# Patient Record
Sex: MALE | Race: WHITE | NOT HISPANIC OR LATINO | Employment: OTHER | URBAN - METROPOLITAN AREA
[De-identification: names, ages, dates, MRNs, and addresses within clinical notes are randomized per-mention and may not be internally consistent; named-entity substitution may affect disease eponyms.]

---

## 2020-01-01 ENCOUNTER — ANESTHESIA EVENT (OUTPATIENT)
Dept: TELEMETRY | Facility: HOSPITAL | Age: 80
End: 2020-01-01

## 2020-01-01 ENCOUNTER — OFFICE VISIT (OUTPATIENT)
Dept: ONCOLOGY | Facility: CLINIC | Age: 80
End: 2020-01-01

## 2020-01-01 ENCOUNTER — DOCUMENTATION (OUTPATIENT)
Dept: ONCOLOGY | Facility: CLINIC | Age: 80
End: 2020-01-01

## 2020-01-01 ENCOUNTER — APPOINTMENT (OUTPATIENT)
Dept: GENERAL RADIOLOGY | Facility: HOSPITAL | Age: 80
End: 2020-01-01

## 2020-01-01 ENCOUNTER — TELEPHONE (OUTPATIENT)
Dept: ONCOLOGY | Facility: CLINIC | Age: 80
End: 2020-01-01

## 2020-01-01 ENCOUNTER — PREP FOR SURGERY (OUTPATIENT)
Dept: OTHER | Facility: HOSPITAL | Age: 80
End: 2020-01-01

## 2020-01-01 ENCOUNTER — TELEPHONE (OUTPATIENT)
Dept: ONCOLOGY | Facility: HOSPITAL | Age: 80
End: 2020-01-01

## 2020-01-01 ENCOUNTER — READMISSION MANAGEMENT (OUTPATIENT)
Dept: CALL CENTER | Facility: HOSPITAL | Age: 80
End: 2020-01-01

## 2020-01-01 ENCOUNTER — HOSPITAL ENCOUNTER (INPATIENT)
Facility: HOSPITAL | Age: 80
LOS: 1 days | Discharge: HOME | End: 2020-10-29
Attending: INTERNAL MEDICINE | Admitting: INTERNAL MEDICINE

## 2020-01-01 ENCOUNTER — APPOINTMENT (OUTPATIENT)
Dept: CT IMAGING | Facility: HOSPITAL | Age: 80
End: 2020-01-01

## 2020-01-01 ENCOUNTER — APPOINTMENT (OUTPATIENT)
Dept: LAB | Facility: HOSPITAL | Age: 80
End: 2020-01-01

## 2020-01-01 ENCOUNTER — ANESTHESIA (OUTPATIENT)
Dept: PERIOP | Facility: HOSPITAL | Age: 80
End: 2020-01-01

## 2020-01-01 ENCOUNTER — TELEPHONE (OUTPATIENT)
Dept: PULMONOLOGY | Facility: HOSPITAL | Age: 80
End: 2020-01-01

## 2020-01-01 ENCOUNTER — HOSPITAL ENCOUNTER (OUTPATIENT)
Dept: CARDIOLOGY | Facility: HOSPITAL | Age: 80
Discharge: HOME | End: 2020-11-11

## 2020-01-01 ENCOUNTER — APPOINTMENT (OUTPATIENT)
Dept: RADIATION ONCOLOGY | Facility: HOSPITAL | Age: 80
End: 2020-01-01

## 2020-01-01 ENCOUNTER — INPATIENT HOSPITAL (OUTPATIENT)
Dept: URBAN - METROPOLITAN AREA HOSPITAL 76 | Facility: HOSPITAL | Age: 80
End: 2020-01-01
Payer: MEDICARE

## 2020-01-01 ENCOUNTER — INPATIENT HOSPITAL (OUTPATIENT)
Dept: URBAN - METROPOLITAN AREA HOSPITAL 84 | Facility: HOSPITAL | Age: 80
End: 2020-01-01
Payer: MEDICARE

## 2020-01-01 ENCOUNTER — CONSULT (OUTPATIENT)
Dept: RADIATION ONCOLOGY | Facility: HOSPITAL | Age: 80
End: 2020-01-01

## 2020-01-01 ENCOUNTER — HOSPITAL ENCOUNTER (OUTPATIENT)
Facility: HOSPITAL | Age: 80
Setting detail: HOSPITAL OUTPATIENT SURGERY
Discharge: HOME | End: 2020-11-16
Attending: THORACIC SURGERY (CARDIOTHORACIC VASCULAR SURGERY) | Admitting: THORACIC SURGERY (CARDIOTHORACIC VASCULAR SURGERY)

## 2020-01-01 ENCOUNTER — APPOINTMENT (OUTPATIENT)
Dept: MRI IMAGING | Facility: HOSPITAL | Age: 80
End: 2020-01-01

## 2020-01-01 ENCOUNTER — APPOINTMENT (OUTPATIENT)
Dept: RESPIRATORY THERAPY | Facility: HOSPITAL | Age: 80
End: 2020-01-01

## 2020-01-01 ENCOUNTER — HOSPITAL ENCOUNTER (OUTPATIENT)
Dept: GENERAL RADIOLOGY | Facility: HOSPITAL | Age: 80
Discharge: HOME | End: 2020-11-16

## 2020-01-01 ENCOUNTER — HOSPITAL ENCOUNTER (OUTPATIENT)
Dept: RADIATION ONCOLOGY | Facility: HOSPITAL | Age: 80
Discharge: HOME | End: 2020-12-11

## 2020-01-01 ENCOUNTER — ANESTHESIA EVENT (OUTPATIENT)
Dept: GASTROENTEROLOGY | Facility: HOSPITAL | Age: 80
End: 2020-01-01

## 2020-01-01 ENCOUNTER — HOSPITAL ENCOUNTER (EMERGENCY)
Facility: HOSPITAL | Age: 80
Discharge: HOME | End: 2020-10-24
Admitting: EMERGENCY MEDICINE

## 2020-01-01 ENCOUNTER — LAB (OUTPATIENT)
Dept: LAB | Facility: HOSPITAL | Age: 80
End: 2020-01-01

## 2020-01-01 ENCOUNTER — ANESTHESIA (OUTPATIENT)
Dept: GASTROENTEROLOGY | Facility: HOSPITAL | Age: 80
End: 2020-01-01

## 2020-01-01 ENCOUNTER — TELEPHONE (OUTPATIENT)
Dept: CARDIOLOGY | Facility: CLINIC | Age: 80
End: 2020-01-01

## 2020-01-01 ENCOUNTER — HOSPITAL ENCOUNTER (OUTPATIENT)
Dept: INFUSION THERAPY | Facility: HOSPITAL | Age: 80
Setting detail: INFUSION SERIES
Discharge: HOME | End: 2020-11-25

## 2020-01-01 ENCOUNTER — DOCUMENTATION (OUTPATIENT)
Dept: ONCOLOGY | Facility: HOSPITAL | Age: 80
End: 2020-01-01

## 2020-01-01 ENCOUNTER — HOSPITAL ENCOUNTER (OUTPATIENT)
Dept: ONCOLOGY | Facility: HOSPITAL | Age: 80
Setting detail: INFUSION SERIES
Discharge: HOME | End: 2020-11-17

## 2020-01-01 ENCOUNTER — HOSPITAL ENCOUNTER (OUTPATIENT)
Dept: PET IMAGING | Facility: HOSPITAL | Age: 80
Discharge: HOME | End: 2020-11-05
Admitting: NURSE PRACTITIONER

## 2020-01-01 ENCOUNTER — HOSPITAL ENCOUNTER (EMERGENCY)
Facility: HOSPITAL | Age: 80
Discharge: HOME | End: 2020-10-17
Attending: EMERGENCY MEDICINE | Admitting: EMERGENCY MEDICINE

## 2020-01-01 ENCOUNTER — ANESTHESIA (OUTPATIENT)
Dept: TELEMETRY | Facility: HOSPITAL | Age: 80
End: 2020-01-01

## 2020-01-01 ENCOUNTER — OFFICE VISIT (OUTPATIENT)
Dept: PULMONOLOGY | Facility: HOSPITAL | Age: 80
End: 2020-01-01

## 2020-01-01 ENCOUNTER — APPOINTMENT (OUTPATIENT)
Dept: INTERVENTIONAL RADIOLOGY/VASCULAR | Facility: HOSPITAL | Age: 80
End: 2020-01-01

## 2020-01-01 ENCOUNTER — OFFICE VISIT (OUTPATIENT)
Dept: SURGERY | Facility: CLINIC | Age: 80
End: 2020-01-01

## 2020-01-01 ENCOUNTER — HOSPITAL ENCOUNTER (OUTPATIENT)
Dept: RADIATION ONCOLOGY | Facility: HOSPITAL | Age: 80
Discharge: HOME | End: 2020-12-09

## 2020-01-01 ENCOUNTER — OFFICE (OUTPATIENT)
Dept: URBAN - METROPOLITAN AREA CLINIC 64 | Facility: CLINIC | Age: 80
End: 2020-01-01

## 2020-01-01 ENCOUNTER — HOSPITAL ENCOUNTER (OUTPATIENT)
Dept: RADIATION ONCOLOGY | Facility: HOSPITAL | Age: 80
Setting detail: RADIATION/ONCOLOGY SERIES
End: 2020-01-01

## 2020-01-01 ENCOUNTER — APPOINTMENT (OUTPATIENT)
Dept: CARDIOLOGY | Facility: HOSPITAL | Age: 80
End: 2020-01-01

## 2020-01-01 ENCOUNTER — HOSPITAL ENCOUNTER (OUTPATIENT)
Dept: ONCOLOGY | Facility: HOSPITAL | Age: 80
Setting detail: INFUSION SERIES
Discharge: HOME | End: 2020-11-09

## 2020-01-01 ENCOUNTER — HOSPITAL ENCOUNTER (OUTPATIENT)
Dept: RADIATION ONCOLOGY | Facility: HOSPITAL | Age: 80
Discharge: HOME | End: 2020-12-10

## 2020-01-01 ENCOUNTER — HOSPITAL ENCOUNTER (INPATIENT)
Facility: HOSPITAL | Age: 80
LOS: 13 days | Discharge: HOME | End: 2020-12-08
Attending: FAMILY MEDICINE | Admitting: STUDENT IN AN ORGANIZED HEALTH CARE EDUCATION/TRAINING PROGRAM

## 2020-01-01 ENCOUNTER — HOSPITAL ENCOUNTER (OUTPATIENT)
Dept: OTHER | Facility: HOSPITAL | Age: 80
Discharge: HOME | End: 2020-11-09

## 2020-01-01 ENCOUNTER — ANESTHESIA EVENT (OUTPATIENT)
Dept: PERIOP | Facility: HOSPITAL | Age: 80
End: 2020-01-01

## 2020-01-01 ENCOUNTER — APPOINTMENT (OUTPATIENT)
Dept: OTHER | Facility: HOSPITAL | Age: 80
End: 2020-01-01

## 2020-01-01 ENCOUNTER — HOSPITAL ENCOUNTER (OUTPATIENT)
Dept: ONCOLOGY | Facility: HOSPITAL | Age: 80
Setting detail: INFUSION SERIES
End: 2020-01-01

## 2020-01-01 ENCOUNTER — HOSPITAL ENCOUNTER (OUTPATIENT)
Dept: RESPIRATORY THERAPY | Facility: HOSPITAL | Age: 80
Discharge: HOME | End: 2020-11-16

## 2020-01-01 VITALS
BODY MASS INDEX: 23.54 KG/M2 | RESPIRATION RATE: 16 BRPM | HEART RATE: 56 BPM | HEIGHT: 74 IN | DIASTOLIC BLOOD PRESSURE: 72 MMHG | SYSTOLIC BLOOD PRESSURE: 129 MMHG | TEMPERATURE: 97.3 F | WEIGHT: 183.4 LBS

## 2020-01-01 VITALS
HEART RATE: 119 BPM | OXYGEN SATURATION: 95 % | HEIGHT: 74 IN | DIASTOLIC BLOOD PRESSURE: 71 MMHG | TEMPERATURE: 97 F | BODY MASS INDEX: 22.07 KG/M2 | RESPIRATION RATE: 22 BRPM | SYSTOLIC BLOOD PRESSURE: 118 MMHG | WEIGHT: 171.96 LBS

## 2020-01-01 VITALS
DIASTOLIC BLOOD PRESSURE: 77 MMHG | HEART RATE: 93 BPM | OXYGEN SATURATION: 98 % | SYSTOLIC BLOOD PRESSURE: 130 MMHG | WEIGHT: 184 LBS | TEMPERATURE: 97.1 F | BODY MASS INDEX: 23.61 KG/M2 | HEIGHT: 74 IN

## 2020-01-01 VITALS
WEIGHT: 195.11 LBS | TEMPERATURE: 97.9 F | RESPIRATION RATE: 16 BRPM | HEIGHT: 72 IN | BODY MASS INDEX: 26.43 KG/M2 | DIASTOLIC BLOOD PRESSURE: 75 MMHG | OXYGEN SATURATION: 95 % | HEART RATE: 113 BPM | SYSTOLIC BLOOD PRESSURE: 126 MMHG

## 2020-01-01 VITALS
HEIGHT: 74 IN | WEIGHT: 181 LBS | HEART RATE: 75 BPM | DIASTOLIC BLOOD PRESSURE: 69 MMHG | BODY MASS INDEX: 23.23 KG/M2 | SYSTOLIC BLOOD PRESSURE: 102 MMHG | TEMPERATURE: 96.9 F

## 2020-01-01 VITALS
HEART RATE: 79 BPM | HEIGHT: 74 IN | BODY MASS INDEX: 24.49 KG/M2 | DIASTOLIC BLOOD PRESSURE: 67 MMHG | TEMPERATURE: 97.1 F | SYSTOLIC BLOOD PRESSURE: 115 MMHG | WEIGHT: 190.8 LBS | RESPIRATION RATE: 16 BRPM

## 2020-01-01 VITALS
TEMPERATURE: 96.6 F | HEIGHT: 74 IN | DIASTOLIC BLOOD PRESSURE: 74 MMHG | HEART RATE: 94 BPM | BODY MASS INDEX: 24.38 KG/M2 | WEIGHT: 190 LBS | RESPIRATION RATE: 16 BRPM | OXYGEN SATURATION: 95 % | SYSTOLIC BLOOD PRESSURE: 124 MMHG

## 2020-01-01 VITALS
BODY MASS INDEX: 25.38 KG/M2 | TEMPERATURE: 98.4 F | OXYGEN SATURATION: 97 % | RESPIRATION RATE: 18 BRPM | WEIGHT: 187.39 LBS | DIASTOLIC BLOOD PRESSURE: 75 MMHG | HEART RATE: 75 BPM | SYSTOLIC BLOOD PRESSURE: 114 MMHG | HEIGHT: 72 IN

## 2020-01-01 VITALS
OXYGEN SATURATION: 95 % | BODY MASS INDEX: 23.61 KG/M2 | TEMPERATURE: 97.9 F | RESPIRATION RATE: 17 BRPM | DIASTOLIC BLOOD PRESSURE: 69 MMHG | SYSTOLIC BLOOD PRESSURE: 116 MMHG | HEART RATE: 62 BPM | WEIGHT: 184 LBS | HEIGHT: 74 IN

## 2020-01-01 VITALS
BODY MASS INDEX: 23.92 KG/M2 | RESPIRATION RATE: 18 BRPM | TEMPERATURE: 98.4 F | SYSTOLIC BLOOD PRESSURE: 127 MMHG | OXYGEN SATURATION: 97 % | WEIGHT: 186.4 LBS | DIASTOLIC BLOOD PRESSURE: 61 MMHG | HEIGHT: 74 IN | HEART RATE: 77 BPM

## 2020-01-01 VITALS
HEART RATE: 93 BPM | SYSTOLIC BLOOD PRESSURE: 135 MMHG | RESPIRATION RATE: 15 BRPM | HEIGHT: 74 IN | TEMPERATURE: 97.6 F | OXYGEN SATURATION: 94 % | BODY MASS INDEX: 25.86 KG/M2 | WEIGHT: 201.5 LBS | DIASTOLIC BLOOD PRESSURE: 64 MMHG

## 2020-01-01 VITALS
TEMPERATURE: 97.9 F | DIASTOLIC BLOOD PRESSURE: 59 MMHG | HEIGHT: 74 IN | SYSTOLIC BLOOD PRESSURE: 109 MMHG | BODY MASS INDEX: 23 KG/M2 | HEART RATE: 98 BPM | WEIGHT: 179.23 LBS | OXYGEN SATURATION: 94 % | RESPIRATION RATE: 16 BRPM

## 2020-01-01 VITALS
HEART RATE: 98 BPM | TEMPERATURE: 98.7 F | RESPIRATION RATE: 20 BRPM | SYSTOLIC BLOOD PRESSURE: 121 MMHG | OXYGEN SATURATION: 99 % | DIASTOLIC BLOOD PRESSURE: 75 MMHG

## 2020-01-01 VITALS
DIASTOLIC BLOOD PRESSURE: 65 MMHG | HEART RATE: 111 BPM | SYSTOLIC BLOOD PRESSURE: 121 MMHG | WEIGHT: 180 LBS | HEIGHT: 74 IN

## 2020-01-01 VITALS — SYSTOLIC BLOOD PRESSURE: 120 MMHG | OXYGEN SATURATION: 92 % | HEART RATE: 106 BPM | DIASTOLIC BLOOD PRESSURE: 57 MMHG

## 2020-01-01 DIAGNOSIS — R63.3 FEEDING DIFFICULTIES: ICD-10-CM

## 2020-01-01 DIAGNOSIS — C34.90 ADENOCARCINOMA, LUNG, UNSPECIFIED LATERALITY (HCC): Primary | Chronic | ICD-10-CM

## 2020-01-01 DIAGNOSIS — C34.32 MALIGNANT NEOPLASM OF LOWER LOBE, LEFT BRONCHUS OR LUNG (HCC): ICD-10-CM

## 2020-01-01 DIAGNOSIS — F17.200 SMOKER: ICD-10-CM

## 2020-01-01 DIAGNOSIS — D69.59 OTHER SECONDARY THROMBOCYTOPENIA: ICD-10-CM

## 2020-01-01 DIAGNOSIS — R09.3 ABNORMAL SPUTUM: ICD-10-CM

## 2020-01-01 DIAGNOSIS — R53.1 WEAKNESS: ICD-10-CM

## 2020-01-01 DIAGNOSIS — R05.9 COUGH: ICD-10-CM

## 2020-01-01 DIAGNOSIS — C34.90 ADENOCARCINOMA, LUNG, UNSPECIFIED LATERALITY (HCC): Primary | ICD-10-CM

## 2020-01-01 DIAGNOSIS — C34.90 MALIGNANT NEOPLASM OF UNSPECIFIED PART OF UNSPECIFIED BRONCH: ICD-10-CM

## 2020-01-01 DIAGNOSIS — J44.9 CHRONIC OBSTRUCTIVE PULMONARY DISEASE, UNSPECIFIED COPD TYPE (HCC): Primary | ICD-10-CM

## 2020-01-01 DIAGNOSIS — R91.8 LUNG MASS: ICD-10-CM

## 2020-01-01 DIAGNOSIS — R63.0 LOSS OF APPETITE: Primary | ICD-10-CM

## 2020-01-01 DIAGNOSIS — R93.89 ABNORMAL CT SCAN: ICD-10-CM

## 2020-01-01 DIAGNOSIS — I48.91 UNSPECIFIED ATRIAL FIBRILLATION: ICD-10-CM

## 2020-01-01 DIAGNOSIS — J44.9 CHRONIC OBSTRUCTIVE PULMONARY DISEASE, UNSPECIFIED COPD TYPE (HCC): ICD-10-CM

## 2020-01-01 DIAGNOSIS — R63.0 ANOREXIA: ICD-10-CM

## 2020-01-01 DIAGNOSIS — K44.9 DIAPHRAGMATIC HERNIA WITHOUT OBSTRUCTION OR GANGRENE: ICD-10-CM

## 2020-01-01 DIAGNOSIS — A41.9 SEPSIS, DUE TO UNSPECIFIED ORGANISM, UNSPECIFIED WHETHER ACUTE ORGAN DYSFUNCTION PRESENT (HCC): ICD-10-CM

## 2020-01-01 DIAGNOSIS — R07.9 CHEST PAIN, UNSPECIFIED TYPE: Primary | ICD-10-CM

## 2020-01-01 DIAGNOSIS — C34.90 ADENOCARCINOMA, LUNG, UNSPECIFIED LATERALITY (HCC): ICD-10-CM

## 2020-01-01 DIAGNOSIS — C34.80 MALIGNANT NEOPLASM OF OVERLAPPING SITES OF UNSPECIFIED BRONC: ICD-10-CM

## 2020-01-01 DIAGNOSIS — Z20.822 COVID-19 RULED OUT: Primary | ICD-10-CM

## 2020-01-01 DIAGNOSIS — I48.91 ATRIAL FIBRILLATION, CONTROLLED (HCC): ICD-10-CM

## 2020-01-01 DIAGNOSIS — I71.40 ABDOMINAL AORTIC ANEURYSM (AAA) WITHOUT RUPTURE (HCC): Primary | ICD-10-CM

## 2020-01-01 DIAGNOSIS — Z45.2 ENCOUNTER FOR FITTING AND ADJUSTMENT OF VASCULAR CATHETER: ICD-10-CM

## 2020-01-01 DIAGNOSIS — J44.9 CHRONIC OBSTRUCTIVE PULMONARY DISEASE, UNSPECIFIED: ICD-10-CM

## 2020-01-01 DIAGNOSIS — R07.89 ATYPICAL CHEST PAIN: ICD-10-CM

## 2020-01-01 DIAGNOSIS — R63.4 ABNORMAL WEIGHT LOSS: ICD-10-CM

## 2020-01-01 DIAGNOSIS — D72.829 LEUKOCYTOSIS, UNSPECIFIED TYPE: ICD-10-CM

## 2020-01-01 DIAGNOSIS — R63.30 FEEDING DIFFICULTIES: ICD-10-CM

## 2020-01-01 DIAGNOSIS — C34.90 ADENOCARCINOMA, LUNG, UNSPECIFIED LATERALITY (HCC): Chronic | ICD-10-CM

## 2020-01-01 DIAGNOSIS — J44.9 CHRONIC OBSTRUCTIVE PULMONARY DISEASE, UNSPECIFIED COPD TYPE (HCC): Chronic | ICD-10-CM

## 2020-01-01 DIAGNOSIS — R09.02 HYPOXIA: ICD-10-CM

## 2020-01-01 DIAGNOSIS — C78.7 SECONDARY MALIGNANT NEOPLASM OF LIVER AND INTRAHEPATIC BILE: ICD-10-CM

## 2020-01-01 DIAGNOSIS — Z72.0 TOBACCO USE: ICD-10-CM

## 2020-01-01 DIAGNOSIS — I49.8 BIGEMINY: ICD-10-CM

## 2020-01-01 DIAGNOSIS — R91.8 LUNG MASS: Primary | ICD-10-CM

## 2020-01-01 DIAGNOSIS — K21.9 GASTROESOPHAGEAL REFLUX DISEASE, UNSPECIFIED WHETHER ESOPHAGITIS PRESENT: ICD-10-CM

## 2020-01-01 DIAGNOSIS — D64.9 ANEMIA, UNSPECIFIED: ICD-10-CM

## 2020-01-01 DIAGNOSIS — D69.6 THROMBOCYTOPENIA (HCC): ICD-10-CM

## 2020-01-01 DIAGNOSIS — C34.32 MALIGNANT NEOPLASM OF LOWER LOBE, LEFT BRONCHUS OR LUNG (HCC): Primary | ICD-10-CM

## 2020-01-01 DIAGNOSIS — D69.6 THROMBOCYTOPENIA (HCC): Primary | ICD-10-CM

## 2020-01-01 DIAGNOSIS — R11.11 VOMITING WITHOUT NAUSEA, INTRACTABILITY OF VOMITING NOT SPECIFIED, UNSPECIFIED VOMITING TYPE: ICD-10-CM

## 2020-01-01 DIAGNOSIS — J18.9 PNEUMONIA OF RIGHT LUNG DUE TO INFECTIOUS ORGANISM, UNSPECIFIED PART OF LUNG: Primary | ICD-10-CM

## 2020-01-01 DIAGNOSIS — R53.1 WEAKNESS: Primary | ICD-10-CM

## 2020-01-01 DIAGNOSIS — R13.10 DYSPHAGIA, UNSPECIFIED: ICD-10-CM

## 2020-01-01 DIAGNOSIS — R16.0 LIVER MASS: ICD-10-CM

## 2020-01-01 DIAGNOSIS — K22.10 ULCER OF ESOPHAGUS WITHOUT BLEEDING: ICD-10-CM

## 2020-01-01 DIAGNOSIS — D61.818 OTHER PANCYTOPENIA: ICD-10-CM

## 2020-01-01 DIAGNOSIS — K59.00 CONSTIPATION, UNSPECIFIED CONSTIPATION TYPE: ICD-10-CM

## 2020-01-01 LAB
ABO GROUP BLD: NORMAL
ALBUMIN SERPL ELPH-MCNC: 1.9 G/DL (ref 2.9–4.4)
ALBUMIN SERPL-MCNC: 2.4 G/DL (ref 3.5–5.2)
ALBUMIN SERPL-MCNC: 2.4 G/DL (ref 3.5–5.2)
ALBUMIN SERPL-MCNC: 2.5 G/DL (ref 3.5–5.2)
ALBUMIN SERPL-MCNC: 2.8 G/DL (ref 3.5–5.2)
ALBUMIN SERPL-MCNC: 2.9 G/DL (ref 3.5–5.2)
ALBUMIN SERPL-MCNC: 2.9 G/DL (ref 3.5–5.2)
ALBUMIN SERPL-MCNC: 3 G/DL (ref 3.5–5.2)
ALBUMIN/GLOB SERPL: 0.5 {RATIO} (ref 0.7–1.7)
ALBUMIN/GLOB SERPL: 0.6 G/DL
ALBUMIN/GLOB SERPL: 0.7 G/DL
ALBUMIN/GLOB SERPL: 0.7 G/DL
ALBUMIN/GLOB SERPL: 0.8 G/DL
ALBUMIN/GLOB SERPL: 0.9 G/DL
ALP SERPL-CCNC: 133 U/L (ref 39–117)
ALP SERPL-CCNC: 76 U/L (ref 39–117)
ALP SERPL-CCNC: 83 U/L (ref 39–117)
ALP SERPL-CCNC: 86 U/L (ref 39–117)
ALP SERPL-CCNC: 86 U/L (ref 39–117)
ALP SERPL-CCNC: 89 U/L (ref 39–117)
ALP SERPL-CCNC: 98 U/L (ref 39–117)
ALPHA1 GLOB SERPL ELPH-MCNC: 0.4 G/DL (ref 0–0.4)
ALPHA2 GLOB SERPL ELPH-MCNC: 0.9 G/DL (ref 0.4–1)
ALT SERPL W P-5'-P-CCNC: 19 U/L (ref 1–41)
ALT SERPL W P-5'-P-CCNC: 20 U/L (ref 1–41)
ALT SERPL W P-5'-P-CCNC: 27 U/L (ref 1–41)
ALT SERPL W P-5'-P-CCNC: 30 U/L (ref 1–41)
ALT SERPL W P-5'-P-CCNC: 40 U/L (ref 1–41)
ANION GAP SERPL CALCULATED.3IONS-SCNC: 10 MMOL/L (ref 5–15)
ANION GAP SERPL CALCULATED.3IONS-SCNC: 11 MMOL/L (ref 5–15)
ANION GAP SERPL CALCULATED.3IONS-SCNC: 12 MMOL/L (ref 5–15)
ANION GAP SERPL CALCULATED.3IONS-SCNC: 12.2 MMOL/L (ref 5–15)
ANION GAP SERPL CALCULATED.3IONS-SCNC: 7 MMOL/L (ref 5–15)
ANION GAP SERPL CALCULATED.3IONS-SCNC: 8 MMOL/L (ref 5–15)
ANION GAP SERPL CALCULATED.3IONS-SCNC: 9 MMOL/L (ref 5–15)
ANISOCYTOSIS BLD QL: ABNORMAL
APPEARANCE FLD: ABNORMAL
APTT PPP: 26 SECONDS (ref 24–31)
APTT PPP: 27.4 SECONDS (ref 24–31)
APTT PPP: 34 SECONDS (ref 24–31)
APTT PPP: 38.3 SECONDS (ref 24–31)
ARTERIAL PATENCY WRIST A: POSITIVE
AST SERPL-CCNC: 12 U/L (ref 1–40)
AST SERPL-CCNC: 13 U/L (ref 1–40)
AST SERPL-CCNC: 14 U/L (ref 1–40)
AST SERPL-CCNC: 17 U/L (ref 1–40)
AST SERPL-CCNC: 19 U/L (ref 1–40)
AST SERPL-CCNC: 27 U/L (ref 1–40)
AST SERPL-CCNC: 8 U/L (ref 1–40)
ATMOSPHERIC PRESS: ABNORMAL MM[HG]
ATMOSPHERIC PRESS: ABNORMAL MM[HG]
B PARAPERT DNA SPEC QL NAA+PROBE: NOT DETECTED
B PARAPERT DNA SPEC QL NAA+PROBE: NOT DETECTED
B PERT DNA SPEC QL NAA+PROBE: NOT DETECTED
B PERT DNA SPEC QL NAA+PROBE: NOT DETECTED
B-GLOBULIN SERPL ELPH-MCNC: 0.9 G/DL (ref 0.7–1.3)
BACTERIA FLD CULT: NORMAL
BACTERIA SPEC AEROBE CULT: NO GROWTH
BACTERIA SPEC AEROBE CULT: NORMAL
BACTERIA SPEC ANAEROBE CULT: NORMAL
BACTERIA SPEC RESP CULT: NORMAL
BACTERIA UR QL AUTO: ABNORMAL /HPF
BACTERIA UR QL AUTO: ABNORMAL /HPF
BASE EXCESS BLDA CALC-SCNC: 0.9 MMOL/L (ref 0–3)
BASE EXCESS BLDV CALC-SCNC: 3 MMOL/L
BASOPHILS # BLD AUTO: 0 10*3/MM3 (ref 0–0.2)
BASOPHILS # BLD AUTO: 0.01 10*3/MM3 (ref 0–0.2)
BASOPHILS # BLD AUTO: 0.01 10*3/MM3 (ref 0–0.2)
BASOPHILS # BLD AUTO: 0.05 10*3/MM3 (ref 0–0.2)
BASOPHILS # BLD AUTO: 0.1 10*3/MM3 (ref 0–0.2)
BASOPHILS # BLD MANUAL: 0.02 10*3/MM3 (ref 0–0.2)
BASOPHILS # BLD MANUAL: 0.08 10*3/MM3 (ref 0–0.2)
BASOPHILS # BLD MANUAL: 0.13 10*3/MM3 (ref 0–0.2)
BASOPHILS NFR BLD AUTO: 0.3 % (ref 0–1.5)
BASOPHILS NFR BLD AUTO: 0.4 % (ref 0–1.5)
BASOPHILS NFR BLD AUTO: 0.5 % (ref 0–1.5)
BASOPHILS NFR BLD AUTO: 0.6 % (ref 0–1.5)
BASOPHILS NFR BLD AUTO: 0.6 % (ref 0–1.5)
BASOPHILS NFR BLD AUTO: 1 % (ref 0–1.5)
BDY SITE: ABNORMAL
BDY SITE: ABNORMAL
BH BB BLOOD EXPIRATION DATE: NORMAL
BH BB BLOOD TYPE BARCODE: 5100
BH BB BLOOD TYPE BARCODE: 5100
BH BB BLOOD TYPE BARCODE: 6200
BH BB BLOOD TYPE BARCODE: 7300
BH BB BLOOD TYPE BARCODE: 8400
BH BB DISPENSE STATUS: NORMAL
BH BB PRODUCT CODE: NORMAL
BH BB UNIT NUMBER: NORMAL
BH CV ECHO MEAS - ACS: 1.7 CM
BH CV ECHO MEAS - AO MAX PG (FULL): -1.2 MMHG
BH CV ECHO MEAS - AO MAX PG: 4 MMHG
BH CV ECHO MEAS - AO MEAN PG (FULL): -0.47 MMHG
BH CV ECHO MEAS - AO MEAN PG: 1.8 MMHG
BH CV ECHO MEAS - AO ROOT AREA (BSA CORRECTED): 1.6
BH CV ECHO MEAS - AO ROOT AREA: 8.3 CM^2
BH CV ECHO MEAS - AO ROOT DIAM: 3.3 CM
BH CV ECHO MEAS - AO V2 MAX: 100.1 CM/SEC
BH CV ECHO MEAS - AO V2 MEAN: 62.4 CM/SEC
BH CV ECHO MEAS - AO V2 VTI: 18.9 CM
BH CV ECHO MEAS - ASC AORTA: 3.6 CM
BH CV ECHO MEAS - AVA(I,A): 3.6 CM^2
BH CV ECHO MEAS - AVA(I,D): 3.6 CM^2
BH CV ECHO MEAS - AVA(V,A): 4.1 CM^2
BH CV ECHO MEAS - AVA(V,D): 4.1 CM^2
BH CV ECHO MEAS - BSA(HAYCOCK): 2.1 M^2
BH CV ECHO MEAS - BSA: 2.1 M^2
BH CV ECHO MEAS - BZI_BMI: 25.4 KILOGRAMS/M^2
BH CV ECHO MEAS - BZI_METRIC_HEIGHT: 182.9 CM
BH CV ECHO MEAS - BZI_METRIC_WEIGHT: 84.8 KG
BH CV ECHO MEAS - EDV(CUBED): 145.9 ML
BH CV ECHO MEAS - EDV(MOD-SP4): 119.6 ML
BH CV ECHO MEAS - EDV(TEICH): 133.2 ML
BH CV ECHO MEAS - EF(CUBED): 57.3 %
BH CV ECHO MEAS - EF(MOD-BP): 60 %
BH CV ECHO MEAS - EF(MOD-SP4): 59.8 %
BH CV ECHO MEAS - EF(TEICH): 48.6 %
BH CV ECHO MEAS - ESV(CUBED): 62.2 ML
BH CV ECHO MEAS - ESV(MOD-SP4): 48 ML
BH CV ECHO MEAS - ESV(TEICH): 68.4 ML
BH CV ECHO MEAS - FS: 24.7 %
BH CV ECHO MEAS - IVS/LVPW: 0.85
BH CV ECHO MEAS - IVSD: 1.3 CM
BH CV ECHO MEAS - LA DIMENSION(2D): 5.8 CM
BH CV ECHO MEAS - LV DIASTOLIC VOL/BSA (35-75): 57.8 ML/M^2
BH CV ECHO MEAS - LV MASS(C)D: 336.7 GRAMS
BH CV ECHO MEAS - LV MASS(C)DI: 162.6 GRAMS/M^2
BH CV ECHO MEAS - LV MAX PG: 5.3 MMHG
BH CV ECHO MEAS - LV MEAN PG: 2.3 MMHG
BH CV ECHO MEAS - LV SYSTOLIC VOL/BSA (12-30): 23.2 ML/M^2
BH CV ECHO MEAS - LV V1 MAX: 114.6 CM/SEC
BH CV ECHO MEAS - LV V1 MEAN: 67.6 CM/SEC
BH CV ECHO MEAS - LV V1 VTI: 18.9 CM
BH CV ECHO MEAS - LVIDD: 5.3 CM
BH CV ECHO MEAS - LVIDS: 4 CM
BH CV ECHO MEAS - LVOT AREA: 3.6 CM^2
BH CV ECHO MEAS - LVOT DIAM: 2.1 CM
BH CV ECHO MEAS - LVPWD: 1.6 CM
BH CV ECHO MEAS - MR MAX PG: 128.9 MMHG
BH CV ECHO MEAS - MR MAX VEL: 567.1 CM/SEC
BH CV ECHO MEAS - MV A MAX VEL: 172 CM/SEC
BH CV ECHO MEAS - MV DEC TIME: 0.17 SEC
BH CV ECHO MEAS - MV E MAX VEL: 217.1 CM/SEC
BH CV ECHO MEAS - MV E/A: 1.3
BH CV ECHO MEAS - MV MAX PG: 24.4 MMHG
BH CV ECHO MEAS - MV MEAN PG: 12.1 MMHG
BH CV ECHO MEAS - MV V2 MAX: 247 CM/SEC
BH CV ECHO MEAS - MV V2 MEAN: 163.4 CM/SEC
BH CV ECHO MEAS - MV V2 VTI: 52.4 CM
BH CV ECHO MEAS - MVA(VTI): 1.3 CM^2
BH CV ECHO MEAS - PA MAX PG (FULL): 1.3 MMHG
BH CV ECHO MEAS - PA MAX PG: 2.6 MMHG
BH CV ECHO MEAS - PA MEAN PG (FULL): 0.81 MMHG
BH CV ECHO MEAS - PA MEAN PG: 1.4 MMHG
BH CV ECHO MEAS - PA V2 MAX: 80.1 CM/SEC
BH CV ECHO MEAS - PA V2 MEAN: 57.3 CM/SEC
BH CV ECHO MEAS - PA V2 VTI: 16.1 CM
BH CV ECHO MEAS - PVA(I,A): 1.9 CM^2
BH CV ECHO MEAS - PVA(I,D): 1.9 CM^2
BH CV ECHO MEAS - PVA(V,A): 2.1 CM^2
BH CV ECHO MEAS - PVA(V,D): 2.1 CM^2
BH CV ECHO MEAS - QP/QS: 0.44
BH CV ECHO MEAS - RAP SYSTOLE: 8 MMHG
BH CV ECHO MEAS - RV MAX PG: 1.2 MMHG
BH CV ECHO MEAS - RV MEAN PG: 0.6 MMHG
BH CV ECHO MEAS - RV V1 MAX: 55.4 CM/SEC
BH CV ECHO MEAS - RV V1 MEAN: 35.4 CM/SEC
BH CV ECHO MEAS - RV V1 VTI: 10 CM
BH CV ECHO MEAS - RVDD: 2.8 CM
BH CV ECHO MEAS - RVOT AREA: 3 CM^2
BH CV ECHO MEAS - RVOT DIAM: 2 CM
BH CV ECHO MEAS - RVSP: 36.1 MMHG
BH CV ECHO MEAS - SI(AO): 75.7 ML/M^2
BH CV ECHO MEAS - SI(CUBED): 40.4 ML/M^2
BH CV ECHO MEAS - SI(LVOT): 32.9 ML/M^2
BH CV ECHO MEAS - SI(MOD-SP4): 34.6 ML/M^2
BH CV ECHO MEAS - SI(TEICH): 31.3 ML/M^2
BH CV ECHO MEAS - SV(AO): 156.8 ML
BH CV ECHO MEAS - SV(CUBED): 83.6 ML
BH CV ECHO MEAS - SV(LVOT): 68 ML
BH CV ECHO MEAS - SV(MOD-SP4): 71.6 ML
BH CV ECHO MEAS - SV(RVOT): 29.8 ML
BH CV ECHO MEAS - SV(TEICH): 64.8 ML
BH CV ECHO MEAS - TR MAX VEL: 255.3 CM/SEC
BILIRUB SERPL-MCNC: 0.4 MG/DL (ref 0–1.2)
BILIRUB SERPL-MCNC: 0.5 MG/DL (ref 0–1.2)
BILIRUB SERPL-MCNC: 0.8 MG/DL (ref 0–1.2)
BILIRUB SERPL-MCNC: 0.8 MG/DL (ref 0–1.2)
BILIRUB SERPL-MCNC: 0.9 MG/DL (ref 0–1.2)
BILIRUB UR QL STRIP: NEGATIVE
BILIRUB UR QL STRIP: NEGATIVE
BUN SERPL-MCNC: 11 MG/DL (ref 8–23)
BUN SERPL-MCNC: 11 MG/DL (ref 8–23)
BUN SERPL-MCNC: 12 MG/DL (ref 8–23)
BUN SERPL-MCNC: 13 MG/DL (ref 8–23)
BUN SERPL-MCNC: 14 MG/DL (ref 8–23)
BUN SERPL-MCNC: 15 MG/DL (ref 8–23)
BUN SERPL-MCNC: 16 MG/DL (ref 8–23)
BUN SERPL-MCNC: 17 MG/DL (ref 8–23)
BUN SERPL-MCNC: 18 MG/DL (ref 8–23)
BUN SERPL-MCNC: 18 MG/DL (ref 8–23)
BUN SERPL-MCNC: 19 MG/DL (ref 8–23)
BUN SERPL-MCNC: 20 MG/DL (ref 8–23)
BUN SERPL-MCNC: 20 MG/DL (ref 8–23)
BUN SERPL-MCNC: 21 MG/DL (ref 8–23)
BUN SERPL-MCNC: 21 MG/DL (ref 8–23)
BUN SERPL-MCNC: 22 MG/DL (ref 8–23)
BUN SERPL-MCNC: 24 MG/DL (ref 8–23)
BUN SERPL-MCNC: 25 MG/DL (ref 8–23)
BUN/CREAT SERPL: 12.6 (ref 7–25)
BUN/CREAT SERPL: 13.4 (ref 7–25)
BUN/CREAT SERPL: 17.1 (ref 7–25)
BUN/CREAT SERPL: 20 (ref 7–25)
BUN/CREAT SERPL: 21.9 (ref 7–25)
BUN/CREAT SERPL: 22.5 (ref 7–25)
BUN/CREAT SERPL: 23.2 (ref 7–25)
BUN/CREAT SERPL: 25.4 (ref 7–25)
BUN/CREAT SERPL: 25.8 (ref 7–25)
BUN/CREAT SERPL: 26.9 (ref 7–25)
BUN/CREAT SERPL: 27.3 (ref 7–25)
BUN/CREAT SERPL: 27.5 (ref 7–25)
BUN/CREAT SERPL: 28.3 (ref 7–25)
BUN/CREAT SERPL: 28.4 (ref 7–25)
BUN/CREAT SERPL: 29.9 (ref 7–25)
BUN/CREAT SERPL: 32.8 (ref 7–25)
BUN/CREAT SERPL: 32.8 (ref 7–25)
BUN/CREAT SERPL: 34.5 (ref 7–25)
BUN/CREAT SERPL: 39.3 (ref 7–25)
BUN/CREAT SERPL: 46.3 (ref 7–25)
BURR CELLS BLD QL SMEAR: ABNORMAL
C PNEUM DNA NPH QL NAA+NON-PROBE: NOT DETECTED
C PNEUM DNA NPH QL NAA+NON-PROBE: NOT DETECTED
CALCIUM SPEC-SCNC: 7.4 MG/DL (ref 8.6–10.5)
CALCIUM SPEC-SCNC: 7.5 MG/DL (ref 8.6–10.5)
CALCIUM SPEC-SCNC: 7.6 MG/DL (ref 8.6–10.5)
CALCIUM SPEC-SCNC: 7.7 MG/DL (ref 8.6–10.5)
CALCIUM SPEC-SCNC: 7.7 MG/DL (ref 8.6–10.5)
CALCIUM SPEC-SCNC: 7.8 MG/DL (ref 8.6–10.5)
CALCIUM SPEC-SCNC: 7.9 MG/DL (ref 8.6–10.5)
CALCIUM SPEC-SCNC: 7.9 MG/DL (ref 8.6–10.5)
CALCIUM SPEC-SCNC: 8 MG/DL (ref 8.6–10.5)
CALCIUM SPEC-SCNC: 8 MG/DL (ref 8.6–10.5)
CALCIUM SPEC-SCNC: 8.1 MG/DL (ref 8.6–10.5)
CALCIUM SPEC-SCNC: 8.1 MG/DL (ref 8.6–10.5)
CALCIUM SPEC-SCNC: 8.2 MG/DL (ref 8.6–10.5)
CALCIUM SPEC-SCNC: 8.2 MG/DL (ref 8.6–10.5)
CALCIUM SPEC-SCNC: 8.3 MG/DL (ref 8.6–10.5)
CALCIUM SPEC-SCNC: 8.5 MG/DL (ref 8.6–10.5)
CALCIUM SPEC-SCNC: 8.7 MG/DL (ref 8.6–10.5)
CALCIUM SPEC-SCNC: 8.9 MG/DL (ref 8.6–10.5)
CALCIUM SPEC-SCNC: 8.9 MG/DL (ref 8.6–10.5)
CALCIUM SPEC-SCNC: 9 MG/DL (ref 8.6–10.5)
CHLORIDE SERPL-SCNC: 101 MMOL/L (ref 98–107)
CHLORIDE SERPL-SCNC: 101 MMOL/L (ref 98–107)
CHLORIDE SERPL-SCNC: 102 MMOL/L (ref 98–107)
CHLORIDE SERPL-SCNC: 102 MMOL/L (ref 98–107)
CHLORIDE SERPL-SCNC: 103 MMOL/L (ref 98–107)
CHLORIDE SERPL-SCNC: 103 MMOL/L (ref 98–107)
CHLORIDE SERPL-SCNC: 107 MMOL/L (ref 98–107)
CHLORIDE SERPL-SCNC: 107 MMOL/L (ref 98–107)
CHLORIDE SERPL-SCNC: 91 MMOL/L (ref 98–107)
CHLORIDE SERPL-SCNC: 93 MMOL/L (ref 98–107)
CHLORIDE SERPL-SCNC: 94 MMOL/L (ref 98–107)
CHLORIDE SERPL-SCNC: 96 MMOL/L (ref 98–107)
CHLORIDE SERPL-SCNC: 97 MMOL/L (ref 98–107)
CHLORIDE SERPL-SCNC: 97 MMOL/L (ref 98–107)
CHLORIDE SERPL-SCNC: 98 MMOL/L (ref 98–107)
CHLORIDE SERPL-SCNC: 99 MMOL/L (ref 98–107)
CLARITY UR: ABNORMAL
CLARITY UR: CLEAR
CMV DNA SPEC QL NAA+PROBE: NEGATIVE
CO2 BLDA-SCNC: 24.1 MMOL/L (ref 22–29)
CO2 BLDA-SCNC: 28.3 MMOL/L (ref 22–29)
CO2 SERPL-SCNC: 21 MMOL/L (ref 22–29)
CO2 SERPL-SCNC: 24 MMOL/L (ref 22–29)
CO2 SERPL-SCNC: 25.8 MMOL/L (ref 22–29)
CO2 SERPL-SCNC: 26 MMOL/L (ref 22–29)
CO2 SERPL-SCNC: 27 MMOL/L (ref 22–29)
CO2 SERPL-SCNC: 28 MMOL/L (ref 22–29)
CO2 SERPL-SCNC: 28 MMOL/L (ref 22–29)
CO2 SERPL-SCNC: 29 MMOL/L (ref 22–29)
CO2 SERPL-SCNC: 29 MMOL/L (ref 22–29)
CO2 SERPL-SCNC: 30 MMOL/L (ref 22–29)
CO2 SERPL-SCNC: 31 MMOL/L (ref 22–29)
CO2 SERPL-SCNC: 32 MMOL/L (ref 22–29)
CO2 SERPL-SCNC: 33 MMOL/L (ref 22–29)
COLOR FLD: YELLOW
COLOR UR: ABNORMAL
COLOR UR: ABNORMAL
CREAT BLDA-MCNC: 0.7 MG/DL (ref 0.6–1.3)
CREAT SERPL-MCNC: 0.46 MG/DL (ref 0.76–1.27)
CREAT SERPL-MCNC: 0.51 MG/DL (ref 0.76–1.27)
CREAT SERPL-MCNC: 0.54 MG/DL (ref 0.76–1.27)
CREAT SERPL-MCNC: 0.58 MG/DL (ref 0.76–1.27)
CREAT SERPL-MCNC: 0.59 MG/DL (ref 0.76–1.27)
CREAT SERPL-MCNC: 0.61 MG/DL (ref 0.76–1.27)
CREAT SERPL-MCNC: 0.64 MG/DL (ref 0.76–1.27)
CREAT SERPL-MCNC: 0.64 MG/DL (ref 0.76–1.27)
CREAT SERPL-MCNC: 0.66 MG/DL (ref 0.76–1.27)
CREAT SERPL-MCNC: 0.67 MG/DL (ref 0.76–1.27)
CREAT SERPL-MCNC: 0.69 MG/DL (ref 0.76–1.27)
CREAT SERPL-MCNC: 0.7 MG/DL (ref 0.76–1.27)
CREAT SERPL-MCNC: 0.7 MG/DL (ref 0.76–1.27)
CREAT SERPL-MCNC: 0.77 MG/DL (ref 0.76–1.27)
CREAT SERPL-MCNC: 0.8 MG/DL (ref 0.76–1.27)
CREAT SERPL-MCNC: 0.82 MG/DL (ref 0.76–1.27)
CREAT SERPL-MCNC: 0.87 MG/DL (ref 0.76–1.27)
CRP SERPL-MCNC: 7.32 MG/DL (ref 0–0.5)
D-LACTATE SERPL-SCNC: 1.2 MMOL/L (ref 0.5–2)
D-LACTATE SERPL-SCNC: 2.3 MMOL/L (ref 0.5–2)
DACRYOCYTES BLD QL SMEAR: ABNORMAL
DEPRECATED RDW RBC AUTO: 42.4 FL (ref 37–54)
DEPRECATED RDW RBC AUTO: 42.5 FL (ref 37–54)
DEPRECATED RDW RBC AUTO: 42.9 FL (ref 37–54)
DEPRECATED RDW RBC AUTO: 43.3 FL (ref 37–54)
DEPRECATED RDW RBC AUTO: 43.3 FL (ref 37–54)
DEPRECATED RDW RBC AUTO: 43.8 FL (ref 37–54)
DEPRECATED RDW RBC AUTO: 44.2 FL (ref 37–54)
DEPRECATED RDW RBC AUTO: 45.1 FL (ref 37–54)
DEPRECATED RDW RBC AUTO: 45.1 FL (ref 37–54)
DEPRECATED RDW RBC AUTO: 45.5 FL (ref 37–54)
DEPRECATED RDW RBC AUTO: 45.5 FL (ref 37–54)
DEPRECATED RDW RBC AUTO: 45.9 FL (ref 37–54)
DEPRECATED RDW RBC AUTO: 45.9 FL (ref 37–54)
DEPRECATED RDW RBC AUTO: 46.3 FL (ref 37–54)
DEPRECATED RDW RBC AUTO: 46.4 FL (ref 37–54)
DEPRECATED RDW RBC AUTO: 46.8 FL (ref 37–54)
DEPRECATED RDW RBC AUTO: 48.2 FL (ref 37–54)
DOHLE BODIES: PRESENT
DOHLE BODIES: PRESENT
EOSINOPHIL # BLD AUTO: 0.55 10*3/MM3 (ref 0–0.4)
EOSINOPHIL # BLD AUTO: 0.83 10*3/MM3 (ref 0–0.4)
EOSINOPHIL # BLD AUTO: 2.6 10*3/MM3 (ref 0–0.4)
EOSINOPHIL # BLD AUTO: 2.79 10*3/MM3 (ref 0–0.4)
EOSINOPHIL # BLD AUTO: 2.8 10*3/MM3 (ref 0–0.4)
EOSINOPHIL # BLD AUTO: 2.8 10*3/MM3 (ref 0–0.4)
EOSINOPHIL # BLD AUTO: 3 10*3/MM3 (ref 0–0.4)
EOSINOPHIL # BLD MANUAL: 0.02 10*3/MM3 (ref 0–0.4)
EOSINOPHIL # BLD MANUAL: 0.08 10*3/MM3 (ref 0–0.4)
EOSINOPHIL # BLD MANUAL: 0.11 10*3/MM3 (ref 0–0.4)
EOSINOPHIL # BLD MANUAL: 0.16 10*3/MM3 (ref 0–0.4)
EOSINOPHIL # BLD MANUAL: 0.16 10*3/MM3 (ref 0–0.4)
EOSINOPHIL # BLD MANUAL: 0.17 10*3/MM3 (ref 0–0.4)
EOSINOPHIL # BLD MANUAL: 0.21 10*3/MM3 (ref 0–0.4)
EOSINOPHIL # BLD MANUAL: 0.27 10*3/MM3 (ref 0–0.4)
EOSINOPHIL # BLD MANUAL: 0.29 10*3/MM3 (ref 0–0.4)
EOSINOPHIL # BLD MANUAL: 0.42 10*3/MM3 (ref 0–0.4)
EOSINOPHIL # BLD MANUAL: 0.5 10*3/MM3 (ref 0–0.4)
EOSINOPHIL # BLD MANUAL: 0.51 10*3/MM3 (ref 0–0.4)
EOSINOPHIL # BLD MANUAL: 0.67 10*3/MM3 (ref 0–0.4)
EOSINOPHIL # BLD MANUAL: 0.74 10*3/MM3 (ref 0–0.4)
EOSINOPHIL # BLD MANUAL: 0.9 10*3/MM3 (ref 0–0.4)
EOSINOPHIL # BLD MANUAL: 3.63 10*3/MM3 (ref 0–0.4)
EOSINOPHIL NFR BLD AUTO: 16.9 % (ref 0.3–6.2)
EOSINOPHIL NFR BLD AUTO: 17.3 % (ref 0.3–6.2)
EOSINOPHIL NFR BLD AUTO: 17.7 % (ref 0.3–6.2)
EOSINOPHIL NFR BLD AUTO: 17.8 % (ref 0.3–6.2)
EOSINOPHIL NFR BLD AUTO: 19.8 % (ref 0.3–6.2)
EOSINOPHIL NFR BLD AUTO: 24.2 % (ref 0.3–6.2)
EOSINOPHIL NFR BLD AUTO: 27 % (ref 0.3–6.2)
EOSINOPHIL NFR BLD MANUAL: 1 % (ref 0.3–6.2)
EOSINOPHIL NFR BLD MANUAL: 1 % (ref 0.3–6.2)
EOSINOPHIL NFR BLD MANUAL: 15 % (ref 0.3–6.2)
EOSINOPHIL NFR BLD MANUAL: 15 % (ref 0.3–6.2)
EOSINOPHIL NFR BLD MANUAL: 16 % (ref 0.3–6.2)
EOSINOPHIL NFR BLD MANUAL: 18 % (ref 0.3–6.2)
EOSINOPHIL NFR BLD MANUAL: 2 % (ref 0.3–6.2)
EOSINOPHIL NFR BLD MANUAL: 22 % (ref 0.3–6.2)
EOSINOPHIL NFR BLD MANUAL: 22 % (ref 0.3–6.2)
EOSINOPHIL NFR BLD MANUAL: 3 % (ref 0.3–6.2)
EOSINOPHIL NFR BLD MANUAL: 4 % (ref 0.3–6.2)
EOSINOPHIL NFR BLD MANUAL: 4 % (ref 0.3–6.2)
EOSINOPHIL NFR BLD MANUAL: 5 % (ref 0.3–6.2)
EOSINOPHIL NFR BLD MANUAL: 5 % (ref 0.3–6.2)
EOSINOPHIL NFR BLD MANUAL: 5.1 % (ref 0.3–6.2)
EOSINOPHIL NFR BLD MANUAL: 8 % (ref 0.3–6.2)
ERYTHROCYTE [DISTWIDTH] IN BLOOD BY AUTOMATED COUNT: 12.9 % (ref 12.3–15.4)
ERYTHROCYTE [DISTWIDTH] IN BLOOD BY AUTOMATED COUNT: 13.9 % (ref 12.3–15.4)
ERYTHROCYTE [DISTWIDTH] IN BLOOD BY AUTOMATED COUNT: 14 % (ref 12.3–15.4)
ERYTHROCYTE [DISTWIDTH] IN BLOOD BY AUTOMATED COUNT: 14.1 % (ref 12.3–15.4)
ERYTHROCYTE [DISTWIDTH] IN BLOOD BY AUTOMATED COUNT: 14.2 % (ref 12.3–15.4)
ERYTHROCYTE [DISTWIDTH] IN BLOOD BY AUTOMATED COUNT: 14.3 % (ref 12.3–15.4)
ERYTHROCYTE [DISTWIDTH] IN BLOOD BY AUTOMATED COUNT: 14.3 % (ref 12.3–15.4)
ERYTHROCYTE [DISTWIDTH] IN BLOOD BY AUTOMATED COUNT: 14.4 % (ref 12.3–15.4)
ERYTHROCYTE [DISTWIDTH] IN BLOOD BY AUTOMATED COUNT: 14.5 % (ref 12.3–15.4)
ERYTHROCYTE [DISTWIDTH] IN BLOOD BY AUTOMATED COUNT: 14.5 % (ref 12.3–15.4)
ERYTHROCYTE [DISTWIDTH] IN BLOOD BY AUTOMATED COUNT: 14.6 % (ref 12.3–15.4)
ERYTHROCYTE [DISTWIDTH] IN BLOOD BY AUTOMATED COUNT: 14.7 % (ref 12.3–15.4)
FERRITIN SERPL-MCNC: 334.9 NG/ML (ref 30–400)
FERRITIN SERPL-MCNC: 3676 NG/ML (ref 30–400)
FIBRINOGEN PPP-MCNC: 554 MG/DL (ref 210–450)
FLUAV H1 2009 PAND RNA NPH QL NAA+PROBE: NOT DETECTED
FLUAV H1 2009 PAND RNA NPH QL NAA+PROBE: NOT DETECTED
FLUAV H1 HA GENE NPH QL NAA+PROBE: NOT DETECTED
FLUAV H1 HA GENE NPH QL NAA+PROBE: NOT DETECTED
FLUAV H3 RNA NPH QL NAA+PROBE: NOT DETECTED
FLUAV H3 RNA NPH QL NAA+PROBE: NOT DETECTED
FLUAV SUBTYP SPEC NAA+PROBE: NOT DETECTED
FLUAV SUBTYP SPEC NAA+PROBE: NOT DETECTED
FLUBV RNA ISLT QL NAA+PROBE: NOT DETECTED
FLUBV RNA ISLT QL NAA+PROBE: NOT DETECTED
FOLATE SERPL-MCNC: 10.8 NG/ML (ref 4.78–24.2)
FOLATE SERPL-MCNC: 18.8 NG/ML (ref 4.78–24.2)
FUNGUS WND CULT: NORMAL
GAMMA GLOB SERPL ELPH-MCNC: 1.4 G/DL (ref 0.4–1.8)
GFR SERPL CREATININE-BSD FRML MDRD: 109 ML/MIN/1.73
GFR SERPL CREATININE-BSD FRML MDRD: 109 ML/MIN/1.73
GFR SERPL CREATININE-BSD FRML MDRD: 111 ML/MIN/1.73
GFR SERPL CREATININE-BSD FRML MDRD: 114 ML/MIN/1.73
GFR SERPL CREATININE-BSD FRML MDRD: 117 ML/MIN/1.73
GFR SERPL CREATININE-BSD FRML MDRD: 121 ML/MIN/1.73
GFR SERPL CREATININE-BSD FRML MDRD: 121 ML/MIN/1.73
GFR SERPL CREATININE-BSD FRML MDRD: 128 ML/MIN/1.73
GFR SERPL CREATININE-BSD FRML MDRD: 133 ML/MIN/1.73
GFR SERPL CREATININE-BSD FRML MDRD: 135 ML/MIN/1.73
GFR SERPL CREATININE-BSD FRML MDRD: 147 ML/MIN/1.73
GFR SERPL CREATININE-BSD FRML MDRD: 85 ML/MIN/1.73
GFR SERPL CREATININE-BSD FRML MDRD: 91 ML/MIN/1.73
GFR SERPL CREATININE-BSD FRML MDRD: 93 ML/MIN/1.73
GFR SERPL CREATININE-BSD FRML MDRD: 97 ML/MIN/1.73
GFR SERPL CREATININE-BSD FRML MDRD: >150 ML/MIN/1.73
GFR SERPL CREATININE-BSD FRML MDRD: >150 ML/MIN/1.73
GLOBULIN SER CALC-MCNC: 3.5 G/DL (ref 2.2–3.9)
GLOBULIN UR ELPH-MCNC: 2.8 GM/DL
GLOBULIN UR ELPH-MCNC: 2.9 GM/DL
GLOBULIN UR ELPH-MCNC: 3 GM/DL
GLOBULIN UR ELPH-MCNC: 3.9 GM/DL
GLOBULIN UR ELPH-MCNC: 4 GM/DL
GLOBULIN UR ELPH-MCNC: 4.1 GM/DL
GLOBULIN UR ELPH-MCNC: 4.5 GM/DL
GLUCOSE BLDC GLUCOMTR-MCNC: 111 MG/DL (ref 70–105)
GLUCOSE BLDC GLUCOMTR-MCNC: 120 MG/DL (ref 70–105)
GLUCOSE BLDC GLUCOMTR-MCNC: 137 MG/DL (ref 70–105)
GLUCOSE BLDC GLUCOMTR-MCNC: 141 MG/DL (ref 70–105)
GLUCOSE BLDC GLUCOMTR-MCNC: 147 MG/DL (ref 70–105)
GLUCOSE BLDC GLUCOMTR-MCNC: 164 MG/DL (ref 70–105)
GLUCOSE BLDC GLUCOMTR-MCNC: 166 MG/DL (ref 70–105)
GLUCOSE BLDC GLUCOMTR-MCNC: 166 MG/DL (ref 70–105)
GLUCOSE BLDC GLUCOMTR-MCNC: 178 MG/DL (ref 70–105)
GLUCOSE BLDC GLUCOMTR-MCNC: 179 MG/DL (ref 70–105)
GLUCOSE BLDC GLUCOMTR-MCNC: 186 MG/DL (ref 70–105)
GLUCOSE BLDC GLUCOMTR-MCNC: 187 MG/DL (ref 70–105)
GLUCOSE BLDC GLUCOMTR-MCNC: 188 MG/DL (ref 70–105)
GLUCOSE BLDC GLUCOMTR-MCNC: 195 MG/DL (ref 70–105)
GLUCOSE BLDC GLUCOMTR-MCNC: 214 MG/DL (ref 70–105)
GLUCOSE BLDC GLUCOMTR-MCNC: 215 MG/DL (ref 70–105)
GLUCOSE BLDC GLUCOMTR-MCNC: 240 MG/DL (ref 70–105)
GLUCOSE SERPL-MCNC: 103 MG/DL (ref 65–99)
GLUCOSE SERPL-MCNC: 103 MG/DL (ref 65–99)
GLUCOSE SERPL-MCNC: 111 MG/DL (ref 65–99)
GLUCOSE SERPL-MCNC: 113 MG/DL (ref 65–99)
GLUCOSE SERPL-MCNC: 119 MG/DL (ref 65–99)
GLUCOSE SERPL-MCNC: 139 MG/DL (ref 65–99)
GLUCOSE SERPL-MCNC: 140 MG/DL (ref 65–99)
GLUCOSE SERPL-MCNC: 143 MG/DL (ref 65–99)
GLUCOSE SERPL-MCNC: 144 MG/DL (ref 65–99)
GLUCOSE SERPL-MCNC: 154 MG/DL (ref 65–99)
GLUCOSE SERPL-MCNC: 155 MG/DL (ref 65–99)
GLUCOSE SERPL-MCNC: 155 MG/DL (ref 65–99)
GLUCOSE SERPL-MCNC: 158 MG/DL (ref 65–99)
GLUCOSE SERPL-MCNC: 194 MG/DL (ref 65–99)
GLUCOSE SERPL-MCNC: 202 MG/DL (ref 65–99)
GLUCOSE SERPL-MCNC: 244 MG/DL (ref 65–99)
GLUCOSE SERPL-MCNC: 70 MG/DL (ref 65–99)
GLUCOSE SERPL-MCNC: 77 MG/DL (ref 65–99)
GLUCOSE SERPL-MCNC: 83 MG/DL (ref 65–99)
GLUCOSE SERPL-MCNC: 89 MG/DL (ref 65–99)
GLUCOSE UR STRIP-MCNC: NEGATIVE MG/DL
GLUCOSE UR STRIP-MCNC: NEGATIVE MG/DL
GRAM STN SPEC: NORMAL
HADV DNA SPEC NAA+PROBE: NOT DETECTED
HADV DNA SPEC NAA+PROBE: NOT DETECTED
HAPTOGLOB SERPL-MCNC: 285 MG/DL (ref 30–200)
HAPTOGLOB SERPL-MCNC: 343 MG/DL (ref 30–200)
HCO3 BLDA-SCNC: 23.1 MMOL/L (ref 21–28)
HCO3 BLDV-SCNC: 27.1 MMOL/L
HCOV 229E RNA SPEC QL NAA+PROBE: NOT DETECTED
HCOV 229E RNA SPEC QL NAA+PROBE: NOT DETECTED
HCOV HKU1 RNA SPEC QL NAA+PROBE: NOT DETECTED
HCOV HKU1 RNA SPEC QL NAA+PROBE: NOT DETECTED
HCOV NL63 RNA SPEC QL NAA+PROBE: NOT DETECTED
HCOV NL63 RNA SPEC QL NAA+PROBE: NOT DETECTED
HCOV OC43 RNA SPEC QL NAA+PROBE: NOT DETECTED
HCOV OC43 RNA SPEC QL NAA+PROBE: NOT DETECTED
HCT VFR BLD AUTO: 29.8 % (ref 37.5–51)
HCT VFR BLD AUTO: 29.8 % (ref 37.5–51)
HCT VFR BLD AUTO: 30.1 % (ref 37.5–51)
HCT VFR BLD AUTO: 31.7 % (ref 37.5–51)
HCT VFR BLD AUTO: 32.3 % (ref 37.5–51)
HCT VFR BLD AUTO: 32.7 % (ref 37.5–51)
HCT VFR BLD AUTO: 34 % (ref 37.5–51)
HCT VFR BLD AUTO: 34.3 % (ref 37.5–51)
HCT VFR BLD AUTO: 36 % (ref 37.5–51)
HCT VFR BLD AUTO: 36.3 % (ref 37.5–51)
HCT VFR BLD AUTO: 36.3 % (ref 37.5–51)
HCT VFR BLD AUTO: 36.9 % (ref 37.5–51)
HCT VFR BLD AUTO: 37.5 % (ref 37.5–51)
HCT VFR BLD AUTO: 37.6 % (ref 37.5–51)
HCT VFR BLD AUTO: 37.7 % (ref 37.5–51)
HCT VFR BLD AUTO: 40.6 % (ref 37.5–51)
HCT VFR BLD AUTO: 41.8 % (ref 37.5–51)
HCT VFR BLD AUTO: 42 % (ref 37.5–51)
HCT VFR BLD AUTO: 42.1 % (ref 37.5–51)
HCT VFR BLD AUTO: 42.3 % (ref 37.5–51)
HCT VFR BLD AUTO: 42.4 % (ref 37.5–51)
HCT VFR BLD AUTO: 42.7 % (ref 37.5–51)
HCT VFR BLD AUTO: 42.9 % (ref 37.5–51)
HCT VFR BLD AUTO: 43.8 % (ref 37.5–51)
HCT VFR BLD AUTO: 44.3 % (ref 37.5–51)
HCT VFR BLD AUTO: 45.4 % (ref 37.5–51)
HCT VFR BLD AUTO: 45.9 % (ref 37.5–51)
HEMODILUTION: NO
HGB BLD-MCNC: 10 G/DL (ref 13–17.7)
HGB BLD-MCNC: 10.6 G/DL (ref 13–17.7)
HGB BLD-MCNC: 10.6 G/DL (ref 13–17.7)
HGB BLD-MCNC: 10.7 G/DL (ref 13–17.7)
HGB BLD-MCNC: 11.1 G/DL (ref 13–17.7)
HGB BLD-MCNC: 11.5 G/DL (ref 13–17.7)
HGB BLD-MCNC: 11.7 G/DL (ref 13–17.7)
HGB BLD-MCNC: 11.9 G/DL (ref 13–17.7)
HGB BLD-MCNC: 11.9 G/DL (ref 13–17.7)
HGB BLD-MCNC: 12.2 G/DL (ref 13–17.7)
HGB BLD-MCNC: 12.2 G/DL (ref 13–17.7)
HGB BLD-MCNC: 12.3 G/DL (ref 13–17.7)
HGB BLD-MCNC: 12.4 G/DL (ref 13–17.7)
HGB BLD-MCNC: 13.6 G/DL (ref 13–17.7)
HGB BLD-MCNC: 13.7 G/DL (ref 13–17.7)
HGB BLD-MCNC: 13.7 G/DL (ref 13–17.7)
HGB BLD-MCNC: 14.1 G/DL (ref 13–17.7)
HGB BLD-MCNC: 14.2 G/DL (ref 13–17.7)
HGB BLD-MCNC: 14.3 G/DL (ref 13–17.7)
HGB BLD-MCNC: 14.4 G/DL (ref 13–17.7)
HGB BLD-MCNC: 14.4 G/DL (ref 13–17.7)
HGB BLD-MCNC: 15 G/DL (ref 13–17.7)
HGB BLD-MCNC: 15.2 G/DL (ref 13–17.7)
HGB BLD-MCNC: 9.8 G/DL (ref 13–17.7)
HGB BLD-MCNC: 9.8 G/DL (ref 13–17.7)
HGB UR QL STRIP.AUTO: ABNORMAL
HGB UR QL STRIP.AUTO: NEGATIVE
HMPV RNA NPH QL NAA+NON-PROBE: NOT DETECTED
HMPV RNA NPH QL NAA+NON-PROBE: NOT DETECTED
HOLD SPECIMEN: NORMAL
HPIV1 RNA SPEC QL NAA+PROBE: NOT DETECTED
HPIV1 RNA SPEC QL NAA+PROBE: NOT DETECTED
HPIV2 RNA SPEC QL NAA+PROBE: NOT DETECTED
HPIV2 RNA SPEC QL NAA+PROBE: NOT DETECTED
HPIV3 RNA NPH QL NAA+PROBE: NOT DETECTED
HPIV3 RNA NPH QL NAA+PROBE: NOT DETECTED
HPIV4 P GENE NPH QL NAA+PROBE: NOT DETECTED
HPIV4 P GENE NPH QL NAA+PROBE: NOT DETECTED
HYALINE CASTS UR QL AUTO: ABNORMAL /LPF
HYALINE CASTS UR QL AUTO: ABNORMAL /LPF
INHALED O2 CONCENTRATION: 21 %
INHALED O2 CONCENTRATION: 50 %
INR PPP: 1.15 (ref 0.93–1.1)
INR PPP: 1.22 (ref 0.93–1.1)
INR PPP: 1.29 (ref 0.93–1.1)
INR PPP: 1.35 (ref 0.93–1.1)
INR PPP: 2.38 (ref 0.93–1.1)
IRON 24H UR-MRATE: 23 MCG/DL (ref 59–158)
IRON 24H UR-MRATE: 48 MCG/DL (ref 59–158)
IRON SATN MFR SERPL: 10 % (ref 20–50)
IRON SATN MFR SERPL: 30 % (ref 20–50)
KETONES UR QL STRIP: ABNORMAL
KETONES UR QL STRIP: ABNORMAL
L PNEUMO1 AG UR QL IA: NEGATIVE
LAB AP CARIS, ADDENDUM: NORMAL
LAB AP CASE REPORT: NORMAL
LAB AP DIAGNOSIS COMMENT: NORMAL
LABORATORY COMMENT REPORT: ABNORMAL
LACTATE HOLD SPECIMEN: NORMAL
LARGE PLATELETS: ABNORMAL
LARGE PLATELETS: ABNORMAL
LDH FLD-CCNC: 190 U/L
LDH SERPL-CCNC: 249 U/L (ref 135–225)
LDH SERPL-CCNC: 396 U/L (ref 135–225)
LEUKOCYTE ESTERASE UR QL STRIP.AUTO: ABNORMAL
LEUKOCYTE ESTERASE UR QL STRIP.AUTO: NEGATIVE
LIPASE SERPL-CCNC: 8 U/L (ref 13–60)
LYMPHOCYTES # BLD AUTO: 0.7 10*3/MM3 (ref 0.7–3.1)
LYMPHOCYTES # BLD AUTO: 0.9 10*3/MM3 (ref 0.7–3.1)
LYMPHOCYTES # BLD AUTO: 0.97 10*3/MM3 (ref 0.7–3.1)
LYMPHOCYTES # BLD AUTO: 1.1 10*3/MM3 (ref 0.7–3.1)
LYMPHOCYTES # BLD AUTO: 1.4 10*3/MM3 (ref 0.7–3.1)
LYMPHOCYTES # BLD MANUAL: 0.29 10*3/MM3 (ref 0.7–3.1)
LYMPHOCYTES # BLD MANUAL: 0.38 10*3/MM3 (ref 0.7–3.1)
LYMPHOCYTES # BLD MANUAL: 0.46 10*3/MM3 (ref 0.7–3.1)
LYMPHOCYTES # BLD MANUAL: 0.46 10*3/MM3 (ref 0.7–3.1)
LYMPHOCYTES # BLD MANUAL: 0.49 10*3/MM3 (ref 0.7–3.1)
LYMPHOCYTES # BLD MANUAL: 0.63 10*3/MM3 (ref 0.7–3.1)
LYMPHOCYTES # BLD MANUAL: 0.64 10*3/MM3 (ref 0.7–3.1)
LYMPHOCYTES # BLD MANUAL: 0.78 10*3/MM3 (ref 0.7–3.1)
LYMPHOCYTES # BLD MANUAL: 0.88 10*3/MM3 (ref 0.7–3.1)
LYMPHOCYTES # BLD MANUAL: 0.88 10*3/MM3 (ref 0.7–3.1)
LYMPHOCYTES # BLD MANUAL: 0.89 10*3/MM3 (ref 0.7–3.1)
LYMPHOCYTES # BLD MANUAL: 0.98 10*3/MM3 (ref 0.7–3.1)
LYMPHOCYTES # BLD MANUAL: 1.25 10*3/MM3 (ref 0.7–3.1)
LYMPHOCYTES # BLD MANUAL: 1.58 10*3/MM3 (ref 0.7–3.1)
LYMPHOCYTES # BLD MANUAL: 1.62 10*3/MM3 (ref 0.7–3.1)
LYMPHOCYTES # BLD MANUAL: 2 10*3/MM3 (ref 0.7–3.1)
LYMPHOCYTES # BLD MANUAL: 2.04 10*3/MM3 (ref 0.7–3.1)
LYMPHOCYTES # BLD MANUAL: 2.31 10*3/MM3 (ref 0.7–3.1)
LYMPHOCYTES NFR BLD AUTO: 29.3 % (ref 19.6–45.3)
LYMPHOCYTES NFR BLD AUTO: 30.8 % (ref 19.6–45.3)
LYMPHOCYTES NFR BLD AUTO: 6.2 % (ref 19.6–45.3)
LYMPHOCYTES NFR BLD AUTO: 6.7 % (ref 19.6–45.3)
LYMPHOCYTES NFR BLD AUTO: 6.7 % (ref 19.6–45.3)
LYMPHOCYTES NFR BLD AUTO: 7.6 % (ref 19.6–45.3)
LYMPHOCYTES NFR BLD AUTO: 9.5 % (ref 19.6–45.3)
LYMPHOCYTES NFR BLD MANUAL: 1.1 % (ref 5–12)
LYMPHOCYTES NFR BLD MANUAL: 11 % (ref 19.6–45.3)
LYMPHOCYTES NFR BLD MANUAL: 11 % (ref 19.6–45.3)
LYMPHOCYTES NFR BLD MANUAL: 13 % (ref 19.6–45.3)
LYMPHOCYTES NFR BLD MANUAL: 13 % (ref 5–12)
LYMPHOCYTES NFR BLD MANUAL: 14 % (ref 19.6–45.3)
LYMPHOCYTES NFR BLD MANUAL: 16 % (ref 19.6–45.3)
LYMPHOCYTES NFR BLD MANUAL: 16 % (ref 19.6–45.3)
LYMPHOCYTES NFR BLD MANUAL: 16 % (ref 5–12)
LYMPHOCYTES NFR BLD MANUAL: 19.8 % (ref 19.6–45.3)
LYMPHOCYTES NFR BLD MANUAL: 2 % (ref 5–12)
LYMPHOCYTES NFR BLD MANUAL: 2 % (ref 5–12)
LYMPHOCYTES NFR BLD MANUAL: 20 % (ref 19.6–45.3)
LYMPHOCYTES NFR BLD MANUAL: 21 % (ref 19.6–45.3)
LYMPHOCYTES NFR BLD MANUAL: 3 % (ref 19.6–45.3)
LYMPHOCYTES NFR BLD MANUAL: 3 % (ref 5–12)
LYMPHOCYTES NFR BLD MANUAL: 30 % (ref 19.6–45.3)
LYMPHOCYTES NFR BLD MANUAL: 30.3 % (ref 19.6–45.3)
LYMPHOCYTES NFR BLD MANUAL: 33 % (ref 19.6–45.3)
LYMPHOCYTES NFR BLD MANUAL: 4 % (ref 5–12)
LYMPHOCYTES NFR BLD MANUAL: 4 % (ref 5–12)
LYMPHOCYTES NFR BLD MANUAL: 49 % (ref 19.6–45.3)
LYMPHOCYTES NFR BLD MANUAL: 49 % (ref 19.6–45.3)
LYMPHOCYTES NFR BLD MANUAL: 5 % (ref 5–12)
LYMPHOCYTES NFR BLD MANUAL: 6 % (ref 5–12)
LYMPHOCYTES NFR BLD MANUAL: 6 % (ref 5–12)
LYMPHOCYTES NFR BLD MANUAL: 7 % (ref 19.6–45.3)
LYMPHOCYTES NFR BLD MANUAL: 7 % (ref 19.6–45.3)
LYMPHOCYTES NFR BLD MANUAL: 7 % (ref 5–12)
LYMPHOCYTES NFR BLD MANUAL: 8 % (ref 19.6–45.3)
LYMPHOCYTES NFR BLD MANUAL: 8 % (ref 5–12)
LYMPHOCYTES NFR BLD MANUAL: 8 % (ref 5–12)
LYMPHOCYTES NFR BLD MANUAL: 8.1 % (ref 5–12)
LYMPHOCYTES NFR BLD MANUAL: 9 % (ref 5–12)
LYMPHOCYTES NFR FLD MANUAL: 28 %
Lab: NORMAL
M PNEUMO IGG SER IA-ACNC: NOT DETECTED
M PNEUMO IGG SER IA-ACNC: NOT DETECTED
M PROTEIN SERPL ELPH-MCNC: ABNORMAL G/DL
MAGNESIUM SERPL-MCNC: 1.5 MG/DL (ref 1.6–2.4)
MAGNESIUM SERPL-MCNC: 1.6 MG/DL (ref 1.6–2.4)
MAGNESIUM SERPL-MCNC: 1.7 MG/DL (ref 1.6–2.4)
MAGNESIUM SERPL-MCNC: 1.7 MG/DL (ref 1.6–2.4)
MAGNESIUM SERPL-MCNC: 1.8 MG/DL (ref 1.6–2.4)
MAGNESIUM SERPL-MCNC: 1.9 MG/DL (ref 1.6–2.4)
MAGNESIUM SERPL-MCNC: 2 MG/DL (ref 1.6–2.4)
MAGNESIUM SERPL-MCNC: 2.1 MG/DL (ref 1.6–2.4)
MAGNESIUM SERPL-MCNC: 2.3 MG/DL (ref 1.6–2.4)
MAGNESIUM SERPL-MCNC: 2.9 MG/DL (ref 1.6–2.4)
MCH RBC QN AUTO: 28.4 PG (ref 26.6–33)
MCH RBC QN AUTO: 28.4 PG (ref 26.6–33)
MCH RBC QN AUTO: 28.6 PG (ref 26.6–33)
MCH RBC QN AUTO: 28.6 PG (ref 26.6–33)
MCH RBC QN AUTO: 28.7 PG (ref 26.6–33)
MCH RBC QN AUTO: 28.7 PG (ref 26.6–33)
MCH RBC QN AUTO: 28.9 PG (ref 26.6–33)
MCH RBC QN AUTO: 29 PG (ref 26.6–33)
MCH RBC QN AUTO: 29.1 PG (ref 26.6–33)
MCH RBC QN AUTO: 29.3 PG (ref 26.6–33)
MCH RBC QN AUTO: 29.4 PG (ref 26.6–33)
MCH RBC QN AUTO: 29.4 PG (ref 26.6–33)
MCH RBC QN AUTO: 29.5 PG (ref 26.6–33)
MCH RBC QN AUTO: 29.6 PG (ref 26.6–33)
MCH RBC QN AUTO: 29.7 PG (ref 26.6–33)
MCH RBC QN AUTO: 29.8 PG (ref 26.6–33)
MCH RBC QN AUTO: 29.9 PG (ref 26.6–33)
MCH RBC QN AUTO: 30 PG (ref 26.6–33)
MCH RBC QN AUTO: 30.1 PG (ref 26.6–33)
MCH RBC QN AUTO: 30.2 PG (ref 26.6–33)
MCH RBC QN AUTO: 30.4 PG (ref 26.6–33)
MCH RBC QN AUTO: 30.4 PG (ref 26.6–33)
MCHC RBC AUTO-ENTMCNC: 32.1 G/DL (ref 31.5–35.7)
MCHC RBC AUTO-ENTMCNC: 32.3 G/DL (ref 31.5–35.7)
MCHC RBC AUTO-ENTMCNC: 32.3 G/DL (ref 31.5–35.7)
MCHC RBC AUTO-ENTMCNC: 32.4 G/DL (ref 31.5–35.7)
MCHC RBC AUTO-ENTMCNC: 32.4 G/DL (ref 31.5–35.7)
MCHC RBC AUTO-ENTMCNC: 32.5 G/DL (ref 31.5–35.7)
MCHC RBC AUTO-ENTMCNC: 32.5 G/DL (ref 31.5–35.7)
MCHC RBC AUTO-ENTMCNC: 32.6 G/DL (ref 31.5–35.7)
MCHC RBC AUTO-ENTMCNC: 32.7 G/DL (ref 31.5–35.7)
MCHC RBC AUTO-ENTMCNC: 32.7 G/DL (ref 31.5–35.7)
MCHC RBC AUTO-ENTMCNC: 32.8 G/DL (ref 31.5–35.7)
MCHC RBC AUTO-ENTMCNC: 32.9 G/DL (ref 31.5–35.7)
MCHC RBC AUTO-ENTMCNC: 33 G/DL (ref 31.5–35.7)
MCHC RBC AUTO-ENTMCNC: 33 G/DL (ref 31.5–35.7)
MCHC RBC AUTO-ENTMCNC: 33.1 G/DL (ref 31.5–35.7)
MCHC RBC AUTO-ENTMCNC: 33.3 G/DL (ref 31.5–35.7)
MCHC RBC AUTO-ENTMCNC: 33.3 G/DL (ref 31.5–35.7)
MCHC RBC AUTO-ENTMCNC: 33.4 G/DL (ref 31.5–35.7)
MCHC RBC AUTO-ENTMCNC: 33.6 G/DL (ref 31.5–35.7)
MCV RBC AUTO: 86.5 FL (ref 79–97)
MCV RBC AUTO: 86.7 FL (ref 79–97)
MCV RBC AUTO: 86.8 FL (ref 79–97)
MCV RBC AUTO: 86.8 FL (ref 79–97)
MCV RBC AUTO: 87 FL (ref 79–97)
MCV RBC AUTO: 87.6 FL (ref 79–97)
MCV RBC AUTO: 87.6 FL (ref 79–97)
MCV RBC AUTO: 87.7 FL (ref 79–97)
MCV RBC AUTO: 87.9 FL (ref 79–97)
MCV RBC AUTO: 88.4 FL (ref 79–97)
MCV RBC AUTO: 88.9 FL (ref 79–97)
MCV RBC AUTO: 89.3 FL (ref 79–97)
MCV RBC AUTO: 89.5 FL (ref 79–97)
MCV RBC AUTO: 89.6 FL (ref 79–97)
MCV RBC AUTO: 89.8 FL (ref 79–97)
MCV RBC AUTO: 90.1 FL (ref 79–97)
MCV RBC AUTO: 90.4 FL (ref 79–97)
MCV RBC AUTO: 90.5 FL (ref 79–97)
MCV RBC AUTO: 90.8 FL (ref 79–97)
MCV RBC AUTO: 90.8 FL (ref 79–97)
MCV RBC AUTO: 90.9 FL (ref 79–97)
MCV RBC AUTO: 91.9 FL (ref 79–97)
MCV RBC AUTO: 92.4 FL (ref 79–97)
MCV RBC AUTO: 92.4 FL (ref 79–97)
MESOTHL CELL NFR FLD MANUAL: 15 %
METAMYELOCYTES NFR BLD MANUAL: 1 % (ref 0–0)
METAMYELOCYTES NFR BLD MANUAL: 2 % (ref 0–0)
METAMYELOCYTES NFR BLD MANUAL: 4 % (ref 0–0)
METAMYELOCYTES NFR BLD MANUAL: 6 % (ref 0–0)
METAMYELOCYTES NFR BLD MANUAL: 8.8 % (ref 0–0)
METAMYELOCYTES NFR BLD MANUAL: 9 % (ref 0–0)
METHOD: ABNORMAL
MODALITY: ABNORMAL
MODALITY: ABNORMAL
MONOCYTES # BLD AUTO: 0.02 10*3/MM3 (ref 0.1–0.9)
MONOCYTES # BLD AUTO: 0.03 10*3/MM3 (ref 0.1–0.9)
MONOCYTES # BLD AUTO: 0.03 10*3/MM3 (ref 0.1–0.9)
MONOCYTES # BLD AUTO: 0.04 10*3/MM3 (ref 0.1–0.9)
MONOCYTES # BLD AUTO: 0.04 10*3/MM3 (ref 0.1–0.9)
MONOCYTES # BLD AUTO: 0.05 10*3/MM3 (ref 0.1–0.9)
MONOCYTES # BLD AUTO: 0.07 10*3/MM3 (ref 0.1–0.9)
MONOCYTES # BLD AUTO: 0.11 10*3/MM3 (ref 0.1–0.9)
MONOCYTES # BLD AUTO: 0.17 10*3/MM3 (ref 0.1–0.9)
MONOCYTES # BLD AUTO: 0.17 10*3/MM3 (ref 0.1–0.9)
MONOCYTES # BLD AUTO: 0.32 10*3/MM3 (ref 0.1–0.9)
MONOCYTES # BLD AUTO: 0.67 10*3/MM3 (ref 0.1–0.9)
MONOCYTES # BLD AUTO: 0.72 10*3/MM3 (ref 0.1–0.9)
MONOCYTES # BLD AUTO: 0.99 10*3/MM3 (ref 0.1–0.9)
MONOCYTES # BLD AUTO: 1 10*3/MM3 (ref 0.1–0.9)
MONOCYTES # BLD AUTO: 1.1 10*3/MM3 (ref 0.1–0.9)
MONOCYTES # BLD AUTO: 1.26 10*3/MM3 (ref 0.1–0.9)
MONOCYTES # BLD AUTO: 1.26 10*3/MM3 (ref 0.1–0.9)
MONOCYTES # BLD AUTO: 1.3 10*3/MM3 (ref 0.1–0.9)
MONOCYTES # BLD AUTO: 1.3 10*3/MM3 (ref 0.1–0.9)
MONOCYTES # BLD AUTO: 1.32 10*3/MM3 (ref 0.1–0.9)
MONOCYTES # BLD AUTO: 1.34 10*3/MM3 (ref 0.1–0.9)
MONOCYTES # BLD AUTO: 1.38 10*3/MM3 (ref 0.1–0.9)
MONOCYTES # BLD AUTO: 1.46 10*3/MM3 (ref 0.1–0.9)
MONOCYTES # BLD AUTO: 1.63 10*3/MM3 (ref 0.1–0.9)
MONOCYTES NFR BLD AUTO: 0.7 % (ref 5–12)
MONOCYTES NFR BLD AUTO: 1.8 % (ref 5–12)
MONOCYTES NFR BLD AUTO: 6.5 % (ref 5–12)
MONOCYTES NFR BLD AUTO: 7 % (ref 5–12)
MONOCYTES NFR BLD AUTO: 7.9 % (ref 5–12)
MONOCYTES NFR BLD AUTO: 8.2 % (ref 5–12)
MONOCYTES NFR BLD AUTO: 8.6 % (ref 5–12)
MONOCYTES NFR FLD: 39 %
MYCOBACTERIUM SPEC CULT: NORMAL
MYELOCYTES NFR BLD MANUAL: 1 % (ref 0–0)
MYELOCYTES NFR BLD MANUAL: 2 % (ref 0–0)
MYELOCYTES NFR BLD MANUAL: 3 % (ref 0–0)
MYELOCYTES NFR BLD MANUAL: 6 % (ref 0–0)
NEUTROPHILS # BLD AUTO: 0.48 10*3/MM3 (ref 1.7–7)
NEUTROPHILS # BLD AUTO: 0.54 10*3/MM3 (ref 1.7–7)
NEUTROPHILS # BLD AUTO: 0.66 10*3/MM3 (ref 1.7–7)
NEUTROPHILS # BLD AUTO: 1.09 10*3/MM3 (ref 1.7–7)
NEUTROPHILS # BLD AUTO: 1.09 10*3/MM3 (ref 1.7–7)
NEUTROPHILS # BLD AUTO: 1.15 10*3/MM3 (ref 1.7–7)
NEUTROPHILS # BLD AUTO: 1.25 10*3/MM3 (ref 1.7–7)
NEUTROPHILS # BLD AUTO: 1.42 10*3/MM3 (ref 1.7–7)
NEUTROPHILS # BLD AUTO: 1.43 10*3/MM3 (ref 1.7–7)
NEUTROPHILS # BLD AUTO: 10.44 10*3/MM3 (ref 1.7–7)
NEUTROPHILS # BLD AUTO: 11.46 10*3/MM3 (ref 1.7–7)
NEUTROPHILS # BLD AUTO: 13.93 10*3/MM3 (ref 1.7–7)
NEUTROPHILS # BLD AUTO: 14.32 10*3/MM3 (ref 1.7–7)
NEUTROPHILS # BLD AUTO: 16.35 10*3/MM3 (ref 1.7–7)
NEUTROPHILS # BLD AUTO: 6.64 10*3/MM3 (ref 1.7–7)
NEUTROPHILS # BLD AUTO: 8 10*3/MM3 (ref 1.7–7)
NEUTROPHILS # BLD AUTO: 9.52 10*3/MM3 (ref 1.7–7)
NEUTROPHILS # BLD AUTO: 9.57 10*3/MM3 (ref 1.7–7)
NEUTROPHILS NFR BLD AUTO: 0.97 10*3/MM3 (ref 1.7–7)
NEUTROPHILS NFR BLD AUTO: 1.31 10*3/MM3 (ref 1.7–7)
NEUTROPHILS NFR BLD AUTO: 10.51 10*3/MM3 (ref 1.7–7)
NEUTROPHILS NFR BLD AUTO: 10.8 10*3/MM3 (ref 1.7–7)
NEUTROPHILS NFR BLD AUTO: 11.4 10*3/MM3 (ref 1.7–7)
NEUTROPHILS NFR BLD AUTO: 42.7 % (ref 42.7–76)
NEUTROPHILS NFR BLD AUTO: 42.8 % (ref 42.7–76)
NEUTROPHILS NFR BLD AUTO: 63.1 % (ref 42.7–76)
NEUTROPHILS NFR BLD AUTO: 67.1 % (ref 42.7–76)
NEUTROPHILS NFR BLD AUTO: 67.2 % (ref 42.7–76)
NEUTROPHILS NFR BLD AUTO: 67.7 % (ref 42.7–76)
NEUTROPHILS NFR BLD AUTO: 68.2 % (ref 42.7–76)
NEUTROPHILS NFR BLD AUTO: 9.6 10*3/MM3 (ref 1.7–7)
NEUTROPHILS NFR BLD AUTO: 9.8 10*3/MM3 (ref 1.7–7)
NEUTROPHILS NFR BLD MANUAL: 21 % (ref 42.7–76)
NEUTROPHILS NFR BLD MANUAL: 30 % (ref 42.7–76)
NEUTROPHILS NFR BLD MANUAL: 30 % (ref 42.7–76)
NEUTROPHILS NFR BLD MANUAL: 41 % (ref 42.7–76)
NEUTROPHILS NFR BLD MANUAL: 45 % (ref 42.7–76)
NEUTROPHILS NFR BLD MANUAL: 46 % (ref 42.7–76)
NEUTROPHILS NFR BLD MANUAL: 46.2 % (ref 42.7–76)
NEUTROPHILS NFR BLD MANUAL: 48 % (ref 42.7–76)
NEUTROPHILS NFR BLD MANUAL: 50 % (ref 42.7–76)
NEUTROPHILS NFR BLD MANUAL: 50.5 % (ref 42.7–76)
NEUTROPHILS NFR BLD MANUAL: 55 % (ref 42.7–76)
NEUTROPHILS NFR BLD MANUAL: 58 % (ref 42.7–76)
NEUTROPHILS NFR BLD MANUAL: 59 % (ref 42.7–76)
NEUTROPHILS NFR BLD MANUAL: 66 % (ref 42.7–76)
NEUTROPHILS NFR BLD MANUAL: 72 % (ref 42.7–76)
NEUTROPHILS NFR BLD MANUAL: 75 % (ref 42.7–76)
NEUTROPHILS NFR FLD MANUAL: 18 %
NEUTS BAND NFR BLD MANUAL: 1 % (ref 0–5)
NEUTS BAND NFR BLD MANUAL: 1.1 % (ref 0–5)
NEUTS BAND NFR BLD MANUAL: 10 % (ref 0–5)
NEUTS BAND NFR BLD MANUAL: 13 % (ref 0–5)
NEUTS BAND NFR BLD MANUAL: 13 % (ref 0–5)
NEUTS BAND NFR BLD MANUAL: 2 % (ref 0–5)
NEUTS BAND NFR BLD MANUAL: 20 % (ref 0–5)
NEUTS BAND NFR BLD MANUAL: 21 % (ref 0–5)
NEUTS BAND NFR BLD MANUAL: 23 % (ref 0–5)
NEUTS BAND NFR BLD MANUAL: 23 % (ref 0–5)
NEUTS BAND NFR BLD MANUAL: 26 % (ref 0–5)
NEUTS BAND NFR BLD MANUAL: 3 % (ref 0–5)
NEUTS BAND NFR BLD MANUAL: 4 % (ref 0–5)
NEUTS BAND NFR BLD MANUAL: 4 % (ref 0–5)
NEUTS BAND NFR BLD MANUAL: 8 % (ref 0–5)
NEUTS BAND NFR BLD MANUAL: 8 % (ref 0–5)
NEUTS VAC BLD QL SMEAR: ABNORMAL
NIGHT BLUE STAIN TISS: NORMAL
NITRITE UR QL STRIP: NEGATIVE
NITRITE UR QL STRIP: NEGATIVE
NRBC BLD AUTO-RTO: 0 /100 WBC (ref 0–0.2)
NRBC SPEC MANUAL: 1 /100 WBC (ref 0–0.2)
NRBC SPEC MANUAL: 2 /100 WBC (ref 0–0.2)
NT-PROBNP SERPL-MCNC: 1184 PG/ML (ref 0–1800)
NT-PROBNP SERPL-MCNC: 1299 PG/ML (ref 0–1800)
NT-PROBNP SERPL-MCNC: 1534 PG/ML (ref 0–1800)
NT-PROBNP SERPL-MCNC: 1779 PG/ML (ref 0–1800)
NT-PROBNP SERPL-MCNC: 1829 PG/ML (ref 0–1800)
NT-PROBNP SERPL-MCNC: 2557 PG/ML (ref 0–1800)
NT-PROBNP SERPL-MCNC: 2797 PG/ML (ref 0–1800)
NT-PROBNP SERPL-MCNC: 5178 PG/ML (ref 0–1800)
NT-PROBNP SERPL-MCNC: 962.5 PG/ML (ref 0–1800)
NUC CELL # FLD: 755 /MM3
OTHER CELLS %: 1.1 % (ref 0–0)
P JIROVECII DNA # SPEC NAA+PROBE: NEGATIVE {COPIES}/ML
PATH REPORT.FINAL DX SPEC: NORMAL
PATH REPORT.GROSS SPEC: NORMAL
PATHOLOGY REVIEW: YES
PCO2 BLDA: 30 MM HG (ref 35–48)
PCO2 BLDV: 39.1 MM HG (ref 42–51)
PH BLDA: 7.5 PH UNITS (ref 7.35–7.45)
PH BLDV: 7.45 PH UNITS (ref 7.32–7.43)
PH FLD: 7 [PH] (ref 6.5–7.5)
PH UR STRIP.AUTO: 5.5 [PH] (ref 5–8)
PH UR STRIP.AUTO: 6 [PH] (ref 5–8)
PHOSPHATE SERPL-MCNC: 1.6 MG/DL (ref 2.5–4.5)
PHOSPHATE SERPL-MCNC: 1.6 MG/DL (ref 2.5–4.5)
PHOSPHATE SERPL-MCNC: 1.7 MG/DL (ref 2.5–4.5)
PHOSPHATE SERPL-MCNC: 1.8 MG/DL (ref 2.5–4.5)
PHOSPHATE SERPL-MCNC: 2 MG/DL (ref 2.5–4.5)
PHOSPHATE SERPL-MCNC: 2.1 MG/DL (ref 2.5–4.5)
PHOSPHATE SERPL-MCNC: 2.3 MG/DL (ref 2.5–4.5)
PHOSPHATE SERPL-MCNC: 2.5 MG/DL (ref 2.5–4.5)
PHOSPHATE SERPL-MCNC: 2.5 MG/DL (ref 2.5–4.5)
PHOSPHATE SERPL-MCNC: 3.6 MG/DL (ref 2.5–4.5)
PLAT MORPH BLD: NORMAL
PLATELET # BLD AUTO: 10 10*3/MM3 (ref 140–450)
PLATELET # BLD AUTO: 11 10*3/MM3 (ref 140–450)
PLATELET # BLD AUTO: 116 10*3/MM3 (ref 140–450)
PLATELET # BLD AUTO: 129 10*3/MM3 (ref 140–450)
PLATELET # BLD AUTO: 13 10*3/MM3 (ref 140–450)
PLATELET # BLD AUTO: 13 10*3/MM3 (ref 140–450)
PLATELET # BLD AUTO: 136 10*3/MM3 (ref 140–450)
PLATELET # BLD AUTO: 149 10*3/MM3 (ref 140–450)
PLATELET # BLD AUTO: 156 10*3/MM3 (ref 140–450)
PLATELET # BLD AUTO: 158 10*3/MM3 (ref 140–450)
PLATELET # BLD AUTO: 16 10*3/MM3 (ref 140–450)
PLATELET # BLD AUTO: 167 10*3/MM3 (ref 140–450)
PLATELET # BLD AUTO: 169 10*3/MM3 (ref 140–450)
PLATELET # BLD AUTO: 176 10*3/MM3 (ref 140–450)
PLATELET # BLD AUTO: 19 10*3/MM3 (ref 140–450)
PLATELET # BLD AUTO: 23 10*3/MM3 (ref 140–450)
PLATELET # BLD AUTO: 26 10*3/MM3 (ref 140–450)
PLATELET # BLD AUTO: 28 10*3/MM3 (ref 140–450)
PLATELET # BLD AUTO: 29 10*3/MM3 (ref 140–450)
PLATELET # BLD AUTO: 3 10*3/MM3 (ref 140–450)
PLATELET # BLD AUTO: 30 10*3/MM3 (ref 140–450)
PLATELET # BLD AUTO: 32 10*3/MM3 (ref 140–450)
PLATELET # BLD AUTO: 33 10*3/MM3 (ref 140–450)
PLATELET # BLD AUTO: 35 10*3/MM3 (ref 140–450)
PLATELET # BLD AUTO: 36 10*3/MM3 (ref 140–450)
PLATELET # BLD AUTO: 6 10*3/MM3 (ref 140–450)
PLATELET # BLD AUTO: 6 10*3/MM3 (ref 140–450)
PLATELET # BLD AUTO: 66 10*3/MM3 (ref 140–450)
PLATELET # BLD AUTO: 9 10*3/MM3 (ref 140–450)
PMV BLD AUTO: 10.1 FL (ref 6–12)
PMV BLD AUTO: 10.4 FL (ref 6–12)
PMV BLD AUTO: 10.8 FL (ref 6–12)
PMV BLD AUTO: 10.9 FL (ref 6–12)
PMV BLD AUTO: 11.9 FL (ref 6–12)
PMV BLD AUTO: 13.2 FL (ref 6–12)
PMV BLD AUTO: 7.3 FL (ref 6–12)
PMV BLD AUTO: 7.6 FL (ref 6–12)
PMV BLD AUTO: 7.6 FL (ref 6–12)
PMV BLD AUTO: 7.7 FL (ref 6–12)
PMV BLD AUTO: 7.9 FL (ref 6–12)
PMV BLD AUTO: 8.1 FL (ref 6–12)
PMV BLD AUTO: 8.3 FL (ref 6–12)
PMV BLD AUTO: 8.4 FL (ref 6–12)
PMV BLD AUTO: 8.6 FL (ref 6–12)
PMV BLD AUTO: 8.7 FL (ref 6–12)
PMV BLD AUTO: 8.8 FL (ref 6–12)
PMV BLD AUTO: 8.9 FL (ref 6–12)
PMV BLD AUTO: 9 FL (ref 6–12)
PMV BLD AUTO: 9.1 FL (ref 6–12)
PMV BLD AUTO: 9.1 FL (ref 6–12)
PMV BLD AUTO: 9.2 FL (ref 6–12)
PMV BLD AUTO: 9.3 FL (ref 6–12)
PMV BLD AUTO: 9.3 FL (ref 6–12)
PMV BLD AUTO: 9.4 FL (ref 6–12)
PMV BLD AUTO: 9.8 FL (ref 6–12)
PMV BLD AUTO: 9.9 FL (ref 6–12)
PO2 BLDA: 57 MM HG (ref 83–108)
PO2 BLDV: 26.8 MM HG
POIKILOCYTOSIS BLD QL SMEAR: ABNORMAL
POTASSIUM SERPL-SCNC: 3.1 MMOL/L (ref 3.5–5.2)
POTASSIUM SERPL-SCNC: 3.2 MMOL/L (ref 3.5–5.2)
POTASSIUM SERPL-SCNC: 3.3 MMOL/L (ref 3.5–5.2)
POTASSIUM SERPL-SCNC: 3.4 MMOL/L (ref 3.5–5.2)
POTASSIUM SERPL-SCNC: 3.5 MMOL/L (ref 3.5–5.2)
POTASSIUM SERPL-SCNC: 3.6 MMOL/L (ref 3.5–5.2)
POTASSIUM SERPL-SCNC: 3.6 MMOL/L (ref 3.5–5.2)
POTASSIUM SERPL-SCNC: 3.7 MMOL/L (ref 3.5–5.2)
POTASSIUM SERPL-SCNC: 3.7 MMOL/L (ref 3.5–5.2)
POTASSIUM SERPL-SCNC: 3.8 MMOL/L (ref 3.5–5.2)
POTASSIUM SERPL-SCNC: 3.8 MMOL/L (ref 3.5–5.2)
POTASSIUM SERPL-SCNC: 3.9 MMOL/L (ref 3.5–5.2)
POTASSIUM SERPL-SCNC: 4 MMOL/L (ref 3.5–5.2)
POTASSIUM SERPL-SCNC: 4.1 MMOL/L (ref 3.5–5.2)
POTASSIUM SERPL-SCNC: 4.3 MMOL/L (ref 3.5–5.2)
PROCALCITONIN SERPL-MCNC: 0.09 NG/ML (ref 0–0.25)
PROCALCITONIN SERPL-MCNC: 0.21 NG/ML (ref 0–0.25)
PROCALCITONIN SERPL-MCNC: 0.23 NG/ML (ref 0–0.25)
PROT FLD-MCNC: 2.4 G/DL
PROT SERPL-MCNC: 5.3 G/DL (ref 6–8.5)
PROT SERPL-MCNC: 5.3 G/DL (ref 6–8.5)
PROT SERPL-MCNC: 5.4 G/DL (ref 6–8.5)
PROT SERPL-MCNC: 5.4 G/DL (ref 6–8.5)
PROT SERPL-MCNC: 6.7 G/DL (ref 6–8.5)
PROT SERPL-MCNC: 7 G/DL (ref 6–8.5)
PROT SERPL-MCNC: 7 G/DL (ref 6–8.5)
PROT SERPL-MCNC: 7.4 G/DL (ref 6–8.5)
PROT UR QL STRIP: ABNORMAL
PROT UR QL STRIP: ABNORMAL
PROTHROMBIN TIME: 12.6 SECONDS (ref 9.6–11.7)
PROTHROMBIN TIME: 13.3 SECONDS (ref 9.6–11.7)
PROTHROMBIN TIME: 14 SECONDS (ref 9.6–11.7)
PROTHROMBIN TIME: 14.6 SECONDS (ref 9.6–11.7)
PROTHROMBIN TIME: 25.1 SECONDS (ref 9.6–11.7)
QT INTERVAL: 355 MS
QT INTERVAL: 372 MS
QT INTERVAL: 391 MS
QT INTERVAL: 399 MS
QT INTERVAL: 408 MS
RBC # BLD AUTO: 3.42 10*6/MM3 (ref 4.14–5.8)
RBC # BLD AUTO: 3.43 10*6/MM3 (ref 4.14–5.8)
RBC # BLD AUTO: 3.47 10*6/MM3 (ref 4.14–5.8)
RBC # BLD AUTO: 3.61 10*6/MM3 (ref 4.14–5.8)
RBC # BLD AUTO: 3.72 10*6/MM3 (ref 4.14–5.8)
RBC # BLD AUTO: 3.73 10*6/MM3 (ref 4.14–5.8)
RBC # BLD AUTO: 3.92 10*6/MM3 (ref 4.14–5.8)
RBC # BLD AUTO: 3.93 10*6/MM3 (ref 4.14–5.8)
RBC # BLD AUTO: 4.03 10*6/MM3 (ref 4.14–5.8)
RBC # BLD AUTO: 4.1 10*6/MM3 (ref 4.14–5.8)
RBC # BLD AUTO: 4.11 10*6/MM3 (ref 4.14–5.8)
RBC # BLD AUTO: 4.15 10*6/MM3 (ref 4.14–5.8)
RBC # BLD AUTO: 4.19 10*6/MM3 (ref 4.14–5.8)
RBC # BLD AUTO: 4.25 10*6/MM3 (ref 4.14–5.8)
RBC # BLD AUTO: 4.26 10*6/MM3 (ref 4.14–5.8)
RBC # BLD AUTO: 4.46 10*6/MM3 (ref 4.14–5.8)
RBC # BLD AUTO: 4.55 10*6/MM3 (ref 4.14–5.8)
RBC # BLD AUTO: 4.59 10*6/MM3 (ref 4.14–5.8)
RBC # BLD AUTO: 4.62 10*6/MM3 (ref 4.14–5.8)
RBC # BLD AUTO: 4.65 10*6/MM3 (ref 4.14–5.8)
RBC # BLD AUTO: 4.7 10*6/MM3 (ref 4.14–5.8)
RBC # BLD AUTO: 4.71 10*6/MM3 (ref 4.14–5.8)
RBC # BLD AUTO: 4.73 10*6/MM3 (ref 4.14–5.8)
RBC # BLD AUTO: 4.86 10*6/MM3 (ref 4.14–5.8)
RBC # BLD AUTO: 4.88 10*6/MM3 (ref 4.14–5.8)
RBC # BLD AUTO: 5.02 10*6/MM3 (ref 4.14–5.8)
RBC # BLD AUTO: 5.23 10*6/MM3 (ref 4.14–5.8)
RBC # UR: ABNORMAL /HPF
RBC # UR: ABNORMAL /HPF
RBC MORPH BLD: NORMAL
REF LAB TEST METHOD: ABNORMAL
REF LAB TEST METHOD: ABNORMAL
RETICS # AUTO: 0.04 10*6/MM3 (ref 0.02–0.13)
RETICS # AUTO: <0.01 10*6/MM3 (ref 0.02–0.13)
RETICS/RBC NFR AUTO: 0.11 % (ref 0.7–1.9)
RETICS/RBC NFR AUTO: 1.12 % (ref 0.7–1.9)
RH BLD: POSITIVE
RHINOVIRUS RNA SPEC NAA+PROBE: NOT DETECTED
RHINOVIRUS RNA SPEC NAA+PROBE: NOT DETECTED
RSV RNA NPH QL NAA+NON-PROBE: NOT DETECTED
RSV RNA NPH QL NAA+NON-PROBE: NOT DETECTED
S PNEUM AG SPEC QL LA: NEGATIVE
SAO2 % BLDCOA: 92 % (ref 94–98)
SAO2 % BLDCOV: 53.2 %
SARS-COV-2 RNA NPH QL NAA+NON-PROBE: NOT DETECTED
SARS-COV-2 RNA PNL SPEC NAA+PROBE: NOT DETECTED
SARS-COV-2 RNA RESP QL NAA+PROBE: NOT DETECTED
SARS-COV-2 RNA RESP QL NAA+PROBE: NOT DETECTED
SCAN SLIDE: NORMAL
SMALL PLATELETS BLD QL SMEAR: ABNORMAL
SODIUM SERPL-SCNC: 130 MMOL/L (ref 136–145)
SODIUM SERPL-SCNC: 130 MMOL/L (ref 136–145)
SODIUM SERPL-SCNC: 131 MMOL/L (ref 136–145)
SODIUM SERPL-SCNC: 132 MMOL/L (ref 136–145)
SODIUM SERPL-SCNC: 133 MMOL/L (ref 136–145)
SODIUM SERPL-SCNC: 133 MMOL/L (ref 136–145)
SODIUM SERPL-SCNC: 136 MMOL/L (ref 136–145)
SODIUM SERPL-SCNC: 136 MMOL/L (ref 136–145)
SODIUM SERPL-SCNC: 137 MMOL/L (ref 136–145)
SODIUM SERPL-SCNC: 138 MMOL/L (ref 136–145)
SODIUM SERPL-SCNC: 138 MMOL/L (ref 136–145)
SODIUM SERPL-SCNC: 139 MMOL/L (ref 136–145)
SODIUM SERPL-SCNC: 140 MMOL/L (ref 136–145)
SODIUM SERPL-SCNC: 141 MMOL/L (ref 136–145)
SP GR UR STRIP: 1.02 (ref 1–1.03)
SP GR UR STRIP: 1.03 (ref 1–1.03)
SPECIMEN SOURCE: NORMAL
SPECIMEN SOURCE: NORMAL
SQUAMOUS #/AREA URNS HPF: ABNORMAL /HPF
SQUAMOUS #/AREA URNS HPF: ABNORMAL /HPF
TIBC SERPL-MCNC: 158 MCG/DL (ref 298–536)
TIBC SERPL-MCNC: 231 MCG/DL (ref 298–536)
TOXIC GRANULATION: ABNORMAL
TRANSFERRIN SERPL-MCNC: 106 MG/DL (ref 200–360)
TRANSFERRIN SERPL-MCNC: 155 MG/DL (ref 200–360)
TROPONIN T SERPL-MCNC: <0.01 NG/ML (ref 0–0.03)
TSH SERPL DL<=0.05 MIU/L-ACNC: 1.46 UIU/ML (ref 0.27–4.2)
UNIT  ABO: NORMAL
UNIT  RH: NORMAL
UROBILINOGEN UR QL STRIP: ABNORMAL
UROBILINOGEN UR QL STRIP: ABNORMAL
VARIANT LYMPHS NFR BLD MANUAL: 1 % (ref 0–5)
VARIANT LYMPHS NFR BLD MANUAL: 1 % (ref 0–5)
VARIANT LYMPHS NFR BLD MANUAL: 2 % (ref 0–5)
VARIANT LYMPHS NFR BLD MANUAL: 3 % (ref 0–5)
VARIANT LYMPHS NFR BLD MANUAL: 4 % (ref 0–5)
VIRUS SPEC CULT: NORMAL
VIT B12 BLD-MCNC: >2000 PG/ML (ref 211–946)
VIT B12 BLD-MCNC: >2000 PG/ML (ref 211–946)
WBC # BLD AUTO: 1.4 10*3/MM3 (ref 3.4–10.8)
WBC # BLD AUTO: 1.6 10*3/MM3 (ref 3.4–10.8)
WBC # BLD AUTO: 1.8 10*3/MM3 (ref 3.4–10.8)
WBC # BLD AUTO: 1.8 10*3/MM3 (ref 3.4–10.8)
WBC # BLD AUTO: 1.9 10*3/MM3 (ref 3.4–10.8)
WBC # BLD AUTO: 12.5 10*3/MM3 (ref 3.4–10.8)
WBC # BLD AUTO: 14 10*3/MM3 (ref 3.4–10.8)
WBC # BLD AUTO: 14.6 10*3/MM3 (ref 3.4–10.8)
WBC # BLD AUTO: 14.7 10*3/MM3 (ref 3.4–10.8)
WBC # BLD AUTO: 15.2 10*3/MM3 (ref 3.4–10.8)
WBC # BLD AUTO: 15.66 10*3/MM3 (ref 3.4–10.8)
WBC # BLD AUTO: 15.7 10*3/MM3 (ref 3.4–10.8)
WBC # BLD AUTO: 16 10*3/MM3 (ref 3.4–10.8)
WBC # BLD AUTO: 16 10*3/MM3 (ref 3.4–10.8)
WBC # BLD AUTO: 16.2 10*3/MM3 (ref 3.4–10.8)
WBC # BLD AUTO: 16.5 10*3/MM3 (ref 3.4–10.8)
WBC # BLD AUTO: 16.5 10*3/MM3 (ref 3.4–10.8)
WBC # BLD AUTO: 16.7 10*3/MM3 (ref 3.4–10.8)
WBC # BLD AUTO: 17.9 10*3/MM3 (ref 3.4–10.8)
WBC # BLD AUTO: 19.7 10*3/MM3 (ref 3.4–10.8)
WBC # BLD AUTO: 2.1 10*3/MM3 (ref 3.4–10.8)
WBC # BLD AUTO: 2.1 10*3/MM3 (ref 3.4–10.8)
WBC # BLD AUTO: 2.27 10*3/MM3 (ref 3.4–10.8)
WBC # BLD AUTO: 2.3 10*3/MM3 (ref 3.4–10.8)
WBC # BLD AUTO: 3.07 10*3/MM3 (ref 3.4–10.8)
WBC # BLD AUTO: 4.2 10*3/MM3 (ref 3.4–10.8)
WBC # BLD AUTO: 8.1 10*3/MM3 (ref 3.4–10.8)
WBC MORPH BLD: NORMAL
WBC UR QL AUTO: ABNORMAL /HPF
WBC UR QL AUTO: ABNORMAL /HPF
WHOLE BLOOD HOLD SPECIMEN: NORMAL
WHOLE BLOOD HOLD SPECIMEN: NORMAL

## 2020-01-01 PROCEDURE — 82746 ASSAY OF FOLIC ACID SERUM: CPT | Performed by: NURSE PRACTITIONER

## 2020-01-01 PROCEDURE — 84100 ASSAY OF PHOSPHORUS: CPT

## 2020-01-01 PROCEDURE — 83880 ASSAY OF NATRIURETIC PEPTIDE: CPT | Performed by: INTERNAL MEDICINE

## 2020-01-01 PROCEDURE — 97530 THERAPEUTIC ACTIVITIES: CPT

## 2020-01-01 PROCEDURE — C1788 PORT, INDWELLING, IMP: HCPCS | Performed by: THORACIC SURGERY (CARDIOTHORACIC VASCULAR SURGERY)

## 2020-01-01 PROCEDURE — 99204 OFFICE O/P NEW MOD 45 MIN: CPT | Performed by: THORACIC SURGERY (CARDIOTHORACIC VASCULAR SURGERY)

## 2020-01-01 PROCEDURE — 77336 RADIATION PHYSICS CONSULT: CPT | Performed by: RADIOLOGY

## 2020-01-01 PROCEDURE — 83010 ASSAY OF HAPTOGLOBIN QUANT: CPT | Performed by: INTERNAL MEDICINE

## 2020-01-01 PROCEDURE — 94760 N-INVAS EAR/PLS OXIMETRY 1: CPT

## 2020-01-01 PROCEDURE — 93306 TTE W/DOPPLER COMPLETE: CPT

## 2020-01-01 PROCEDURE — 82962 GLUCOSE BLOOD TEST: CPT

## 2020-01-01 PROCEDURE — 25010000002 MAGNESIUM SULFATE PER 500 MG OF MAGNESIUM: Performed by: INTERNAL MEDICINE

## 2020-01-01 PROCEDURE — 99232 SBSQ HOSP IP/OBS MODERATE 35: CPT | Performed by: FAMILY MEDICINE

## 2020-01-01 PROCEDURE — 85025 COMPLETE CBC W/AUTO DIFF WBC: CPT | Performed by: EMERGENCY MEDICINE

## 2020-01-01 PROCEDURE — 83615 LACTATE (LD) (LDH) ENZYME: CPT | Performed by: INTERNAL MEDICINE

## 2020-01-01 PROCEDURE — 94726 PLETHYSMOGRAPHY LUNG VOLUMES: CPT

## 2020-01-01 PROCEDURE — C1726 CATH, BAL DIL, NON-VASCULAR: HCPCS | Performed by: INTERNAL MEDICINE

## 2020-01-01 PROCEDURE — 25010000003 HEPARIN LOCK FLUSH PER 10 UNITS: Performed by: INTERNAL MEDICINE

## 2020-01-01 PROCEDURE — 83880 ASSAY OF NATRIURETIC PEPTIDE: CPT | Performed by: NURSE PRACTITIONER

## 2020-01-01 PROCEDURE — 25010000002 GADOTERIDOL PER 1 ML: Performed by: FAMILY MEDICINE

## 2020-01-01 PROCEDURE — 84484 ASSAY OF TROPONIN QUANT: CPT | Performed by: NURSE PRACTITIONER

## 2020-01-01 PROCEDURE — 77334 RADIATION TREATMENT AID(S): CPT | Performed by: RADIOLOGY

## 2020-01-01 PROCEDURE — 85025 COMPLETE CBC W/AUTO DIFF WBC: CPT | Performed by: INTERNAL MEDICINE

## 2020-01-01 PROCEDURE — C1729 CATH, DRAINAGE: HCPCS

## 2020-01-01 PROCEDURE — G0378 HOSPITAL OBSERVATION PER HR: HCPCS

## 2020-01-01 PROCEDURE — 94060 EVALUATION OF WHEEZING: CPT

## 2020-01-01 PROCEDURE — 25010000003 POTASSIUM CHLORIDE 10 MEQ/100ML SOLUTION: Performed by: FAMILY MEDICINE

## 2020-01-01 PROCEDURE — 84145 PROCALCITONIN (PCT): CPT | Performed by: INTERNAL MEDICINE

## 2020-01-01 PROCEDURE — 80048 BASIC METABOLIC PNL TOTAL CA: CPT | Performed by: FAMILY MEDICINE

## 2020-01-01 PROCEDURE — G0463 HOSPITAL OUTPT CLINIC VISIT: HCPCS

## 2020-01-01 PROCEDURE — 82803 BLOOD GASES ANY COMBINATION: CPT

## 2020-01-01 PROCEDURE — 0 IOPAMIDOL PER 1 ML: Performed by: NURSE PRACTITIONER

## 2020-01-01 PROCEDURE — 83615 LACTATE (LD) (LDH) ENZYME: CPT | Performed by: NURSE PRACTITIONER

## 2020-01-01 PROCEDURE — 25010000002 PROPOFOL 200 MG/20ML EMULSION: Performed by: ANESTHESIOLOGY

## 2020-01-01 PROCEDURE — 92610 EVALUATE SWALLOWING FUNCTION: CPT

## 2020-01-01 PROCEDURE — 89051 BODY FLUID CELL COUNT: CPT | Performed by: INTERNAL MEDICINE

## 2020-01-01 PROCEDURE — 36430 TRANSFUSION BLD/BLD COMPNT: CPT

## 2020-01-01 PROCEDURE — 85610 PROTHROMBIN TIME: CPT | Performed by: INTERNAL MEDICINE

## 2020-01-01 PROCEDURE — 87102 FUNGUS ISOLATION CULTURE: CPT | Performed by: INTERNAL MEDICINE

## 2020-01-01 PROCEDURE — 94799 UNLISTED PULMONARY SVC/PX: CPT

## 2020-01-01 PROCEDURE — 77386: CPT | Performed by: RADIOLOGY

## 2020-01-01 PROCEDURE — 88177 CYTP FNA EVAL EA ADDL: CPT | Performed by: INTERNAL MEDICINE

## 2020-01-01 PROCEDURE — 88305 TISSUE EXAM BY PATHOLOGIST: CPT | Performed by: INTERNAL MEDICINE

## 2020-01-01 PROCEDURE — 85610 PROTHROMBIN TIME: CPT | Performed by: FAMILY MEDICINE

## 2020-01-01 PROCEDURE — 83735 ASSAY OF MAGNESIUM: CPT | Performed by: FAMILY MEDICINE

## 2020-01-01 PROCEDURE — 92526 ORAL FUNCTION THERAPY: CPT

## 2020-01-01 PROCEDURE — 82728 ASSAY OF FERRITIN: CPT | Performed by: NURSE PRACTITIONER

## 2020-01-01 PROCEDURE — 85007 BL SMEAR W/DIFF WBC COUNT: CPT | Performed by: INTERNAL MEDICINE

## 2020-01-01 PROCEDURE — 99233 SBSQ HOSP IP/OBS HIGH 50: CPT | Performed by: INTERNAL MEDICINE

## 2020-01-01 PROCEDURE — 99233 SBSQ HOSP IP/OBS HIGH 50: CPT | Performed by: FAMILY MEDICINE

## 2020-01-01 PROCEDURE — 0B9B8ZX DRAINAGE OF LEFT LOWER LOBE BRONCHUS, VIA NATURAL OR ARTIFICIAL OPENING ENDOSCOPIC, DIAGNOSTIC: ICD-10-PCS | Performed by: INTERNAL MEDICINE

## 2020-01-01 PROCEDURE — 63710000001 PREDNISONE PER 5 MG: Performed by: INTERNAL MEDICINE

## 2020-01-01 PROCEDURE — A9552 F18 FDG: HCPCS | Performed by: NURSE PRACTITIONER

## 2020-01-01 PROCEDURE — 87899 AGENT NOS ASSAY W/OPTIC: CPT | Performed by: NURSE PRACTITIONER

## 2020-01-01 PROCEDURE — 25010000002 DEXAMETHASONE SODIUM PHOSPHATE 120 MG/30ML SOLUTION: Performed by: INTERNAL MEDICINE

## 2020-01-01 PROCEDURE — 25010000002 FILGRASTIM 300 MCG/0.5ML SOLUTION PREFILLED SYRINGE: Performed by: INTERNAL MEDICINE

## 2020-01-01 PROCEDURE — 96366 THER/PROPH/DIAG IV INF ADDON: CPT

## 2020-01-01 PROCEDURE — 77301 RADIOTHERAPY DOSE PLAN IMRT: CPT | Performed by: RADIOLOGY

## 2020-01-01 PROCEDURE — 63710000001 DIPHENHYDRAMINE PER 50 MG: Performed by: INTERNAL MEDICINE

## 2020-01-01 PROCEDURE — 0DH63UZ INSERTION OF FEEDING DEVICE INTO STOMACH, PERCUTANEOUS APPROACH: ICD-10-PCS | Performed by: INTERNAL MEDICINE

## 2020-01-01 PROCEDURE — 88108 CYTOPATH CONCENTRATE TECH: CPT | Performed by: INTERNAL MEDICINE

## 2020-01-01 PROCEDURE — 84155 ASSAY OF PROTEIN SERUM: CPT | Performed by: NURSE PRACTITIONER

## 2020-01-01 PROCEDURE — 25010000002 ONDANSETRON PER 1 MG

## 2020-01-01 PROCEDURE — 93005 ELECTROCARDIOGRAM TRACING: CPT | Performed by: FAMILY MEDICINE

## 2020-01-01 PROCEDURE — 25010000003 LIDOCAINE 1 % SOLUTION: Performed by: RADIOLOGY

## 2020-01-01 PROCEDURE — 83010 ASSAY OF HAPTOGLOBIN QUANT: CPT | Performed by: NURSE PRACTITIONER

## 2020-01-01 PROCEDURE — 0100U HC BIOFIRE FILMARRAY RESP PANEL 2: CPT | Performed by: INTERNAL MEDICINE

## 2020-01-01 PROCEDURE — P9035 PLATELET PHERES LEUKOREDUCED: HCPCS

## 2020-01-01 PROCEDURE — 93005 ELECTROCARDIOGRAM TRACING: CPT | Performed by: NURSE PRACTITIONER

## 2020-01-01 PROCEDURE — 25010000003 POTASSIUM CHLORIDE 10 MEQ/100ML SOLUTION: Performed by: INTERNAL MEDICINE

## 2020-01-01 PROCEDURE — 85007 BL SMEAR W/DIFF WBC COUNT: CPT | Performed by: FAMILY MEDICINE

## 2020-01-01 PROCEDURE — 77014 CHG CT GUIDANCE RADIATION THERAPY FLDS PLACEMENT: CPT | Performed by: RADIOLOGY

## 2020-01-01 PROCEDURE — 85049 AUTOMATED PLATELET COUNT: CPT | Performed by: INTERNAL MEDICINE

## 2020-01-01 PROCEDURE — 99232 SBSQ HOSP IP/OBS MODERATE 35: CPT | Performed by: NURSE PRACTITIONER

## 2020-01-01 PROCEDURE — 25010000002 MAGNESIUM SULFATE 2 GM/50ML SOLUTION: Performed by: FAMILY MEDICINE

## 2020-01-01 PROCEDURE — 99232 SBSQ HOSP IP/OBS MODERATE 35: CPT | Performed by: INTERNAL MEDICINE

## 2020-01-01 PROCEDURE — 85025 COMPLETE CBC W/AUTO DIFF WBC: CPT | Performed by: FAMILY MEDICINE

## 2020-01-01 PROCEDURE — 25010000003 MAGNESIUM SULFATE 4 GM/100ML SOLUTION: Performed by: INTERNAL MEDICINE

## 2020-01-01 PROCEDURE — 87075 CULTR BACTERIA EXCEPT BLOOD: CPT | Performed by: INTERNAL MEDICINE

## 2020-01-01 PROCEDURE — 25010000002 CEFTRIAXONE PER 250 MG: Performed by: NURSE PRACTITIONER

## 2020-01-01 PROCEDURE — 80048 BASIC METABOLIC PNL TOTAL CA: CPT | Performed by: INTERNAL MEDICINE

## 2020-01-01 PROCEDURE — 99233 SBSQ HOSP IP/OBS HIGH 50: CPT | Performed by: STUDENT IN AN ORGANIZED HEALTH CARE EDUCATION/TRAINING PROGRAM

## 2020-01-01 PROCEDURE — 94640 AIRWAY INHALATION TREATMENT: CPT

## 2020-01-01 PROCEDURE — 87040 BLOOD CULTURE FOR BACTERIA: CPT | Performed by: NURSE PRACTITIONER

## 2020-01-01 PROCEDURE — 25010000002 FUROSEMIDE PER 20 MG: Performed by: INTERNAL MEDICINE

## 2020-01-01 PROCEDURE — 71045 X-RAY EXAM CHEST 1 VIEW: CPT

## 2020-01-01 PROCEDURE — 25010000003 CEFAZOLIN PER 500 MG: Performed by: NURSE PRACTITIONER

## 2020-01-01 PROCEDURE — 93005 ELECTROCARDIOGRAM TRACING: CPT

## 2020-01-01 PROCEDURE — 74183 MRI ABD W/O CNTR FLWD CNTR: CPT

## 2020-01-01 PROCEDURE — 93010 ELECTROCARDIOGRAM REPORT: CPT | Performed by: INTERNAL MEDICINE

## 2020-01-01 PROCEDURE — 81001 URINALYSIS AUTO W/SCOPE: CPT | Performed by: FAMILY MEDICINE

## 2020-01-01 PROCEDURE — C9803 HOPD COVID-19 SPEC COLLECT: HCPCS

## 2020-01-01 PROCEDURE — 36415 COLL VENOUS BLD VENIPUNCTURE: CPT

## 2020-01-01 PROCEDURE — 96375 TX/PRO/DX INJ NEW DRUG ADDON: CPT

## 2020-01-01 PROCEDURE — 99283 EMERGENCY DEPT VISIT LOW MDM: CPT

## 2020-01-01 PROCEDURE — 87206 SMEAR FLUORESCENT/ACID STAI: CPT | Performed by: INTERNAL MEDICINE

## 2020-01-01 PROCEDURE — 85049 AUTOMATED PLATELET COUNT: CPT | Performed by: FAMILY MEDICINE

## 2020-01-01 PROCEDURE — 83735 ASSAY OF MAGNESIUM: CPT

## 2020-01-01 PROCEDURE — 25010000002 CYANOCOBALAMIN PER 1000 MCG: Performed by: NURSE PRACTITIONER

## 2020-01-01 PROCEDURE — 96368 THER/DIAG CONCURRENT INF: CPT

## 2020-01-01 PROCEDURE — 96415 CHEMO IV INFUSION ADDL HR: CPT

## 2020-01-01 PROCEDURE — 86140 C-REACTIVE PROTEIN: CPT | Performed by: NURSE PRACTITIONER

## 2020-01-01 PROCEDURE — 83986 ASSAY PH BODY FLUID NOS: CPT | Performed by: INTERNAL MEDICINE

## 2020-01-01 PROCEDURE — 85045 AUTOMATED RETICULOCYTE COUNT: CPT | Performed by: NURSE PRACTITIONER

## 2020-01-01 PROCEDURE — 85610 PROTHROMBIN TIME: CPT | Performed by: NURSE PRACTITIONER

## 2020-01-01 PROCEDURE — 99204 OFFICE O/P NEW MOD 45 MIN: CPT | Performed by: INTERNAL MEDICINE

## 2020-01-01 PROCEDURE — 85730 THROMBOPLASTIN TIME PARTIAL: CPT | Performed by: NURSE PRACTITIONER

## 2020-01-01 PROCEDURE — 96360 HYDRATION IV INFUSION INIT: CPT

## 2020-01-01 PROCEDURE — 82607 VITAMIN B-12: CPT | Performed by: NURSE PRACTITIONER

## 2020-01-01 PROCEDURE — 86901 BLOOD TYPING SEROLOGIC RH(D): CPT | Performed by: NURSE PRACTITIONER

## 2020-01-01 PROCEDURE — 99239 HOSP IP/OBS DSCHRG MGMT >30: CPT | Performed by: FAMILY MEDICINE

## 2020-01-01 PROCEDURE — 85610 PROTHROMBIN TIME: CPT

## 2020-01-01 PROCEDURE — 25010000002 PIPERACILLIN SOD-TAZOBACTAM PER 1 G: Performed by: INTERNAL MEDICINE

## 2020-01-01 PROCEDURE — 99222 1ST HOSP IP/OBS MODERATE 55: CPT | Performed by: NURSE PRACTITIONER

## 2020-01-01 PROCEDURE — 99222 1ST HOSP IP/OBS MODERATE 55: CPT | Performed by: RADIOLOGY

## 2020-01-01 PROCEDURE — 25010000002 PROPOFOL 10 MG/ML EMULSION: Performed by: ANESTHESIOLOGIST ASSISTANT

## 2020-01-01 PROCEDURE — 25010000003 PHYTONADIONE 10 MG/ML SOLUTION 1 ML AMPULE: Performed by: FAMILY MEDICINE

## 2020-01-01 PROCEDURE — 87116 MYCOBACTERIA CULTURE: CPT | Performed by: INTERNAL MEDICINE

## 2020-01-01 PROCEDURE — 87070 CULTURE OTHR SPECIMN AEROBIC: CPT | Performed by: INTERNAL MEDICINE

## 2020-01-01 PROCEDURE — 25010000002 ENOXAPARIN PER 10 MG: Performed by: FAMILY MEDICINE

## 2020-01-01 PROCEDURE — 80053 COMPREHEN METABOLIC PANEL: CPT | Performed by: FAMILY MEDICINE

## 2020-01-01 PROCEDURE — U0004 COV-19 TEST NON-CDC HGH THRU: HCPCS

## 2020-01-01 PROCEDURE — 84157 ASSAY OF PROTEIN OTHER: CPT | Performed by: INTERNAL MEDICINE

## 2020-01-01 PROCEDURE — 87798 DETECT AGENT NOS DNA AMP: CPT | Performed by: FAMILY MEDICINE

## 2020-01-01 PROCEDURE — 76000 FLUOROSCOPY <1 HR PHYS/QHP: CPT

## 2020-01-01 PROCEDURE — 85730 THROMBOPLASTIN TIME PARTIAL: CPT | Performed by: FAMILY MEDICINE

## 2020-01-01 PROCEDURE — 87635 SARS-COV-2 COVID-19 AMP PRB: CPT | Performed by: INTERNAL MEDICINE

## 2020-01-01 PROCEDURE — 99284 EMERGENCY DEPT VISIT MOD MDM: CPT

## 2020-01-01 PROCEDURE — 87205 SMEAR GRAM STAIN: CPT | Performed by: INTERNAL MEDICINE

## 2020-01-01 PROCEDURE — A9579 GAD-BASE MR CONTRAST NOS,1ML: HCPCS | Performed by: FAMILY MEDICINE

## 2020-01-01 PROCEDURE — 77300 RADIATION THERAPY DOSE PLAN: CPT | Performed by: RADIOLOGY

## 2020-01-01 PROCEDURE — 88342 IMHCHEM/IMCYTCHM 1ST ANTB: CPT | Performed by: INTERNAL MEDICINE

## 2020-01-01 PROCEDURE — 77290 THER RAD SIMULAJ FIELD CPLX: CPT | Performed by: RADIOLOGY

## 2020-01-01 PROCEDURE — 36600 WITHDRAWAL OF ARTERIAL BLOOD: CPT

## 2020-01-01 PROCEDURE — 71260 CT THORAX DX C+: CPT

## 2020-01-01 PROCEDURE — 99406 BEHAV CHNG SMOKING 3-10 MIN: CPT

## 2020-01-01 PROCEDURE — 87252 VIRUS INOCULATION TISSUE: CPT | Performed by: INTERNAL MEDICINE

## 2020-01-01 PROCEDURE — 88173 CYTOPATH EVAL FNA REPORT: CPT | Performed by: INTERNAL MEDICINE

## 2020-01-01 PROCEDURE — 84484 ASSAY OF TROPONIN QUANT: CPT | Performed by: EMERGENCY MEDICINE

## 2020-01-01 PROCEDURE — 82607 VITAMIN B-12: CPT | Performed by: INTERNAL MEDICINE

## 2020-01-01 PROCEDURE — 80053 COMPREHEN METABOLIC PANEL: CPT | Performed by: INTERNAL MEDICINE

## 2020-01-01 PROCEDURE — 71046 X-RAY EXAM CHEST 2 VIEWS: CPT

## 2020-01-01 PROCEDURE — 77338 DESIGN MLC DEVICE FOR IMRT: CPT | Performed by: RADIOLOGY

## 2020-01-01 PROCEDURE — 25010000002 POTASSIUM CHLORIDE PER 2 MEQ OF POTASSIUM: Performed by: INTERNAL MEDICINE

## 2020-01-01 PROCEDURE — 25010000002 PEMETREXED 100 MG RECONSTITUTED SOLUTION 100 MG VIAL: Performed by: INTERNAL MEDICINE

## 2020-01-01 PROCEDURE — 71250 CT THORAX DX C-: CPT

## 2020-01-01 PROCEDURE — 83540 ASSAY OF IRON: CPT | Performed by: NURSE PRACTITIONER

## 2020-01-01 PROCEDURE — 82746 ASSAY OF FOLIC ACID SERUM: CPT | Performed by: INTERNAL MEDICINE

## 2020-01-01 PROCEDURE — 87040 BLOOD CULTURE FOR BACTERIA: CPT | Performed by: FAMILY MEDICINE

## 2020-01-01 PROCEDURE — 25010000002 FUROSEMIDE PER 20 MG: Performed by: FAMILY MEDICINE

## 2020-01-01 PROCEDURE — 84132 ASSAY OF SERUM POTASSIUM: CPT | Performed by: FAMILY MEDICINE

## 2020-01-01 PROCEDURE — 77427 RADIATION TX MANAGEMENT X5: CPT | Performed by: RADIOLOGY

## 2020-01-01 PROCEDURE — 93005 ELECTROCARDIOGRAM TRACING: CPT | Performed by: EMERGENCY MEDICINE

## 2020-01-01 PROCEDURE — 99205 OFFICE O/P NEW HI 60 MIN: CPT | Performed by: RADIOLOGY

## 2020-01-01 PROCEDURE — 63710000001 PREDNISONE PER 1 MG: Performed by: INTERNAL MEDICINE

## 2020-01-01 PROCEDURE — 88341 IMHCHEM/IMCYTCHM EA ADD ANTB: CPT | Performed by: INTERNAL MEDICINE

## 2020-01-01 PROCEDURE — 84145 PROCALCITONIN (PCT): CPT | Performed by: NURSE PRACTITIONER

## 2020-01-01 PROCEDURE — 97535 SELF CARE MNGMENT TRAINING: CPT

## 2020-01-01 PROCEDURE — 94729 DIFFUSING CAPACITY: CPT

## 2020-01-01 PROCEDURE — 25010000003 ACETYLCYSTEINE PER 100 MG: Performed by: INTERNAL MEDICINE

## 2020-01-01 PROCEDURE — 85007 BL SMEAR W/DIFF WBC COUNT: CPT | Performed by: NURSE PRACTITIONER

## 2020-01-01 PROCEDURE — 84145 PROCALCITONIN (PCT): CPT | Performed by: FAMILY MEDICINE

## 2020-01-01 PROCEDURE — 85025 COMPLETE CBC W/AUTO DIFF WBC: CPT | Performed by: NURSE PRACTITIONER

## 2020-01-01 PROCEDURE — 82565 ASSAY OF CREATININE: CPT

## 2020-01-01 PROCEDURE — 85027 COMPLETE CBC AUTOMATED: CPT | Performed by: FAMILY MEDICINE

## 2020-01-01 PROCEDURE — 97163 PT EVAL HIGH COMPLEX 45 MIN: CPT

## 2020-01-01 PROCEDURE — 25010000002 PALONOSETRON 0.25 MG/5ML SOLUTION PREFILLED SYRINGE: Performed by: INTERNAL MEDICINE

## 2020-01-01 PROCEDURE — 0W993ZZ DRAINAGE OF RIGHT PLEURAL CAVITY, PERCUTANEOUS APPROACH: ICD-10-PCS | Performed by: RADIOLOGY

## 2020-01-01 PROCEDURE — 0B9D8ZX DRAINAGE OF RIGHT MIDDLE LUNG LOBE, VIA NATURAL OR ARTIFICIAL OPENING ENDOSCOPIC, DIAGNOSTIC: ICD-10-PCS | Performed by: INTERNAL MEDICINE

## 2020-01-01 PROCEDURE — 25010000002 FENTANYL CITRATE (PF) 100 MCG/2ML SOLUTION: Performed by: ANESTHESIOLOGIST ASSISTANT

## 2020-01-01 PROCEDURE — 86900 BLOOD TYPING SEROLOGIC ABO: CPT | Performed by: NURSE PRACTITIONER

## 2020-01-01 PROCEDURE — 0 IOPAMIDOL PER 1 ML: Performed by: INTERNAL MEDICINE

## 2020-01-01 PROCEDURE — 84165 PROTEIN E-PHORESIS SERUM: CPT | Performed by: NURSE PRACTITIONER

## 2020-01-01 PROCEDURE — 80048 BASIC METABOLIC PNL TOTAL CA: CPT | Performed by: EMERGENCY MEDICINE

## 2020-01-01 PROCEDURE — 93005 ELECTROCARDIOGRAM TRACING: CPT | Performed by: INTERNAL MEDICINE

## 2020-01-01 PROCEDURE — 99222 1ST HOSP IP/OBS MODERATE 55: CPT | Performed by: INTERNAL MEDICINE

## 2020-01-01 PROCEDURE — 83605 ASSAY OF LACTIC ACID: CPT

## 2020-01-01 PROCEDURE — 25010000002 ONDANSETRON PER 1 MG: Performed by: ANESTHESIOLOGIST ASSISTANT

## 2020-01-01 PROCEDURE — 84443 ASSAY THYROID STIM HORMONE: CPT | Performed by: FAMILY MEDICINE

## 2020-01-01 PROCEDURE — 99223 1ST HOSP IP/OBS HIGH 75: CPT | Performed by: INTERNAL MEDICINE

## 2020-01-01 PROCEDURE — 85730 THROMBOPLASTIN TIME PARTIAL: CPT

## 2020-01-01 PROCEDURE — 76942 ECHO GUIDE FOR BIOPSY: CPT

## 2020-01-01 PROCEDURE — 70553 MRI BRAIN STEM W/O & W/DYE: CPT

## 2020-01-01 PROCEDURE — 84484 ASSAY OF TROPONIN QUANT: CPT | Performed by: FAMILY MEDICINE

## 2020-01-01 PROCEDURE — 83735 ASSAY OF MAGNESIUM: CPT | Performed by: NURSE PRACTITIONER

## 2020-01-01 PROCEDURE — 80048 BASIC METABOLIC PNL TOTAL CA: CPT

## 2020-01-01 PROCEDURE — 80053 COMPREHEN METABOLIC PANEL: CPT | Performed by: NURSE PRACTITIONER

## 2020-01-01 PROCEDURE — 83690 ASSAY OF LIPASE: CPT | Performed by: NURSE PRACTITIONER

## 2020-01-01 PROCEDURE — 36561 INSERT TUNNELED CV CATH: CPT | Performed by: THORACIC SURGERY (CARDIOTHORACIC VASCULAR SURGERY)

## 2020-01-01 PROCEDURE — 25010000002 PROPOFOL 10 MG/ML EMULSION: Performed by: ANESTHESIOLOGY

## 2020-01-01 PROCEDURE — 88172 CYTP DX EVAL FNA 1ST EA SITE: CPT | Performed by: INTERNAL MEDICINE

## 2020-01-01 PROCEDURE — 87071 CULTURE AEROBIC QUANT OTHER: CPT | Performed by: INTERNAL MEDICINE

## 2020-01-01 PROCEDURE — 25010000002 PEMETREXED PER 10 MG: Performed by: INTERNAL MEDICINE

## 2020-01-01 PROCEDURE — 25010000002 HYDROMORPHONE PER 4 MG: Performed by: ANESTHESIOLOGY

## 2020-01-01 PROCEDURE — 81001 URINALYSIS AUTO W/SCOPE: CPT | Performed by: NURSE PRACTITIONER

## 2020-01-01 PROCEDURE — 25010000002 FOSAPREPITANT PER 1 MG: Performed by: INTERNAL MEDICINE

## 2020-01-01 PROCEDURE — 43241 EGD TUBE/CATH INSERTION: CPT | Performed by: INTERNAL MEDICINE

## 2020-01-01 PROCEDURE — 83735 ASSAY OF MAGNESIUM: CPT | Performed by: INTERNAL MEDICINE

## 2020-01-01 PROCEDURE — 99215 OFFICE O/P EST HI 40 MIN: CPT | Performed by: INTERNAL MEDICINE

## 2020-01-01 PROCEDURE — 74177 CT ABD & PELVIS W/CONTRAST: CPT

## 2020-01-01 PROCEDURE — 84466 ASSAY OF TRANSFERRIN: CPT | Performed by: INTERNAL MEDICINE

## 2020-01-01 PROCEDURE — 85027 COMPLETE CBC AUTOMATED: CPT

## 2020-01-01 PROCEDURE — 77001 FLUOROGUIDE FOR VEIN DEVICE: CPT | Performed by: THORACIC SURGERY (CARDIOTHORACIC VASCULAR SURGERY)

## 2020-01-01 PROCEDURE — 96367 TX/PROPH/DG ADDL SEQ IV INF: CPT

## 2020-01-01 PROCEDURE — 07B74ZX EXCISION OF THORAX LYMPHATIC, PERCUTANEOUS ENDOSCOPIC APPROACH, DIAGNOSTIC: ICD-10-PCS | Performed by: INTERNAL MEDICINE

## 2020-01-01 PROCEDURE — 0 FLUDEOXYGLUCOSE F18 SOLUTION: Performed by: NURSE PRACTITIONER

## 2020-01-01 PROCEDURE — 0202U NFCT DS 22 TRGT SARS-COV-2: CPT | Performed by: NURSE PRACTITIONER

## 2020-01-01 PROCEDURE — 3E0G76Z INTRODUCTION OF NUTRITIONAL SUBSTANCE INTO UPPER GI, VIA NATURAL OR ARTIFICIAL OPENING: ICD-10-PCS | Performed by: INTERNAL MEDICINE

## 2020-01-01 PROCEDURE — 36591 DRAW BLOOD OFF VENOUS DEVICE: CPT

## 2020-01-01 PROCEDURE — 0 IOPAMIDOL PER 1 ML: Performed by: FAMILY MEDICINE

## 2020-01-01 PROCEDURE — 25010000002 CYANOCOBALAMIN PER 1000 MCG: Performed by: INTERNAL MEDICINE

## 2020-01-01 PROCEDURE — 84466 ASSAY OF TRANSFERRIN: CPT | Performed by: NURSE PRACTITIONER

## 2020-01-01 PROCEDURE — 96361 HYDRATE IV INFUSION ADD-ON: CPT

## 2020-01-01 PROCEDURE — 94664 DEMO&/EVAL PT USE INHALER: CPT

## 2020-01-01 PROCEDURE — 96374 THER/PROPH/DIAG INJ IV PUSH: CPT

## 2020-01-01 PROCEDURE — 84100 ASSAY OF PHOSPHORUS: CPT | Performed by: FAMILY MEDICINE

## 2020-01-01 PROCEDURE — 93306 TTE W/DOPPLER COMPLETE: CPT | Performed by: INTERNAL MEDICINE

## 2020-01-01 PROCEDURE — 96372 THER/PROPH/DIAG INJ SC/IM: CPT | Performed by: INTERNAL MEDICINE

## 2020-01-01 PROCEDURE — 25010000002 PROPOFOL 200 MG/20ML EMULSION: Performed by: ANESTHESIOLOGIST ASSISTANT

## 2020-01-01 PROCEDURE — 36415 COLL VENOUS BLD VENIPUNCTURE: CPT | Performed by: INTERNAL MEDICINE

## 2020-01-01 PROCEDURE — 99285 EMERGENCY DEPT VISIT HI MDM: CPT

## 2020-01-01 PROCEDURE — 25010000002 ROMIPLOSTIM PER 10 MCG: Performed by: INTERNAL MEDICINE

## 2020-01-01 PROCEDURE — 85730 THROMBOPLASTIN TIME PARTIAL: CPT | Performed by: INTERNAL MEDICINE

## 2020-01-01 PROCEDURE — 25010000002 HEPARIN (PORCINE) PER 1000 UNITS: Performed by: THORACIC SURGERY (CARDIOTHORACIC VASCULAR SURGERY)

## 2020-01-01 PROCEDURE — 96413 CHEMO IV INFUSION 1 HR: CPT

## 2020-01-01 PROCEDURE — 96417 CHEMO IV INFUS EACH ADDL SEQ: CPT

## 2020-01-01 PROCEDURE — 87496 CYTOMEG DNA AMP PROBE: CPT | Performed by: INTERNAL MEDICINE

## 2020-01-01 PROCEDURE — 85025 COMPLETE CBC W/AUTO DIFF WBC: CPT

## 2020-01-01 PROCEDURE — 83540 ASSAY OF IRON: CPT | Performed by: INTERNAL MEDICINE

## 2020-01-01 PROCEDURE — 99215 OFFICE O/P EST HI 40 MIN: CPT | Performed by: NURSE PRACTITIONER

## 2020-01-01 PROCEDURE — 83880 ASSAY OF NATRIURETIC PEPTIDE: CPT | Performed by: FAMILY MEDICINE

## 2020-01-01 PROCEDURE — 25010000003 CEFAZOLIN PER 500 MG: Performed by: INTERNAL MEDICINE

## 2020-01-01 PROCEDURE — 97167 OT EVAL HIGH COMPLEX 60 MIN: CPT

## 2020-01-01 PROCEDURE — 25010000002 CISPLATIN PER 50 MG: Performed by: INTERNAL MEDICINE

## 2020-01-01 PROCEDURE — 85384 FIBRINOGEN ACTIVITY: CPT | Performed by: INTERNAL MEDICINE

## 2020-01-01 PROCEDURE — 85045 AUTOMATED RETICULOCYTE COUNT: CPT | Performed by: INTERNAL MEDICINE

## 2020-01-01 PROCEDURE — 25010000002 DEXAMETHASONE PER 1 MG: Performed by: ANESTHESIOLOGIST ASSISTANT

## 2020-01-01 PROCEDURE — 99223 1ST HOSP IP/OBS HIGH 75: CPT | Performed by: FAMILY MEDICINE

## 2020-01-01 PROCEDURE — 78815 PET IMAGE W/CT SKULL-THIGH: CPT

## 2020-01-01 DEVICE — POWERPORT M.R.I. IMPLANTABLE PORT WITH ATTACHABLE 8F CHRONOFLEX OPEN-ENDED SINGLE-LUMEN VENOUS CATHETER INTERMEDIATE KIT  (WITH SUTURE PLUGS)
Type: IMPLANTABLE DEVICE | Site: SUBCLAVIAN | Status: FUNCTIONAL
Brand: POWERPORT M.R.I., CHRONOFLEX

## 2020-01-01 RX ORDER — MEPERIDINE HYDROCHLORIDE 25 MG/ML
12.5 INJECTION INTRAMUSCULAR; INTRAVENOUS; SUBCUTANEOUS
Status: DISCONTINUED | OUTPATIENT
Start: 2020-01-01 | End: 2020-01-01 | Stop reason: HOSPADM

## 2020-01-01 RX ORDER — ACETAMINOPHEN 650 MG/1
650 SUPPOSITORY RECTAL ONCE AS NEEDED
Status: DISCONTINUED | OUTPATIENT
Start: 2020-01-01 | End: 2020-01-01 | Stop reason: HOSPADM

## 2020-01-01 RX ORDER — ALBUTEROL SULFATE 2.5 MG/3ML
2.5 SOLUTION RESPIRATORY (INHALATION) EVERY 6 HOURS PRN
Status: DISCONTINUED | OUTPATIENT
Start: 2020-01-01 | End: 2020-01-01

## 2020-01-01 RX ORDER — MAGNESIUM SULFATE HEPTAHYDRATE 40 MG/ML
2 INJECTION, SOLUTION INTRAVENOUS AS NEEDED
Status: DISCONTINUED | OUTPATIENT
Start: 2020-01-01 | End: 2020-01-01 | Stop reason: HOSPADM

## 2020-01-01 RX ORDER — DIPHENHYDRAMINE HCL 25 MG
25 CAPSULE ORAL ONCE
Status: COMPLETED | OUTPATIENT
Start: 2020-01-01 | End: 2020-01-01

## 2020-01-01 RX ORDER — TRAZODONE HYDROCHLORIDE 50 MG/1
50 TABLET ORAL NIGHTLY
Status: DISCONTINUED | OUTPATIENT
Start: 2020-01-01 | End: 2020-01-01 | Stop reason: HOSPADM

## 2020-01-01 RX ORDER — PREDNISONE 10 MG/1
10 TABLET ORAL DAILY
Status: DISCONTINUED | OUTPATIENT
Start: 2020-01-01 | End: 2020-01-01 | Stop reason: HOSPADM

## 2020-01-01 RX ORDER — DIPHENHYDRAMINE HYDROCHLORIDE 50 MG/ML
25 INJECTION INTRAMUSCULAR; INTRAVENOUS ONCE
Status: COMPLETED | OUTPATIENT
Start: 2020-01-01 | End: 2020-01-01

## 2020-01-01 RX ORDER — ONDANSETRON 2 MG/ML
4 INJECTION INTRAMUSCULAR; INTRAVENOUS ONCE AS NEEDED
Status: CANCELLED | OUTPATIENT
Start: 2020-01-01

## 2020-01-01 RX ORDER — PROPOFOL 10 MG/ML
VIAL (ML) INTRAVENOUS AS NEEDED
Status: DISCONTINUED | OUTPATIENT
Start: 2020-01-01 | End: 2020-01-01 | Stop reason: SURG

## 2020-01-01 RX ORDER — SODIUM CHLORIDE 9 MG/ML
250 INJECTION, SOLUTION INTRAVENOUS ONCE
Status: COMPLETED | OUTPATIENT
Start: 2020-01-01 | End: 2020-01-01

## 2020-01-01 RX ORDER — IPRATROPIUM BROMIDE AND ALBUTEROL SULFATE 2.5; .5 MG/3ML; MG/3ML
3 SOLUTION RESPIRATORY (INHALATION) ONCE AS NEEDED
Status: DISCONTINUED | OUTPATIENT
Start: 2020-01-01 | End: 2020-01-01 | Stop reason: HOSPADM

## 2020-01-01 RX ORDER — FUROSEMIDE 40 MG/1
40 TABLET ORAL 2 TIMES DAILY
Qty: 60 TABLET | Refills: 1 | Status: SHIPPED | OUTPATIENT
Start: 2020-01-01

## 2020-01-01 RX ORDER — ASCORBIC ACID 500 MG
500 TABLET ORAL DAILY
COMMUNITY

## 2020-01-01 RX ORDER — NALOXONE HCL 0.4 MG/ML
0.4 VIAL (ML) INJECTION AS NEEDED
Status: DISCONTINUED | OUTPATIENT
Start: 2020-01-01 | End: 2020-01-01 | Stop reason: HOSPADM

## 2020-01-01 RX ORDER — CEFDINIR 300 MG/1
300 CAPSULE ORAL 2 TIMES DAILY
Qty: 20 CAPSULE | Refills: 0 | Status: SHIPPED | OUTPATIENT
Start: 2020-01-01 | End: 2020-01-01

## 2020-01-01 RX ORDER — ACETAMINOPHEN 650 MG/1
650 SUPPOSITORY RECTAL ONCE
Status: COMPLETED | OUTPATIENT
Start: 2020-01-01 | End: 2020-01-01

## 2020-01-01 RX ORDER — BUDESONIDE AND FORMOTEROL FUMARATE DIHYDRATE 160; 4.5 UG/1; UG/1
2 AEROSOL RESPIRATORY (INHALATION)
Qty: 1 INHALER | Refills: 0 | Status: SHIPPED | OUTPATIENT
Start: 2020-01-01

## 2020-01-01 RX ORDER — DEXAMETHASONE SODIUM PHOSPHATE 4 MG/ML
INJECTION, SOLUTION INTRA-ARTICULAR; INTRALESIONAL; INTRAMUSCULAR; INTRAVENOUS; SOFT TISSUE AS NEEDED
Status: DISCONTINUED | OUTPATIENT
Start: 2020-01-01 | End: 2020-01-01 | Stop reason: SURG

## 2020-01-01 RX ORDER — DOXYCYCLINE 100 MG/1
100 TABLET ORAL EVERY 12 HOURS
Status: DISCONTINUED | OUTPATIENT
Start: 2020-01-01 | End: 2020-01-01 | Stop reason: HOSPADM

## 2020-01-01 RX ORDER — IPRATROPIUM BROMIDE AND ALBUTEROL SULFATE 2.5; .5 MG/3ML; MG/3ML
3 SOLUTION RESPIRATORY (INHALATION)
Status: DISCONTINUED | OUTPATIENT
Start: 2020-01-01 | End: 2020-01-01 | Stop reason: HOSPADM

## 2020-01-01 RX ORDER — AZITHROMYCIN 250 MG/1
TABLET, FILM COATED ORAL
Qty: 6 TABLET | Refills: 0 | Status: SHIPPED | OUTPATIENT
Start: 2020-01-01 | End: 2020-01-01 | Stop reason: HOSPADM

## 2020-01-01 RX ORDER — ACETAMINOPHEN 650 MG/1
650 SUPPOSITORY RECTAL EVERY 4 HOURS PRN
Status: DISCONTINUED | OUTPATIENT
Start: 2020-01-01 | End: 2020-01-01 | Stop reason: HOSPADM

## 2020-01-01 RX ORDER — METOPROLOL TARTRATE 50 MG/1
50 TABLET, FILM COATED ORAL EVERY 12 HOURS SCHEDULED
Status: DISCONTINUED | OUTPATIENT
Start: 2020-01-01 | End: 2020-01-01 | Stop reason: HOSPADM

## 2020-01-01 RX ORDER — GUAIFENESIN 600 MG/1
1200 TABLET, EXTENDED RELEASE ORAL EVERY 12 HOURS SCHEDULED
Status: DISCONTINUED | OUTPATIENT
Start: 2020-01-01 | End: 2020-01-01 | Stop reason: HOSPADM

## 2020-01-01 RX ORDER — IPRATROPIUM BROMIDE AND ALBUTEROL SULFATE 2.5; .5 MG/3ML; MG/3ML
3 SOLUTION RESPIRATORY (INHALATION)
Qty: 120 ML | Refills: 5 | Status: SHIPPED | OUTPATIENT
Start: 2020-01-01

## 2020-01-01 RX ORDER — CYANOCOBALAMIN 1000 UG/ML
1000 INJECTION, SOLUTION INTRAMUSCULAR; SUBCUTANEOUS ONCE
Status: COMPLETED | OUTPATIENT
Start: 2020-01-01 | End: 2020-01-01

## 2020-01-01 RX ORDER — ONDANSETRON 2 MG/ML
4 INJECTION INTRAMUSCULAR; INTRAVENOUS EVERY 6 HOURS PRN
Status: DISCONTINUED | OUTPATIENT
Start: 2020-01-01 | End: 2020-01-01 | Stop reason: HOSPADM

## 2020-01-01 RX ORDER — HEPARIN SODIUM (PORCINE) LOCK FLUSH IV SOLN 100 UNIT/ML 100 UNIT/ML
500 SOLUTION INTRAVENOUS AS NEEDED
Status: DISCONTINUED | OUTPATIENT
Start: 2020-01-01 | End: 2020-01-01 | Stop reason: HOSPADM

## 2020-01-01 RX ORDER — LIDOCAINE AND PRILOCAINE 25; 25 MG/G; MG/G
CREAM TOPICAL AS NEEDED
Qty: 30 G | Refills: 5 | Status: SHIPPED | OUTPATIENT
Start: 2020-01-01

## 2020-01-01 RX ORDER — MAGNESIUM SULFATE HEPTAHYDRATE 40 MG/ML
4 INJECTION, SOLUTION INTRAVENOUS AS NEEDED
Status: DISCONTINUED | OUTPATIENT
Start: 2020-01-01 | End: 2020-01-01 | Stop reason: HOSPADM

## 2020-01-01 RX ORDER — POTASSIUM CHLORIDE 20 MEQ/1
40 TABLET, EXTENDED RELEASE ORAL AS NEEDED
Status: DISCONTINUED | OUTPATIENT
Start: 2020-01-01 | End: 2020-01-01 | Stop reason: HOSPADM

## 2020-01-01 RX ORDER — ONDANSETRON 2 MG/ML
INJECTION INTRAMUSCULAR; INTRAVENOUS
Status: COMPLETED
Start: 2020-01-01 | End: 2020-01-01

## 2020-01-01 RX ORDER — PROMETHAZINE HYDROCHLORIDE 25 MG/1
25 TABLET ORAL ONCE AS NEEDED
Status: CANCELLED | OUTPATIENT
Start: 2020-01-01

## 2020-01-01 RX ORDER — DOXYCYCLINE 100 MG/1
100 TABLET ORAL 2 TIMES DAILY
Qty: 6 TABLET | Refills: 0 | Status: SHIPPED | OUTPATIENT
Start: 2020-01-01 | End: 2020-01-01

## 2020-01-01 RX ORDER — NALOXONE HCL 0.4 MG/ML
0.4 VIAL (ML) INJECTION AS NEEDED
Status: CANCELLED | OUTPATIENT
Start: 2020-01-01

## 2020-01-01 RX ORDER — MIRTAZAPINE 15 MG/1
15 TABLET, FILM COATED ORAL NIGHTLY
Status: DISCONTINUED | OUTPATIENT
Start: 2020-01-01 | End: 2020-01-01 | Stop reason: HOSPADM

## 2020-01-01 RX ORDER — SODIUM CHLORIDE 0.9 % (FLUSH) 0.9 %
10 SYRINGE (ML) INJECTION AS NEEDED
Status: DISCONTINUED | OUTPATIENT
Start: 2020-01-01 | End: 2020-01-01 | Stop reason: HOSPADM

## 2020-01-01 RX ORDER — OXYCODONE HYDROCHLORIDE 5 MG/1
7.5 TABLET ORAL ONCE AS NEEDED
Status: DISCONTINUED | OUTPATIENT
Start: 2020-01-01 | End: 2020-01-01 | Stop reason: HOSPADM

## 2020-01-01 RX ORDER — SODIUM CHLORIDE 0.9 % (FLUSH) 0.9 %
10 SYRINGE (ML) INJECTION EVERY 12 HOURS SCHEDULED
Status: DISCONTINUED | OUTPATIENT
Start: 2020-01-01 | End: 2020-01-01 | Stop reason: HOSPADM

## 2020-01-01 RX ORDER — SODIUM CHLORIDE, SODIUM LACTATE, POTASSIUM CHLORIDE, CALCIUM CHLORIDE 600; 310; 30; 20 MG/100ML; MG/100ML; MG/100ML; MG/100ML
75 INJECTION, SOLUTION INTRAVENOUS CONTINUOUS
Status: DISCONTINUED | OUTPATIENT
Start: 2020-01-01 | End: 2020-01-01

## 2020-01-01 RX ORDER — PROMETHAZINE HYDROCHLORIDE 25 MG/1
25 TABLET ORAL ONCE AS NEEDED
Status: DISCONTINUED | OUTPATIENT
Start: 2020-01-01 | End: 2020-01-01 | Stop reason: HOSPADM

## 2020-01-01 RX ORDER — OMEPRAZOLE 10 MG/1
40 CAPSULE, DELAYED RELEASE ORAL DAILY
Qty: 30 CAPSULE | Refills: 5 | Status: CANCELLED | OUTPATIENT
Start: 2020-01-01

## 2020-01-01 RX ORDER — PREDNISONE 10 MG/1
TABLET ORAL
Qty: 18 TABLET | Refills: 0 | Status: SHIPPED | OUTPATIENT
Start: 2020-01-01 | End: 2020-01-01

## 2020-01-01 RX ORDER — ALBUTEROL SULFATE 90 UG/1
2 AEROSOL, METERED RESPIRATORY (INHALATION) EVERY 4 HOURS PRN
Qty: 8 G | Refills: 0 | Status: SHIPPED | OUTPATIENT
Start: 2020-01-01

## 2020-01-01 RX ORDER — ALUMINA, MAGNESIA, AND SIMETHICONE 2400; 2400; 240 MG/30ML; MG/30ML; MG/30ML
15 SUSPENSION ORAL EVERY 6 HOURS PRN
Status: DISCONTINUED | OUTPATIENT
Start: 2020-01-01 | End: 2020-01-01

## 2020-01-01 RX ORDER — ACETAMINOPHEN 160 MG/5ML
650 SOLUTION ORAL ONCE
Status: COMPLETED | OUTPATIENT
Start: 2020-01-01 | End: 2020-01-01

## 2020-01-01 RX ORDER — ACETAMINOPHEN 160 MG/5ML
650 SOLUTION ORAL EVERY 4 HOURS PRN
Status: DISCONTINUED | OUTPATIENT
Start: 2020-01-01 | End: 2020-01-01 | Stop reason: HOSPADM

## 2020-01-01 RX ORDER — HYDROMORPHONE HCL 110MG/55ML
0.2 PATIENT CONTROLLED ANALGESIA SYRINGE INTRAVENOUS
Status: DISCONTINUED | OUTPATIENT
Start: 2020-01-01 | End: 2020-01-01 | Stop reason: HOSPADM

## 2020-01-01 RX ORDER — PANTOPRAZOLE SODIUM 40 MG/1
40 TABLET, DELAYED RELEASE ORAL
Status: DISCONTINUED | OUTPATIENT
Start: 2020-01-01 | End: 2020-01-01 | Stop reason: ALTCHOICE

## 2020-01-01 RX ORDER — APIXABAN 5 MG (74)
5 KIT ORAL TAKE AS DIRECTED
Qty: 74 TABLET | Refills: 0 | Status: SHIPPED | OUTPATIENT
Start: 2020-01-01 | End: 2020-01-01 | Stop reason: HOSPADM

## 2020-01-01 RX ORDER — LIDOCAINE HYDROCHLORIDE 20 MG/ML
JELLY TOPICAL
Status: DISCONTINUED
Start: 2020-01-01 | End: 2020-01-01 | Stop reason: WASHOUT

## 2020-01-01 RX ORDER — BISACODYL 10 MG
10 SUPPOSITORY, RECTAL RECTAL DAILY PRN
Status: DISCONTINUED | OUTPATIENT
Start: 2020-01-01 | End: 2020-01-01 | Stop reason: HOSPADM

## 2020-01-01 RX ORDER — LABETALOL HYDROCHLORIDE 5 MG/ML
5 INJECTION, SOLUTION INTRAVENOUS
Status: DISCONTINUED | OUTPATIENT
Start: 2020-01-01 | End: 2020-01-01 | Stop reason: HOSPADM

## 2020-01-01 RX ORDER — SODIUM CHLORIDE 9 MG/ML
250 INJECTION, SOLUTION INTRAVENOUS AS NEEDED
Status: DISCONTINUED | OUTPATIENT
Start: 2020-01-01 | End: 2020-01-01 | Stop reason: HOSPADM

## 2020-01-01 RX ORDER — CYANOCOBALAMIN 1000 UG/ML
1000 INJECTION, SOLUTION INTRAMUSCULAR; SUBCUTANEOUS ONCE
Status: DISCONTINUED | OUTPATIENT
Start: 2020-01-01 | End: 2020-01-01

## 2020-01-01 RX ORDER — SODIUM CHLORIDE 9 MG/ML
250 INJECTION, SOLUTION INTRAVENOUS ONCE
Status: CANCELLED | OUTPATIENT
Start: 2020-12-30

## 2020-01-01 RX ORDER — DEXAMETHASONE 4 MG/1
4 TABLET ORAL 2 TIMES DAILY WITH MEALS
Status: DISCONTINUED | OUTPATIENT
Start: 2020-01-01 | End: 2020-01-01

## 2020-01-01 RX ORDER — POLYETHYLENE GLYCOL 3350 17 G/17G
34 POWDER, FOR SOLUTION ORAL ONCE
Status: COMPLETED | OUTPATIENT
Start: 2020-01-01 | End: 2020-01-01

## 2020-01-01 RX ORDER — BISACODYL 5 MG/1
5 TABLET, DELAYED RELEASE ORAL DAILY PRN
Status: DISCONTINUED | OUTPATIENT
Start: 2020-01-01 | End: 2020-01-01 | Stop reason: HOSPADM

## 2020-01-01 RX ORDER — ACETAMINOPHEN 325 MG/1
650 TABLET ORAL ONCE
Status: COMPLETED | OUTPATIENT
Start: 2020-01-01 | End: 2020-01-01

## 2020-01-01 RX ORDER — METOCLOPRAMIDE 10 MG/1
10 TABLET ORAL
Status: DISCONTINUED | OUTPATIENT
Start: 2020-01-01 | End: 2020-01-01 | Stop reason: HOSPADM

## 2020-01-01 RX ORDER — SODIUM CHLORIDE 9 MG/ML
30 INJECTION, SOLUTION INTRAVENOUS CONTINUOUS PRN
Status: DISCONTINUED | OUTPATIENT
Start: 2020-01-01 | End: 2020-01-01 | Stop reason: HOSPADM

## 2020-01-01 RX ORDER — NYSTATIN 100000 [USP'U]/ML
2000 SUSPENSION ORAL
Qty: 280 | Refills: 0 | Status: ACTIVE
Start: 2020-01-01

## 2020-01-01 RX ORDER — GUAIFENESIN AND CODEINE PHOSPHATE 100; 10 MG/5ML; MG/5ML
5 SOLUTION ORAL EVERY 4 HOURS PRN
Status: DISCONTINUED | OUTPATIENT
Start: 2020-01-01 | End: 2020-01-01

## 2020-01-01 RX ORDER — ROCURONIUM BROMIDE 10 MG/ML
INJECTION, SOLUTION INTRAVENOUS AS NEEDED
Status: DISCONTINUED | OUTPATIENT
Start: 2020-01-01 | End: 2020-01-01 | Stop reason: SURG

## 2020-01-01 RX ORDER — FAMOTIDINE 40 MG/1
40 TABLET, FILM COATED ORAL NIGHTLY PRN
Qty: 30 TABLET | Refills: 5 | Status: SHIPPED | OUTPATIENT
Start: 2020-01-01

## 2020-01-01 RX ORDER — FLUMAZENIL 0.1 MG/ML
0.2 INJECTION INTRAVENOUS AS NEEDED
Status: DISCONTINUED | OUTPATIENT
Start: 2020-01-01 | End: 2020-01-01 | Stop reason: HOSPADM

## 2020-01-01 RX ORDER — METOPROLOL TARTRATE 50 MG/1
50 TABLET, FILM COATED ORAL EVERY 12 HOURS SCHEDULED
Qty: 60 TABLET | Refills: 2 | Status: SHIPPED | OUTPATIENT
Start: 2020-01-01

## 2020-01-01 RX ORDER — FOLIC ACID 1 MG/1
1 TABLET ORAL DAILY
Status: DISCONTINUED | OUTPATIENT
Start: 2020-01-01 | End: 2020-01-01 | Stop reason: HOSPADM

## 2020-01-01 RX ORDER — BUDESONIDE AND FORMOTEROL FUMARATE DIHYDRATE 160; 4.5 UG/1; UG/1
2 AEROSOL RESPIRATORY (INHALATION)
Status: DISCONTINUED | OUTPATIENT
Start: 2020-01-01 | End: 2020-01-01 | Stop reason: HOSPADM

## 2020-01-01 RX ORDER — FOLIC ACID 1 MG/1
1 TABLET ORAL DAILY
Qty: 30 TABLET | Refills: 5 | Status: SHIPPED | OUTPATIENT
Start: 2020-01-01

## 2020-01-01 RX ORDER — FUROSEMIDE 10 MG/ML
40 INJECTION INTRAMUSCULAR; INTRAVENOUS EVERY 12 HOURS
Status: DISCONTINUED | OUTPATIENT
Start: 2020-01-01 | End: 2020-01-01 | Stop reason: HOSPADM

## 2020-01-01 RX ORDER — BENZONATATE 100 MG/1
200 CAPSULE ORAL 3 TIMES DAILY PRN
Status: DISCONTINUED | OUTPATIENT
Start: 2020-01-01 | End: 2020-01-01 | Stop reason: HOSPADM

## 2020-01-01 RX ORDER — OXYCODONE HYDROCHLORIDE 5 MG/1
10 TABLET ORAL ONCE AS NEEDED
Status: CANCELLED | OUTPATIENT
Start: 2020-01-01

## 2020-01-01 RX ORDER — ONDANSETRON 2 MG/ML
INJECTION INTRAMUSCULAR; INTRAVENOUS AS NEEDED
Status: DISCONTINUED | OUTPATIENT
Start: 2020-01-01 | End: 2020-01-01 | Stop reason: SURG

## 2020-01-01 RX ORDER — LABETALOL HYDROCHLORIDE 5 MG/ML
5 INJECTION, SOLUTION INTRAVENOUS
Status: CANCELLED | OUTPATIENT
Start: 2020-01-01

## 2020-01-01 RX ORDER — SODIUM CHLORIDE 0.9 % (FLUSH) 0.9 %
20 SYRINGE (ML) INJECTION AS NEEDED
Status: DISCONTINUED | OUTPATIENT
Start: 2020-01-01 | End: 2020-01-01 | Stop reason: HOSPADM

## 2020-01-01 RX ORDER — SODIUM CHLORIDE 9 MG/ML
50 INJECTION, SOLUTION INTRAVENOUS CONTINUOUS
Status: DISCONTINUED | OUTPATIENT
Start: 2020-01-01 | End: 2020-01-01

## 2020-01-01 RX ORDER — FAMOTIDINE 40 MG/1
40 TABLET, FILM COATED ORAL NIGHTLY PRN
Qty: 30 TABLET | Refills: 1 | Status: SHIPPED | OUTPATIENT
Start: 2020-01-01 | End: 2020-01-01

## 2020-01-01 RX ORDER — POTASSIUM CHLORIDE 1.5 G/1.77G
40 POWDER, FOR SOLUTION ORAL AS NEEDED
Status: DISCONTINUED | OUTPATIENT
Start: 2020-01-01 | End: 2020-01-01 | Stop reason: HOSPADM

## 2020-01-01 RX ORDER — ONDANSETRON 4 MG/1
4 TABLET, FILM COATED ORAL EVERY 6 HOURS PRN
Status: DISCONTINUED | OUTPATIENT
Start: 2020-01-01 | End: 2020-01-01 | Stop reason: HOSPADM

## 2020-01-01 RX ORDER — ACETAMINOPHEN 500 MG
1000 TABLET ORAL ONCE AS NEEDED
Status: CANCELLED | OUTPATIENT
Start: 2020-01-01

## 2020-01-01 RX ORDER — ALBUTEROL SULFATE 90 UG/1
2 AEROSOL, METERED RESPIRATORY (INHALATION) ONCE
Status: COMPLETED | OUTPATIENT
Start: 2020-01-01 | End: 2020-01-01

## 2020-01-01 RX ORDER — ACETAMINOPHEN 500 MG
1000 TABLET ORAL ONCE AS NEEDED
Status: DISCONTINUED | OUTPATIENT
Start: 2020-01-01 | End: 2020-01-01 | Stop reason: HOSPADM

## 2020-01-01 RX ORDER — PROPOFOL 10 MG/ML
INJECTION, EMULSION INTRAVENOUS AS NEEDED
Status: DISCONTINUED | OUTPATIENT
Start: 2020-01-01 | End: 2020-01-01 | Stop reason: SURG

## 2020-01-01 RX ORDER — SODIUM CHLORIDE, SODIUM LACTATE, POTASSIUM CHLORIDE, CALCIUM CHLORIDE 600; 310; 30; 20 MG/100ML; MG/100ML; MG/100ML; MG/100ML
INJECTION, SOLUTION INTRAVENOUS CONTINUOUS PRN
Status: DISCONTINUED | OUTPATIENT
Start: 2020-01-01 | End: 2020-01-01 | Stop reason: SURG

## 2020-01-01 RX ORDER — PALONOSETRON 0.05 MG/ML
0.25 INJECTION, SOLUTION INTRAVENOUS ONCE
Status: CANCELLED | OUTPATIENT
Start: 2020-12-30

## 2020-01-01 RX ORDER — HYDROMORPHONE HCL 110MG/55ML
0.25 PATIENT CONTROLLED ANALGESIA SYRINGE INTRAVENOUS
Status: DISCONTINUED | OUTPATIENT
Start: 2020-01-01 | End: 2020-01-01 | Stop reason: HOSPADM

## 2020-01-01 RX ORDER — FLUMAZENIL 0.1 MG/ML
0.1 INJECTION INTRAVENOUS AS NEEDED
Status: CANCELLED | OUTPATIENT
Start: 2020-01-01

## 2020-01-01 RX ORDER — LIDOCAINE HYDROCHLORIDE 10 MG/ML
INJECTION, SOLUTION EPIDURAL; INFILTRATION; INTRACAUDAL; PERINEURAL AS NEEDED
Status: DISCONTINUED | OUTPATIENT
Start: 2020-01-01 | End: 2020-01-01 | Stop reason: SURG

## 2020-01-01 RX ORDER — ASPIRIN 81 MG/1
81 TABLET ORAL DAILY
COMMUNITY

## 2020-01-01 RX ORDER — NITROGLYCERIN 0.4 MG/1
0.4 TABLET SUBLINGUAL
Status: DISCONTINUED | OUTPATIENT
Start: 2020-01-01 | End: 2020-01-01 | Stop reason: HOSPADM

## 2020-01-01 RX ORDER — ACETYLCYSTEINE 200 MG/ML
SOLUTION ORAL; RESPIRATORY (INHALATION)
Status: DISCONTINUED
Start: 2020-01-01 | End: 2020-01-01 | Stop reason: HOSPADM

## 2020-01-01 RX ORDER — SUCRALFATE 1 G/1
1 TABLET ORAL
Status: DISCONTINUED | OUTPATIENT
Start: 2020-01-01 | End: 2020-01-01 | Stop reason: HOSPADM

## 2020-01-01 RX ORDER — POTASSIUM CHLORIDE 7.45 MG/ML
10 INJECTION INTRAVENOUS
Status: DISCONTINUED | OUTPATIENT
Start: 2020-01-01 | End: 2020-01-01 | Stop reason: HOSPADM

## 2020-01-01 RX ORDER — ACETAMINOPHEN 650 MG/1
650 SUPPOSITORY RECTAL ONCE AS NEEDED
Status: CANCELLED | OUTPATIENT
Start: 2020-01-01

## 2020-01-01 RX ORDER — LIDOCAINE 50 MG/G
OINTMENT TOPICAL
Status: COMPLETED
Start: 2020-01-01 | End: 2020-01-01

## 2020-01-01 RX ORDER — APIXABAN 5 MG/1
TABLET, FILM COATED ORAL
Qty: 60 TABLET | Refills: 5 | Status: SHIPPED | OUTPATIENT
Start: 2020-01-01 | End: 2020-01-01 | Stop reason: HOSPADM

## 2020-01-01 RX ORDER — FENTANYL CITRATE 50 UG/ML
INJECTION, SOLUTION INTRAMUSCULAR; INTRAVENOUS AS NEEDED
Status: DISCONTINUED | OUTPATIENT
Start: 2020-01-01 | End: 2020-01-01 | Stop reason: SURG

## 2020-01-01 RX ORDER — LIDOCAINE HYDROCHLORIDE 10 MG/ML
INJECTION, SOLUTION INFILTRATION; PERINEURAL
Status: COMPLETED | OUTPATIENT
Start: 2020-01-01 | End: 2020-01-01

## 2020-01-01 RX ORDER — ONDANSETRON 2 MG/ML
4 INJECTION INTRAMUSCULAR; INTRAVENOUS ONCE AS NEEDED
Status: DISCONTINUED | OUTPATIENT
Start: 2020-01-01 | End: 2020-01-01 | Stop reason: HOSPADM

## 2020-01-01 RX ORDER — ONDANSETRON HYDROCHLORIDE 8 MG/1
8 TABLET, FILM COATED ORAL 3 TIMES DAILY PRN
Qty: 30 TABLET | Refills: 5 | Status: SHIPPED | OUTPATIENT
Start: 2020-01-01

## 2020-01-01 RX ORDER — ACETAMINOPHEN 500 MG
1000 TABLET ORAL ONCE
Status: COMPLETED | OUTPATIENT
Start: 2020-01-01 | End: 2020-01-01

## 2020-01-01 RX ORDER — DEXAMETHASONE 4 MG/1
TABLET ORAL
Qty: 6 TABLET | Refills: 3 | Status: SHIPPED | OUTPATIENT
Start: 2020-01-01 | End: 2020-01-01

## 2020-01-01 RX ORDER — POLYETHYLENE GLYCOL 3350 17 G/17G
17 POWDER, FOR SOLUTION ORAL DAILY
Status: DISCONTINUED | OUTPATIENT
Start: 2020-01-01 | End: 2020-01-01

## 2020-01-01 RX ORDER — FOLIC ACID 1 MG/1
1 TABLET ORAL DAILY
Qty: 30 TABLET | Refills: 2 | Status: SHIPPED | OUTPATIENT
Start: 2020-01-01 | End: 2020-01-01 | Stop reason: SDUPTHER

## 2020-01-01 RX ORDER — LIDOCAINE HYDROCHLORIDE 20 MG/ML
JELLY TOPICAL AS NEEDED
Status: DISCONTINUED | OUTPATIENT
Start: 2020-01-01 | End: 2020-01-01 | Stop reason: HOSPADM

## 2020-01-01 RX ORDER — DEXAMETHASONE 4 MG/1
4 TABLET ORAL 2 TIMES DAILY WITH MEALS
COMMUNITY

## 2020-01-01 RX ORDER — PANTOPRAZOLE SODIUM 40 MG/10ML
40 INJECTION, POWDER, LYOPHILIZED, FOR SOLUTION INTRAVENOUS
Status: DISCONTINUED | OUTPATIENT
Start: 2020-01-01 | End: 2020-01-01

## 2020-01-01 RX ORDER — FLUMAZENIL 0.1 MG/ML
0.1 INJECTION INTRAVENOUS AS NEEDED
Status: DISCONTINUED | OUTPATIENT
Start: 2020-01-01 | End: 2020-01-01 | Stop reason: HOSPADM

## 2020-01-01 RX ORDER — OXYCODONE HYDROCHLORIDE 5 MG/1
10 TABLET ORAL ONCE AS NEEDED
Status: COMPLETED | OUTPATIENT
Start: 2020-01-01 | End: 2020-01-01

## 2020-01-01 RX ORDER — ACETAMINOPHEN 325 MG/1
650 TABLET ORAL EVERY 4 HOURS PRN
Status: DISCONTINUED | OUTPATIENT
Start: 2020-01-01 | End: 2020-01-01 | Stop reason: HOSPADM

## 2020-01-01 RX ORDER — HEPARIN SODIUM 10000 [USP'U]/ML
INJECTION, SOLUTION INTRAVENOUS; SUBCUTANEOUS AS NEEDED
Status: DISCONTINUED | OUTPATIENT
Start: 2020-01-01 | End: 2020-01-01 | Stop reason: HOSPADM

## 2020-01-01 RX ORDER — LIDOCAINE HYDROCHLORIDE 20 MG/ML
INJECTION, SOLUTION INFILTRATION; PERINEURAL AS NEEDED
Status: DISCONTINUED | OUTPATIENT
Start: 2020-01-01 | End: 2020-01-01 | Stop reason: HOSPADM

## 2020-01-01 RX ORDER — IPRATROPIUM BROMIDE AND ALBUTEROL SULFATE 2.5; .5 MG/3ML; MG/3ML
3 SOLUTION RESPIRATORY (INHALATION) ONCE AS NEEDED
Status: CANCELLED | OUTPATIENT
Start: 2020-01-01

## 2020-01-01 RX ORDER — PALONOSETRON 0.05 MG/ML
0.25 INJECTION, SOLUTION INTRAVENOUS ONCE
Status: COMPLETED | OUTPATIENT
Start: 2020-01-01 | End: 2020-01-01

## 2020-01-01 RX ORDER — SODIUM CHLORIDE 9 MG/ML
250 INJECTION, SOLUTION INTRAVENOUS AS NEEDED
Status: CANCELLED | OUTPATIENT
Start: 2020-01-01

## 2020-01-01 RX ORDER — METOCLOPRAMIDE 10 MG/1
10 TABLET ORAL
Qty: 90 TABLET | Refills: 1 | Status: SHIPPED | OUTPATIENT
Start: 2020-01-01

## 2020-01-01 RX ORDER — ECHINACEA PURPUREA EXTRACT 125 MG
2 TABLET ORAL AS NEEDED
Status: DISCONTINUED | OUTPATIENT
Start: 2020-01-01 | End: 2020-01-01 | Stop reason: HOSPADM

## 2020-01-01 RX ORDER — HEPARIN SODIUM (PORCINE) LOCK FLUSH IV SOLN 100 UNIT/ML 100 UNIT/ML
500 SOLUTION INTRAVENOUS AS NEEDED
Status: CANCELLED | OUTPATIENT
Start: 2020-01-01

## 2020-01-01 RX ORDER — SODIUM CHLORIDE 0.9 % (FLUSH) 0.9 %
20 SYRINGE (ML) INJECTION AS NEEDED
Status: CANCELLED | OUTPATIENT
Start: 2020-01-01

## 2020-01-01 RX ORDER — ALBUTEROL SULFATE 90 UG/1
AEROSOL, METERED RESPIRATORY (INHALATION)
Refills: 0 | OUTPATIENT
Start: 2020-01-01

## 2020-01-01 RX ORDER — CYANOCOBALAMIN 1000 UG/ML
1000 INJECTION, SOLUTION INTRAMUSCULAR; SUBCUTANEOUS ONCE
Status: CANCELLED | OUTPATIENT
Start: 2020-01-01

## 2020-01-01 RX ORDER — ONDANSETRON 2 MG/ML
4 INJECTION INTRAMUSCULAR; INTRAVENOUS EVERY 6 HOURS PRN
Status: DISCONTINUED | OUTPATIENT
Start: 2020-01-01 | End: 2020-01-01

## 2020-01-01 RX ORDER — LANSOPRAZOLE
30 KIT EVERY MORNING
Status: DISCONTINUED | OUTPATIENT
Start: 2020-01-01 | End: 2020-01-01 | Stop reason: HOSPADM

## 2020-01-01 RX ORDER — LIDOCAINE HYDROCHLORIDE 20 MG/ML
JELLY TOPICAL
Status: DISCONTINUED
Start: 2020-01-01 | End: 2020-01-01 | Stop reason: HOSPADM

## 2020-01-01 RX ORDER — LIDOCAINE HYDROCHLORIDE 20 MG/ML
INJECTION, SOLUTION INTRAVENOUS
Status: DISCONTINUED
Start: 2020-01-01 | End: 2020-01-01 | Stop reason: HOSPADM

## 2020-01-01 RX ORDER — HYDROMORPHONE HCL 110MG/55ML
0.2 PATIENT CONTROLLED ANALGESIA SYRINGE INTRAVENOUS
Status: CANCELLED | OUTPATIENT
Start: 2020-01-01 | End: 2020-01-01

## 2020-01-01 RX ORDER — ERGOCALCIFEROL (VITAMIN D2) 10 MCG
400 TABLET ORAL DAILY
COMMUNITY

## 2020-01-01 RX ORDER — LIDOCAINE HYDROCHLORIDE AND EPINEPHRINE 10; 10 MG/ML; UG/ML
INJECTION, SOLUTION INFILTRATION; PERINEURAL AS NEEDED
Status: DISCONTINUED | OUTPATIENT
Start: 2020-01-01 | End: 2020-01-01 | Stop reason: HOSPADM

## 2020-01-01 RX ORDER — PREDNISONE 20 MG/1
20 TABLET ORAL DAILY
Status: DISCONTINUED | OUTPATIENT
Start: 2020-01-01 | End: 2020-01-01 | Stop reason: HOSPADM

## 2020-01-01 RX ORDER — HYDROMORPHONE HCL 110MG/55ML
0.25 PATIENT CONTROLLED ANALGESIA SYRINGE INTRAVENOUS
Status: CANCELLED | OUTPATIENT
Start: 2020-01-01 | End: 2020-01-01

## 2020-01-01 RX ORDER — PANTOPRAZOLE SODIUM 40 MG/1
40 TABLET, DELAYED RELEASE ORAL DAILY
Qty: 14 TABLET | Refills: 0 | Status: SHIPPED | OUTPATIENT
Start: 2020-01-01

## 2020-01-01 RX ORDER — CHOLECALCIFEROL (VITAMIN D3) 125 MCG
5 CAPSULE ORAL NIGHTLY PRN
Status: DISCONTINUED | OUTPATIENT
Start: 2020-01-01 | End: 2020-01-01 | Stop reason: HOSPADM

## 2020-01-01 RX ORDER — CEFDINIR 300 MG/1
300 CAPSULE ORAL 2 TIMES DAILY
Qty: 6 CAPSULE | Refills: 0 | Status: SHIPPED | OUTPATIENT
Start: 2020-01-01 | End: 2020-01-01

## 2020-01-01 RX ORDER — FUROSEMIDE 10 MG/ML
20 INJECTION INTRAMUSCULAR; INTRAVENOUS EVERY 8 HOURS
Status: COMPLETED | OUTPATIENT
Start: 2020-01-01 | End: 2020-01-01

## 2020-01-01 RX ORDER — MIRTAZAPINE 15 MG/1
15 TABLET, FILM COATED ORAL NIGHTLY
Qty: 30 TABLET | Refills: 5 | Status: SHIPPED | OUTPATIENT
Start: 2020-01-01

## 2020-01-01 RX ORDER — NICOTINE 21 MG/24HR
1 PATCH, TRANSDERMAL 24 HOURS TRANSDERMAL EVERY 24 HOURS
Qty: 21 PATCH | Refills: 0 | Status: SHIPPED | OUTPATIENT
Start: 2020-01-01 | End: 2020-01-01

## 2020-01-01 RX ORDER — CYANOCOBALAMIN 1000 UG/ML
1000 INJECTION, SOLUTION INTRAMUSCULAR; SUBCUTANEOUS
Status: DISCONTINUED | OUTPATIENT
Start: 2020-01-01 | End: 2020-01-01 | Stop reason: HOSPADM

## 2020-01-01 RX ORDER — LIDOCAINE HYDROCHLORIDE 20 MG/ML
INJECTION, SOLUTION INTRAVENOUS
Status: DISCONTINUED
Start: 2020-01-01 | End: 2020-01-01 | Stop reason: WASHOUT

## 2020-01-01 RX ORDER — LIDOCAINE HYDROCHLORIDE 20 MG/ML
INJECTION, SOLUTION EPIDURAL; INFILTRATION; INTRACAUDAL; PERINEURAL AS NEEDED
Status: DISCONTINUED | OUTPATIENT
Start: 2020-01-01 | End: 2020-01-01 | Stop reason: SURG

## 2020-01-01 RX ADMIN — METOCLOPRAMIDE 10 MG: 10 TABLET ORAL at 12:07

## 2020-01-01 RX ADMIN — IPRATROPIUM BROMIDE AND ALBUTEROL SULFATE 3 ML: 2.5; .5 SOLUTION RESPIRATORY (INHALATION) at 11:18

## 2020-01-01 RX ADMIN — METOPROLOL TARTRATE 25 MG: 25 TABLET, FILM COATED ORAL at 20:24

## 2020-01-01 RX ADMIN — DIPHENHYDRAMINE HYDROCHLORIDE 25 MG: 25 CAPSULE ORAL at 09:01

## 2020-01-01 RX ADMIN — NYSTATIN 500000 UNITS: 100000 SUSPENSION ORAL at 21:00

## 2020-01-01 RX ADMIN — IPRATROPIUM BROMIDE AND ALBUTEROL SULFATE 3 ML: 2.5; .5 SOLUTION RESPIRATORY (INHALATION) at 12:54

## 2020-01-01 RX ADMIN — FILGRASTIM 300 MCG: 300 INJECTION, SOLUTION INTRAVENOUS; SUBCUTANEOUS at 16:32

## 2020-01-01 RX ADMIN — MAGNESIUM SULFATE HEPTAHYDRATE 2 G: 40 INJECTION, SOLUTION INTRAVENOUS at 20:30

## 2020-01-01 RX ADMIN — METOPROLOL TARTRATE 50 MG: 50 TABLET, FILM COATED ORAL at 21:29

## 2020-01-01 RX ADMIN — PROPOFOL 200 MG: 10 INJECTION, EMULSION INTRAVENOUS at 09:46

## 2020-01-01 RX ADMIN — SUCRALFATE 1 G: 1 TABLET ORAL at 11:11

## 2020-01-01 RX ADMIN — NYSTATIN 500000 UNITS: 100000 SUSPENSION ORAL at 21:37

## 2020-01-01 RX ADMIN — SUCRALFATE 1 G: 1 TABLET ORAL at 17:25

## 2020-01-01 RX ADMIN — ACETAMINOPHEN 650 MG: 325 TABLET, FILM COATED ORAL at 09:01

## 2020-01-01 RX ADMIN — SODIUM CHLORIDE 5 MG/HR: 900 INJECTION, SOLUTION INTRAVENOUS at 18:38

## 2020-01-01 RX ADMIN — ENOXAPARIN SODIUM 90 MG: 100 INJECTION SUBCUTANEOUS at 20:24

## 2020-01-01 RX ADMIN — DILTIAZEM HYDROCHLORIDE 30 MG: 30 TABLET ORAL at 05:31

## 2020-01-01 RX ADMIN — SUCRALFATE 1 G: 1 TABLET ORAL at 17:24

## 2020-01-01 RX ADMIN — CEFTRIAXONE SODIUM 2 G: 2 INJECTION, POWDER, FOR SOLUTION INTRAMUSCULAR; INTRAVENOUS at 17:32

## 2020-01-01 RX ADMIN — METOCLOPRAMIDE 10 MG: 10 TABLET ORAL at 17:55

## 2020-01-01 RX ADMIN — METOPROLOL TARTRATE 50 MG: 50 TABLET, FILM COATED ORAL at 21:10

## 2020-01-01 RX ADMIN — PREDNISONE 30 MG: 10 TABLET ORAL at 12:19

## 2020-01-01 RX ADMIN — SUCRALFATE 1 G: 1 TABLET ORAL at 22:44

## 2020-01-01 RX ADMIN — DILTIAZEM HYDROCHLORIDE 30 MG: 30 TABLET ORAL at 05:28

## 2020-01-01 RX ADMIN — METOPROLOL TARTRATE 50 MG: 50 TABLET, FILM COATED ORAL at 20:42

## 2020-01-01 RX ADMIN — PANTOPRAZOLE SODIUM 40 MG: 40 TABLET, DELAYED RELEASE ORAL at 06:07

## 2020-01-01 RX ADMIN — SUCRALFATE 1 G: 1 TABLET ORAL at 17:16

## 2020-01-01 RX ADMIN — IPRATROPIUM BROMIDE AND ALBUTEROL SULFATE 3 ML: 2.5; .5 SOLUTION RESPIRATORY (INHALATION) at 11:34

## 2020-01-01 RX ADMIN — NYSTATIN 500000 UNITS: 100000 SUSPENSION ORAL at 20:12

## 2020-01-01 RX ADMIN — BUDESONIDE AND FORMOTEROL FUMARATE DIHYDRATE 2 PUFF: 160; 4.5 AEROSOL RESPIRATORY (INHALATION) at 06:50

## 2020-01-01 RX ADMIN — SUCRALFATE 1 G: 1 TABLET ORAL at 21:28

## 2020-01-01 RX ADMIN — FILGRASTIM 300 MCG: 300 INJECTION, SOLUTION INTRAVENOUS; SUBCUTANEOUS at 17:25

## 2020-01-01 RX ADMIN — NYSTATIN 500000 UNITS: 100000 SUSPENSION ORAL at 17:07

## 2020-01-01 RX ADMIN — NYSTATIN 500000 UNITS: 100000 SUSPENSION ORAL at 13:01

## 2020-01-01 RX ADMIN — CEFAZOLIN 1 G: 1 INJECTION, POWDER, FOR SOLUTION INTRAMUSCULAR; INTRAVENOUS at 15:17

## 2020-01-01 RX ADMIN — HYDROMORPHONE HYDROCHLORIDE 0.25 MG: 2 INJECTION, SOLUTION INTRAMUSCULAR; INTRAVENOUS; SUBCUTANEOUS at 11:52

## 2020-01-01 RX ADMIN — NYSTATIN 500000 UNITS: 100000 SUSPENSION ORAL at 11:39

## 2020-01-01 RX ADMIN — IOPAMIDOL 100 ML: 755 INJECTION, SOLUTION INTRAVENOUS at 09:15

## 2020-01-01 RX ADMIN — SUCRALFATE 1 G: 1 TABLET ORAL at 21:37

## 2020-01-01 RX ADMIN — DILTIAZEM HYDROCHLORIDE 30 MG: 30 TABLET ORAL at 11:34

## 2020-01-01 RX ADMIN — BUDESONIDE AND FORMOTEROL FUMARATE DIHYDRATE 2 PUFF: 160; 4.5 AEROSOL RESPIRATORY (INHALATION) at 07:30

## 2020-01-01 RX ADMIN — Medication 10 ML: at 20:24

## 2020-01-01 RX ADMIN — IPRATROPIUM BROMIDE AND ALBUTEROL SULFATE 3 ML: 2.5; .5 SOLUTION RESPIRATORY (INHALATION) at 15:17

## 2020-01-01 RX ADMIN — METOCLOPRAMIDE 10 MG: 10 TABLET ORAL at 09:17

## 2020-01-01 RX ADMIN — PANTOPRAZOLE SODIUM 40 MG: 40 TABLET, DELAYED RELEASE ORAL at 06:09

## 2020-01-01 RX ADMIN — Medication 10 ML: at 08:58

## 2020-01-01 RX ADMIN — SUCRALFATE 1 G: 1 TABLET ORAL at 09:32

## 2020-01-01 RX ADMIN — POTASSIUM, SODIUM PHOSPHATES 280 MG-160 MG-250 MG ORAL POWDER PACKET 2 PACKET: POWDER IN PACKET at 08:58

## 2020-01-01 RX ADMIN — IPRATROPIUM BROMIDE AND ALBUTEROL SULFATE 3 ML: 2.5; .5 SOLUTION RESPIRATORY (INHALATION) at 07:38

## 2020-01-01 RX ADMIN — FUROSEMIDE 40 MG: 10 INJECTION, SOLUTION INTRAMUSCULAR; INTRAVENOUS at 05:30

## 2020-01-01 RX ADMIN — FUROSEMIDE 40 MG: 10 INJECTION, SOLUTION INTRAMUSCULAR; INTRAVENOUS at 17:56

## 2020-01-01 RX ADMIN — NYSTATIN 500000 UNITS: 100000 SUSPENSION ORAL at 17:25

## 2020-01-01 RX ADMIN — METOPROLOL TARTRATE 25 MG: 25 TABLET, FILM COATED ORAL at 22:42

## 2020-01-01 RX ADMIN — DILTIAZEM HYDROCHLORIDE 30 MG: 30 TABLET ORAL at 01:22

## 2020-01-01 RX ADMIN — SUCRALFATE 1 G: 1 TABLET ORAL at 21:00

## 2020-01-01 RX ADMIN — MIRTAZAPINE 15 MG: 15 TABLET, FILM COATED ORAL at 21:29

## 2020-01-01 RX ADMIN — Medication 10 ML: at 09:16

## 2020-01-01 RX ADMIN — HEPARIN 500 UNITS: 100 SYRINGE at 16:34

## 2020-01-01 RX ADMIN — BUDESONIDE AND FORMOTEROL FUMARATE DIHYDRATE 2 PUFF: 160; 4.5 AEROSOL RESPIRATORY (INHALATION) at 07:16

## 2020-01-01 RX ADMIN — LIDOCAINE HYDROCHLORIDE 60 MG: 20 INJECTION, SOLUTION EPIDURAL; INFILTRATION; INTRACAUDAL; PERINEURAL at 07:25

## 2020-01-01 RX ADMIN — FOLIC ACID 1 MG: 1 TABLET ORAL at 08:58

## 2020-01-01 RX ADMIN — IPRATROPIUM BROMIDE AND ALBUTEROL SULFATE 3 ML: 2.5; .5 SOLUTION RESPIRATORY (INHALATION) at 11:41

## 2020-01-01 RX ADMIN — POTASSIUM CHLORIDE 10 MEQ: 7.46 INJECTION, SOLUTION INTRAVENOUS at 06:19

## 2020-01-01 RX ADMIN — FENTANYL CITRATE 50 MCG: 50 INJECTION, SOLUTION INTRAMUSCULAR; INTRAVENOUS at 11:43

## 2020-01-01 RX ADMIN — SUCRALFATE 1 G: 1 TABLET ORAL at 17:55

## 2020-01-01 RX ADMIN — SUCRALFATE 1 G: 1 TABLET ORAL at 17:07

## 2020-01-01 RX ADMIN — SUCRALFATE 1 G: 1 TABLET ORAL at 08:38

## 2020-01-01 RX ADMIN — CEFTRIAXONE SODIUM 1 G: 10 INJECTION, POWDER, FOR SOLUTION INTRAVENOUS at 14:51

## 2020-01-01 RX ADMIN — PIPERACILLIN AND TAZOBACTAM 3.38 G: 3; .375 INJECTION, POWDER, LYOPHILIZED, FOR SOLUTION INTRAVENOUS at 07:05

## 2020-01-01 RX ADMIN — DOXYCYCLINE 100 MG: 100 TABLET, FILM COATED ORAL at 05:20

## 2020-01-01 RX ADMIN — PIPERACILLIN AND TAZOBACTAM 3.38 G: 3; .375 INJECTION, POWDER, LYOPHILIZED, FOR SOLUTION INTRAVENOUS at 11:44

## 2020-01-01 RX ADMIN — DILTIAZEM HYDROCHLORIDE 30 MG: 30 TABLET ORAL at 13:01

## 2020-01-01 RX ADMIN — DILTIAZEM HYDROCHLORIDE 30 MG: 30 TABLET ORAL at 12:07

## 2020-01-01 RX ADMIN — BUDESONIDE AND FORMOTEROL FUMARATE DIHYDRATE 2 PUFF: 160; 4.5 AEROSOL RESPIRATORY (INHALATION) at 20:44

## 2020-01-01 RX ADMIN — FLUDEOXYGLUCOSE F18 1 DOSE: 300 INJECTION INTRAVENOUS at 13:45

## 2020-01-01 RX ADMIN — Medication 10 ML: at 20:15

## 2020-01-01 RX ADMIN — Medication 10 ML: at 11:18

## 2020-01-01 RX ADMIN — METOCLOPRAMIDE 10 MG: 10 TABLET ORAL at 09:22

## 2020-01-01 RX ADMIN — METOPROLOL TARTRATE 50 MG: 50 TABLET, FILM COATED ORAL at 08:38

## 2020-01-01 RX ADMIN — NYSTATIN 500000 UNITS: 100000 SUSPENSION ORAL at 21:52

## 2020-01-01 RX ADMIN — IPRATROPIUM BROMIDE AND ALBUTEROL SULFATE 3 ML: 2.5; .5 SOLUTION RESPIRATORY (INHALATION) at 15:50

## 2020-01-01 RX ADMIN — DILTIAZEM HYDROCHLORIDE 30 MG: 30 TABLET ORAL at 01:18

## 2020-01-01 RX ADMIN — MIRTAZAPINE 15 MG: 15 TABLET, FILM COATED ORAL at 20:42

## 2020-01-01 RX ADMIN — FUROSEMIDE 20 MG: 20 INJECTION, SOLUTION INTRAMUSCULAR; INTRAVENOUS at 19:36

## 2020-01-01 RX ADMIN — PIPERACILLIN SODIUM,TAZOBACTAM SODIUM 3.38 G: 3; .375 INJECTION, POWDER, FOR SOLUTION INTRAVENOUS at 21:28

## 2020-01-01 RX ADMIN — NYSTATIN 500000 UNITS: 100000 SUSPENSION ORAL at 11:54

## 2020-01-01 RX ADMIN — NYSTATIN 500000 UNITS: 100000 SUSPENSION ORAL at 17:11

## 2020-01-01 RX ADMIN — IPRATROPIUM BROMIDE AND ALBUTEROL SULFATE 3 ML: 2.5; .5 SOLUTION RESPIRATORY (INHALATION) at 20:01

## 2020-01-01 RX ADMIN — Medication 10 ML: at 08:45

## 2020-01-01 RX ADMIN — IPRATROPIUM BROMIDE AND ALBUTEROL SULFATE 3 ML: 2.5; .5 SOLUTION RESPIRATORY (INHALATION) at 11:23

## 2020-01-01 RX ADMIN — PREDNISONE 80 MG: 20 TABLET ORAL at 08:55

## 2020-01-01 RX ADMIN — DILTIAZEM HYDROCHLORIDE 30 MG: 30 TABLET ORAL at 05:45

## 2020-01-01 RX ADMIN — METOCLOPRAMIDE 10 MG: 10 TABLET ORAL at 17:16

## 2020-01-01 RX ADMIN — PIPERACILLIN AND TAZOBACTAM 3.38 G: 3; .375 INJECTION, POWDER, LYOPHILIZED, FOR SOLUTION INTRAVENOUS at 19:06

## 2020-01-01 RX ADMIN — DILTIAZEM HYDROCHLORIDE 30 MG: 30 TABLET ORAL at 00:13

## 2020-01-01 RX ADMIN — MIRTAZAPINE 15 MG: 15 TABLET, FILM COATED ORAL at 20:58

## 2020-01-01 RX ADMIN — METOPROLOL TARTRATE 50 MG: 50 TABLET, FILM COATED ORAL at 09:57

## 2020-01-01 RX ADMIN — IPRATROPIUM BROMIDE AND ALBUTEROL SULFATE 3 ML: 2.5; .5 SOLUTION RESPIRATORY (INHALATION) at 12:18

## 2020-01-01 RX ADMIN — DOXYCYCLINE 100 MG: 100 INJECTION, POWDER, LYOPHILIZED, FOR SOLUTION INTRAVENOUS at 14:32

## 2020-01-01 RX ADMIN — METOPROLOL TARTRATE 50 MG: 50 TABLET, FILM COATED ORAL at 09:16

## 2020-01-01 RX ADMIN — ROMIPLOSTIM 80 MCG: 250 INJECTION, POWDER, LYOPHILIZED, FOR SOLUTION SUBCUTANEOUS at 15:38

## 2020-01-01 RX ADMIN — SODIUM CHLORIDE 250 ML: 9 INJECTION, SOLUTION INTRAVENOUS at 09:28

## 2020-01-01 RX ADMIN — TRAZODONE HYDROCHLORIDE 50 MG: 50 TABLET ORAL at 21:00

## 2020-01-01 RX ADMIN — NYSTATIN 500000 UNITS: 100000 SUSPENSION ORAL at 20:37

## 2020-01-01 RX ADMIN — PIPERACILLIN AND TAZOBACTAM 3.38 G: 3; .375 INJECTION, POWDER, LYOPHILIZED, FOR SOLUTION INTRAVENOUS at 03:21

## 2020-01-01 RX ADMIN — IPRATROPIUM BROMIDE AND ALBUTEROL SULFATE 3 ML: 2.5; .5 SOLUTION RESPIRATORY (INHALATION) at 19:20

## 2020-01-01 RX ADMIN — ACETAMINOPHEN 1000 MG: 500 TABLET, FILM COATED ORAL at 13:21

## 2020-01-01 RX ADMIN — IPRATROPIUM BROMIDE AND ALBUTEROL SULFATE 3 ML: 2.5; .5 SOLUTION RESPIRATORY (INHALATION) at 07:30

## 2020-01-01 RX ADMIN — IPRATROPIUM BROMIDE AND ALBUTEROL SULFATE 3 ML: 2.5; .5 SOLUTION RESPIRATORY (INHALATION) at 06:50

## 2020-01-01 RX ADMIN — ALBUTEROL SULFATE 2 PUFF: 90 AEROSOL, METERED RESPIRATORY (INHALATION) at 14:42

## 2020-01-01 RX ADMIN — PIPERACILLIN AND TAZOBACTAM 3.38 G: 3; .375 INJECTION, POWDER, LYOPHILIZED, FOR SOLUTION INTRAVENOUS at 11:25

## 2020-01-01 RX ADMIN — NYSTATIN 500000 UNITS: 100000 SUSPENSION ORAL at 08:38

## 2020-01-01 RX ADMIN — IPRATROPIUM BROMIDE AND ALBUTEROL SULFATE 3 ML: 2.5; .5 SOLUTION RESPIRATORY (INHALATION) at 14:29

## 2020-01-01 RX ADMIN — BUDESONIDE AND FORMOTEROL FUMARATE DIHYDRATE 2 PUFF: 160; 4.5 AEROSOL RESPIRATORY (INHALATION) at 20:58

## 2020-01-01 RX ADMIN — NYSTATIN 500000 UNITS: 100000 SUSPENSION ORAL at 09:26

## 2020-01-01 RX ADMIN — IPRATROPIUM BROMIDE AND ALBUTEROL SULFATE 3 ML: 2.5; .5 SOLUTION RESPIRATORY (INHALATION) at 07:31

## 2020-01-01 RX ADMIN — MAGNESIUM HYDROXIDE 10 ML: 2400 SUSPENSION ORAL at 10:38

## 2020-01-01 RX ADMIN — METOPROLOL TARTRATE 50 MG: 50 TABLET, FILM COATED ORAL at 20:58

## 2020-01-01 RX ADMIN — DILTIAZEM HYDROCHLORIDE 30 MG: 30 TABLET ORAL at 23:30

## 2020-01-01 RX ADMIN — PROPOFOL 150 MG: 10 INJECTION, EMULSION INTRAVENOUS at 11:47

## 2020-01-01 RX ADMIN — BUDESONIDE AND FORMOTEROL FUMARATE DIHYDRATE 2 PUFF: 160; 4.5 AEROSOL RESPIRATORY (INHALATION) at 20:21

## 2020-01-01 RX ADMIN — SODIUM CHLORIDE 100 ML: 900 INJECTION, SOLUTION INTRAVENOUS at 09:29

## 2020-01-01 RX ADMIN — ACETAMINOPHEN 1000 MG: 500 TABLET, FILM COATED ORAL at 15:39

## 2020-01-01 RX ADMIN — POTASSIUM CHLORIDE 40 MEQ: 1.5 POWDER, FOR SOLUTION ORAL at 11:13

## 2020-01-01 RX ADMIN — SODIUM CHLORIDE 1070 MG: 900 INJECTION, SOLUTION INTRAVENOUS at 12:50

## 2020-01-01 RX ADMIN — SUCRALFATE 1 G: 1 TABLET ORAL at 11:50

## 2020-01-01 RX ADMIN — BUDESONIDE AND FORMOTEROL FUMARATE DIHYDRATE 2 PUFF: 160; 4.5 AEROSOL RESPIRATORY (INHALATION) at 07:38

## 2020-01-01 RX ADMIN — NYSTATIN 500000 UNITS: 100000 SUSPENSION ORAL at 17:56

## 2020-01-01 RX ADMIN — SUCRALFATE 1 G: 1 TABLET ORAL at 21:29

## 2020-01-01 RX ADMIN — Medication 10 ML: at 09:03

## 2020-01-01 RX ADMIN — ALBUTEROL SULFATE 2.5 MG: 2.5 SOLUTION RESPIRATORY (INHALATION) at 12:56

## 2020-01-01 RX ADMIN — CEFTRIAXONE SODIUM 2 G: 10 INJECTION, POWDER, FOR SOLUTION INTRAVENOUS at 14:32

## 2020-01-01 RX ADMIN — POTASSIUM, SODIUM PHOSPHATES 280 MG-160 MG-250 MG ORAL POWDER PACKET 2 PACKET: POWDER IN PACKET at 08:38

## 2020-01-01 RX ADMIN — DILTIAZEM HYDROCHLORIDE 30 MG: 30 TABLET ORAL at 00:51

## 2020-01-01 RX ADMIN — NYSTATIN 500000 UNITS: 100000 SUSPENSION ORAL at 08:58

## 2020-01-01 RX ADMIN — SODIUM CHLORIDE 100 ML/HR: 9 INJECTION, SOLUTION INTRAVENOUS at 23:01

## 2020-01-01 RX ADMIN — SUCRALFATE 1 G: 1 TABLET ORAL at 16:32

## 2020-01-01 RX ADMIN — METOPROLOL TARTRATE 50 MG: 50 TABLET, FILM COATED ORAL at 22:02

## 2020-01-01 RX ADMIN — FUROSEMIDE 40 MG: 10 INJECTION, SOLUTION INTRAMUSCULAR; INTRAVENOUS at 05:22

## 2020-01-01 RX ADMIN — BUDESONIDE AND FORMOTEROL FUMARATE DIHYDRATE 2 PUFF: 160; 4.5 AEROSOL RESPIRATORY (INHALATION) at 18:36

## 2020-01-01 RX ADMIN — GUAIFENESIN 1200 MG: 600 TABLET, EXTENDED RELEASE ORAL at 20:24

## 2020-01-01 RX ADMIN — APIXABAN 5 MG: 5 TABLET, FILM COATED ORAL at 17:29

## 2020-01-01 RX ADMIN — IPRATROPIUM BROMIDE AND ALBUTEROL SULFATE 3 ML: 2.5; .5 SOLUTION RESPIRATORY (INHALATION) at 07:28

## 2020-01-01 RX ADMIN — BUDESONIDE AND FORMOTEROL FUMARATE DIHYDRATE 2 PUFF: 160; 4.5 AEROSOL RESPIRATORY (INHALATION) at 19:43

## 2020-01-01 RX ADMIN — DILTIAZEM HYDROCHLORIDE 30 MG: 30 TABLET ORAL at 06:09

## 2020-01-01 RX ADMIN — MAGNESIUM SULFATE HEPTAHYDRATE 4 G: 40 INJECTION, SOLUTION INTRAVENOUS at 10:00

## 2020-01-01 RX ADMIN — METOPROLOL TARTRATE 50 MG: 50 TABLET, FILM COATED ORAL at 21:37

## 2020-01-01 RX ADMIN — ROCURONIUM BROMIDE 40 MG: 10 INJECTION, SOLUTION INTRAVENOUS at 11:47

## 2020-01-01 RX ADMIN — Medication 10 ML: at 14:01

## 2020-01-01 RX ADMIN — MIRTAZAPINE 15 MG: 15 TABLET, FILM COATED ORAL at 20:12

## 2020-01-01 RX ADMIN — NYSTATIN 500000 UNITS: 100000 SUSPENSION ORAL at 20:57

## 2020-01-01 RX ADMIN — IPRATROPIUM BROMIDE AND ALBUTEROL SULFATE 3 ML: 2.5; .5 SOLUTION RESPIRATORY (INHALATION) at 16:00

## 2020-01-01 RX ADMIN — MAGNESIUM SULFATE HEPTAHYDRATE 4 G: 40 INJECTION, SOLUTION INTRAVENOUS at 11:17

## 2020-01-01 RX ADMIN — NYSTATIN 500000 UNITS: 100000 SUSPENSION ORAL at 20:13

## 2020-01-01 RX ADMIN — FOLIC ACID 1 MG: 1 TABLET ORAL at 14:31

## 2020-01-01 RX ADMIN — DILTIAZEM HYDROCHLORIDE 30 MG: 30 TABLET ORAL at 01:55

## 2020-01-01 RX ADMIN — IPRATROPIUM BROMIDE AND ALBUTEROL SULFATE 3 ML: 2.5; .5 SOLUTION RESPIRATORY (INHALATION) at 21:12

## 2020-01-01 RX ADMIN — BUDESONIDE AND FORMOTEROL FUMARATE DIHYDRATE 2 PUFF: 160; 4.5 AEROSOL RESPIRATORY (INHALATION) at 20:17

## 2020-01-01 RX ADMIN — TRAZODONE HYDROCHLORIDE 50 MG: 50 TABLET ORAL at 21:10

## 2020-01-01 RX ADMIN — DILTIAZEM HYDROCHLORIDE 30 MG: 30 TABLET ORAL at 05:58

## 2020-01-01 RX ADMIN — IPRATROPIUM BROMIDE AND ALBUTEROL SULFATE 3 ML: 2.5; .5 SOLUTION RESPIRATORY (INHALATION) at 15:56

## 2020-01-01 RX ADMIN — METOPROLOL TARTRATE 50 MG: 50 TABLET, FILM COATED ORAL at 21:00

## 2020-01-01 RX ADMIN — SUCRALFATE 1 G: 1 TABLET ORAL at 11:39

## 2020-01-01 RX ADMIN — FILGRASTIM 300 MCG: 300 INJECTION, SOLUTION INTRAVENOUS; SUBCUTANEOUS at 17:11

## 2020-01-01 RX ADMIN — IPRATROPIUM BROMIDE AND ALBUTEROL SULFATE 3 ML: 2.5; .5 SOLUTION RESPIRATORY (INHALATION) at 11:50

## 2020-01-01 RX ADMIN — SUCRALFATE 1 G: 1 TABLET ORAL at 20:42

## 2020-01-01 RX ADMIN — DILTIAZEM HYDROCHLORIDE 30 MG: 30 TABLET ORAL at 00:05

## 2020-01-01 RX ADMIN — GUAIFENESIN 1200 MG: 600 TABLET, EXTENDED RELEASE ORAL at 20:56

## 2020-01-01 RX ADMIN — METOCLOPRAMIDE 10 MG: 10 TABLET ORAL at 17:07

## 2020-01-01 RX ADMIN — METOPROLOL TARTRATE 50 MG: 50 TABLET, FILM COATED ORAL at 07:57

## 2020-01-01 RX ADMIN — OXYCODONE HYDROCHLORIDE 10 MG: 5 TABLET ORAL at 01:17

## 2020-01-01 RX ADMIN — PROPOFOL 135 MCG/KG/MIN: 10 INJECTION, EMULSION INTRAVENOUS at 11:49

## 2020-01-01 RX ADMIN — DILTIAZEM HYDROCHLORIDE 30 MG: 30 TABLET ORAL at 17:24

## 2020-01-01 RX ADMIN — PANTOPRAZOLE SODIUM 40 MG: 40 TABLET, DELAYED RELEASE ORAL at 05:57

## 2020-01-01 RX ADMIN — POTASSIUM CHLORIDE 10 MEQ: 7.46 INJECTION, SOLUTION INTRAVENOUS at 07:25

## 2020-01-01 RX ADMIN — NYSTATIN 500000 UNITS: 100000 SUSPENSION ORAL at 13:58

## 2020-01-01 RX ADMIN — IPRATROPIUM BROMIDE AND ALBUTEROL SULFATE 3 ML: 2.5; .5 SOLUTION RESPIRATORY (INHALATION) at 19:32

## 2020-01-01 RX ADMIN — DILTIAZEM HYDROCHLORIDE 30 MG: 30 TABLET ORAL at 17:57

## 2020-01-01 RX ADMIN — BUDESONIDE AND FORMOTEROL FUMARATE DIHYDRATE 2 PUFF: 160; 4.5 AEROSOL RESPIRATORY (INHALATION) at 20:04

## 2020-01-01 RX ADMIN — DILTIAZEM HYDROCHLORIDE 30 MG: 30 TABLET ORAL at 11:38

## 2020-01-01 RX ADMIN — DILTIAZEM HYDROCHLORIDE 30 MG: 30 TABLET ORAL at 19:06

## 2020-01-01 RX ADMIN — FOLIC ACID 1 MG: 1 TABLET ORAL at 09:25

## 2020-01-01 RX ADMIN — POTASSIUM CHLORIDE 40 MEQ: 1500 TABLET, EXTENDED RELEASE ORAL at 06:27

## 2020-01-01 RX ADMIN — IPRATROPIUM BROMIDE AND ALBUTEROL SULFATE 3 ML: 2.5; .5 SOLUTION RESPIRATORY (INHALATION) at 19:54

## 2020-01-01 RX ADMIN — GUAIFENESIN 1200 MG: 600 TABLET, EXTENDED RELEASE ORAL at 08:46

## 2020-01-01 RX ADMIN — BUDESONIDE AND FORMOTEROL FUMARATE DIHYDRATE 2 PUFF: 160; 4.5 AEROSOL RESPIRATORY (INHALATION) at 19:52

## 2020-01-01 RX ADMIN — METOCLOPRAMIDE 10 MG: 10 TABLET ORAL at 11:11

## 2020-01-01 RX ADMIN — NYSTATIN 500000 UNITS: 100000 SUSPENSION ORAL at 16:32

## 2020-01-01 RX ADMIN — Medication 10 ML: at 14:46

## 2020-01-01 RX ADMIN — BUDESONIDE AND FORMOTEROL FUMARATE DIHYDRATE 2 PUFF: 160; 4.5 AEROSOL RESPIRATORY (INHALATION) at 19:32

## 2020-01-01 RX ADMIN — METOPROLOL TARTRATE 50 MG: 50 TABLET, FILM COATED ORAL at 09:17

## 2020-01-01 RX ADMIN — METOPROLOL TARTRATE 25 MG: 25 TABLET, FILM COATED ORAL at 10:39

## 2020-01-01 RX ADMIN — PREDNISONE 80 MG: 20 TABLET ORAL at 20:37

## 2020-01-01 RX ADMIN — CEFAZOLIN 1 G: 1 INJECTION, POWDER, FOR SOLUTION INTRAMUSCULAR; INTRAVENOUS at 12:19

## 2020-01-01 RX ADMIN — METOPROLOL TARTRATE 25 MG: 25 TABLET, FILM COATED ORAL at 08:47

## 2020-01-01 RX ADMIN — FOLIC ACID 1 MG: 1 TABLET ORAL at 11:11

## 2020-01-01 RX ADMIN — LIDOCAINE HYDROCHLORIDE 6 ML: 10 INJECTION, SOLUTION INFILTRATION; PERINEURAL at 16:28

## 2020-01-01 RX ADMIN — POTASSIUM CHLORIDE 40 MEQ: 1.5 POWDER, FOR SOLUTION ORAL at 14:03

## 2020-01-01 RX ADMIN — SODIUM CHLORIDE, POTASSIUM CHLORIDE, SODIUM LACTATE AND CALCIUM CHLORIDE 75 ML/HR: 600; 310; 30; 20 INJECTION, SOLUTION INTRAVENOUS at 17:57

## 2020-01-01 RX ADMIN — BUDESONIDE AND FORMOTEROL FUMARATE DIHYDRATE 2 PUFF: 160; 4.5 AEROSOL RESPIRATORY (INHALATION) at 20:02

## 2020-01-01 RX ADMIN — METOCLOPRAMIDE 10 MG: 10 TABLET ORAL at 09:15

## 2020-01-01 RX ADMIN — MIRTAZAPINE 15 MG: 15 TABLET, FILM COATED ORAL at 20:14

## 2020-01-01 RX ADMIN — IPRATROPIUM BROMIDE AND ALBUTEROL SULFATE 3 ML: 2.5; .5 SOLUTION RESPIRATORY (INHALATION) at 19:43

## 2020-01-01 RX ADMIN — POTASSIUM CHLORIDE 10 MEQ: 7.46 INJECTION, SOLUTION INTRAVENOUS at 05:17

## 2020-01-01 RX ADMIN — MAGNESIUM SULFATE HEPTAHYDRATE 4 G: 40 INJECTION, SOLUTION INTRAVENOUS at 09:16

## 2020-01-01 RX ADMIN — METOCLOPRAMIDE 10 MG: 10 TABLET ORAL at 13:37

## 2020-01-01 RX ADMIN — DILTIAZEM HYDROCHLORIDE 30 MG: 30 TABLET, FILM COATED ORAL at 16:51

## 2020-01-01 RX ADMIN — PALONOSETRON 0.25 MG: 0.25 INJECTION, SOLUTION INTRAVENOUS at 12:48

## 2020-01-01 RX ADMIN — Medication 10 ML: at 10:38

## 2020-01-01 RX ADMIN — SUCRALFATE 1 G: 1 TABLET ORAL at 09:57

## 2020-01-01 RX ADMIN — SODIUM CHLORIDE: 9 INJECTION, SOLUTION INTRAVENOUS at 12:37

## 2020-01-01 RX ADMIN — POLYETHYLENE GLYCOL 3350 34 G: 17 POWDER, FOR SOLUTION ORAL at 17:31

## 2020-01-01 RX ADMIN — IPRATROPIUM BROMIDE AND ALBUTEROL SULFATE 3 ML: 2.5; .5 SOLUTION RESPIRATORY (INHALATION) at 08:38

## 2020-01-01 RX ADMIN — BUDESONIDE AND FORMOTEROL FUMARATE DIHYDRATE 2 PUFF: 160; 4.5 AEROSOL RESPIRATORY (INHALATION) at 21:12

## 2020-01-01 RX ADMIN — CEFTRIAXONE SODIUM 2 G: 2 INJECTION, POWDER, FOR SOLUTION INTRAMUSCULAR; INTRAVENOUS at 12:20

## 2020-01-01 RX ADMIN — Medication 10 ML: at 09:26

## 2020-01-01 RX ADMIN — CEFTRIAXONE SODIUM 2 G: 2 INJECTION, POWDER, FOR SOLUTION INTRAMUSCULAR; INTRAVENOUS at 15:13

## 2020-01-01 RX ADMIN — POTASSIUM, SODIUM PHOSPHATES 280 MG-160 MG-250 MG ORAL POWDER PACKET 2 PACKET: POWDER IN PACKET at 15:47

## 2020-01-01 RX ADMIN — SUCRALFATE 1 G: 1 TABLET ORAL at 13:01

## 2020-01-01 RX ADMIN — FUROSEMIDE 20 MG: 20 INJECTION, SOLUTION INTRAMUSCULAR; INTRAVENOUS at 03:53

## 2020-01-01 RX ADMIN — IPRATROPIUM BROMIDE AND ALBUTEROL SULFATE 3 ML: 2.5; .5 SOLUTION RESPIRATORY (INHALATION) at 20:04

## 2020-01-01 RX ADMIN — IPRATROPIUM BROMIDE AND ALBUTEROL SULFATE 3 ML: 2.5; .5 SOLUTION RESPIRATORY (INHALATION) at 07:44

## 2020-01-01 RX ADMIN — METOCLOPRAMIDE 10 MG: 10 TABLET ORAL at 11:50

## 2020-01-01 RX ADMIN — FUROSEMIDE 20 MG: 20 INJECTION, SOLUTION INTRAMUSCULAR; INTRAVENOUS at 11:52

## 2020-01-01 RX ADMIN — SODIUM CHLORIDE 5 MG/HR: 900 INJECTION, SOLUTION INTRAVENOUS at 12:44

## 2020-01-01 RX ADMIN — POTASSIUM CHLORIDE 1000 ML: 2 INJECTION, SOLUTION, CONCENTRATE INTRAVENOUS at 13:45

## 2020-01-01 RX ADMIN — PANTOPRAZOLE SODIUM 40 MG: 40 INJECTION, POWDER, FOR SOLUTION INTRAVENOUS at 05:31

## 2020-01-01 RX ADMIN — FUROSEMIDE 40 MG: 10 INJECTION, SOLUTION INTRAMUSCULAR; INTRAVENOUS at 17:24

## 2020-01-01 RX ADMIN — GADOTERIDOL 20 ML: 279.3 INJECTION, SOLUTION INTRAVENOUS at 16:46

## 2020-01-01 RX ADMIN — METOPROLOL TARTRATE 50 MG: 50 TABLET, FILM COATED ORAL at 09:01

## 2020-01-01 RX ADMIN — METOPROLOL TARTRATE 50 MG: 50 TABLET, FILM COATED ORAL at 09:22

## 2020-01-01 RX ADMIN — NYSTATIN 500000 UNITS: 100000 SUSPENSION ORAL at 09:18

## 2020-01-01 RX ADMIN — METOCLOPRAMIDE 10 MG: 10 TABLET ORAL at 13:01

## 2020-01-01 RX ADMIN — METOPROLOL TARTRATE 50 MG: 50 TABLET, FILM COATED ORAL at 20:13

## 2020-01-01 RX ADMIN — IPRATROPIUM BROMIDE AND ALBUTEROL SULFATE 3 ML: 2.5; .5 SOLUTION RESPIRATORY (INHALATION) at 07:54

## 2020-01-01 RX ADMIN — METOCLOPRAMIDE 10 MG: 10 TABLET ORAL at 13:58

## 2020-01-01 RX ADMIN — SUCRALFATE 1 G: 1 TABLET ORAL at 21:10

## 2020-01-01 RX ADMIN — CEFAZOLIN SODIUM 2 G: 1 INJECTION, POWDER, FOR SOLUTION INTRAMUSCULAR; INTRAVENOUS at 09:46

## 2020-01-01 RX ADMIN — METOPROLOL TARTRATE 50 MG: 50 TABLET, FILM COATED ORAL at 09:25

## 2020-01-01 RX ADMIN — NYSTATIN 500000 UNITS: 100000 SUSPENSION ORAL at 08:53

## 2020-01-01 RX ADMIN — IPRATROPIUM BROMIDE AND ALBUTEROL SULFATE 3 ML: 2.5; .5 SOLUTION RESPIRATORY (INHALATION) at 11:25

## 2020-01-01 RX ADMIN — BUDESONIDE AND FORMOTEROL FUMARATE DIHYDRATE 2 PUFF: 160; 4.5 AEROSOL RESPIRATORY (INHALATION) at 07:08

## 2020-01-01 RX ADMIN — POTASSIUM, SODIUM PHOSPHATES 280 MG-160 MG-250 MG ORAL POWDER PACKET 2 PACKET: POWDER IN PACKET at 17:55

## 2020-01-01 RX ADMIN — Medication 10 ML: at 08:56

## 2020-01-01 RX ADMIN — LIDOCAINE HYDROCHLORIDE 50 MG: 10 INJECTION, SOLUTION EPIDURAL; INFILTRATION; INTRACAUDAL; PERINEURAL at 09:46

## 2020-01-01 RX ADMIN — FOLIC ACID 1 MG: 1 TABLET ORAL at 09:57

## 2020-01-01 RX ADMIN — PANTOPRAZOLE SODIUM 40 MG: 40 TABLET, DELAYED RELEASE ORAL at 05:16

## 2020-01-01 RX ADMIN — NYSTATIN 500000 UNITS: 100000 SUSPENSION ORAL at 22:46

## 2020-01-01 RX ADMIN — IPRATROPIUM BROMIDE AND ALBUTEROL SULFATE 3 ML: 2.5; .5 SOLUTION RESPIRATORY (INHALATION) at 20:02

## 2020-01-01 RX ADMIN — SUCRALFATE 1 G: 1 TABLET ORAL at 17:56

## 2020-01-01 RX ADMIN — DILTIAZEM HYDROCHLORIDE 30 MG: 30 TABLET ORAL at 05:21

## 2020-01-01 RX ADMIN — DILTIAZEM HYDROCHLORIDE 30 MG: 30 TABLET ORAL at 17:55

## 2020-01-01 RX ADMIN — METOCLOPRAMIDE 10 MG: 10 TABLET ORAL at 17:24

## 2020-01-01 RX ADMIN — DILTIAZEM HYDROCHLORIDE 30 MG: 30 TABLET ORAL at 05:57

## 2020-01-01 RX ADMIN — METOPROLOL TARTRATE 50 MG: 50 TABLET, FILM COATED ORAL at 11:43

## 2020-01-01 RX ADMIN — GUAIFENESIN 1200 MG: 600 TABLET, EXTENDED RELEASE ORAL at 10:39

## 2020-01-01 RX ADMIN — MIRTAZAPINE 15 MG: 15 TABLET, FILM COATED ORAL at 21:37

## 2020-01-01 RX ADMIN — LIDOCAINE: 50 OINTMENT TOPICAL at 13:40

## 2020-01-01 RX ADMIN — DILTIAZEM HYDROCHLORIDE 30 MG: 30 TABLET ORAL at 13:58

## 2020-01-01 RX ADMIN — TRAZODONE HYDROCHLORIDE 50 MG: 50 TABLET ORAL at 21:28

## 2020-01-01 RX ADMIN — METOPROLOL TARTRATE 50 MG: 50 TABLET, FILM COATED ORAL at 20:12

## 2020-01-01 RX ADMIN — Medication 10 ML: at 20:41

## 2020-01-01 RX ADMIN — PANTOPRAZOLE SODIUM 40 MG: 40 TABLET, DELAYED RELEASE ORAL at 05:58

## 2020-01-01 RX ADMIN — DOXYCYCLINE 100 MG: 100 TABLET, FILM COATED ORAL at 17:32

## 2020-01-01 RX ADMIN — DILTIAZEM HYDROCHLORIDE 30 MG: 30 TABLET ORAL at 17:11

## 2020-01-01 RX ADMIN — IPRATROPIUM BROMIDE AND ALBUTEROL SULFATE 3 ML: 2.5; .5 SOLUTION RESPIRATORY (INHALATION) at 07:08

## 2020-01-01 RX ADMIN — FUROSEMIDE 40 MG: 10 INJECTION, SOLUTION INTRAMUSCULAR; INTRAVENOUS at 04:42

## 2020-01-01 RX ADMIN — FUROSEMIDE 40 MG: 10 INJECTION, SOLUTION INTRAMUSCULAR; INTRAVENOUS at 06:09

## 2020-01-01 RX ADMIN — METOCLOPRAMIDE 10 MG: 10 TABLET ORAL at 17:25

## 2020-01-01 RX ADMIN — NYSTATIN 500000 UNITS: 100000 SUSPENSION ORAL at 20:42

## 2020-01-01 RX ADMIN — DILTIAZEM HYDROCHLORIDE 30 MG: 30 TABLET ORAL at 11:50

## 2020-01-01 RX ADMIN — FOLIC ACID 1 MG: 1 TABLET ORAL at 09:22

## 2020-01-01 RX ADMIN — BUDESONIDE AND FORMOTEROL FUMARATE DIHYDRATE 2 PUFF: 160; 4.5 AEROSOL RESPIRATORY (INHALATION) at 20:00

## 2020-01-01 RX ADMIN — NICOTINE 1 PATCH: 7 PATCH, EXTENDED RELEASE TRANSDERMAL at 11:12

## 2020-01-01 RX ADMIN — BUDESONIDE AND FORMOTEROL FUMARATE DIHYDRATE 2 PUFF: 160; 4.5 AEROSOL RESPIRATORY (INHALATION) at 08:39

## 2020-01-01 RX ADMIN — PROPOFOL 250 MG: 10 INJECTION, EMULSION INTRAVENOUS at 12:52

## 2020-01-01 RX ADMIN — FUROSEMIDE 40 MG: 10 INJECTION, SOLUTION INTRAMUSCULAR; INTRAVENOUS at 05:46

## 2020-01-01 RX ADMIN — TRAZODONE HYDROCHLORIDE 50 MG: 50 TABLET ORAL at 21:37

## 2020-01-01 RX ADMIN — MAGNESIUM SULFATE HEPTAHYDRATE 4 G: 40 INJECTION, SOLUTION INTRAVENOUS at 08:39

## 2020-01-01 RX ADMIN — Medication 10 ML: at 21:37

## 2020-01-01 RX ADMIN — Medication 10 ML: at 20:31

## 2020-01-01 RX ADMIN — DILTIAZEM HYDROCHLORIDE 30 MG: 30 TABLET ORAL at 17:16

## 2020-01-01 RX ADMIN — NYSTATIN 500000 UNITS: 100000 SUSPENSION ORAL at 09:30

## 2020-01-01 RX ADMIN — MAGNESIUM HYDROXIDE 10 ML: 2400 SUSPENSION ORAL at 08:47

## 2020-01-01 RX ADMIN — MIRTAZAPINE 15 MG: 15 TABLET, FILM COATED ORAL at 20:13

## 2020-01-01 RX ADMIN — ACETAMINOPHEN 650 MG: 325 TABLET, FILM COATED ORAL at 14:41

## 2020-01-01 RX ADMIN — FUROSEMIDE 40 MG: 10 INJECTION, SOLUTION INTRAMUSCULAR; INTRAVENOUS at 19:06

## 2020-01-01 RX ADMIN — MIRTAZAPINE 15 MG: 15 TABLET, FILM COATED ORAL at 20:39

## 2020-01-01 RX ADMIN — LIDOCAINE HYDROCHLORIDE 50 MG: 10 INJECTION, SOLUTION EPIDURAL; INFILTRATION; INTRACAUDAL; PERINEURAL at 11:47

## 2020-01-01 RX ADMIN — DILTIAZEM HYDROCHLORIDE 30 MG: 30 TABLET ORAL at 16:32

## 2020-01-01 RX ADMIN — IPRATROPIUM BROMIDE AND ALBUTEROL SULFATE 3 ML: 2.5; .5 SOLUTION RESPIRATORY (INHALATION) at 20:52

## 2020-01-01 RX ADMIN — ONDANSETRON 4 MG: 2 INJECTION INTRAMUSCULAR; INTRAVENOUS at 20:21

## 2020-01-01 RX ADMIN — Medication 10 ML: at 08:24

## 2020-01-01 RX ADMIN — NYSTATIN 500000 UNITS: 100000 SUSPENSION ORAL at 09:57

## 2020-01-01 RX ADMIN — CYANOCOBALAMIN 1000 MCG: 1000 INJECTION, SOLUTION INTRAMUSCULAR; SUBCUTANEOUS at 17:24

## 2020-01-01 RX ADMIN — DEXAMETHASONE SODIUM PHOSPHATE 12 MG: 4 INJECTION, SOLUTION INTRA-ARTICULAR; INTRALESIONAL; INTRAMUSCULAR; INTRAVENOUS; SOFT TISSUE at 12:32

## 2020-01-01 RX ADMIN — BUDESONIDE AND FORMOTEROL FUMARATE DIHYDRATE 2 PUFF: 160; 4.5 AEROSOL RESPIRATORY (INHALATION) at 19:54

## 2020-01-01 RX ADMIN — Medication 10 ML: at 09:21

## 2020-01-01 RX ADMIN — METOPROLOL TARTRATE 50 MG: 50 TABLET, FILM COATED ORAL at 20:37

## 2020-01-01 RX ADMIN — IPRATROPIUM BROMIDE AND ALBUTEROL SULFATE 3 ML: 2.5; .5 SOLUTION RESPIRATORY (INHALATION) at 14:45

## 2020-01-01 RX ADMIN — Medication 10 ML: at 16:34

## 2020-01-01 RX ADMIN — POTASSIUM, SODIUM PHOSPHATES 280 MG-160 MG-250 MG ORAL POWDER PACKET 2 PACKET: POWDER IN PACKET at 01:22

## 2020-01-01 RX ADMIN — METOCLOPRAMIDE 10 MG: 10 TABLET ORAL at 17:57

## 2020-01-01 RX ADMIN — NYSTATIN 500000 UNITS: 100000 SUSPENSION ORAL at 09:16

## 2020-01-01 RX ADMIN — IPRATROPIUM BROMIDE AND ALBUTEROL SULFATE 3 ML: 2.5; .5 SOLUTION RESPIRATORY (INHALATION) at 19:52

## 2020-01-01 RX ADMIN — METOCLOPRAMIDE 10 MG: 10 TABLET ORAL at 09:56

## 2020-01-01 RX ADMIN — METOCLOPRAMIDE 10 MG: 10 TABLET ORAL at 08:58

## 2020-01-01 RX ADMIN — METOPROLOL TARTRATE 50 MG: 50 TABLET, FILM COATED ORAL at 22:45

## 2020-01-01 RX ADMIN — DILTIAZEM HYDROCHLORIDE 30 MG: 30 TABLET ORAL at 17:07

## 2020-01-01 RX ADMIN — TRAZODONE HYDROCHLORIDE 50 MG: 50 TABLET ORAL at 20:41

## 2020-01-01 RX ADMIN — METOCLOPRAMIDE 10 MG: 10 TABLET ORAL at 17:11

## 2020-01-01 RX ADMIN — CYANOCOBALAMIN 1000 MCG: 1000 INJECTION INTRAMUSCULAR; SUBCUTANEOUS at 10:54

## 2020-01-01 RX ADMIN — IPRATROPIUM BROMIDE AND ALBUTEROL SULFATE 3 ML: 2.5; .5 SOLUTION RESPIRATORY (INHALATION) at 06:28

## 2020-01-01 RX ADMIN — NYSTATIN 500000 UNITS: 100000 SUSPENSION ORAL at 16:51

## 2020-01-01 RX ADMIN — DILTIAZEM HYDROCHLORIDE 30 MG: 30 TABLET ORAL at 00:54

## 2020-01-01 RX ADMIN — IPRATROPIUM BROMIDE AND ALBUTEROL SULFATE 3 ML: 2.5; .5 SOLUTION RESPIRATORY (INHALATION) at 20:21

## 2020-01-01 RX ADMIN — MIRTAZAPINE 15 MG: 15 TABLET, FILM COATED ORAL at 21:10

## 2020-01-01 RX ADMIN — Medication 10 ML: at 23:01

## 2020-01-01 RX ADMIN — PROPOFOL 120 MG: 10 INJECTION, EMULSION INTRAVENOUS at 07:39

## 2020-01-01 RX ADMIN — FILGRASTIM 300 MCG: 300 INJECTION, SOLUTION INTRAVENOUS; SUBCUTANEOUS at 17:15

## 2020-01-01 RX ADMIN — NYSTATIN 500000 UNITS: 100000 SUSPENSION ORAL at 17:15

## 2020-01-01 RX ADMIN — METOPROLOL TARTRATE 50 MG: 50 TABLET, FILM COATED ORAL at 14:31

## 2020-01-01 RX ADMIN — ENOXAPARIN SODIUM 90 MG: 100 INJECTION SUBCUTANEOUS at 08:46

## 2020-01-01 RX ADMIN — NYSTATIN 500000 UNITS: 100000 SUSPENSION ORAL at 13:37

## 2020-01-01 RX ADMIN — IPRATROPIUM BROMIDE AND ALBUTEROL SULFATE 3 ML: 2.5; .5 SOLUTION RESPIRATORY (INHALATION) at 20:45

## 2020-01-01 RX ADMIN — METOPROLOL TARTRATE 50 MG: 50 TABLET, FILM COATED ORAL at 08:58

## 2020-01-01 RX ADMIN — POTASSIUM CHLORIDE 10 MEQ: 7.46 INJECTION, SOLUTION INTRAVENOUS at 10:24

## 2020-01-01 RX ADMIN — DOXYCYCLINE 100 MG: 100 TABLET, FILM COATED ORAL at 06:00

## 2020-01-01 RX ADMIN — SODIUM CHLORIDE 30 ML/HR: 9 INJECTION, SOLUTION INTRAVENOUS at 21:52

## 2020-01-01 RX ADMIN — METOCLOPRAMIDE 10 MG: 10 TABLET ORAL at 08:38

## 2020-01-01 RX ADMIN — SUCRALFATE 1 G: 1 TABLET ORAL at 12:07

## 2020-01-01 RX ADMIN — Medication 10 ML: at 20:52

## 2020-01-01 RX ADMIN — DILTIAZEM HYDROCHLORIDE 30 MG: 30 TABLET ORAL at 13:36

## 2020-01-01 RX ADMIN — SUCRALFATE 1 G: 1 TABLET ORAL at 11:47

## 2020-01-01 RX ADMIN — SUCRALFATE 1 G: 1 TABLET ORAL at 09:25

## 2020-01-01 RX ADMIN — IPRATROPIUM BROMIDE AND ALBUTEROL SULFATE 3 ML: 2.5; .5 SOLUTION RESPIRATORY (INHALATION) at 12:46

## 2020-01-01 RX ADMIN — DILTIAZEM HYDROCHLORIDE 30 MG: 30 TABLET ORAL at 00:33

## 2020-01-01 RX ADMIN — DILTIAZEM HYDROCHLORIDE 30 MG: 30 TABLET ORAL at 12:04

## 2020-01-01 RX ADMIN — Medication 10 ML: at 21:28

## 2020-01-01 RX ADMIN — DILTIAZEM HYDROCHLORIDE 30 MG: 30 TABLET ORAL at 06:01

## 2020-01-01 RX ADMIN — SODIUM CHLORIDE 30 ML/HR: 9 INJECTION, SOLUTION INTRAVENOUS at 12:20

## 2020-01-01 RX ADMIN — SODIUM CHLORIDE, SODIUM LACTATE, POTASSIUM CHLORIDE, AND CALCIUM CHLORIDE: .6; .31; .03; .02 INJECTION, SOLUTION INTRAVENOUS at 09:43

## 2020-01-01 RX ADMIN — METOCLOPRAMIDE 10 MG: 10 TABLET ORAL at 12:05

## 2020-01-01 RX ADMIN — Medication 10 ML: at 21:29

## 2020-01-01 RX ADMIN — FUROSEMIDE 40 MG: 10 INJECTION, SOLUTION INTRAMUSCULAR; INTRAVENOUS at 03:30

## 2020-01-01 RX ADMIN — MAGNESIUM SULFATE HEPTAHYDRATE 2 G: 40 INJECTION, SOLUTION INTRAVENOUS at 02:30

## 2020-01-01 RX ADMIN — NYSTATIN 500000 UNITS: 100000 SUSPENSION ORAL at 21:29

## 2020-01-01 RX ADMIN — IPRATROPIUM BROMIDE AND ALBUTEROL SULFATE 3 ML: 2.5; .5 SOLUTION RESPIRATORY (INHALATION) at 20:17

## 2020-01-01 RX ADMIN — POTASSIUM, SODIUM PHOSPHATES 280 MG-160 MG-250 MG ORAL POWDER PACKET 2 PACKET: POWDER IN PACKET at 09:16

## 2020-01-01 RX ADMIN — METOCLOPRAMIDE 10 MG: 10 TABLET ORAL at 11:34

## 2020-01-01 RX ADMIN — FILGRASTIM 300 MCG: 300 INJECTION, SOLUTION INTRAVENOUS; SUBCUTANEOUS at 16:51

## 2020-01-01 RX ADMIN — Medication 10 ML: at 09:52

## 2020-01-01 RX ADMIN — SODIUM CHLORIDE, POTASSIUM CHLORIDE, SODIUM LACTATE AND CALCIUM CHLORIDE 75 ML/HR: 600; 310; 30; 20 INJECTION, SOLUTION INTRAVENOUS at 06:12

## 2020-01-01 RX ADMIN — PANTOPRAZOLE SODIUM 40 MG: 40 TABLET, DELAYED RELEASE ORAL at 06:01

## 2020-01-01 RX ADMIN — Medication 10 ML: at 21:02

## 2020-01-01 RX ADMIN — POTASSIUM, SODIUM PHOSPHATES 280 MG-160 MG-250 MG ORAL POWDER PACKET 2 PACKET: POWDER IN PACKET at 19:07

## 2020-01-01 RX ADMIN — Medication 10 ML: at 20:58

## 2020-01-01 RX ADMIN — BUDESONIDE AND FORMOTEROL FUMARATE DIHYDRATE 2 PUFF: 160; 4.5 AEROSOL RESPIRATORY (INHALATION) at 19:20

## 2020-01-01 RX ADMIN — GADOTERIDOL 18 ML: 279.3 INJECTION, SOLUTION INTRAVENOUS at 11:10

## 2020-01-01 RX ADMIN — PANTOPRAZOLE SODIUM 40 MG: 40 INJECTION, POWDER, FOR SOLUTION INTRAVENOUS at 05:46

## 2020-01-01 RX ADMIN — METOCLOPRAMIDE 10 MG: 10 TABLET ORAL at 08:55

## 2020-01-01 RX ADMIN — NYSTATIN 500000 UNITS: 100000 SUSPENSION ORAL at 11:50

## 2020-01-01 RX ADMIN — PREDNISONE 80 MG: 20 TABLET ORAL at 14:30

## 2020-01-01 RX ADMIN — DILTIAZEM HYDROCHLORIDE 30 MG: 30 TABLET ORAL at 05:16

## 2020-01-01 RX ADMIN — NYSTATIN 500000 UNITS: 100000 SUSPENSION ORAL at 08:24

## 2020-01-01 RX ADMIN — BUDESONIDE AND FORMOTEROL FUMARATE DIHYDRATE 2 PUFF: 160; 4.5 AEROSOL RESPIRATORY (INHALATION) at 07:45

## 2020-01-01 RX ADMIN — FUROSEMIDE 40 MG: 10 INJECTION, SOLUTION INTRAMUSCULAR; INTRAVENOUS at 17:07

## 2020-01-01 RX ADMIN — IPRATROPIUM BROMIDE AND ALBUTEROL SULFATE 3 ML: 2.5; .5 SOLUTION RESPIRATORY (INHALATION) at 07:37

## 2020-01-01 RX ADMIN — NYSTATIN 500000 UNITS: 100000 SUSPENSION ORAL at 07:57

## 2020-01-01 RX ADMIN — NYSTATIN 500000 UNITS: 100000 SUSPENSION ORAL at 17:16

## 2020-01-01 RX ADMIN — TRAZODONE HYDROCHLORIDE 50 MG: 50 TABLET ORAL at 23:32

## 2020-01-01 RX ADMIN — METOCLOPRAMIDE 10 MG: 10 TABLET ORAL at 11:39

## 2020-01-01 RX ADMIN — MIRTAZAPINE 15 MG: 15 TABLET, FILM COATED ORAL at 23:33

## 2020-01-01 RX ADMIN — SUCRALFATE 1 G: 1 TABLET ORAL at 17:10

## 2020-01-01 RX ADMIN — Medication 10 ML: at 21:10

## 2020-01-01 RX ADMIN — SUCRALFATE 1 G: 1 TABLET ORAL at 20:58

## 2020-01-01 RX ADMIN — SUCRALFATE 1 G: 1 TABLET ORAL at 20:12

## 2020-01-01 RX ADMIN — MAGNESIUM HYDROXIDE 10 ML: 2400 SUSPENSION ORAL at 17:32

## 2020-01-01 RX ADMIN — IOPAMIDOL 100 ML: 755 INJECTION, SOLUTION INTRAVENOUS at 02:00

## 2020-01-01 RX ADMIN — ONDANSETRON 4 MG: 2 INJECTION INTRAMUSCULAR; INTRAVENOUS at 11:38

## 2020-01-01 RX ADMIN — IPRATROPIUM BROMIDE AND ALBUTEROL SULFATE 3 ML: 2.5; .5 SOLUTION RESPIRATORY (INHALATION) at 18:36

## 2020-01-01 RX ADMIN — FOLIC ACID 1 MG: 1 TABLET ORAL at 09:16

## 2020-01-01 RX ADMIN — METOPROLOL TARTRATE 50 MG: 50 TABLET, FILM COATED ORAL at 20:15

## 2020-01-01 RX ADMIN — IPRATROPIUM BROMIDE AND ALBUTEROL SULFATE 3 ML: 2.5; .5 SOLUTION RESPIRATORY (INHALATION) at 15:18

## 2020-01-01 RX ADMIN — NYSTATIN 500000 UNITS: 100000 SUSPENSION ORAL at 17:24

## 2020-01-01 RX ADMIN — METOPROLOL TARTRATE 50 MG: 50 TABLET, FILM COATED ORAL at 11:12

## 2020-01-01 RX ADMIN — IPRATROPIUM BROMIDE AND ALBUTEROL SULFATE 3 ML: 2.5; .5 SOLUTION RESPIRATORY (INHALATION) at 16:04

## 2020-01-01 RX ADMIN — DILTIAZEM HYDROCHLORIDE 30 MG: 30 TABLET ORAL at 00:38

## 2020-01-01 RX ADMIN — PANTOPRAZOLE SODIUM 40 MG: 40 TABLET, DELAYED RELEASE ORAL at 05:35

## 2020-01-01 RX ADMIN — SUCRALFATE 1 G: 1 TABLET ORAL at 19:14

## 2020-01-01 RX ADMIN — SUCRALFATE 1 G: 1 TABLET ORAL at 20:39

## 2020-01-01 RX ADMIN — NYSTATIN 500000 UNITS: 100000 SUSPENSION ORAL at 11:13

## 2020-01-01 RX ADMIN — BUDESONIDE AND FORMOTEROL FUMARATE DIHYDRATE 2 PUFF: 160; 4.5 AEROSOL RESPIRATORY (INHALATION) at 07:55

## 2020-01-01 RX ADMIN — SUCRALFATE 1 G: 1 TABLET ORAL at 11:34

## 2020-01-01 RX ADMIN — DILTIAZEM HYDROCHLORIDE 30 MG: 30 TABLET ORAL at 22:45

## 2020-01-01 RX ADMIN — PHYTONADIONE 5 MG: 10 INJECTION, EMULSION INTRAMUSCULAR; INTRAVENOUS; SUBCUTANEOUS at 17:04

## 2020-01-01 RX ADMIN — FUROSEMIDE 40 MG: 10 INJECTION, SOLUTION INTRAMUSCULAR; INTRAVENOUS at 17:55

## 2020-01-01 RX ADMIN — Medication 10 ML: at 20:13

## 2020-01-01 RX ADMIN — IPRATROPIUM BROMIDE AND ALBUTEROL SULFATE 3 ML: 2.5; .5 SOLUTION RESPIRATORY (INHALATION) at 07:16

## 2020-01-01 RX ADMIN — SODIUM CHLORIDE 1000 ML: 9 INJECTION, SOLUTION INTRAVENOUS at 21:04

## 2020-01-01 RX ADMIN — NYSTATIN 500000 UNITS: 100000 SUSPENSION ORAL at 11:34

## 2020-01-01 RX ADMIN — METOCLOPRAMIDE 10 MG: 10 TABLET ORAL at 09:26

## 2020-01-01 RX ADMIN — IPRATROPIUM BROMIDE AND ALBUTEROL SULFATE 3 ML: 2.5; .5 SOLUTION RESPIRATORY (INHALATION) at 11:47

## 2020-01-01 RX ADMIN — BUDESONIDE AND FORMOTEROL FUMARATE DIHYDRATE 2 PUFF: 160; 4.5 AEROSOL RESPIRATORY (INHALATION) at 06:31

## 2020-01-01 RX ADMIN — NYSTATIN 500000 UNITS: 100000 SUSPENSION ORAL at 11:52

## 2020-01-01 RX ADMIN — METOPROLOL TARTRATE 50 MG: 50 TABLET, FILM COATED ORAL at 08:56

## 2020-01-01 RX ADMIN — POTASSIUM CHLORIDE 40 MEQ: 1500 TABLET, EXTENDED RELEASE ORAL at 18:07

## 2020-01-01 RX ADMIN — BUDESONIDE AND FORMOTEROL FUMARATE DIHYDRATE 2 PUFF: 160; 4.5 AEROSOL RESPIRATORY (INHALATION) at 07:31

## 2020-01-01 RX ADMIN — SUCRALFATE 1 G: 1 TABLET ORAL at 13:37

## 2020-01-01 RX ADMIN — MAGNESIUM HYDROXIDE 10 ML: 2400 SUSPENSION ORAL at 05:20

## 2020-01-01 RX ADMIN — METOCLOPRAMIDE 10 MG: 10 TABLET ORAL at 16:32

## 2020-01-01 RX ADMIN — POTASSIUM CHLORIDE 1000 ML: 2 INJECTION, SOLUTION, CONCENTRATE INTRAVENOUS at 10:26

## 2020-01-01 RX ADMIN — BUDESONIDE AND FORMOTEROL FUMARATE DIHYDRATE 2 PUFF: 160; 4.5 AEROSOL RESPIRATORY (INHALATION) at 07:37

## 2020-01-01 RX ADMIN — DILTIAZEM HYDROCHLORIDE 30 MG: 30 TABLET ORAL at 06:07

## 2020-01-01 RX ADMIN — DEXAMETHASONE SODIUM PHOSPHATE 4 MG: 4 INJECTION, SOLUTION INTRAMUSCULAR; INTRAVENOUS at 11:36

## 2020-01-01 RX ADMIN — DOXYCYCLINE 100 MG: 100 TABLET, FILM COATED ORAL at 17:08

## 2020-01-01 RX ADMIN — FOLIC ACID 1 MG: 1 TABLET ORAL at 08:38

## 2020-01-01 RX ADMIN — IOPAMIDOL 100 ML: 755 INJECTION, SOLUTION INTRAVENOUS at 13:48

## 2020-01-01 RX ADMIN — DIPHENHYDRAMINE HYDROCHLORIDE 25 MG: 25 CAPSULE ORAL at 14:41

## 2020-01-01 RX ADMIN — ENOXAPARIN SODIUM 90 MG: 100 INJECTION SUBCUTANEOUS at 10:38

## 2020-01-01 RX ADMIN — POTASSIUM CHLORIDE 40 MEQ: 1.5 POWDER, FOR SOLUTION ORAL at 17:59

## 2020-01-01 RX ADMIN — Medication 10 ML: at 20:12

## 2020-01-01 RX ADMIN — DOXYCYCLINE 100 MG: 100 TABLET, FILM COATED ORAL at 05:41

## 2020-01-01 RX ADMIN — SUCRALFATE 1 G: 1 TABLET ORAL at 08:58

## 2020-01-01 RX ADMIN — NYSTATIN 500000 UNITS: 100000 SUSPENSION ORAL at 12:04

## 2020-01-01 RX ADMIN — Medication 10 ML: at 22:47

## 2020-01-01 RX ADMIN — PANTOPRAZOLE SODIUM 40 MG: 40 INJECTION, POWDER, FOR SOLUTION INTRAVENOUS at 05:36

## 2020-01-01 RX ADMIN — SUCRALFATE 1 G: 1 TABLET ORAL at 09:17

## 2020-01-01 RX ADMIN — NITROGLYCERIN 0.4 MG: 0.4 TABLET SUBLINGUAL at 12:18

## 2020-01-01 RX ADMIN — NYSTATIN 500000 UNITS: 100000 SUSPENSION ORAL at 20:35

## 2020-01-01 RX ADMIN — FILGRASTIM 300 MCG: 300 INJECTION, SOLUTION INTRAVENOUS; SUBCUTANEOUS at 17:16

## 2020-01-01 RX ADMIN — SUCRALFATE 1 G: 1 TABLET ORAL at 12:04

## 2020-01-01 RX ADMIN — Medication 10 ML: at 08:39

## 2020-01-01 RX ADMIN — MIRTAZAPINE 15 MG: 15 TABLET, FILM COATED ORAL at 21:00

## 2020-01-01 RX ADMIN — MAGNESIUM SULFATE HEPTAHYDRATE 2 G: 40 INJECTION, SOLUTION INTRAVENOUS at 23:34

## 2020-01-01 RX ADMIN — FOLIC ACID 1 MG: 1 TABLET ORAL at 09:01

## 2020-01-01 RX ADMIN — Medication 10 ML: at 21:01

## 2020-01-01 RX ADMIN — SUCRALFATE 1 G: 1 TABLET ORAL at 08:55

## 2020-01-01 RX ADMIN — SUCRALFATE 1 G: 1 TABLET ORAL at 13:58

## 2020-01-01 RX ADMIN — SUCRALFATE 1 G: 1 TABLET ORAL at 09:16

## 2020-01-01 RX ADMIN — PIPERACILLIN AND TAZOBACTAM 3.38 G: 3; .375 INJECTION, POWDER, LYOPHILIZED, FOR SOLUTION INTRAVENOUS at 03:42

## 2020-01-01 RX ADMIN — SUGAMMADEX 200 MG: 100 INJECTION, SOLUTION INTRAVENOUS at 12:38

## 2020-01-01 RX ADMIN — CISPLATIN 160 MG: 1 INJECTION, SOLUTION INTRAVENOUS at 13:46

## 2020-10-18 NOTE — ED PROVIDER NOTES
Subjective   Patient is a 79-year-old male complaining of a burning sensation in his chest whenever he eats food.  This developed over the past 1 week.  Pain is more of a discomfort.  He denies cough fever shortness of breath or other complaint          Review of Systems  Negative for headache ears no cough fever shortness of breath along pain Bondar dysuria or other complaint.  Past Medical History:   Diagnosis Date   • COPD (chronic obstructive pulmonary disease) (CMS/[facility])        No Known Allergies    No past surgical history on file.    No family history on file.    Social History     Socioeconomic History   • Marital status:      Spouse name: Not on file   • Number of children: Not on file   • Years of education: Not on file   • Highest education level: Not on file           Objective   Physical Exam  HEENT exam shows TMs to be clear.  Oropharynx, spit sclerae nonicteric.  Neck has no adenopathy JVD or bruits.  Lungs are clear.  Heart has regular rate rhythm without murmur or gallop.  Chest is nontender.  Abdomen is soft nontender.  Extremity exam is unremarkable.  Procedures  My cage interpretation shows normal sinus rhythm with no acute ST change         ED Course      Results for orders placed or performed during the hospital encounter of 10/17/20   Basic Metabolic Panel    Specimen: Blood   Result Value Ref Range    Glucose 111 (H) 65 - 99 mg/dL    BUN 11 8 - 23 mg/dL    Creatinine 0.82 0.76 - 1.27 mg/dL    Sodium 132 (L) 136 - 145 mmol/L    Potassium 3.7 3.5 - 5.2 mmol/L    Chloride 97 (L) 98 - 107 mmol/L    CO2 24.0 22.0 - 29.0 mmol/L    Calcium 8.9 8.6 - 10.5 mg/dL    eGFR Non African Amer 91 >60 mL/min/1.73    BUN/Creatinine Ratio 13.4 7.0 - 25.0    Anion Gap 11.0 5.0 - 15.0 mmol/L   Troponin    Specimen: Blood   Result Value Ref Range    Troponin T <0.010 0.000 - 0.030 ng/mL   CBC Auto Differential    Specimen: Blood   Result Value Ref Range    WBC 14.60 (H) 3.40 - 10.80 10*3/mm3    RBC 4.46 4.14  - 5.80 10*6/mm3    Hemoglobin 13.6 13.0 - 17.7 g/dL    Hematocrit 40.6 37.5 - 51.0 %    MCV 90.9 79.0 - 97.0 fL    MCH 30.4 26.6 - 33.0 pg    MCHC 33.4 31.5 - 35.7 g/dL    RDW 14.3 12.3 - 15.4 %    RDW-SD 45.1 37.0 - 54.0 fl    MPV 8.1 6.0 - 12.0 fL    Platelets 136 (L) 140 - 450 10*3/mm3    Neutrophil % 67.7 42.7 - 76.0 %    Lymphocyte % 7.6 (L) 19.6 - 45.3 %    Monocyte % 6.5 5.0 - 12.0 %    Eosinophil % 17.7 (H) 0.3 - 6.2 %    Basophil % 0.5 0.0 - 1.5 %    Neutrophils, Absolute 9.80 (H) 1.70 - 7.00 10*3/mm3    Lymphocytes, Absolute 1.10 0.70 - 3.10 10*3/mm3    Monocytes, Absolute 1.00 (H) 0.10 - 0.90 10*3/mm3    Eosinophils, Absolute 2.60 (H) 0.00 - 0.40 10*3/mm3    Basophils, Absolute 0.10 0.00 - 0.20 10*3/mm3    nRBC 0.0 0.0 - 0.2 /100 WBC       No radiology results for the last day                                     MDM  Number of Diagnoses or Management Options  Diagnosis management comments: Patient has a benign physical exam.  Is no evidence of acute coronary syndrome based on EKG and troponin.  Metabolic panel is at baseline.  There is no evidence infectious process including pneumonia.  Patient will be discharged and will placed on Protonix.  We will see his MD for recheck as needed.       Amount and/or Complexity of Data Reviewed  Clinical lab tests: reviewed    Risk of Complications, Morbidity, and/or Mortality  Presenting problems: high  Diagnostic procedures: high  Management options: high    Patient Progress  Patient progress: stable      Final diagnoses:   Chest pain, unspecified type   Gastroesophageal reflux disease, unspecified whether esophagitis present            Carlos Degroot MD  10/17/20 7072

## 2020-10-24 NOTE — ED NOTES
Pt flagged for Simple Sepsis. Notified provider. Provider stated that pt said he felt good enough to go home and didn't feel being admitted was necessary. Reiterated if condition worsens, to return to the ED. Pt also needs O2 at home, instructed him to find a PCP this week and get an appt.      Fadia Schmidt, RN  10/24/20 2608

## 2020-10-24 NOTE — ED NOTES
"Pt presents with left anterior lung pain that got worse over the last few days d/t a cough. Pt reports he was here on 10/17 and dx with GERD. Pt states he was prescribed Protonix and that has caused a cough that has now led to this lung pain. Pts O2 sat was 91% on room air. When questioned about it, pt stated \"they always say my oxygen is low\". Pt reports he smokes and had O2 at home for a while and it helped. Pt states he would like to get O2 at home. Placed pt on 2L nasal cannula. o2 sat is now 96%.     Fadia Schmidt, RN  10/24/20 6224    "

## 2020-10-24 NOTE — ED PROVIDER NOTES
Subjective   Patient is a 79-year-old white male comes in with complaints of was here about a week ago and was told he had reflux is taking his omeprazole as directed but still having a lot of coughing hurting on the left side due to coughing states could not sleep last night because he was coughing denies any fever chills states is coughing up clear stuff no nausea no vomiting no taken any other medications no swelling no palpitations is eating and drinking okay no loss of taste or smell no sore throat.  Patient is a smoker      History provided by:  Patient  Cough  Cough characteristics:  Productive  Sputum characteristics:  Clear  Severity:  Moderate  Onset quality:  Gradual  Duration:  1 week  Timing:  Unable to specify  Progression:  Unchanged  Chronicity:  New  Smoker: yes    Context: not animal exposure, not exposure to allergens, not fumes, not occupational exposure, not sick contacts, not upper respiratory infection, not weather changes and not with activity    Relieved by:  Nothing  Worsened by:  Nothing  Ineffective treatments: Omeprazole.  Associated symptoms: chest pain    Associated symptoms: no chills, no diaphoresis, no ear fullness, no ear pain, no eye discharge, no fever, no headaches, no myalgias, no rash, no rhinorrhea, no shortness of breath, no sinus congestion, no sore throat, no weight loss and no wheezing        Review of Systems   Constitutional: Negative for activity change, appetite change, chills, diaphoresis, fatigue, fever and weight loss.   HENT: Negative for congestion, ear pain, rhinorrhea, sore throat and trouble swallowing.    Eyes: Negative for discharge and redness.   Respiratory: Positive for cough. Negative for apnea, chest tightness, shortness of breath, wheezing and stridor.    Cardiovascular: Positive for chest pain. Negative for palpitations and leg swelling.   Gastrointestinal: Negative for abdominal distention, abdominal pain and nausea.   Genitourinary: Negative for  difficulty urinating, dysuria and flank pain.   Musculoskeletal: Negative for arthralgias, back pain, gait problem, joint swelling, myalgias and neck pain.   Skin: Negative for color change, pallor and rash.   Neurological: Negative for dizziness, numbness and headaches.       Past Medical History:   Diagnosis Date   • COPD (chronic obstructive pulmonary disease) (CMS/[facility])        No Known Allergies    History reviewed. No pertinent surgical history.    No family history on file.    Social History     Socioeconomic History   • Marital status:      Spouse name: Not on file   • Number of children: Not on file   • Years of education: Not on file   • Highest education level: Not on file           Objective   Physical Exam  Vitals signs and nursing note reviewed.   Constitutional:       General: He is not in acute distress.     Appearance: He is well-developed. He is not diaphoretic.   HENT:      Head: Normocephalic and atraumatic.      Right Ear: Tympanic membrane, ear canal and external ear normal.      Left Ear: Tympanic membrane, ear canal and external ear normal.      Mouth/Throat:      Mouth: Mucous membranes are moist.      Pharynx: Oropharynx is clear.   Eyes:      Conjunctiva/sclera: Conjunctivae normal.      Pupils: Pupils are equal, round, and reactive to light.   Neck:      Musculoskeletal: Normal range of motion and neck supple. No muscular tenderness.   Cardiovascular:      Rate and Rhythm: Normal rate and regular rhythm.      Heart sounds: Normal heart sounds. No murmur. No friction rub. No gallop.    Pulmonary:      Effort: Pulmonary effort is normal. No respiratory distress.      Breath sounds: Rhonchi present. No wheezing or rales.   Chest:      Chest wall: Tenderness present.   Abdominal:      General: Bowel sounds are normal. There is no distension.      Palpations: Abdomen is soft.      Tenderness: There is no abdominal tenderness.   Musculoskeletal: Normal range of motion.   Skin:     General:  Skin is warm and dry.      Findings: No rash.   Neurological:      General: No focal deficit present.      Mental Status: He is alert and oriented to person, place, and time.   Psychiatric:         Behavior: Behavior normal.         Thought Content: Thought content normal.         Judgment: Judgment normal.         Procedures           ED Course  ED Course as of Oct 24 1454   Sat Oct 24, 2020   1217 EKG read by  showing normal sinus rhythm with multifocal PVCs compared to previous multifocal PVCs has replaced by unifocal PVCs    [JM]   1246 CBC white count 16.0    [JM]   1428 IMPRESSION:  New patchy right basilar airspace disease with small right pleural  effusion    [JM]   1450 Patient informed of all lab work and chest x-ray show no pneumonia offered to have patient admitted patient is strongly encouraged to stay but is wanting to go home we will treat with an antibiotic and oral antibiotic he is to follow-up if increased symptoms verbalized understanding    [JM]      ED Course User Index  [] Sofya Farr, APRN           Xr Chest 1 View    Result Date: 10/24/2020  New patchy right basilar airspace disease with small right pleural effusion.  Electronically Signed By-Tam Diehl On:10/24/2020 2:23 PM This report was finalized on [id number] by  Tam Diehl, .    Medications   sodium chloride 0.9 % flush 10 mL (has no administration in time range)   cefTRIAXone (ROCEPHIN) in SWFI 1 gram/10ml IV PUSH syringe (1 g Intravenous Given 10/24/20 1451)     Labs Reviewed   COMPREHENSIVE METABOLIC PANEL - Abnormal; Notable for the following components:       Result Value    Glucose 103 (*)     Creatinine 0.64 (*)     Calcium 8.5 (*)     Albumin 3.00 (*)     All other components within normal limits    Narrative:     GFR Normal >60  Chronic Kidney Disease <60  Kidney Failure <15     CBC WITH AUTO DIFFERENTIAL - Abnormal; Notable for the following components:    WBC 16.00 (*)     Lymphocyte % 6.7 (*)      Eosinophil % 17.3 (*)     Neutrophils, Absolute 10.80 (*)     Monocytes, Absolute 1.30 (*)     Eosinophils, Absolute 2.80 (*)     All other components within normal limits   TROPONIN (IN-HOUSE) - Normal    Narrative:     Troponin T Reference Range:  <= 0.03 ng/mL-   Negative for AMI  >0.03 ng/mL-     Abnormal for myocardial necrosis.  Clinicians would have to utilize clinical acumen, EKG, Troponin and serial changes to determine if it is an Acute Myocardial Infarction or myocardial injury due to an underlying chronic condition.       Results may be falsely decreased if patient taking Biotin.     TROPONIN (IN-HOUSE) - Normal    Narrative:     Troponin T Reference Range:  <= 0.03 ng/mL-   Negative for AMI  >0.03 ng/mL-     Abnormal for myocardial necrosis.  Clinicians would have to utilize clinical acumen, EKG, Troponin and serial changes to determine if it is an Acute Myocardial Infarction or myocardial injury due to an underlying chronic condition.       Results may be falsely decreased if patient taking Biotin.     RAINBOW DRAW    Narrative:     The following orders were created for panel order [place] Draw.  Procedure                               Abnormality         Status                     ---------                               -----------         ------                     Light Blue Top[[id number]]                                   Final result               Green Top (Gel)[[id number]]                                  Final result               Lavender Top[[id number]]                                     Final result               Gold Top - SST[[id number]]                                   Final result                 Please view results for these tests on the individual orders.   CBC AND DIFFERENTIAL    Narrative:     The following orders were created for panel order CBC & Differential.  Procedure                               Abnormality         Status                     ---------                                -----------         ------                     CBC Auto Differential[[id number]]        Abnormal            Final result                 Please view results for these tests on the individual orders.   LIGHT BLUE TOP   GREEN TOP   LAVENDER TOP   GOLD TOP - [facility]                                     MDM  Number of Diagnoses or Management Options  Atypical chest pain:   Cough:   Pneumonia of right lung due to infectious organism, unspecified part of lung:   Smoker:   Diagnosis management comments: Disposition: Discharged.    Patient discharged in stable condition.  Long discussion with patient is wanting to go home for outpatient treatment will return if increased symptoms patient verbalized understanding patient stable and nontoxic in appearance upon discharge reviewed implications of results, diagnosis, meds, responsibility to follow up, warning signs and symptoms of possible worsening, potential complications and reasons to return to ER if increased symptoms chest pain shortness of breath fever chills    Patient/Family voiced understanding of above instructions.    Discussed plan for discharge, as there is no emergent indication for admission.  Pt/family is agreeable and understands need for follow up and repeat testing.  Pt is aware that discharge does not mean that nothing is wrong but it indicates no emergency is present and they must continue care with follow-up as given below or physician of their choice.     FOLLOW-UP  Evon Mendes  [facility] [address] [phone] an appointment as soon as possible for a visit in 2 daysIf symptoms worsen  [facility] [id number] [facility] [phone] symptoms worsen       Medication List      New Prescriptions    azithromycin 250 MG tablet  Commonly known as: ZITHROMAX  Take 2 tablets the first day, then 1 tablet daily for 4 days.           Where to Get Your Medications      You  can get these medications from any pharmacy    Bring a paper prescription for each of these medications  azithromycin 250 MG tablet            Amount and/or Complexity of Data Reviewed  Clinical lab tests: reviewed  Tests in the radiology section of CPT®: reviewed  Tests in the medicine section of CPT®: reviewed        Final diagnoses:   Pneumonia of right lung due to infectious organism, unspecified part of lung   Cough   Atypical chest pain   Smoker            Sofya Farr, APRN  10/24/20 9814

## 2020-10-26 PROBLEM — Z20.822 COVID-19 RULED OUT: Status: ACTIVE | Noted: 2020-01-01

## 2020-10-27 NOTE — ANESTHESIA PROCEDURE NOTES
Airway  Urgency: elective    Date/Time: 10/27/2020 11:53 AM  Airway not difficult    General Information and Staff    Patient location during procedure: OR  Anesthesiologist: Jhonny Diaz MD  CRNA: Zita Pinto AA    Indications and Patient Condition  Indications for airway management: airway protection    Preoxygenated: yes  Mask difficulty assessment: 1 - vent by mask    Final Airway Details  Final airway type: endotracheal airway      Successful airway: ETT  Cuffed: yes   Successful intubation technique: direct laryngoscopy  Endotracheal tube insertion site: oral  Blade: Riley  Blade size: 4  ETT size (mm): 8.5  Cormack-Lehane Classification: grade IIa - partial view of glottis  Placement verified by: chest auscultation and capnometry   Measured from: teeth  ETT/EBT  to teeth (cm): 22  Number of attempts at approach: 1  Assessment: lips, teeth, and gum same as pre-op and atraumatic intubation

## 2020-10-27 NOTE — ANESTHESIA POSTPROCEDURE EVALUATION
Patient: Shay Hernandez    Procedure Summary     Date: 10/27/20 Room / [facility]: [facility] ENDOSCOPY [facility]    Anesthesia Start: 1142 Anesthesia Stop: 1252    Procedure: BRONCHOSCOPY WITH BRONCHOALVEOLAR LAVAGE AND ENDOBRONCHIAL ULTRASOUND WITH FINE NEEDLE ASPIRATION X2 AREAS (N/A Bronchus) Diagnosis:       Lung mass      (Lung mass [R91.8])    Surgeon: Johnny Waldrop MD Provider: Jhonny Diaz MD    Anesthesia Type: general ASA Status: 3          Anesthesia Type: general    Vitals  Vitals Value Taken Time   /64 10/27/20 1313   Temp     Pulse 93 10/27/20 1319   Resp 23 10/27/20 1313   SpO2 90 % 10/27/20 1319   Vitals shown include unvalidated device data.        Post Anesthesia Care and Evaluation    Patient location during evaluation: PACU  Patient participation: complete - patient participated  Level of consciousness: awake  Pain scale: See nurse's notes for pain score.  Pain management: adequate  Airway patency: patent  Anesthetic complications: No anesthetic complications  PONV Status: none  Cardiovascular status: acceptable  Respiratory status: acceptable  Hydration status: acceptable    Comments: Patient seen and examined postoperatively; vital signs stable; SpO2 greater than or equal to 90%; cardiopulmonary status stable; nausea/vomiting adequately controlled; pain adequately controlled; no apparent anesthesia complications; patient discharged from anesthesia care when discharge criteria were met

## 2020-10-27 NOTE — ANESTHESIA PREPROCEDURE EVALUATION
Anesthesia Evaluation     Patient summary reviewed and Nursing notes reviewed   no history of anesthetic complications:  NPO Solid Status: > 8 hours  NPO Liquid Status: > 8 hours           Airway   Mallampati: II  TM distance: >3 FB  Neck ROM: full  No difficulty expected  Dental    (+) partials    Pulmonary - normal exam   (+) COPD, shortness of breath,   Cardiovascular - normal exam    (+) dysrhythmias Atrial Fib,       Neuro/Psych  (+) weakness,     GI/Hepatic/Renal/Endo - negative ROS     Musculoskeletal (-) negative ROS    Abdominal  - normal exam   Substance History - negative use     OB/GYN negative ob/gyn ROS         Other                        Anesthesia Plan    ASA 3     general     intravenous induction     Anesthetic plan, all risks, benefits, and alternatives have been provided, discussed and informed consent has been obtained with: patient.    Plan discussed with CAA.

## 2020-10-27 NOTE — TELEPHONE ENCOUNTER
10/27/20 0322 - Received a consult on patient from Dr. Bernal. Dx: advice for patient condition. Information sent to Dr. Daley as he is on call and this is a new patient.

## 2020-10-29 PROBLEM — C34.92 ADENOCARCINOMA OF LUNG, LEFT (HCC): Status: ACTIVE | Noted: 2020-01-01

## 2020-10-29 PROBLEM — C34.90 ADENOCARCINOMA, LUNG, UNSPECIFIED LATERALITY (HCC): Chronic | Status: ACTIVE | Noted: 2020-01-01

## 2020-10-30 NOTE — OUTREACH NOTE
Prep Survey      Responses   [facility] facility patient discharged from?  [facility]   Is LACE score < 7 ?  No   Eligibility  Readm Mgmt   Discharge diagnosis  Lung adenocarcinoma [s/p BRONCHOSCOPY WITH BRONCHOALVEOLAR LAVAGE AND ENDOBRONCHIAL ULTRASOUND WITH FINE NEEDLE ASPIRATION X2 AREAS]   Does the patient have one of the following disease processes/diagnoses(primary or secondary)?  Other   Does the patient have Home health ordered?  No   Is there a DME ordered?  No   Comments regarding appointments  Needs f/u scheduled   Medication alerts for this patient  Start eliquis, continue aspirin   Prep survey completed?  Yes          Liv Lujan RN

## 2020-10-30 NOTE — TELEPHONE ENCOUNTER
PATIENT'S WIFE SLIM (NO BH VERBAL) CALLING, NEEDS TO SCHEDULE A HOSPITAL FOLLOWUP WITH DR. HERNANDEZ FOR 1 TO 2 WEEKS AFTER DISCHARGE, DISCHARGED ON 10-29-20, (HUB UNABLE TO SCHEDULE IN THE 1 TO 2 WEEK TIME FRAME, ALSO ATTEMPTED WARM TRANSFER, NO ANSWER), PLEASE ADVISE?      CALL BACK # [phone]

## 2020-11-02 NOTE — OUTREACH NOTE
Medical Week 1 Survey      Responses   [facility] [place] patient discharged from?  [facility]   Does the patient have one of the following disease processes/diagnoses(primary or secondary)?  Other   Week 1 attempt successful?  Yes   Call start time  1813   Call end time  1825   Discharge diagnosis  Lung adenocarcinoma   Medication alerts for this patient  Start eliquis, continue aspirin   Meds reviewed with patient/caregiver?  Yes   Is the patient having any side effects they believe may be caused by any medication additions or changes?  No   Does the patient have all medications ordered at discharge?  Yes   Is the patient taking all medications as directed (includes completed medication regime)?  Yes   Medication comments  States they did not get one medication but they said it was from an appt they had today.  They will contact the dr regarding this.   Does the patient have a primary care provider?   Yes   Does the patient have an appointment with their PCP within 7 days of discharge?  Yes   Has the patient kept scheduled appointments due by today?  Yes   Has home health visited the patient within 72 hours of discharge?  N/A   Has all DME been delivered?  No   DME comments  States [facility] is to deliver more oxygen tomorrow   Psychosocial issues?  No   Did the patient receive a copy of their discharge instructions?  Yes   Nursing interventions  Reviewed instructions with patient   Is the patient/caregiver able to teach back signs and symptoms related to disease process for when to call PCP?  Yes   Is the patient/caregiver able to teach back signs and symptoms related to disease process for when to call 911?  Yes   Is the patient/caregiver able to teach back the hierarchy of who to call/visit for symptoms/problems? PCP, Specialist, Home health nurse, Urgent Care, ED, 911  Yes   Additional teach back comments  Talked with patient, wife and daughter.  He has appts made and had a follow up with one of his doctors today.  He is  using 2L of oxgen at night.  They do not have a pulse and encouraged to purchase one.     Week 1 call completed?  Yes   Wrap up additional comments  Questions answered and they will contact  regarding unable to get medication.          Radha Ragland LPN

## 2020-11-02 NOTE — PROGRESS NOTES
Hematology/Oncology Inpatient Progress Note    PATIENT NAME: Shay Hernandez  : 1940  MRN: [id number]    CHIEF COMPLAINT: Lung adenocarcinoma    HISTORY OF PRESENT ILLNESS:    Shay Hernandez is a 79 y.o. male long-term smoker who recently moved from [place] to stay with his daughter, presented to [facility] on 10/26/2020 with complaints of worsening dyspnea, cough, and epigastric pain worsening over two weeks. He had experienced a 33 lb weight loss due to decreased appetite. A  CT scan of the chest identified a hilar mass concerning for malignancy. There was moderate mediastinal adenopathy consistent with metastatic disease, debris in the right lower lobe bronchus, small right pleural effusion, a 9 mm nodular opacity in the right lower lobe and a 2.2 cm liver lesion.  The patient was admitted for further management and work-up. Pulmonary was consulted. Of note, the patient had been to the ED twice earlier in 2020 and was diagnosed with GERD on 10/17/2020 and pneumonia on 10/26/2020.      10/27/20  Hematology/Oncology was consulted for a hilar mass.   · 10/26/2020 2D Echo  - Left ventricular ejection fraction appears to be 61 - 65%  · 10/26/2020 - CT abdomen :  Tiny right pleural effusion and a 2.9 cm masslike opacity with adjacent atelectasis in the right lung base 9 mm noncalcified nodule in the posterior right lower lobe is also present.There is a 2.2 cm circumscribed hypodense mass near the hepatic dome which is not quite fluid density. 3. No acute intra-abdominal or intrapelvic abnormality     · 10/27/2020 -MRI Abdomen With & Without Contrast  1. There is a 2.2 cm lesion in the dome of liver with features of a cyst. Negative for evidence of hepatic metastases. 2. There is subcarinal adenopathy and right hilar abnormality.  3. The gallbladder is mildly distended and there is a tiny amount of fluid around it. If patient has symptoms of gallbladder disease, could consider ultrasound. 4.  There are small bilateral pleural effusions and mild lower lobe lung consolidations. 5. There is a 3.5 cm abdominal aortic aneurysm.    · 10/27/2020: CT chest with contrast: 6.6 x 3.9 cm right hilar mass is seen extending into the right lower lobe along the bronchovascular bundle distribution.  Opacification of the distal bronchus intermedius and right middle lobe and right lower lobe proximal bronchus.  Pathologically enlarged mediastinal lymph nodes are present.  Subcarinal lymph node 5.4 x 3.8 cm, precarinal 2.7 x 2.4 cm, right mid paratracheal 3.6 x 4.4 cm and right upper paratracheal 2.5 x 1.6 cm.  Pathologically enlarged left hilar lymph node measures 2.7 x 2.2 cm.  Left supraclavicular lymph node measures 1.5 x 0.7 cm and is mildly enlarged.  No axillary lymphadenopathy.    · 10/27/2020 - Bronchoscopy with EBUS.  Subcarinal lymph node FNA positive for metastatic poorly differentiated adenocarcinoma. Specimens #1 and #3: Immunohistochemistry was performed utilizing appropriate controls and the tumor is positive for CK7 and TTF-1 and is negative for napsin A, p63, and CK5/6. 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 Many of the tumor cells display significant pleomorphism with bizarre appearing nuclei and mitotic figures. This raises the possibility of a sarcomatoid component, however, that would have to be evaluated on the resection specimen. Specimens #2 and #4: One of the smear passes displays scattered atypical cells in a background of lymphoid tissue. Immunohistochemistry was performed utilizing appropriate controls on the cell block for cytokeratin AE1/3. The cytokeratin AE1/3 stain is negative which excludes the presence of atypical cells in the cell block. The significance of the atypical cells on the smears is unknown, however, malignancy cannot be entirely excluded.    · 10/29/2020 - RI Brain With & Without Contrast  1.There are some nonspecific T2 signal changes  involving the cerebral hemispheres that could be reflective of more chronic small vessel ischemic change. 2.There is underlying cerebral atrophy. 3.No definite evidence for metastatic disease.      He has a past medical history of Atrial fibrillation (CMS/HCC) and COPD (chronic obstructive pulmonary disease) (CMS/HCC).     PCP: Provider, No Known  Subjective    Patient was recently discharged from the hospital.  He presents today with his daughter.  He is aware of the recent diagnosis.  She is trying to quit smoking.  He reports very minor hemoptysis since his recent bronchoscopy and biopsy  She does report fatigue.  Also reports recent weight loss.  Breathing has improved since discharge.  Still has some cough and breathing trouble.  Taking aspirin and Eliquis.    Review of Systems   Constitutional: Positive for fatigue. Negative for chills and fever.        Weight loss   HENT: Negative for ear pain, mouth sores, nosebleeds and sore throat.    Eyes: Negative for photophobia and visual disturbance.   Respiratory: Positive for cough and shortness of breath. Negative for wheezing and stridor.    Cardiovascular: Negative for chest pain and palpitations.   Gastrointestinal: Negative for abdominal pain, diarrhea, nausea and vomiting.   Endocrine: Negative for cold intolerance and heat intolerance.   Genitourinary: Negative for dysuria and hematuria.   Musculoskeletal: Negative for joint swelling and neck stiffness.   Skin: Negative for color change and rash.   Neurological: Negative for seizures and syncope.   Hematological: Negative for adenopathy.        No obvious bleeding   Psychiatric/Behavioral: Negative for agitation, confusion and hallucinations.     MEDICATIONS:    Current Outpatient Medications on File Prior to Visit   Medication Sig Dispense Refill   • albuterol sulfate  (90 Base) MCG/ACT inhaler Inhale 2 puffs Every 4 (Four) Hours As Needed for Wheezing. 8 g 0   • apixaban (ELIQUIS) 5 MG tablet tablet  "Take 1 tablet by mouth Every 12 (Twelve) Hours for 30 days. 60 tablet 0   • aspirin (aspirin) 81 MG EC tablet Take 81 mg by mouth Daily.     • budesonide-formoterol (SYMBICORT) 160-4.5 MCG/ACT inhaler Inhale 2 puffs 2 (Two) Times a Day. 1 inhaler 0   • [] cefdinir (OMNICEF) 300 MG capsule Take 1 capsule by mouth 2 (Two) Times a Day for 3 days. 6 capsule 0   • [] doxycycline (Adoxa) 100 MG tablet Take 1 tablet by mouth 2 (Two) Times a Day for 3 days. 6 tablet 0   • metoprolol tartrate (LOPRESSOR) 25 MG tablet Take 1 tablet by mouth Every 12 (Twelve) Hours for 30 days. 60 tablet 0   • Multiple Vitamins-Minerals (HEALTHY EYES PO) Take 1 tablet by mouth Daily.     • pantoprazole (PROTONIX) 40 MG EC tablet Take 1 tablet by mouth Daily. 14 tablet 0   • predniSONE (DELTASONE) 10 MG tablet Take 3 tablets by mouth Daily for 3 days, THEN 2 tablets Daily for 3 days, THEN 1 tablet Daily for 3 days. 18 tablet 0   • vitamin C (ASCORBIC ACID) 500 MG tablet Take 500 mg by mouth Daily.     • Vitamin D, Cholecalciferol, (CHOLECALCIFEROL) 10 MCG (400 UNIT) tablet Take 400 Units by mouth Daily.     • vitamin E 100 UNIT capsule Take 100 Units by mouth Daily.       No current facility-administered medications on file prior to visit.      ALLERGIES:  No Known Allergies    Objective    VITALS:   /67   Pulse 79   Temp 97.1 °F (36.2 °C)   Resp 16   Ht 188 cm (74\")   Wt 86.5 kg (190 lb 12.8 oz)   BMI 24.50 kg/m²     PHYSICAL EXAM: (performed by MD)  Physical Exam  Vitals signs and nursing note reviewed.   Constitutional:       General: He is not in acute distress.     Appearance: Normal appearance. He is not diaphoretic.   HENT:      Head: Normocephalic and atraumatic.      Mouth/Throat:      Comments: partial dentures  Eyes:      General: No scleral icterus.        Right eye: No discharge.         Left eye: No discharge.      Conjunctiva/sclera: Conjunctivae normal.   Neck:      Musculoskeletal: Normal range of " motion and neck supple.      Thyroid: No thyromegaly.   Cardiovascular:      Rate and Rhythm: Normal rate and regular rhythm.      Pulses: Normal pulses.      Heart sounds: Normal heart sounds. No friction rub. No gallop.    Pulmonary:      Effort: Pulmonary effort is normal. No respiratory distress.      Breath sounds: Normal breath sounds. No stridor. No wheezing.      Comments: Decreased air entry bilaterally in the lungs  Abdominal:      General: Bowel sounds are normal.      Palpations: Abdomen is soft. There is no mass.      Tenderness: There is no abdominal tenderness. There is no guarding or rebound.   Musculoskeletal: Normal range of motion.         General: No tenderness.   Lymphadenopathy:      Cervical: No cervical adenopathy.   Skin:     General: Skin is warm.      Coloration: Skin is not jaundiced.      Findings: No erythema or rash.   Neurological:      General: No focal deficit present.      Mental Status: He is alert and oriented to person, place, and time. Mental status is at baseline.      Motor: No abnormal muscle tone.   Psychiatric:         Mood and Affect: Mood normal.         Behavior: Behavior normal.         Thought Content: Thought content normal.         RECENT LABS:    Lab Results - Last 18 Months   Lab Units 10/29/20  0350 10/28/20  1006 10/27/20  0442   WBC 10*3/mm3 16.50* 16.00* 15.20*   HEMOGLOBIN g/dL 13.6 13.7 14.1   HEMATOCRIT % 41.8 42.7 42.0   PLATELETS 10*3/mm3 167 156 158   MCV fL 91.9 92.4 90.5     Lab Results - Last 18 Months   Lab Units 10/29/20  0350 10/28/20  1006 10/27/20  0442 10/26/20  1226 10/24/20  1228   SODIUM mmol/L 137 141 140 139 136   POTASSIUM mmol/L 4.1 4.0 3.7 4.0 3.8   CHLORIDE mmol/L 102 107 107 103 103   CO2 mmol/L 24.0 26.0 21.0* 24.0 24.0   BUN mg/dL 14 16 17 18 14   CREATININE mg/dL 0.70* 0.69* 0.66* 0.80 0.64*   CALCIUM mg/dL 8.1* 8.3* 8.2* 9.0 8.5*   BILIRUBIN mg/dL  --   --  0.4 0.5 0.4   ALK PHOS U/L  --   --  98 89 76   ALT (SGPT) U/L  --   --  40  30 19   AST (SGOT) U/L  --   --  27 19 13   GLUCOSE mg/dL 83 119* 113* 143* 103*       Lab Results   Component Value Date    GLUCOSE 83 10/29/2020    BUN 14 10/29/2020    CREATININE 0.70 (L) 10/29/2020    EGFRIFNONA 109 10/29/2020    BCR 20.0 10/29/2020    K 4.1 10/29/2020    CO2 24.0 10/29/2020    CALCIUM 8.1 (L) 10/29/2020    ALBUMIN 2.90 (L) 10/27/2020    AST 27 10/27/2020    ALT 40 10/27/2020     Lab Results   Component Value Date    FERRITIN 334.90 10/26/2020     No results found for: FOLATE  No results found for: OCCULTBLD  No results found for: RETICCTPCT  No results found for: [id number], TSH  No results found for: SPEP, UPEP  LDH   Date Value Ref Range Status   10/26/2020 249 (H) 135 - 225 U/L Final     No results found for: LUZMARIA, RF, SEDRATE  No results found for: FIBRINOGEN, HAPTOGLOBIN, DDIMER  No results found for: DDIMER  Lab Results   Component Value Date    PTT 26.0 10/26/2020    INR 1.15 (H) 10/26/2020     No results found for:   No results found for: CEA  No results found for:   No results found for: PSA  No results found for: [id number]    PENDING RESULTS: none     IMAGING REVIEWED:  No radiology results for the last day      Assessment/Plan   ASSESSMENT  1. 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    Also has left hilar lymph nodes consistent with metastasis and borderline enlarged left supraclavicular lymph node. . MRI abdomen 10/28/2020 - 2.2 cm lesion in the dome of liver with features of a cyst. Negative for evidence of hepatic metastases. S/p bronchoscopy with EBUS on 10/27/2020 - subcarinal lymph node FNA positive for metastatic poorly differentiated adenocarcinoma. Staining pattern is consistent with adenocarcinoma of pulmonary origin. Many of the tumor cells display significant pleomorphism with bizarre appearing nuclei and mitotic figures. This raises the possibility of a  sarcomatoid component. MRI brain 10/29/2020 - no metastatic disease.     2. Pneumonia/pneumonia: Status post antibiotics and steroids recently.  Finished outpatient doxycycline  3. Reactive leukocytosis  4. Atrial fibrillation: Cardiology consulted - appears more like atrial flutter. Patient refused  anticoagulation with Coumadin at this time.  Was started on Eliquis during the hospitalization  5. Chest pain:Cardiology consulted.  2D Echo 10/26/2020 - Left ventricular ejection fraction appears to be 61 - 65%. Moderate MR Severe left atrial enlargement. Grade 2 diastolic dysfunction. Mild pulmonary hypertension   6. Weight loss: Unintentional weight loss of 33 lbs. Nutrition consulted.   7. Abdominal aortic aneurysm: MRI abdomen 10/27/2020 - There is a 3.5 cm abdominal aortic aneurysm.  8. Tobacco abuse: 60 pack year history of cigarette smoking. Encouraged smoking cessation.      PLAN  1. We will arrange PET CT scan ASA.  2. Radiation oncology consultation ASA, discussed the case with him today.  3. Due to presence of bulky lymph nodes in the bulky lesions, it may be better to start the patient on chemotherapy first.  We will plan cisplatin and Alimta.  We will get authorization for 4 cycles.  If patient has metastases on the PET scan we will switch to carbo Alimta and Keytruda combination.  The patient has stage III disease, will consider starting radiation therapy after getting some response with the chemotherapy.  First cycle can be given through peripheral IV.  4. Check iron panel, B12, folate levels, CBC today  5. NGS molecular testing on the tumor specimen to identify somatic actionable mutations.  Will include PD-L1 score.  6. B12 injection today and q. Monthly in preparation for Alimta.  7. Folic acid 1 mg daily  8. Thoracic surgery consultation and Port-A-Cath placement  9. Will have to hold anticoagulation prior to port.  10. Offered nicotine patches.  Milligrams x3 weeks, 40 mg times another 3 weeks  followed by 7 mg x 6 weeks  11. Follow-up in about 2 weeks       I have spent greater than 40 minutes in patient encounter and more than 50% of the time was spent in counseling and coordination of care, including review of imaging, pathology, indications for treatment, goals of therapy, alternatives and risks, as well as surveillance and potential outcomes.     Electronically signed by Ziggy Daley MD, 11/02/20, 3:31 PM EST.

## 2020-11-02 NOTE — TELEPHONE ENCOUNTER
S/W PT'S WIFE AND SHE CONFIRMED DATE TIME AND LOCATION FOR ADD ON APPT TODAY IN THE OFFICE APPROVED BY DR HERNANDEZ

## 2020-11-02 NOTE — TELEPHONE ENCOUNTER
Pt is out of medications, and his wife has questions on how to manage them.    Cefdinir (OMNICEF) 300 MG  doxycyycline-100MG  predniSONE 10MG    Pt's wife has questions about what to do with medication refills, he will run out of both today 300mg and 100mg tablets above.     She also has questions about additional medication instructions from recent hospital discharge. Instructions like prednisone switching from 10MG to 20MG. She just needs clarification on what to do.    Catrina Hernandez-  [phone]

## 2020-11-05 NOTE — PROGRESS NOTES
Radiation Oncology Consult Note    Name: Shay Hernandez  YOB: 1940  MRN #: [id number]  Date of Service: 11/5/2020  Referring Provider: Ziggy Daley  Primary Care Provider: Provider, No Known    DIAGNOSIS: Locally Advanced NSCLC  Encounter Diagnosis   Name Primary?   • Adenocarcinoma, lung, unspecified laterality (CMS/HCC) Yes     REASON FOR CONSULTATION/CHIEF COMPLAINT:  I was asked to see the patient at the request of the referring provider noted below for advice and recommendations regarding this diagnosis and the role of radiation therapy.                              REQUESTING PHYSICIAN:  Ziggy Daley    RECORDS OBTAINED:  Records of the patients history including those obtained from the referring provider were reviewed and summarized in detail.    HISTORY OF PRESENT ILLNESS:  Shay Hernandez is a 80 y.o. male who has prior skin cancer and reported L neck XRT at [place] 6 years ago (records requested).  He presented to ED locally here at [facility], he had had an unintentional weight loss, worsening dyspnea, cough and epigastric pain. The weight loss was up to 33 lbs.  A CT scan of the chest showed a right hilar mass concerning for malignancy, noting mediastinal adenopathy consistent with metastatic disease, debris in the right lower lobe bronchus, small right pleural effusion, a 9mm nodular opacity in the right lower lobe and a 2.2 cm liver lesion.      10/26/2020 2D echo; Left Ventricular EF appears to be 61-65%  MRI abdomen 10/27/2020--the 2.2 cm lesion in the dome of the liver consistent with cyst.    MRI brain 10/29/2020--- Nonspecific T2 signal changes involving the cerebral hemispheres that could be reflective of more chronic small vessel ischemic change; there is underlying cerebral atrophy, no definitive evidence for metastatic disease.    PET/CT today--waiting on the read but shows bilateral SCV nodes, mediastinal diseases and what looks like a drop met in the right lower lobe--Will await final  read.    Discussed upfront chemo as likely plan with patient and Dr. Daley; awaiting final read tomorrow.      The following portions of the patient's history were reviewed and updated as appropriate: allergies, current medications, past family history, past medical history, past social history, past surgical history and problem list. Reviewed with the patient and remain unchanged.    PAST MEDICAL HISTORY:  he  has a past medical history of Adenocarcinoma, lung, unspecified laterality (CMS/[facility]) (10/27/2020), Atrial fibrillation (CMS/[facility]), and COPD (chronic obstructive pulmonary disease) (CMS/[facility]).  MEDICATIONS:   Current Outpatient Medications:   •  albuterol sulfate  (90 Base) MCG/ACT inhaler, Inhale 2 puffs Every 4 (Four) Hours As Needed for Wheezing., Disp: 8 g, Rfl: 0  •  apixaban (ELIQUIS) 5 MG tablet tablet, Take 1 tablet by mouth Every 12 (Twelve) Hours for 30 days., Disp: 60 tablet, Rfl: 0  •  aspirin (aspirin) 81 MG EC tablet, Take 81 mg by mouth Daily., Disp: , Rfl:   •  budesonide-formoterol (SYMBICORT) 160-4.5 MCG/ACT inhaler, Inhale 2 puffs 2 (Two) Times a Day., Disp: 1 inhaler, Rfl: 0  •  folic acid (FOLVITE) 1 MG tablet, Take 1 tablet by mouth Daily., Disp: 30 tablet, Rfl: 2  •  metoprolol tartrate (LOPRESSOR) 25 MG tablet, Take 1 tablet by mouth Every 12 (Twelve) Hours for 30 days., Disp: 60 tablet, Rfl: 0  •  Multiple Vitamins-Minerals (HEALTHY EYES PO), Take 1 tablet by mouth Daily., Disp: , Rfl:   •  nicotine (NICODERM CQ) 14 MG/24HR patch, Place 1 patch on the skin as directed by provider Daily for 21 days., Disp: 21 patch, Rfl: 0  •  nicotine (NICODERM CQ) 21 MG/24HR patch, Place 1 patch on the skin as directed by provider Daily for 21 days., Disp: 21 patch, Rfl: 0  •  nicotine (NICODERM CQ) 7 MG/24HR patch, Place 1 patch on the skin as directed by provider Daily. 1 patch daily X 3 weeks, Disp: 42 patch, Rfl: 0  •  pantoprazole (PROTONIX) 40 MG EC tablet, Take 1 tablet by mouth Daily.,  "Disp: 14 tablet, Rfl: 0  •  predniSONE (DELTASONE) 10 MG tablet, Take 3 tablets by mouth Daily for 3 days, THEN 2 tablets Daily for 3 days, THEN 1 tablet Daily for 3 days., Disp: 18 tablet, Rfl: 0  •  vitamin C (ASCORBIC ACID) 500 MG tablet, Take 500 mg by mouth Daily., Disp: , Rfl:   •  Vitamin D, Cholecalciferol, (CHOLECALCIFEROL) 10 MCG (400 UNIT) tablet, Take 400 Units by mouth Daily., Disp: , Rfl:   •  vitamin E 100 UNIT capsule, Take 100 Units by mouth Daily., Disp: , Rfl:   No current facility-administered medications for this visit.   ALLERGIES: No Known Allergies  PAST SURGICAL HISTORY: he has a past surgical history that includes Appendectomy and Bronchoscopy (N/A, 10/27/2020).  PREVIOUS RADIOTHERAPY OR CHEMOTHERAPY: prior XRT  FAMILY HISTORY: his family history includes Heart attack in his mother; Lung disease in an other family member.  SOCIAL HISTORY: he  reports that he has been smoking cigarettes. He has a 120.00 pack-year smoking history. He does not have any smokeless tobacco history on file. He reports current alcohol use of about 4.0 standard drinks of alcohol per week. He reports previous drug use.  PAIN AND PAIN MANAGEMENT: No pain.  Vitals:    11/05/20 1519   BP: 127/61   Pulse: 77   Resp: 18   Temp: 98.4 °F (36.9 °C)   TempSrc: Oral   SpO2: 97%   Weight: 84.6 kg (186 lb 6.4 oz)   Height: 188 cm (74\")   PainSc: 0-No pain     NUTRITIONAL STATUS: no issues  KPS: 70    PHQ-9 Total Score: 0     Review of Systems:   General: No fevers, chills, weight change, or drenching night sweats. Skin: No rashes or jaundice.  HEENT: No change in vision or hearing, no headaches.  Neck: No dysphagia or masses.  Heme/Lymph: No easy bruising or bleeding.  Respiratory System: As noted above.  Cardiovascular: No chest pain, palpitations, or dyspnea on exertion.  - Pacemaker. GI: No nausea, vomiting, diarrhea, melena, or hematochezia.  : No dysuria or hematuria.  Endocrine: No heat or cold intolerance. " "Musculoskeletal: No myalgias or arthralgias.  Neuro: No weakness, numbness, syncope, or seizures. Psych: No mood changes or depression. Ext: Denies swelling.        Objective     Vitals:  Vitals:    11/05/20 1519   BP: 127/61   Pulse: 77   Resp: 18   Temp: 98.4 °F (36.9 °C)   TempSrc: Oral   SpO2: 97%   Weight: 84.6 kg (186 lb 6.4 oz)   Height: 188 cm (74\")   PainSc: 0-No pain         PHYSICAL EXAM:  GENERAL: in no apparent distress, sitting comfortably in room.    HEENT: normocephalic, atraumatic. Pupils are equal, round, reactive to light. Sclera anicteric. Conjunctiva not injected. Oropharynx without erythema, ulcerations or thrush.   NECK: Supple with no masses.  LYMPHATIC: no cervical, supraclavicular or axillary adenopathy appreciated bilaterally.   CARDIOVASCULAR: S1 & S2 detected; no murmurs, rubs or gallops.  CHEST: clear to auscultation bilaterally; no wheezes, crackles or rubs. Work of breathing normal.  ABDOMEN: bowel sounds present. Abdomen is soft, nontender, nondistended.   MUSCULOSKELETAL: no tenderness to palpation along the spine or scapulae. Normal range of motion.  EXTREMITIES: no clubbing, cyanosis, edema.  SKIN: no erythema, rashes, ulcerations noted.   NEUROLOGIC: cranial nerves II-XII grossly intact bilaterally. No focal neurologic deficits.  PSYCHIATRIC:  alert, aware, and appropriate.      PERTINENT IMAGING/PATHOLOGY/LABS (Medical Decision Making):     COORDINATION OF CARE: A copy of this note is sent to the referring provider.    PATHOLOGY (Reviewed): path 10/27/2020-- Positive for metastatic poorly differentiated adenocarcinoma.    IMAGING (Reviewed): PET/CT read pending, appears to be high volume disease.  LABS (Reviewed):  Hematology WBC   Date Value Ref Range Status   10/29/2020 16.50 (H) 3.40 - 10.80 10*3/mm3 Final     RBC   Date Value Ref Range Status   10/29/2020 4.55 4.14 - 5.80 10*6/mm3 Final     Hemoglobin   Date Value Ref Range Status   10/29/2020 13.6 13.0 - 17.7 g/dL Final "     Hematocrit   Date Value Ref Range Status   10/29/2020 41.8 37.5 - 51.0 % Final     Platelets   Date Value Ref Range Status   10/29/2020 167 140 - 450 10*3/mm3 Final      Chemistry   Lab Results   Component Value Date    GLUCOSE 83 10/29/2020    BUN 14 10/29/2020    CREATININE 0.70 (L) 10/29/2020    EGFRIFNONA 109 10/29/2020    BCR 20.0 10/29/2020    K 4.1 10/29/2020    CO2 24.0 10/29/2020    CALCIUM 8.1 (L) 10/29/2020    ALBUMIN 2.90 (L) 10/27/2020    AST 27 10/27/2020    ALT 40 10/27/2020       Assessment/Plan     ASSESSMENT AND PLAN:  1. Adenocarcinoma, lung, unspecified laterality (CMS/HCC)       T4N3M0, regionally metastatic lung cancer, Stage IIIB  -possible solitary drop metastasis in the right chest which might upstage him.    Need records from [place] as he had left neck XRT for skin cancer there and that might change his radiation therapy treatment options going forward.    -Port needed; saw with cancer navigator today.  -He will see Dr. Richards. Doubt candidate for local therapy, likely including both surgery and chemo/radiation but will await final read on PET/CT and Multi-D discussions.      This assessment comes from my review of the imaging, pathology, physician notes and other pertinent information as mentioned.    DISPOSITION: Awaiting PET/CT read  Awaiting [facility] ([place]) prior radiation therapy records for review.    I would think he would start with upfront chemo +/- immunotherapy but will await.        TIME SPENT WITH PATIENT:   I spent greater than 60 minutes in face-to-face time with the patient and greater than 60% of those minute minutes of that time were spent in counseling and coordination of care, including review of imaging and pathology; indications, goals, logistics, alternatives and risks - both common and rare - for my recommendations as well as surveillance and potential outcomes.         CC: MD Jan López MD John A Cox, MD  11/5/2020   4:31 PM  EST        Addendum: PET/CT showed metabolically active bilateral SCV lymph nodes, Right hilar dominant mass, right lower lobe 2 cm nodular desnity; additional focus of 1.9 cm hypermetabolic nodule within the posterior right lower lobe.  Bulky pathologically enlarged lymph nodes in the mediastinum--- right paratracheal, precarinal, subcarinal.      Still awaiting records from [place] but concerned for XRT options.    Approved by:     Zion Lux MD  11/10/20  17:39 EST

## 2020-11-05 NOTE — TELEPHONE ENCOUNTER
Spoke with Dr. Melina Klein's office.  Patient scheduled for 12- at 10:00 with arrival time 9:45. Records faxed.  Attempted to contact patient but had to LMV asking him to call back.

## 2020-11-06 NOTE — TELEPHONE ENCOUNTER
Oncology Nursing Navigation Note    Called patient to update on plan of care. Discussed with patient, wife, and daughter Eliane. PICC line placement on 11/8/20 at 1300 with IV nurse. Vitamin B12 injection 11/9/20 at 1015 with scheduling of chemotherapy teaching and start to follow. Consult with Susie 11/10/20 at 1245 (known). Possible chemotherapy start on 11/11/20. All questions answered to satisfaction. Encouraged to call with any further questions or concerns.    Emma Kuhn RN, BSN  Oncology Nurse Navigator  [facility]  [phone]  Office  [phone]  Fax

## 2020-11-06 NOTE — TELEPHONE ENCOUNTER
Oncology Nursing Navigation Note    Called patient with update on plan of care. Patient and wife both on call. Patient to have PICC line placed by IV team by Monday at the latest to allow for more prompt administration of chemotherapy. Patient and wife verbalized understanding and agreed to plan. Encouraged to call with any questions or concerns.    Emma Kuhn RN, BSN  Oncology Nurse Navigator  [facility]  [phone]  Office  [phone]  Fax

## 2020-11-09 NOTE — PROGRESS NOTES
Oncology Nursing Navigation Note    Patient's wife LVM regarding PICC line placement yesterday. PICC was not placed due to misunderstanding through registration team regarding IV nurse schedule and coverage. Arranged for patient to have PICC placed today (11/9/20) after 1015 appointment at [facility]. Called patient with updated date and time for placement. Patient and wife verbalized understanding and agreed to plan.    Emma Kuhn RN, BSN  Oncology Nurse Navigator  [facility]  [phone]  Office  [phone]  Fax

## 2020-11-09 NOTE — TELEPHONE ENCOUNTER
From 11.2.20 progress note: I do not see orders under labs for NGS or PDL1. I did not see results for NGS/[employer] or PDL1/[employer]

## 2020-11-09 NOTE — CONSULTS
Patient present for PICC line placement for chemotherapy.   I spoke with the patient and patients wife in regards to when chemo treatment would take place. Pt and wife were unsure of exact date. I then asked who was going to manange PICC line at home (routine maintenance) and neither knew anything about that care. Patients wife called Emma with [facility] and [facility] in regards to this issue.  I personally was able to call the [facility] and speak with Emma in regards to who was going to maintain this line once placed. Nothing had been set up with this patient on how to care for this line. Emma stated that the patient is scheduled to meet with Dr Richards tomorrow 11/10 about having a port placed this week and that the Dr ordering the Chemo treatment wanted the patient started ASAP, which is why a PICC was ordered. While speaking to Emma, the patient and his wife said that they wanted to wait on the placement of the PICC line because they were not comfortable with all that has transpired.  Emma spoke with the patient and both agreed to not place the PICC line at this time, wait to have the port placed, and begin chemo on Monday.

## 2020-11-10 NOTE — PROGRESS NOTES
Oncology Nursing Navigation Note    Met with patient, wife, and daughter after chemotherapy teaching visit. Informed patient of order for EKG that was placed in order to assist with cardiac clearance since his last EKG was performed at the hospital prior to discharge. Patient to complete EKG during visit tomorrow at hospital for PAT testing. Family all in agreement to plan.    Emma Kuhn RN, BSN  Oncology Nurse Navigator  [facility]  [phone]  Office  [phone]  Fax

## 2020-11-10 NOTE — PROGRESS NOTES
Case Management/ Note    Patient Name: Shay Hernandez  YOB: 1940  MRN #: [id number]    OSW met with patient, his wife Catrina and daughter, Eliane. He is alert and oriented to person, place and time. BIMS 15/15. He is pleasant. He eye contact is poor and he fidgets with his hands during most of the conversation. He denies any depression / anxiety. They have moved from their [place] home of 53 years after learning of his diagnosis and then deciding to move to be with family. They have bought a home and will move in late November. We discussed the number of major stressors they have and ways to combat stress.     Currently, they live with their daughter Eliane. They will move to their own home late November. Basic needs are met. They have a long term care insurance. They are requesting an itemized estimation of the chemotherapy medication; message sent to [place] financial counselor. He does not have living will or healthcare representative. They asked questions about durable POA and these were answered. Gave them information regarding eldercare law attorneys. He is a  and they want to pursue his benefits. They were given information for the [facility] office.     Resources given: community resources, oncology resources, transportation resources, port pillow, small pillow, ACS PHM.     Electronically signed by:   Julisa Schultz LCSW, OSW-C  11/10/20, 16:21 EST

## 2020-11-10 NOTE — PROGRESS NOTES
Subjective   Chief Complaint   Patient presents with   • Lung Cancer     new patient needs port placement    Shay Hernandez is a 80 y.o. male.      History of Present Illness  This patient presents with biopsy-proven lung cancer performed by endobronchial ultrasound and biopsy.  He has a right hilar mass associated with bulky adenopathy in the mediastinum.  He has had some dysphagia.  He has bulky subcarinal adenopathy probably responsible for this.  Has had some weight loss.  His appetite is poor associated with the disease.  He has shortness of breath with exertion.  No fever chills or night sweats.  Patient is anticoagulated.  The following portions of the patient's history were reviewed and updated as appropriate: allergies, current medications, past family history, past medical history, past social history, past surgical history and problem list.    I have personally reviewed the following radiographs and reviewed the radiology reports.  My independent finds are   I reviewed the PET CT scan.  Patient has bulky hilar adenopathy mediastinal adenopathy.           I have reviewed the hospital record and reviewed the accompanying physician notes.  I have discussed the case with the following physicians: Case discussed with Dr. Lux.  Planning a left subclavian Mediport.     Past Medical History:   Diagnosis Date   • Adenocarcinoma, lung, unspecified laterality (CMS/HCC) 10/27/2020   • Atrial fibrillation (CMS/HCC)    • COPD (chronic obstructive pulmonary disease) (CMS/[facility])      Social History     Social History Narrative    Lives with wife     Social History     Tobacco Use   Smoking Status Former Smoker   • Packs/day: 2.00   • Years: 60.00   • Pack years: 120.00   • Types: Cigarettes   • Quit date: 10/27/2020   • Years since quittin.0   Smokeless Tobacco Never Used   Tobacco Comment    quit a week ago     Family History   Problem Relation Age of Onset   • Heart attack Mother    • Lung disease Other          Cryptococcus       Current Outpatient Medications:   •  albuterol sulfate  (90 Base) MCG/ACT inhaler, Inhale 2 puffs Every 4 (Four) Hours As Needed for Wheezing., Disp: 8 g, Rfl: 0  •  apixaban (ELIQUIS) 5 MG tablet tablet, Take 1 tablet by mouth Every 12 (Twelve) Hours for 30 days., Disp: 60 tablet, Rfl: 0  •  aspirin (aspirin) 81 MG EC tablet, Take 81 mg by mouth Daily., Disp: , Rfl:   •  budesonide-formoterol (SYMBICORT) 160-4.5 MCG/ACT inhaler, Inhale 2 puffs 2 (Two) Times a Day., Disp: 1 inhaler, Rfl: 0  •  dexamethasone (DECADRON) 4 MG tablet, Take 2 tablets in the morning daily on Days 2, 3 & 4.  Take with food., Disp: 6 tablet, Rfl: 3  •  folic acid (FOLVITE) 1 MG tablet, Take 1 tablet by mouth Daily. Start 7 days prior to chemotherapy until at least 3 weeks after all chemotherapy., Disp: 30 tablet, Rfl: 5  •  metoprolol tartrate (LOPRESSOR) 25 MG tablet, Take 1 tablet by mouth Every 12 (Twelve) Hours for 30 days., Disp: 60 tablet, Rfl: 0  •  Multiple Vitamins-Minerals (HEALTHY EYES PO), Take 1 tablet by mouth Daily., Disp: , Rfl:   •  nicotine (NICODERM CQ) 14 MG/24HR patch, Place 1 patch on the skin as directed by provider Daily for 21 days., Disp: 21 patch, Rfl: 0  •  nicotine (NICODERM CQ) 21 MG/24HR patch, Place 1 patch on the skin as directed by provider Daily for 21 days., Disp: 21 patch, Rfl: 0  •  nicotine (NICODERM CQ) 7 MG/24HR patch, Place 1 patch on the skin as directed by provider Daily. 1 patch daily X 3 weeks, Disp: 42 patch, Rfl: 0  •  ondansetron (ZOFRAN) 8 MG tablet, Take 1 tablet by mouth 3 (Three) Times a Day As Needed for Nausea or Vomiting., Disp: 30 tablet, Rfl: 5  •  pantoprazole (PROTONIX) 40 MG EC tablet, Take 1 tablet by mouth Daily., Disp: 14 tablet, Rfl: 0  •  vitamin C (ASCORBIC ACID) 500 MG tablet, Take 500 mg by mouth Daily., Disp: , Rfl:   •  Vitamin D, Cholecalciferol, (CHOLECALCIFEROL) 10 MCG (400 UNIT) tablet, Take 400 Units by mouth Daily., Disp: , Rfl:   •   "vitamin E 100 UNIT capsule, Take 100 Units by mouth Daily., Disp: , Rfl:       REVIEW OF SYSTEMS  All systems reviewed.  Results reviewed by me initialed and scanned into the chart.    Objective   /77 (BP Location: Right arm, Patient Position: Sitting, Cuff Size: Adult)   Pulse 93   Temp 97.1 °F (36.2 °C) (Oral)   Ht 188 cm (74\")   Wt 83.5 kg (184 lb)   SpO2 98%   BMI 23.62 kg/m²   Physical Exam pleasant no distress a little bit tired sitting semirecumbent.  Sclera anicteric conjunctiva pink neck supple.  Vaguely palpable left supraclavicular lymph nodes no bulky adenopathy.  Trachea midline.  Symmetrical chest expansion.  PMI not displaced.  No JVD.  No central or peripheral cyanosis.  No clubbing.  No peripheral edema.  No venous stasis.  Abdomen nondistended nontender mild to moderately obese.  Normal mentation normal mood normal affect neurological cranial nerves grossly intact wearing a mask.  Moves all extremities.  Skin without rash somewhat dry and thin.      Assessment/Plan   Regionally metastatic lung cancer stage IIIb.  Possible solitary drop metastasis in the right chest on my reading in the exam which would make him stage Alirio  Plan Mediport placement holding anticoagulation  Clinically frail state not a contraindication to surgery under monitored anesthesia care.  Procedures    Jan Richards MD  11/10/2020  13:16 EST    "

## 2020-11-10 NOTE — H&P (VIEW-ONLY)
Subjective   Chief Complaint   Patient presents with   • Lung Cancer     new patient needs port placement    Shay Hernandez is a 80 y.o. male.      History of Present Illness  This patient presents with biopsy-proven lung cancer performed by endobronchial ultrasound and biopsy.  He has a right hilar mass associated with bulky adenopathy in the mediastinum.  He has had some dysphagia.  He has bulky subcarinal adenopathy probably responsible for this.  Has had some weight loss.  His appetite is poor associated with the disease.  He has shortness of breath with exertion.  No fever chills or night sweats.  Patient is anticoagulated.  The following portions of the patient's history were reviewed and updated as appropriate: allergies, current medications, past family history, past medical history, past social history, past surgical history and problem list.    I have personally reviewed the following radiographs and reviewed the radiology reports.  My independent finds are   I reviewed the PET CT scan.  Patient has bulky hilar adenopathy mediastinal adenopathy.           I have reviewed the hospital record and reviewed the accompanying physician notes.  I have discussed the case with the following physicians: Case discussed with Dr. Lux.  Planning a left subclavian Mediport.     Past Medical History:   Diagnosis Date   • Adenocarcinoma, lung, unspecified laterality (CMS/HCC) 10/27/2020   • Atrial fibrillation (CMS/HCC)    • COPD (chronic obstructive pulmonary disease) (CMS/[facility])      Social History     Social History Narrative    Lives with wife     Social History     Tobacco Use   Smoking Status Former Smoker   • Packs/day: 2.00   • Years: 60.00   • Pack years: 120.00   • Types: Cigarettes   • Quit date: 10/27/2020   • Years since quittin.0   Smokeless Tobacco Never Used   Tobacco Comment    quit a week ago     Family History   Problem Relation Age of Onset   • Heart attack Mother    • Lung disease Other          Cryptococcus       Current Outpatient Medications:   •  albuterol sulfate  (90 Base) MCG/ACT inhaler, Inhale 2 puffs Every 4 (Four) Hours As Needed for Wheezing., Disp: 8 g, Rfl: 0  •  apixaban (ELIQUIS) 5 MG tablet tablet, Take 1 tablet by mouth Every 12 (Twelve) Hours for 30 days., Disp: 60 tablet, Rfl: 0  •  aspirin (aspirin) 81 MG EC tablet, Take 81 mg by mouth Daily., Disp: , Rfl:   •  budesonide-formoterol (SYMBICORT) 160-4.5 MCG/ACT inhaler, Inhale 2 puffs 2 (Two) Times a Day., Disp: 1 inhaler, Rfl: 0  •  dexamethasone (DECADRON) 4 MG tablet, Take 2 tablets in the morning daily on Days 2, 3 & 4.  Take with food., Disp: 6 tablet, Rfl: 3  •  folic acid (FOLVITE) 1 MG tablet, Take 1 tablet by mouth Daily. Start 7 days prior to chemotherapy until at least 3 weeks after all chemotherapy., Disp: 30 tablet, Rfl: 5  •  metoprolol tartrate (LOPRESSOR) 25 MG tablet, Take 1 tablet by mouth Every 12 (Twelve) Hours for 30 days., Disp: 60 tablet, Rfl: 0  •  Multiple Vitamins-Minerals (HEALTHY EYES PO), Take 1 tablet by mouth Daily., Disp: , Rfl:   •  nicotine (NICODERM CQ) 14 MG/24HR patch, Place 1 patch on the skin as directed by provider Daily for 21 days., Disp: 21 patch, Rfl: 0  •  nicotine (NICODERM CQ) 21 MG/24HR patch, Place 1 patch on the skin as directed by provider Daily for 21 days., Disp: 21 patch, Rfl: 0  •  nicotine (NICODERM CQ) 7 MG/24HR patch, Place 1 patch on the skin as directed by provider Daily. 1 patch daily X 3 weeks, Disp: 42 patch, Rfl: 0  •  ondansetron (ZOFRAN) 8 MG tablet, Take 1 tablet by mouth 3 (Three) Times a Day As Needed for Nausea or Vomiting., Disp: 30 tablet, Rfl: 5  •  pantoprazole (PROTONIX) 40 MG EC tablet, Take 1 tablet by mouth Daily., Disp: 14 tablet, Rfl: 0  •  vitamin C (ASCORBIC ACID) 500 MG tablet, Take 500 mg by mouth Daily., Disp: , Rfl:   •  Vitamin D, Cholecalciferol, (CHOLECALCIFEROL) 10 MCG (400 UNIT) tablet, Take 400 Units by mouth Daily., Disp: , Rfl:   •   "vitamin E 100 UNIT capsule, Take 100 Units by mouth Daily., Disp: , Rfl:       REVIEW OF SYSTEMS  All systems reviewed.  Results reviewed by me initialed and scanned into the chart.    Objective   /77 (BP Location: Right arm, Patient Position: Sitting, Cuff Size: Adult)   Pulse 93   Temp 97.1 °F (36.2 °C) (Oral)   Ht 188 cm (74\")   Wt 83.5 kg (184 lb)   SpO2 98%   BMI 23.62 kg/m²   Physical Exam pleasant no distress a little bit tired sitting semirecumbent.  Sclera anicteric conjunctiva pink neck supple.  Vaguely palpable left supraclavicular lymph nodes no bulky adenopathy.  Trachea midline.  Symmetrical chest expansion.  PMI not displaced.  No JVD.  No central or peripheral cyanosis.  No clubbing.  No peripheral edema.  No venous stasis.  Abdomen nondistended nontender mild to moderately obese.  Normal mentation normal mood normal affect neurological cranial nerves grossly intact wearing a mask.  Moves all extremities.  Skin without rash somewhat dry and thin.      Assessment/Plan   Regionally metastatic lung cancer stage IIIb.  Possible solitary drop metastasis in the right chest on my reading in the exam which would make him stage Alirio  Plan Mediport placement holding anticoagulation  Clinically frail state not a contraindication to surgery under monitored anesthesia care.  Procedures    Jan Richards MD  11/10/2020  13:16 EST    "

## 2020-11-10 NOTE — PROGRESS NOTES
Education for Administration of Chemotherapy and/or Biotherapy     NAME: Shay Hernandez  : 1940  MRN: [id number]  DATE OF SERVICE: 11/10/2020  REASON FOR VISIT: PATIENT EDUCATION    Mr. Shay Hernandez is here today for education on his upcoming chemotherapy and/or biotherapy recommended for treatment of his disease. The patient was accompanied by his wife and daughter.     I reviewed treatment options, obtained signed consent, and answered any questions he had regarding the administration of cisplatin and pemetrexed.     Shay Hernandez has already consulted with Ziggy Daley MD for the treatment of stage IIIc poorly differentiated adenocarcinoma of lung.  The patient's oncologist has discussed the same treatment options with the patient and answered his questions prior to today's visit on 2020.    TREATMENT GOALS:  The goal of the treatment is to:    [] Curative intent - intent is cure; cure implies patient survival will not be restricted by current cancer diagnosis   [] Control  - intent is to extend survival but not long enough to meet definition of cure for patient with that diagnosis   [x] Palliative - means treatment given in a non-curative setting to optimize symptom control, improve quality of life, and improve survival    This treatment has been explained to Shay Hernandez. Alternative methods of treatment, if any, have been explained to Shay Hernandez, as have the benefits and risks of each. Based on the physician's explanation of the benefits and risks of this treatment and any alternatives available, the patient agrees the potential benefits outweighs the potential risks involved. I have explained to the patient the most likely complications which might occur from this treatment. The patient understands along with the treatment additional medications may be necessary to lessen the side effects.     SIDE EFFECTS:  Possible side effects may include but are not limited to, any of the  following, or a combination of the following:    []  Abdominal pain  []  Hypersensitivity reaction [x]  Rash/skin irritation   []  Allergic Reaction []  Hypertension []  Secondary malignancies   [x]  Anemia []  Hypertensive crisis  []  Sexual side effects    []  Anxiety []  Hypertriglyceridemia []  Shortness of breath   []  Back pain []  Hypoalbuminemia []  Skin changes   []  Body pain []  Hyponatremia  []  Somnolence   []  Blood clots (DVT/PE) []  Immune-mediated reaction [x]  Pharyngitis,sore throat   []  Bleeding []  Infection  []  Swelling   []  Bone pain []  Infusion reaction  [x]  Taste changes   []  Bruising [x]  Injection site reaction  []  Temperature sensitivity   []  Cardiovascular events  []  Injection site ulceration [x]  Thrombocytopenia   []  Central neurotoxicity []  Insomnia []  Thyroid changes   []  Chest pain []  Itching []  Tinnitus   []  Chills []  Joint pain []  Upper respiratory tract infection    []  Confusion [x]  Kidney toxicity/damage []  Visual changes   []  Congestive heart failure [x]  Leukopenia []  Vitlligo   []  Constipation [x]  LFT imbalances [x]  Vomiting   []  Cough []  Liver damage []  Watery eyes   []  Depression [x]  Loss of appetite []  Weakness   [x]  Diarrhea []  Low blood pressure []  Weight gain   []  Dizziness []  Lung damage []  Weight loss   []  Dry skin []  Menopausal symptoms []  Wound healing complication   []  Ecchymosis []  Menstrual irregularities [x]  Blood test abnormalities (low magnesium, low calcium, low potassium)   []  Electrolyte imbalances []  Metallic taste  [x]  Stomatitis   []  Elevated LDH []  Mood changes [] Other   []  Eye irritation [x]  Mouth sores []  Other   [x]  Fatigue []  Muscle aches  []  Other   []  Fertility effects  []  Nephrotic syndrome []  Other   []  Fevers []  Nail changes []  Other   []  Fistula formation [x]  Nausea  []  Other   []  Flu-like symptoms []  Neck pain  []  Other   []  Fluid retention [x]  Neutropenia []  Other   []   Forgetfulness []  Nosebleeds []  Other   []  Gastrointestinal perforation []  Pain in arms/legs []  Other   []  Hand foot syndrome []  Pericardial effusion  []  Other   [x]  Hair loss/discoloration [x]  Peripheral neuropathy []  Other   []  Headaches []  Petechiae []  Other   [x]  Hearing loss/change [x]  Pharyngitis  []  Other   []  Heart damage []  Photosensitivity  []  Other   []  Hematuria []  Pleural effusion  []  Other   []  Hemorrhage []  Proteinuria  []  Other     VASCULAR ACCESS:  The patient was educated on the possible need for vascular access/port placement.  The patient was advised although uncommon, leakage of an infused medication from the vein or venous access device (port) may lead to skin breakdown and/or other tissue damage.  The patient was advised he may have pain, bleeding, and/or bruising from the insertion of a needle in their vein or venous access device (port).  The patient was further advised despite proper technique, infection with redness and irritation may rarely occur at the site where the needle was inserted.  The patient was advised if complications occur, additional medical treatment is available.    BLOOD COUNT MONITORING:  While receiving treatment, it has been explained to the patient blood counts will be monitored.  This may include but is not limited to a complete blood count (CBC). The patient may develop neutropenia, anemia, or thrombocytopenia. This has been explained and a handout was provided to the patient.     NUTRITION:   It was explained to the patient about nutrition and its importance while undergoing chemotherapy and/or biotherapy. Certain medications will be prescribed during the treatment which may change the way foods taste or smell. These changes may cause poor or no appetite. The patient was advised food is fuel for the body, and if it does not get the fuel it needs, he may become malnourished, which can lead to severe fatigue. It was discussed with the patient  about calories and how to add high-calorie foods to his diet.  Protein was also mentioned in regards to how it will help make new cells for the body. Information was given to Shay Hernandez regarding good protein sources.   It was also discussed with the patient the importance of  eating and drinking every 2-3 hours while awake. We discussed fluid intake of at least 6 to 8 ounce glasses of liquids per day to stay hydrated. Examples are listed below:   Water  Juice (fruit or vegetable)  Soda Sport Drinks Soup   Milk  Ensure, Boost, Glucerna Ice Cream Popsicles Jello   Milkshakes Pudding  Gatorade Sherbert Yogurt     REPRODUCTION:  Reproductive risks were discussed, including appropriate use of birth control, and protection during sexual relations. The risks of becoming pregnant while receiving chemotherapy and/or biotherapy were reviewed for females.  Males were instructed to use appropriate birth control to prevent conception during treatment.  We also discussed the importance of using reliable barrier methods while participating in intimate activities as this may expose their partners to a potentially harmful drug. The importance of pregnancy prevention was emphasized due to risks of increased chance of birth defects and miscarriages.     Shay Hernandez was provided handouts on:   1. Home instructions  2. Complete blood counts and terminology  3. Nutrition during cancer therapy   4. Fluids and dehydration  5. Mouth care  6. Cancer-related fatigue  7. Management of constipation    8. Management of diarrhea   9. Handouts from [employer] on specific drugs: Cisplatin and pemetrexed  10. Handouts from [employer] on specific drugs if applicable: Zofran and dexamethasone  11. A signed copy of chemotherapy/biotherapy consent     TOPICS EDUCATION PROVIDED EDUCATION REINFORCED COMMENTS   ANEMIA:  role of RBC, cause, s/s, ways to manage, role of transfusion [x] [x]    THROMBOCYTOPENIA:  role of platelet, cause, s/s, ways  to prevent bleeding, things to avoid, when to seek help [x] [x]    NEUTROPENIA:  role of WBC, cause, infection precautions, s/s of infection, when to call MD [x] [x]    NUTRITION & APPETITE CHANGES:  importance of maintaining healthy diet & weight, ways to manage to improve intake, dietary consult, exercise regimen [x] [x]    DIARRHEA:  causes, s/s of dehydration, ways to manage, dietary changes, when to call MD [x] [x]    CONSTIPATION:  causes, ways to manage, dietary changes, when to call MD [x] [x]    NAUSEA & VOMITING:  causes, use of antiemetics, dietary changes, when to call MD [x] [x]    MOUTH SORES:  causes, oral care, ways to manage [x] [x]    ALOPECIA:  causes, ways to manage, resources [x] [x]    INFERTILITY & SEXUALITY:  causes, fertility preservation options, sexuality changes, ways to manage, importance of birth control [x] [x]    NERVOUS SYSTEM CHANGES:  causes, s/s, neuropathies, cognitive changes, ways to manage [x] [x]    PAIN:  causes, ways to manage [x] [x]    SKIN & NAIL CHANGES:  cause, s/s, ways to manage [x] [x]    ORGAN TOXICITIES:  cause, s/s, need for diagnostic tests, labs, when to notify MD [x] [x] Risk of kidney toxicity with cisplatin, encouraged maintaining adequate fluid intake    SURVIVORSHIP:  distress, distress assessment, secondary malignancies, early/late effects, follow-up, social issues, social support [x] [x]    HOME CARE:  use of spill kits, storing of PO chemo, how to manage bodily fluids [x] [x]    MISCELLANEOUS:  drug interactions, administration, vesicants  [x] [x] Folic acid supplementation and vitamin B12 should be continued while receiving pemetrexed     PAST MEDICAL HISTORY:  Past Medical History:   Diagnosis Date   • Adenocarcinoma, lung, unspecified laterality (CMS/HCC) 10/27/2020   • Atrial fibrillation (CMS/HCC)    • COPD (chronic obstructive pulmonary disease) (CMS/[facility])        PAST SURGICAL HISTORY:  Past Surgical History:   Procedure Laterality Date   •  APPENDECTOMY     • BRONCHOSCOPY N/A 10/27/2020    Procedure: BRONCHOSCOPY WITH BRONCHOALVEOLAR LAVAGE AND ENDOBRONCHIAL ULTRASOUND WITH FINE NEEDLE ASPIRATION X2 AREAS;  Surgeon: Johnny Waldrop MD;  Location: [facility] ENDOSCOPY;  Service: Pulmonary;  Laterality: N/A;  POST:        CURRENT MEDICATIONS:    Current Outpatient Medications:   •  albuterol sulfate  (90 Base) MCG/ACT inhaler, Inhale 2 puffs Every 4 (Four) Hours As Needed for Wheezing., Disp: 8 g, Rfl: 0  •  apixaban (ELIQUIS) 5 MG tablet tablet, Take 1 tablet by mouth Every 12 (Twelve) Hours for 30 days., Disp: 60 tablet, Rfl: 0  •  aspirin (aspirin) 81 MG EC tablet, Take 81 mg by mouth Daily., Disp: , Rfl:   •  budesonide-formoterol (SYMBICORT) 160-4.5 MCG/ACT inhaler, Inhale 2 puffs 2 (Two) Times a Day., Disp: 1 inhaler, Rfl: 0  •  folic acid (FOLVITE) 1 MG tablet, Take 1 tablet by mouth Daily., Disp: 30 tablet, Rfl: 2  •  metoprolol tartrate (LOPRESSOR) 25 MG tablet, Take 1 tablet by mouth Every 12 (Twelve) Hours for 30 days., Disp: 60 tablet, Rfl: 0  •  Multiple Vitamins-Minerals (HEALTHY EYES PO), Take 1 tablet by mouth Daily., Disp: , Rfl:   •  nicotine (NICODERM CQ) 14 MG/24HR patch, Place 1 patch on the skin as directed by provider Daily for 21 days., Disp: 21 patch, Rfl: 0  •  nicotine (NICODERM CQ) 21 MG/24HR patch, Place 1 patch on the skin as directed by provider Daily for 21 days., Disp: 21 patch, Rfl: 0  •  nicotine (NICODERM CQ) 7 MG/24HR patch, Place 1 patch on the skin as directed by provider Daily. 1 patch daily X 3 weeks, Disp: 42 patch, Rfl: 0  •  pantoprazole (PROTONIX) 40 MG EC tablet, Take 1 tablet by mouth Daily., Disp: 14 tablet, Rfl: 0  •  vitamin C (ASCORBIC ACID) 500 MG tablet, Take 500 mg by mouth Daily., Disp: , Rfl:   •  Vitamin D, Cholecalciferol, (CHOLECALCIFEROL) 10 MCG (400 UNIT) tablet, Take 400 Units by mouth Daily., Disp: , Rfl:   •  vitamin E 100 UNIT capsule, Take 100 Units by mouth Daily., Disp: , Rfl:   No  current facility-administered medications for this visit.     ALLERGIES:  No Known Allergies    FAMILY HISTORY:  Family History   Problem Relation Age of Onset   • Heart attack Mother    • Lung disease Other         Cryptococcus       ONCOLOGIC FAMILY HISTORY:  Cancer-related family history is not on file.    SOCIAL HISTORY:  Social History     Tobacco Use   • Smoking status: Current Every Day Smoker     Packs/day: 2.00     Years: 60.00     Pack years: 120.00     Types: Cigarettes   • Tobacco comment: quit a week ago   Substance Use Topics   • Alcohol use: Yes     Alcohol/week: 4.0 standard drinks     Types: 4 Cans of beer per week   • Drug use: Not Currently       HPI, ROS and PFSH have been reviewed and confirmed on 11/10/2020.     REVIEW OF SYSTEMS:  Review of Systems   Constitutional: Positive for fatigue.        Decreased appetitie   HENT: Positive for hearing loss (Hard of hearing) and trouble swallowing.    Respiratory: Negative for cough and choking.    Gastrointestinal: Negative for diarrhea, nausea and vomiting.   Neurological: Negative for facial asymmetry and speech difficulty.   Psychiatric/Behavioral: The patient is not nervous/anxious.      PHYSICAL EXAMINATION:  Physical Exam  Vitals signs reviewed.   Constitutional:       General: He is not in acute distress.     Appearance: He is not ill-appearing, toxic-appearing or diaphoretic.   HENT:      Head: Normocephalic and atraumatic.      Comments: Hard of hearing  Eyes:      General: No scleral icterus.        Right eye: No discharge.         Left eye: No discharge.      Extraocular Movements: Extraocular movements intact.      Comments: Glasses   Pulmonary:      Effort: Pulmonary effort is normal. No respiratory distress.      Breath sounds: No wheezing.   Skin:     General: Skin is warm.   Neurological:      General: No focal deficit present.      Mental Status: He is alert and oriented to person, place, and time.   Psychiatric:         Mood and  Affect: Mood normal.         Behavior: Behavior normal.       LABS:  WBC   Date Value Ref Range Status   10/29/2020 16.50 (H) 3.40 - 10.80 10*3/mm3 Final     RBC   Date Value Ref Range Status   10/29/2020 4.55 4.14 - 5.80 10*6/mm3 Final     Hemoglobin   Date Value Ref Range Status   10/29/2020 13.6 13.0 - 17.7 g/dL Final     Hematocrit   Date Value Ref Range Status   10/29/2020 41.8 37.5 - 51.0 % Final     MCV   Date Value Ref Range Status   10/29/2020 91.9 79.0 - 97.0 fL Final     MCH   Date Value Ref Range Status   10/29/2020 30.0 26.6 - 33.0 pg Final     MCHC   Date Value Ref Range Status   10/29/2020 32.6 31.5 - 35.7 g/dL Final     RDW   Date Value Ref Range Status   10/29/2020 14.2 12.3 - 15.4 % Final     RDW-SD   Date Value Ref Range Status   10/29/2020 45.9 37.0 - 54.0 fl Final     MPV   Date Value Ref Range Status   10/29/2020 9.3 6.0 - 12.0 fL Final     Platelets   Date Value Ref Range Status   10/29/2020 167 140 - 450 10*3/mm3 Final     Neutrophil %   Date Value Ref Range Status   10/27/2020 63.1 42.7 - 76.0 % Final     Lymphocyte %   Date Value Ref Range Status   10/27/2020 9.5 (L) 19.6 - 45.3 % Final     Monocyte %   Date Value Ref Range Status   10/27/2020 7.0 5.0 - 12.0 % Final     Eosinophil %   Date Value Ref Range Status   10/27/2020 19.8 (H) 0.3 - 6.2 % Final     Basophil %   Date Value Ref Range Status   10/27/2020 0.6 0.0 - 1.5 % Final     Neutrophils Absolute   Date Value Ref Range Status   10/29/2020 9.57 (H) 1.70 - 7.00 10*3/mm3 Final     Neutrophils, Absolute   Date Value Ref Range Status   10/27/2020 9.60 (H) 1.70 - 7.00 10*3/mm3 Final     Lymphocytes, Absolute   Date Value Ref Range Status   10/27/2020 1.40 0.70 - 3.10 10*3/mm3 Final     Monocytes, Absolute   Date Value Ref Range Status   10/27/2020 1.10 (H) 0.10 - 0.90 10*3/mm3 Final     Eosinophils Absolute   Date Value Ref Range Status   10/29/2020 3.63 (H) 0.00 - 0.40 10*3/mm3 Final     Eosinophils, Absolute   Date Value Ref Range  Status   10/27/2020 3.00 (H) 0.00 - 0.40 10*3/mm3 Final     Basophils, Absolute   Date Value Ref Range Status   10/27/2020 0.10 0.00 - 0.20 10*3/mm3 Final     nRBC   Date Value Ref Range Status   10/27/2020 0.0 0.0 - 0.2 /100 WBC Final     Lab Results   Component Value Date    GLUCOSE 83 10/29/2020    BUN 14 10/29/2020    CREATININE 0.70 (L) 10/29/2020    EGFRIFNONA 109 10/29/2020    BCR 20.0 10/29/2020    K 4.1 10/29/2020    CO2 24.0 10/29/2020    CALCIUM 8.1 (L) 10/29/2020    ALBUMIN 2.90 (L) 10/27/2020    AST 27 10/27/2020    ALT 40 10/27/2020     Assessment/Plan   There are no diagnoses linked to this encounter.  ASSESSMENT   1. Encounter for medication management and education of chemotherapy/biotherapy    2. Stage IIIc poorly differentiated adenocarcinoma of lung  3. Dysphagia: likely related to cancer and bulky subcarinal adenopathy.  Patient family noted no difficulty with swallowing fluids  4. Decreased appetite  5. Atrial fibrillation: On Eliquis    PLAN  • Start cisplatin and pemetrexed after port placement- discussed with Dr. Daley  • E-scribed dexamethasone 4 mg tablets per treatment plan: To take 2 tablets in the morning daily on days 2, 3, and 4 with food  • E-scribed Zofran 8 mg tablets per treatment plan: Take 1 tablet by mouth 3 times a day as needed for nausea or vomiting  • Continue monthly vitamin B12 injections-due 12/9/2020  • Continue folic acid 1 mg p.o. daily  • Notified , financial counselor, and dietician patient starting new treatment  • Plan for port placement Friday, 11/13/2020  • Plan for preadmission testing tomorrow 11/11/2020 prior to port placement  • Discussed with Emma Kuhn, nurse navigator and patient will need cardiac clearance prior to port placement.  EKG ordered and to be done tomorrow 11/11/2020 with preadmission testing  • Patient educated to hold Eliquis 2-3 days prior to port placement or as advised by surgeon  • Discussed various appetite stimulants  with patient and family  • Will discuss dysphagia further with Dr. Daley  • Schedule patient for chemotherapy Monday 11/16/20  • RTC 11/19/2020 for follow-up appointment with Dr. Daley    I have reviewed labs results, imaging, vitals, and medications with the patient today.    A total of 70 minutes were spent with the patient, with greater then 50% of time spent in education and counseling.    Electronically signed by MANDI Shelton, 11/10/20, 5:02 PM EST.

## 2020-11-10 NOTE — OUTREACH NOTE
Medical Week 2 Survey      Responses   [facility] patient discharged from?  [facility]   Does the patient have one of the following disease processes/diagnoses(primary or secondary)?  Other   Week 2 attempt successful?  Yes   Call start time  1242   Rescheduled  Rescheduled-pt requested [Patient asked to call back.  He was at appt]   Call end time  1242          Radha Ragland LPN

## 2020-11-11 NOTE — TELEPHONE ENCOUNTER
Oncology Nursing Navigation Note    Called patient's wife and instructed her to resume patient's Eliquis until told otherwise. Discussed with wife and daughter current situation with attempting to obtain cardiac clearance and the importance of obtaining clearance prior to cessation of taking blood thinner. Explained that there is an option to postpone port placement until Monday if clearance isn't obtained until tomorrow. Wife and daughter verbalized understanding. Encouraged to call with any questions or concerns.    Lucy at Dr. Richards's office updated on discussion with wife and daughter. Lucy to inform me when and if she receives cardiac clearance and I will do the same.    Emma Kuhn, RN, BSN  Oncology Nurse Navigator  [facility]  [phone]  Office  [phone]  Fax

## 2020-11-11 NOTE — PROGRESS NOTES
Oncology Nursing Navigation Note    Received VM from wife Catrina. Wife was questioning appointment with Pulmonary Clinic on Friday 11/13/20 regarding where to go and having possible conflict with port placement same day. Called Pulmonary Clinic and spoke with Terrance. Shared that patient will possibly need to reschedule appointment due to possible port placement on Friday. Said I would let her know as soon as possible if the appointment will need to be rescheduled. Terrance shared the soonest available appointment is after the end of the year if a reschedule is needed. Terrance also stated the clinic calls patients the day before their scheduled appointments with instructions on where to go. Returned call to wife and updated her on conversation with Terrance. Wife verbalized understanding.    Emma Kuhn RN, BSN  Oncology Nurse Navigator  [facility]  [phone]  Office  [phone]  Fax

## 2020-11-12 NOTE — OUTREACH NOTE
Medical Week 2 Survey      Responses   [facility] patient discharged from?  [facility]   Does the patient have one of the following disease processes/diagnoses(primary or secondary)?  Other   Week 2 attempt successful?  Yes   Call start time  1452   Discharge diagnosis  Lung adenocarcinoma   Call end time  1457   Meds reviewed with patient/caregiver?  Yes   Is the patient having any side effects they believe may be caused by any medication additions or changes?  No   Does the patient have all medications ordered at discharge?  Yes   Is the patient taking all medications as directed (includes completed medication regime)?  Yes   Does the patient have a primary care provider?   Yes   Does the patient have an appointment with their PCP within 7 days of discharge?  Yes   Has the patient kept scheduled appointments due by today?  Yes [lab tests]   Has home health visited the patient within 72 hours of discharge?  N/A   Psychosocial issues?  No   Did the patient receive a copy of their discharge instructions?  Yes   Nursing interventions  Reviewed instructions with patient   What is the patient's perception of their health status since discharge?  Improving   Is the patient/caregiver able to teach back signs and symptoms related to disease process for when to call PCP?  Yes   Is the patient/caregiver able to teach back signs and symptoms related to disease process for when to call 911?  Yes   Is the patient/caregiver able to teach back the hierarchy of who to call/visit for symptoms/problems? PCP, Specialist, Home health nurse, Urgent Care, ED, 911  Yes   Additional teach back comments  pt states has occasional SOB which is normal, free of edema, appetite good   Week 2 Call Completed?  Yes          Farrah Taylor RN

## 2020-11-13 NOTE — PROGRESS NOTES
PULMONARY/ CRITICAL CARE/ SLEEP MEDICINE OUTPATIENT CONSULT/ FOLLOW UP NOTE        Patient Name:  Shay Hernandez    :  1940    Medical Record:  [id number]    PRIMARY CARE PHYSICIAN     Ambar Hassan APRN    REASON FOR CONSULTATION    Shay Hernandez is a 80 y.o. male who is seen in the office today for follow-up of his hospital admission.  Patient was recently discharged from [facility].  He was admitted with hemoptysis.  During the work-up he was noted to have a lung mass with multiple enlarged lymph nodes.  He underwent bronchoscopy. Diagnosed with Lung cancer.  He was seen by Oncology and thoracic surgery.  He is planning to undergo Port placement and start Chemo per oncology  SOA with activity. + cough with phlegm. Occasional hemoptysis. Using symbicort and albuterol. Patient feels that the nebulizer treatments helped better than inhaler and is wanting to get a script for that. Patient is using supplemental O2 at night and PRN during the day  No smoking since his recent hospitalization    REVIEW OF SYSTEMS    Constitutional:  Denies fever or chills, + weight loss and decreased appetite   Eyes:  Denies change in visual acuity   HENT:  Denies nasal congestion or sore throat   Respiratory:  + cough +shortness of breath   Cardiovascular:  Denies chest pain or edema   GI:  Denies abdominal pain, nausea, vomiting, bloody stools or diarrhea   :  Denies dysuria   Musculoskeletal:  Denies back pain or joint pain   Integument:  Denies rash   Neurologic:  Denies headache, focal weakness or sensory changes   Endocrine:  Denies polyuria or polydipsia   Lymphatic:  Denies swollen glands   Psychiatric:  Denies depression or anxiety     MEDICAL HISTORY    Past Medical History:   Diagnosis Date   • Adenocarcinoma, lung, unspecified laterality (CMS/HCC) 10/27/2020   • Atrial fibrillation (CMS/HCC)    • COPD (chronic obstructive pulmonary disease) (CMS/[facility])         SURGICAL HISTORY    Past Surgical  History:   Procedure Laterality Date   • APPENDECTOMY     • BRONCHOSCOPY N/A 10/27/2020    Procedure: BRONCHOSCOPY WITH BRONCHOALVEOLAR LAVAGE AND ENDOBRONCHIAL ULTRASOUND WITH FINE NEEDLE ASPIRATION X2 AREAS;  Surgeon: Johnny Waldrop MD;  Location: [facility] ENDOSCOPY;  Service: Pulmonary;  Laterality: N/A;  POST:         FAMILY HISTORY    Family History   Problem Relation Age of Onset   • Heart attack Mother    • Lung disease Other         Cryptococcus       SOCIAL HISTORY    Social History     Tobacco Use   • Smoking status: Former Smoker     Packs/day: 2.00     Years: 60.00     Pack years: 120.00     Types: Cigarettes     Quit date: 10/27/2020     Years since quittin.0   • Smokeless tobacco: Never Used   Substance Use Topics   • Alcohol use: Not Currently     Alcohol/week: 4.0 standard drinks     Types: 4 Cans of beer per week        ALLERGIES    No Known Allergies      MEDICATIONS    Current Outpatient Medications on File Prior to Visit   Medication Sig Dispense Refill   • albuterol sulfate  (90 Base) MCG/ACT inhaler Inhale 2 puffs Every 4 (Four) Hours As Needed for Wheezing. 8 g 0   • budesonide-formoterol (SYMBICORT) 160-4.5 MCG/ACT inhaler Inhale 2 puffs 2 (Two) Times a Day. 1 inhaler 0   • folic acid (FOLVITE) 1 MG tablet Take 1 tablet by mouth Daily. Start 7 days prior to chemotherapy until at least 3 weeks after all chemotherapy. 30 tablet 5   • metoprolol tartrate (LOPRESSOR) 25 MG tablet Take 1 tablet by mouth Every 12 (Twelve) Hours for 30 days. 60 tablet 0   • ondansetron (ZOFRAN) 8 MG tablet Take 1 tablet by mouth 3 (Three) Times a Day As Needed for Nausea or Vomiting. 30 tablet 5   • pantoprazole (PROTONIX) 40 MG EC tablet Take 1 tablet by mouth Daily. 14 tablet 0   • [DISCONTINUED] dexamethasone (DECADRON) 4 MG tablet Take 2 tablets in the morning daily on Days 2, 3 & 4.  Take with food. 6 tablet 3   • apixaban (ELIQUIS) 5 MG tablet tablet Take 1 tablet by mouth Every 12 (Twelve) Hours  "for 30 days. 60 tablet 0   • aspirin (aspirin) 81 MG EC tablet Take 81 mg by mouth Daily. Dr. Richards told pt to keep taking until dos     • Multiple Vitamins-Minerals (HEALTHY EYES PO) Take 1 tablet by mouth Daily.     • nicotine (NICODERM CQ) 14 MG/24HR patch Place 1 patch on the skin as directed by provider Daily for 21 days. 21 patch 0   • nicotine (NICODERM CQ) 21 MG/24HR patch Place 1 patch on the skin as directed by provider Daily for 21 days. 21 patch 0   • nicotine (NICODERM CQ) 7 MG/24HR patch Place 1 patch on the skin as directed by provider Daily. 1 patch daily X 3 weeks 42 patch 0   • vitamin C (ASCORBIC ACID) 500 MG tablet Take 500 mg by mouth Daily.     • Vitamin D, Cholecalciferol, (CHOLECALCIFEROL) 10 MCG (400 UNIT) tablet Take 400 Units by mouth Daily.     • vitamin E 100 UNIT capsule Take 100 Units by mouth Daily.       No current facility-administered medications on file prior to visit.        PHYSICAL EXAM    Vitals:    11/13/20 1259   BP: 116/69   BP Location: Left arm   Patient Position: Sitting   Cuff Size: Adult   Pulse: 62   Resp: 17   Temp: 97.9 °F (36.6 °C)   TempSrc: Oral   SpO2: 95%   Weight: 83.5 kg (184 lb)   Height: 188 cm (74\")        Constitutional:  Chronically ill appearing,, no acute distress, non-toxic appearance   Eyes:  PERRL, conjunctiva normal   HENT:  Atraumatic, external ears normal, nose normal, oropharynx moist, no pharyngeal exudates.   Neck- normal range of motion, no tenderness, supple   Respiratory:  No respiratory distress, normal breath sounds, no rales, no wheezing   Cardiovascular:  Normal rate, normal rhythm, no murmurs, no gallops, no rubs   GI:  Soft, nondistended, normal bowel sounds, nontender, no organomegaly, no mass, no rebound, no guarding   :  No costovertebral angle tenderness   Musculoskeletal:  No edema, no tenderness, no deformities. Back- no tenderness  Integument:  Well hydrated, no rash   Lymphatic:  No lymphadenopathy noted   Neurologic:  " Alert & oriented x 3, CN 2-12 normal, normal motor function, normal sensory function, no focal deficits noted   Psychiatric:  Speech and behavior appropriate     Ct Chest Without Contrast    Result Date: 10/26/2020  1. There is a right lower hilar mass which is consistent with lung malignancy and/or adenopathy. It is not optimally demonstrated on this noncontrast exam. 2. There is moderate mediastinal adenopathy, consistent with metastatic disease. 3. There is material in the right lower lobe bronchus which may be due to mucous or mucous plug associated with the lower hilar mass. 4. There has been an increase in the right lower lobe opacity since the prior exam, which may be due to postobstructive change or pneumonia. There is a Very small right pleural effusion. 5. 9 mm nodular opacity in the right lower lobe could be a lung nodule or due to postobstructive change. 6. 2.2 cm liver lesion is not well evaluated and could be a cyst or metastasis. Could consider MR for further evaluation.  Electronically Signed By-Stacey Robles On:10/26/2020 8:24 PM This report was finalized on [id number] by  Stacey Robles, .    Ct Chest With Contrast    Result Date: 10/27/2020   1. Right hilar mass, extending into the right lower lobe along the bronchovascular bundles. This consistent with the appearance of limits. There is opacification of the distal bronchus intermedius, right middle lobe and right lower lobe bronchi, which could represent inspissated secretions or direct tumor infiltration into the bronchi. 2. Pathologically enlarged mediastinal and left hilar lymph nodes consistent with metastasis. Borderline enlarged left supraclavicular lymph node. 3. 1 cm indeterminate low-density lesion within the inferior right hepatic lobe. Metastatic disease to liver cannot be excluded. A benign cyst is seen within the hepatic dome. 4. Emphysema with bilateral lower lobe and right middle lobe atelectasis. 5. Interstitial thickening in the  bases favored to reflect mild interstitial edema. 6. Trace bilateral pleural effusions and pericardial effusion.    Electronically Signed By-Dr. Aranza Martinez MD On:10/27/2020 8:55 AM This report was finalized on [id number] by Dr. Aranza Martinez MD.    Mri Brain With & Without Contrast    Result Date: 10/29/2020  1.There are some nonspecific T2 signal changes involving the cerebral hemispheres that could be reflective of more chronic small vessel ischemic change. 2.There is underlying cerebral atrophy. 3.No definite evidence for metastatic disease.  Electronically Signed By-Bryce Aceves On:10/29/2020 12:17 PM This report was finalized on [id number] by  Bryce Aceves, .    Mri Abdomen With & Without Contrast    Result Date: 10/27/2020  IMPRESSION :  1. There is a 2.2 cm lesion in the dome of liver with features of a cyst. Negative for evidence of hepatic metastases. 2. There is subcarinal adenopathy and right hilar abnormality. Please see CT chest dictation of 10/27/2020. 3. The gallbladder is mildly distended and there is a tiny amount of fluid around it. If patient has symptoms of gallbladder disease, could consider ultrasound. 4. There are small bilateral pleural effusions and mild lower lobe lung consolidations. 5. There is a 3.5 cm abdominal aortic aneurysm.  Electronically Signed By-Stacey Robles On:10/27/2020 6:34 PM This report was finalized on [id number] by  Stacey Robles, .    Ct Abdomen Pelvis With Contrast    Result Date: 10/26/2020    1. Tiny right pleural effusion and a 2.9 cm masslike opacity with adjacent atelectasis in the right lung base 9 mm noncalcified nodule in the posterior right lower lobe is also present. Partially imaged is extensive necrotic right hilar and infrahilar and subcarinal adenopathy. Findings are suspicious for malignancy, dedicated chest CT with contrast is recommended. 2. There is a 2.2 cm circumscribed hypodense mass near the hepatic dome which is not quite fluid  density. Liver metastasis could have this appearance in the appropriate clinical setting. Additional imaging workup for this is needed. MRI is the imaging study of choice for evaluation of liver parenchymal lesions. 3. No acute intra-abdominal or intrapelvic abnormality.  Electronically Signed By-Job Magdaleno DO. On:10/26/2020 2:24 PM This report was finalized on [id number] by  Job Magdaleno DO..    Xr Chest 1 View    Result Date: 10/24/2020  New patchy right basilar airspace disease with small right pleural effusion.  Electronically Signed By-Tam Diehl On:10/24/2020 2:23 PM This report was finalized on [id number] by  Tam Diehl, .    Xr Chest 1 View    Result Date: 10/18/2020  Mild medial right basilar opacities which could represent atelectasis or mild infiltrate.  Electronically Signed By-DR. Kashmir Hernandez MD On:10/18/2020 8:39 AM This report was finalized on [id number] by DR. Kashmir Hernandez MD.    Nm Pet Skull Base To Mid Thigh    Result Date: 11/5/2020   1. Hypermetabolic right hilar mass consistent with malignancy. 2. Hypermetabolic nodular densities within the right lower lobe consistent with metastatic disease. 3. Bulky pathologically enlarged hypermetabolic mediastinal adenopathy, consistent with metastatic disease. No left hilar adenopathy. 4. Displaced left rib fracture is favored to represent a posttraumatic etiology rather than that of malignancy. No convincing evidence of skeletal metastatic disease. 5. There is moderate FDG accumulation within the proximal stomach with gastric wall thickening. This may simply be physiologic uptake. Gastritis could have a similar imaging appearance. However, primary gastric malignancy or metastatic disease is not excluded. Consider endoscopic correlation. 6. Enlarged hypermetabolic bilateral supraclavicular lymph nodes in the lower neck, consistent with metastases.  Electronically Signed By-Dr. Aranza Martinez MD On:11/5/2020 4:06 PM This report was  finalized on [id number] by Dr. Aranza Martinez MD.    Xr Chest Ap    Result Date: 10/26/2020  No change in patchy right basilar airspace disease with small right pleural effusion.  Electronically Signed By-Amador Edwards On:10/26/2020 1:06 PM This report was finalized on [id number] by  Amador Edwards, .     Results for orders placed during the hospital encounter of 10/26/20   Adult Transthoracic Echo Complete W/ Cont if Necessary Per Protocol    Narrative · The left atrial cavity is severely dilated.  · Left ventricular diastolic function is consistent with (grade II w/high   LAP) pseudonormalization.  · Left ventricular wall thickness is consistent with concentric   hypertrophy.  · Estimated left ventricular EF was in agreement with the calculated left   ventricular EF. Left ventricular ejection fraction appears to be 61 - 65%.   Left ventricular systolic function is normal.  · There is moderate calcification of the aortic valve mainly affecting the   non-coronary cusp(s).  · There is mild, bileaflet mitral valve thickening present.  · Mild to moderate mitral valve regurgitation is present with a   centrally-directed jet noted.  · Mild tricuspid valve regurgitation is present.  · Estimated right ventricular systolic pressure from tricuspid   regurgitation is mildly elevated (35-45 mmHg).     Moderate MR  Severe left atrial enlargement  Grade 2 diastolic dysfunction  Preserved LV systolic function EF 60 to 65%  Normal RV size and function  Normal IVC  Mild pulmonary hypertension by TR gradient  If clinically indicated would recommend transesophageal echo for   evaluation of mitral regurgitation with severe left atrial enlargement,   spectral Doppler and color Doppler indicate not more than moderate but   suboptimal acoustic windows, possibly underestimated, recommend ESE if   clinically indicated and were suspicious of severe MR         ASSESSMENT & PLAN:    There are no diagnoses linked to this  encounter.    COPD  -Remains on symbicort. Add Nebulizer machine and Duonebs  -Set up PFTs    Adeno Ca of Lung  -Being F/U by Oncology and thoracic surgery  -Port placement planned for next week and then plan for chemo    Hypoxemia  --6 min walk 10/20: + deast at rest  -Advised to use supplemental O2 regularly    Pulm HTN  -RVSP 35-45 with normal EF and DD  -Latasha WHO group 2 and 3    F/U 2-3 months    This document has been electronically signed by  Ke Tran MD  13:25 EST

## 2020-11-16 NOTE — TELEPHONE ENCOUNTER
PATIENTS DAUGHTER ERON HESTER CALLED AND WOULD LIKE YOU TO CALL HER CONCERNING HER FATHERS OFFICE VISIT ON 11/13.  SHE IS ON HIS RELEASE OF INFORMATION FORM.  [phone]

## 2020-11-16 NOTE — TELEPHONE ENCOUNTER
Pt's daughter Nazia says pt was advised not to take certain bandages off from around his port that was placed in today. She's wondering how the cream should be applied before his treatments if the bandages need to stay on     Phone# [phone]

## 2020-11-16 NOTE — TELEPHONE ENCOUNTER
Pt daughter, who is on verbal release, has many questions regarding what medications he is on and how they are to be taken and what to do if they run out.   I went over his med list extensively with his daughter. I advised that it appears that 4 of his medications were prescribed by the hospitalist. Advised that when these medications run out, his PCP will need to manage.    Daughter asks for EMLA cream for port needle insertion. This will be sent to his pharmacy.

## 2020-11-16 NOTE — TELEPHONE ENCOUNTER
Returned call to the patient's daughter.  Advised to bring the EMLA cream with them in the morning and to come in a few minutes early and we would assist with applying the cream.  She asked if she and her mother could stay with him.  Discussed COVID restrictions on visitors.  She stated the her father is hearing impaired.  Advised the he could have one visitor for his first treatment and if it goes well, we may not be able to allow that for future visits.  She asked if her and her mother could take turns with him throughout the day.  Advised that it would need to be the same visitor to decrease the chances of any exposures.  Patient's daughter v/u.

## 2020-11-16 NOTE — ANESTHESIA POSTPROCEDURE EVALUATION
Patient: Shay Hernandez    Procedure Summary     Date: 11/16/20 Room / Location: [facility] OR 09 / [facility] MAIN OR    Anesthesia Start: 0943 Anesthesia Stop: 1019    Procedure: INSERTION VENOUS ACCESS DEVICE, LEFT SUBCLAVIAN UNDER FLOUROSCOPIC GUIDANCE  (N/A ) Diagnosis:       Adenocarcinoma, lung, unspecified laterality (CMS/HCC)      (Adenocarcinoma, lung, unspecified laterality (CMS/HCC) [C34.90])    Surgeon: Jan Richards MD Provider: Ric Mcnamara MD    Anesthesia Type: MAC ASA Status: 4          Anesthesia Type: MAC    Vitals  Vitals Value Taken Time   /66 11/16/20 1026   Temp     Pulse 97 11/16/20 1027   Resp 24 11/16/20 1024   SpO2 93 % 11/16/20 1027   Vitals shown include unvalidated device data.        Post Anesthesia Care and Evaluation    Patient location during evaluation: PACU  Patient participation: complete - patient participated  Level of consciousness: awake  Pain scale: See nurse's notes for pain score.  Pain management: adequate  Airway patency: patent  Anesthetic complications: No anesthetic complications  PONV Status: none  Cardiovascular status: acceptable  Respiratory status: acceptable  Hydration status: acceptable    Comments: Patient seen and examined postoperatively; vital signs stable; SpO2 greater than or equal to 90%; cardiopulmonary status stable; nausea/vomiting adequately controlled; pain adequately controlled; no apparent anesthesia complications; patient discharged from anesthesia care when discharge criteria were met

## 2020-11-16 NOTE — DISCHARGE INSTRUCTIONS
Elevate head of bed 30 degrees for today.  Hold Eliquis for 2 days.  Then resume Eliquis provided there is no wound bleeding  Leave outer bandage on for 3 days.  Leave Steri-Strips on for 3 weeks.  Return to clinic in 3 weeks for suture removal.

## 2020-11-16 NOTE — OP NOTE
INSERTION VENOUS ACCESS DEVICE  Procedure Report    Patient Name:  Shay Hernandez  YOB: 1940    Date of Surgery:  11/16/2020     Indications:  Lung cancer    Pre-op Diagnosis:   Adenocarcinoma, lung, unspecified laterality (CMS/HCC) [C34.90]       Post-Op Diagnosis Codes:     * Adenocarcinoma, lung, unspecified laterality (CMS/HCC) [C34.90]    Procedure/CPT® Codes:      Procedure(s):  INSERTION VENOUS ACCESS DEVICE, LEFT SUBCLAVIAN UNDER FLOUROSCOPIC GUIDANCE     Staff:  Surgeon(s):  Jan Richards MD         Anesthesia: Monitored Anesthesia Care    Estimated Blood Loss: minimal    Implants:    Implant Name Type Inv. Item Serial No.  Lot No. LRB No. Used Action   POWERPORT MRI CATH/POLY INTERMED 1L CHRISTIAN/H 8F CLR - [id number] Implant POWERPORT MRI CATH/POLY INTERMED 1L CHRISTIAN/H 8F CLR  [employer] [id number] Left 1 Implanted       Specimen:          None        Findings: Satisfactory port position, function and no pntx.    Complications: none    Description of Procedure: The patient was prepped and draped in a standard sterile fashion an OR timeout was taken all were in agreement we then proceeded.  The left subclavian vein was cannulated in routine fashion a dilator and sheath were advanced over the guidewire.  The dilator and the guidewire were removed with the catheter was introduced the sheath sheath was peeled away and the catheter was tunneled to the Mediport pocket.  Under fluoroscopic guidance the catheter was adjusted so the tip resided in the superior vena cava.  The catheter aspirated and flushed nicely with heparinized saline.  The catheter was connected to the med port. The med port fit snugly in the med port pocket.  The wound was closed in layers around with absorbable suture.  Final fluoroscopy confirmed good placement of the catheter no pneumothorax and no kinking of the catheter.          Jan Richards MD     Date: 11/16/2020  Time: 10:12 EST

## 2020-11-16 NOTE — ANESTHESIA PREPROCEDURE EVALUATION
Anesthesia Evaluation     Patient summary reviewed and Nursing notes reviewed   no history of anesthetic complications:  NPO Solid Status: > 8 hours  NPO Liquid Status: > 8 hours           Airway   Dental      Pulmonary    (+) a smoker Former, lung cancer, COPD, shortness of breath,   Cardiovascular     ECG reviewed  PT is on anticoagulation therapy  Patient on routine beta blocker    (+) valvular problems/murmurs MR and TI, dysrhythmias Atrial Fib,       Neuro/Psych  (+) weakness,     GI/Hepatic/Renal/Endo    (+)  GERD,      Musculoskeletal     Abdominal    Substance History      OB/GYN          Other      history of cancer    ROS/Med Hx Other: Pulmonary hypertension, chest pain, liver mass    Echocardiogram Findings    Left Ventricle Calculated left ventricular EF = 60% Estimated left ventricular EF was in agreement with the calculated left ventricular EF. Left ventricular ejection fraction appears to be 61 - 65%. Left ventricular systolic function is normal. Septal wall motion is normal. Normal left ventricular cavity size noted. Left ventricular wall thickness is consistent with mild to moderate concentric hypertrophy. All left ventricular wall segments contract normally. Left ventricular diastolic function is consistent with (grade II w/high LAP) pseudonormalization.  Right Ventricle Normal right ventricular cavity size, wall thickness, systolic function and septal motion noted.  Left Atrium The left atrial cavity is severely dilated. Left atrial volume is severely increased. No evidence of a left atrial mass present. The interatrial septum does not appear to be redundant. No interatrial septal aneurysm present. No lipomatous hypertrophy of the interatrial septum present. Cannot exclude the presence of a patent foramen ovale. Saline test for shunting not performed. Normal pulmonary vein flow.  Right Atrium Normal right atrial cavity size noted.  Aortic Valve The aortic valve is abnormal in structure. The  aortic valve exhibits sclerosis. There is moderate calcification of the aortic valve mainly affecting the non-coronary cusp(s). No significant aortic valve regurgitation is present. No hemodynamically significant aortic valve stenosis is present.  Mitral Valve There is mild, bileaflet mitral valve thickening present. Mild to moderate mitral valve regurgitation is present with a centrally-directed jet noted. No significant mitral valve stenosis is present. Enlarged atrium severely, MR possibly underestimated, appears not more than moderate by spectral Doppler and color-flow Doppler but poor acoustic windows.  If clinically indicated recommend transesophageal echo for evaluation  Tricuspid Valve Mild tricuspid valve regurgitation is present. Estimated right ventricular systolic pressure from tricuspid regurgitation is mildly elevated (35-45 mmHg). Mild pulmonary hypertension is present. No evidence of significant tricuspid valve stenosis is present.  Pulmonic Valve The pulmonic valve is grossly normal in structure. There is no significant pulmonic valve regurgitation present. There is no pulmonic valve stenosis present.  Greater Vessels No dilation of the aortic root is present. No dilation of the sinuses of Valsalva is present. The inferior vena cava is normally sized. Normal IVC inspiratory collapse of greater than 50% noted.  Pericardium There is no evidence of pericardial effusion. .    PSH  APPENDECTOMY BRONCHOSCOPY                   Anesthesia Plan    ASA 4     MAC   (Patient identified; pre-operative vital signs, all relevant labs/studies, complete medical/surgical/anesthetic history, full medication list, full allergy list, and NPO status obtained/reviewed; physical assessment performed; anesthetic options, side effects, potential complications, risks, and benefits discussed; questions answered; written anesthesia consent obtained; patient cleared for procedure; anesthesia machine and equipment checked and  functioning)    Anesthetic plan, all risks, benefits, and alternatives have been provided, discussed and informed consent has been obtained with: patient.

## 2020-11-17 PROBLEM — Z45.2 ENCOUNTER FOR FITTING AND ADJUSTMENT OF VASCULAR CATHETER: Status: ACTIVE | Noted: 2020-01-01

## 2020-11-17 NOTE — PROGRESS NOTES
Hematology/Oncology Inpatient Progress Note    PATIENT NAME: Shay Hernandez  : 1940  MRN: [id number]    CHIEF COMPLAINT: Lung adenocarcinoma    HISTORY OF PRESENT ILLNESS:    Shay Hernandez is a 79 y.o. male long-term smoker who recently moved from [place] to stay with his daughter, presented to [facility] on 10/26/2020 with complaints of worsening dyspnea, cough, and epigastric pain worsening over two weeks. He had experienced a 33 lb weight loss due to decreased appetite. A  CT scan of the chest identified a hilar mass concerning for malignancy. There was moderate mediastinal adenopathy consistent with metastatic disease, debris in the right lower lobe bronchus, small right pleural effusion, a 9 mm nodular opacity in the right lower lobe and a 2.2 cm liver lesion.  The patient was admitted for further management and work-up. Pulmonary was consulted. Of note, the patient had been to the ED twice earlier in 2020 and was diagnosed with GERD on 10/17/2020 and pneumonia on 10/26/2020.      10/27/20  Hematology/Oncology was consulted for a hilar mass.   · 10/26/2020 2D Echo  - Left ventricular ejection fraction appears to be 61 - 65%  · 10/26/2020 - CT abdomen :  Tiny right pleural effusion and a 2.9 cm masslike opacity with adjacent atelectasis in the right lung base 9 mm noncalcified nodule in the posterior right lower lobe is also present.There is a 2.2 cm circumscribed hypodense mass near the hepatic dome which is not quite fluid density. 3. No acute intra-abdominal or intrapelvic abnormality     · 10/27/2020 -MRI Abdomen With & Without Contrast  1. There is a 2.2 cm lesion in the dome of liver with features of a cyst. Negative for evidence of hepatic metastases. 2. There is subcarinal adenopathy and right hilar abnormality.  3. The gallbladder is mildly distended and there is a tiny amount of fluid around it. If patient has symptoms of gallbladder disease, could consider ultrasound. 4.  There are small bilateral pleural effusions and mild lower lobe lung consolidations. 5. There is a 3.5 cm abdominal aortic aneurysm.    · 10/27/2020: CT chest with contrast: 6.6 x 3.9 cm right hilar mass is seen extending into the right lower lobe along the bronchovascular bundle distribution.  Opacification of the distal bronchus intermedius and right middle lobe and right lower lobe proximal bronchus.  Pathologically enlarged mediastinal lymph nodes are present.  Subcarinal lymph node 5.4 x 3.8 cm, precarinal 2.7 x 2.4 cm, right mid paratracheal 3.6 x 4.4 cm and right upper paratracheal 2.5 x 1.6 cm.  Pathologically enlarged left hilar lymph node measures 2.7 x 2.2 cm.  Left supraclavicular lymph node measures 1.5 x 0.7 cm and is mildly enlarged.  No axillary lymphadenopathy.    · 10/27/2020 - Bronchoscopy with EBUS.  Subcarinal lymph node FNA positive for metastatic poorly differentiated adenocarcinoma. Specimens #1 and #3: Immunohistochemistry was performed utilizing appropriate controls and the tumor is positive for CK7 and TTF-1 and is negative for napsin A, p63, and CK5/6. 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 Many of the tumor cells display significant pleomorphism with bizarre appearing nuclei and mitotic figures. This raises the possibility of a sarcomatoid component, however, that would have to be evaluated on the resection specimen. Specimens #2 and #4: One of the smear passes displays scattered atypical cells in a background of lymphoid tissue. Immunohistochemistry was performed utilizing appropriate controls on the cell block for cytokeratin AE1/3. The cytokeratin AE1/3 stain is negative which excludes the presence of atypical cells in the cell block. The significance of the atypical cells on the smears is unknown, however, malignancy cannot be entirely excluded.    · 10/29/2020 - MRI Brain With & Without Contrast  1.There are some nonspecific T2 signal changes  involving the cerebral hemispheres that could be reflective of more chronic small vessel ischemic change. 2.There is underlying cerebral atrophy. 3.No definite evidence for metastatic disease.    · 11/2/2020 iron 23 low, iron saturation 9% low, TIBC 231, B12 greater than 2000, folate 10.8,  · 11/5/2020: PET CT scan: Hypermetabolic right hilar mass consistent with malignancy.  Hypermetabolic nodular densities within the right lower lobe consistent with metastatic disease.  Bulky pathological enlarged hypermetabolic mediastinal adenopathy, consistent with metastatic disease.  No left hilar adenopathy.  Moderate FDG accumulation within the proximal stomach with gastric wall thickening.  This could be physiologic uptake..  Could be gastritis.  Gastric malignancy cannot be excluded.  Endoscopy recommended.  Enlarged hypermetabolic bilateral supraclavicular lymph nodes in the lower neck consistent with metastasis.  · 11/11/2020 creatinine 0.8,  · 11/17/2020 patient received cycle 1 of cisplatin and pemetrexed.  Cisplatin 75 mg/m², pemetrexed 400 mg per metered square..  WBC 15.6, hemoglobin 13.7, platelets 129, creatinine 0.7, albumin 2.8  ·     He has a past medical history of Atrial fibrillation (CMS/HCC) and COPD (chronic obstructive pulmonary disease) (CMS/HCC).     PCP: Provider, No Known  Subjective    Patient has started chemotherapy 2 days ago.  He states he is tolerating it well.  He does report symptoms of persistent heartburn.  In the interim he was evaluated by radiation oncologist Dr. Lux.  He also had a PET scan and the findings were discussed with the family and patient.   Breathing has improved since discharge.  Still has some cough and breathing trouble.  Taking aspirin and Eliquis.  Denies any enlarged lymph nodes in the supraclavicular regions.      Review of Systems   Constitutional: Positive for fatigue. Negative for chills and fever.        Weight loss   HENT: Negative for ear pain, mouth sores,  nosebleeds and sore throat.    Eyes: Negative for photophobia and visual disturbance.   Respiratory: Positive for cough and shortness of breath. Negative for wheezing and stridor.    Cardiovascular: Negative for chest pain and palpitations.   Gastrointestinal: Positive for abdominal pain. Negative for diarrhea, nausea and vomiting.        Heartburn   Endocrine: Negative for cold intolerance and heat intolerance.   Genitourinary: Negative for dysuria and hematuria.   Musculoskeletal: Negative for joint swelling and neck stiffness.   Skin: Negative for color change and rash.   Neurological: Negative for seizures, syncope and light-headedness.   Hematological: Negative for adenopathy.        No obvious bleeding   Psychiatric/Behavioral: Negative for agitation, confusion, hallucinations and self-injury.     MEDICATIONS:    Current Outpatient Medications on File Prior to Visit   Medication Sig Dispense Refill   • albuterol sulfate  (90 Base) MCG/ACT inhaler Inhale 2 puffs Every 4 (Four) Hours As Needed for Wheezing. 8 g 0   • aspirin (aspirin) 81 MG EC tablet Take 81 mg by mouth Daily. Dr. Richards told pt to keep taking until dos     • budesonide-formoterol (SYMBICORT) 160-4.5 MCG/ACT inhaler Inhale 2 puffs 2 (Two) Times a Day. 1 inhaler 0   • dexamethasone (DECADRON) 4 MG tablet Take 4 mg by mouth 2 (Two) Times a Day With Meals.     • Eliquis 5 MG tablet tablet TAKE 1 TABLET BY MOUTH EVERY 12 (TWELVE) HOURS FOR 30 DAYS. 60 tablet 5   • folic acid (FOLVITE) 1 MG tablet Take 1 tablet by mouth Daily. Start 7 days prior to chemotherapy until at least 3 weeks after all chemotherapy. 30 tablet 5   • ipratropium-albuterol (DUO-NEB) 0.5-2.5 mg/3 ml nebulizer Take 3 mL by nebulization 4 (Four) Times a Day. (Patient taking differently: Take 3 mL by nebulization 4 (Four) Times a Day. No taking yet) 120 mL 5   • lidocaine-prilocaine (EMLA) 2.5-2.5 % cream Apply  topically to the appropriate area as directed As Needed for  "Mild Pain  (45min prior to needle insertion). 30 g 5   • metoprolol tartrate (LOPRESSOR) 25 MG tablet Take 1 tablet by mouth Every 12 (Twelve) Hours for 30 days. 60 tablet 0   • Multiple Vitamins-Minerals (HEALTHY EYES PO) Take 1 tablet by mouth Daily.     • nicotine (NICODERM CQ) 14 MG/24HR patch Place 1 patch on the skin as directed by provider Daily for 21 days. 21 patch 0   • nicotine (NICODERM CQ) 21 MG/24HR patch Place 1 patch on the skin as directed by provider Daily for 21 days. 21 patch 0   • nicotine (NICODERM CQ) 7 MG/24HR patch Place 1 patch on the skin as directed by provider Daily. 1 patch daily X 3 weeks 42 patch 0   • ondansetron (ZOFRAN) 8 MG tablet Take 1 tablet by mouth 3 (Three) Times a Day As Needed for Nausea or Vomiting. 30 tablet 5   • pantoprazole (PROTONIX) 40 MG EC tablet Take 1 tablet by mouth Daily. 14 tablet 0   • vitamin C (ASCORBIC ACID) 500 MG tablet Take 500 mg by mouth Daily.     • Vitamin D, Cholecalciferol, (CHOLECALCIFEROL) 10 MCG (400 UNIT) tablet Take 400 Units by mouth Daily.     • vitamin E 100 UNIT capsule Take 100 Units by mouth Daily.       Current Facility-Administered Medications on File Prior to Visit   Medication Dose Route Frequency Provider Last Rate Last Admin   • [DISCONTINUED] heparin injection 500 Units  500 Units Intravenous PRN Ziggy Daley MD   500 Units at 11/17/20 1634   • [DISCONTINUED] sodium chloride 0.9 % flush 20 mL  20 mL Intravenous PRN Ziggy Daley MD   10 mL at 11/17/20 1634     ALLERGIES:  No Known Allergies    Objective    VITALS:   /74   Pulse 94   Temp 96.6 °F (35.9 °C)   Resp 16   Ht 188 cm (74\")   Wt 86.2 kg (190 lb)   SpO2 95%   BMI 24.39 kg/m²     PHYSICAL EXAM: (performed by MD)  Physical Exam  Vitals signs and nursing note reviewed.   Constitutional:       General: He is not in acute distress.     Appearance: Normal appearance. He is not diaphoretic.   HENT:      Head: Normocephalic and atraumatic.      Mouth/Throat:      " Comments: partial dentures  Eyes:      General: No scleral icterus.        Right eye: No discharge.         Left eye: No discharge.      Conjunctiva/sclera: Conjunctivae normal.   Neck:      Musculoskeletal: Normal range of motion and neck supple.      Thyroid: No thyromegaly.   Cardiovascular:      Rate and Rhythm: Normal rate and regular rhythm.      Pulses: Normal pulses.      Heart sounds: Normal heart sounds. No friction rub. No gallop.    Pulmonary:      Effort: Pulmonary effort is normal. No respiratory distress.      Breath sounds: Normal breath sounds. No stridor. No wheezing.      Comments: Decreased air entry bilaterally in the lungs  Abdominal:      General: Bowel sounds are normal.      Palpations: Abdomen is soft. There is no mass.      Tenderness: There is no abdominal tenderness. There is no guarding or rebound.   Musculoskeletal: Normal range of motion.         General: No tenderness.   Lymphadenopathy:      Cervical: No cervical adenopathy.   Skin:     General: Skin is warm.      Coloration: Skin is not jaundiced.      Findings: No erythema or rash.   Neurological:      General: No focal deficit present.      Mental Status: He is alert and oriented to person, place, and time. Mental status is at baseline.      Motor: No abnormal muscle tone.   Psychiatric:         Mood and Affect: Mood normal.         Behavior: Behavior normal.         Thought Content: Thought content normal.         RECENT LABS:    Lab Results - Last 18 Months   Lab Units 11/17/20  0852 11/11/20 0949 10/29/20  0350   WBC 10*3/mm3 15.66* 16.50* 16.50*   HEMOGLOBIN g/dL 13.7 14.2 13.6   HEMATOCRIT % 42.4 42.3 41.8   PLATELETS 10*3/mm3 129* 176 167   MCV fL 92.4 89.8 91.9     Lab Results - Last 18 Months   Lab Units 11/17/20  0908 11/17/20  0852 11/11/20  0949 10/29/20  0350  10/27/20  0442 10/26/20  1226   SODIUM mmol/L  --  137 139 137   < > 140 139   POTASSIUM mmol/L  --  3.9 4.3 4.1   < > 3.7 4.0   CHLORIDE mmol/L  --  102 101  102   < > 107 103   CO2 mmol/L  --  26.0 25.8 24.0   < > 21.0* 24.0   BUN mg/dL  --  12 11 14   < > 17 18   CREATININE mg/dL 0.70 0.70* 0.87 0.70*   < > 0.66* 0.80   CALCIUM mg/dL  --  8.7 8.9 8.1*   < > 8.2* 9.0   BILIRUBIN mg/dL  --  0.4  --   --   --  0.4 0.5   ALK PHOS U/L  --  83  --   --   --  98 89   ALT (SGPT) U/L  --  20  --   --   --  40 30   AST (SGOT) U/L  --  14  --   --   --  27 19   GLUCOSE mg/dL  --  140* 103* 83   < > 113* 143*    < > = values in this interval not displayed.       Lab Results   Component Value Date    GLUCOSE 140 (H) 11/17/2020    BUN 12 11/17/2020    CREATININE 0.70 11/17/2020    EGFRIFNONA 109 11/17/2020    BCR 17.1 11/17/2020    K 3.9 11/17/2020    CO2 26.0 11/17/2020    CALCIUM 8.7 11/17/2020    ALBUMIN 2.80 (L) 11/17/2020    AST 14 11/17/2020    ALT 20 11/17/2020     Lab Results   Component Value Date    IRON 23 (L) 11/02/2020    TIBC 231 (L) 11/02/2020    FERRITIN 334.90 10/26/2020     Lab Results   Component Value Date    FOLATE 10.80 11/02/2020     No results found for: OCCULTBLD  No results found for: RETICCTPCT  Lab Results   Component Value Date    WPFWNIGY70 >2,000 (H) 11/02/2020     No results found for: SPEP, UPEP  LDH   Date Value Ref Range Status   10/26/2020 249 (H) 135 - 225 U/L Final     No results found for: LUZMAIRA, RF, SEDRATE  No results found for: FIBRINOGEN, HAPTOGLOBIN, DDIMER  No results found for: DDIMER  Lab Results   Component Value Date    PTT 27.4 11/11/2020    INR 1.29 (H) 11/11/2020     No results found for:   No results found for: CEA  No results found for:   No results found for: PSA  No results found for: [id number]    PENDING RESULTS: none     IMAGING REVIEWED:  No radiology results for the last day      Assessment/Plan   ASSESSMENT  1. 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    Also has left hilar lymph nodes consistent with metastasis and  borderline enlarged left supraclavicular lymph node. . MRI abdomen 10/28/2020 - 2.2 cm lesion in the dome of liver with features of a cyst. Negative for evidence of hepatic metastases. S/p bronchoscopy with EBUS on 10/27/2020 - subcarinal lymph node FNA positive for metastatic poorly differentiated adenocarcinoma. Staining pattern is consistent with adenocarcinoma of pulmonary origin. Many of the tumor cells display significant pleomorphism with bizarre appearing nuclei and mitotic figures. This raises the possibility of a sarcomatoid component. MRI brain 10/29/2020 - no metastatic disease.     2. Pneumonia/pneumonia: Status post antibiotics and steroids recently.  Finished outpatient doxycycline  3. Reactive leukocytosis  4. Atrial fibrillation: Cardiology consulted - appears more like atrial flutter. Patient refused  anticoagulation with Coumadin at this time.  Was started on Eliquis during the hospitalization  5. Chest pain:Cardiology consulted.  2D Echo 10/26/2020 - Left ventricular ejection fraction appears to be 61 - 65%. Moderate MR Severe left atrial enlargement. Grade 2 diastolic dysfunction. Mild pulmonary hypertension   6. Weight loss: Unintentional weight loss of 33 lbs. Nutrition consulted.   7. Abdominal aortic aneurysm: MRI abdomen 10/27/2020 - There is a 3.5 cm abdominal aortic aneurysm.  8. Tobacco abuse: 60 pack year history of cigarette smoking. Encouraged smoking cessation.      PLAN  1. Gastroenterology consultation for endoscopy due to the recent abnormal PET scan findings..  2. Continue omeprazole.  We will also start patient on Pepcid for his heartburn symptoms.  3. Due to presence of bulky lymph nodes and more widespread disease, we have started the patient on chemotherapy with cisplatin and Alimta.  Patient is status post cycle 1.  Tolerating it well.   Radiation may be considered at a later point depending on response..   4. Port-A-Cath flushes every month  5. Pending NGS molecular testing  on the tumor specimen to identify somatic actionable mutations.  Will include PD-L1 score.  6. Continue B12 injections  7. Folic acid 1 mg daily  8. Monitor platelet count and hold anticoagulation if platelets less than 50,000.  9. Recommended to use nicotine patches.  10. Follow-up in about 3 weeks     I have reviewed and confirmed the accuracy of the patient's history: Chief complaint, HPI, ROS and Subjective as entered by the MA/LPN/RN. Ziggy Daley MD 11/19/20       Electronically signed by Ziggy Daley MD, 11/19/20, 2:00 PM EST.

## 2020-11-18 NOTE — TELEPHONE ENCOUNTER
I spoke to this patient and informed him that albuterol needs to be filled through PCP. The patient verbalized understanding. No further needs at this time.

## 2020-11-18 NOTE — TELEPHONE ENCOUNTER
PCP needs to fill albuterol.  Please advise pt.     Electronically signed by MANDI Latham, 11/18/20, 8:39 AM EST.

## 2020-11-20 NOTE — TELEPHONE ENCOUNTER
It looks like we are not prescribing this medication for the patient.  Please have patient request refill from PCP.    Electronically signed by MANDI Shelton, 11/20/20, 1:28 PM EST.

## 2020-11-23 NOTE — TELEPHONE ENCOUNTER
Nutrition Assessment  Shay Hernandez    UBW: 230# - 240#    Food Allergies: Pt denied allergies at this time    Chewing/Swallowing Problems: Per pt's daughter, he doesn't have issues chewing, but does have difficulty swallowing due to diagnosis and the size and location of tumor.    Who does the cooking & shopping: Pt is currently living with his daughter's family, but will be soon moving into a new home with his wife. His wife will be doing the cooking and shopping for him.    Nausea/Vomiting/Diarrhea: Per pt's daughter, the pt doesn't have any n/v/d/c.    Appetite: Poor    24-Hour Diet Recall:   Breakfast - yogurt, water  Lunch  - nothing  Dinner - Ensure, yogurt    Discussion: I called the pt, he was unavailable, I spoke to his daughter, Eliane, about his nutrition status. Pt's daughter said he hasn't been eating well in weeks and and states he doesn't have an appetite. She said he's also having difficulty swallowing. I emailed her a list of foods that are soft/high in protein that he should be able to swallow. I recommended she give him VHCB to promote increased intake. She said she will try, but stated he doesn't like ONS. We also discussed the option of an appetite stimulant, the pt's daughter said they'd be open to trying one, I will discuss this with Dr. Daley. I also suggested they provide with him a liberal diet with very few restrictions to promote intake, I encouraged them to keep a snack box filled with his favorite snacks by his side to promote intake, pt's daughter verbalized understanding.    Pt's daughter was kind and appreciated today's call. All questions and concerns were addressed and answered. I provided my contact information and encouraged her to call or schedule an appointment as needed.    Shayna Royal, MS,RD,LD-[place],CD-[place]  Registered Dietitian

## 2020-11-23 NOTE — TELEPHONE ENCOUNTER
Eliane called in asking if it was normal for her Dad to feel so bad after chemotherapy, stated that he was weak and that he is only eating maybe one or two boost a day with ice cream. Advised her that it is normal to feel weak and not have an appeitite. I called Shayna and she is going to call the patient. They will be in on 11/24/20 for treatment. Patient is not vomiting or having diarrhea at this time.advised to drink plenty of fluids and if something sounds good to him let him try and eat that

## 2020-11-24 NOTE — TELEPHONE ENCOUNTER
I spoke to the patients daughter Eliane and informed her that remeron was sent to patients pharmacy to increase patients appetite. Instructed to take at bedtime as may cause drowsiness. Eliane verbalized understanding.

## 2020-11-24 NOTE — TELEPHONE ENCOUNTER
----- Message from MANDI Latham sent at 11/23/2020  5:32 PM EST -----  Regarding: Remeron  Monika/Barbie,     Call and advise we will start patient on Remeron 15 mg QHS to stimulate appetite. Escribed new prescription     Electronically signed by MANDI Latham, 11/23/20, 5:32 PM EST.      ----- Message -----  From: Shayna Royal RD  Sent: 11/23/2020   4:32 PM EST  To: Ziggy Daley MD, Barbie Wilhelm MA    Pt having difficulty eating due to poor appetite and difficulty swallowing. He'd benefit from an appetite stimulant.     Pt's daughter said the tumor location is making swallowing difficult, if this doesn't improve, a feeding tube may be necessary.    Shayna Royal, MS,RD,LD-[place],CD-[place]  Registered Dietitian

## 2020-11-25 PROBLEM — D69.6 THROMBOCYTOPENIA (HCC): Status: ACTIVE | Noted: 2020-01-01

## 2020-11-25 NOTE — TELEPHONE ENCOUNTER
I called [facility] and spoke with Emma to let her know that pt will be admitted into hospital after receiving plt transfusion. Carol Fragoso NP is waiting on Hospitalist group to let her know status of admit. Emma verbalized understanding.

## 2020-11-25 NOTE — OUTREACH NOTE
Medical Week 4 Survey      Responses   [facility] patient discharged from?  [facility]   Does the patient have one of the following disease processes/diagnoses(primary or secondary)?  Other   Week 4 attempt successful?  Yes   Call start time  1909   Call end time  1914   Discharge diagnosis  Lung adenocarcinoma   Is patient permission given to speak with other caregiver?  Yes   Person spoke with today (if not patient) and relationship  Daughteraudebt   Medication alerts for this patient  Start eliquis, continue aspirin   Meds reviewed with patient/caregiver?  Yes   Is the patient having any side effects they believe may be caused by any medication additions or changes?  No   Is the patient taking all medications as directed (includes completed medication regime)?  Yes   Has the patient kept scheduled appointments due by today?  Yes   Comments  Has blood work, GI appt and cardiologist appt all tomorrow.   Is the patient still receiving Home Health Services?  N/A   Psychosocial issues?  No   What is the patient's perception of their health status since discharge?  Worsening   Is the patient/caregiver able to teach back signs and symptoms related to disease process for when to call PCP?  Yes   Is the patient/caregiver able to teach back signs and symptoms related to disease process for when to call 911?  Yes   Is the patient/caregiver able to teach back the hierarchy of who to call/visit for symptoms/problems? PCP, Specialist, Home health nurse, Urgent Care, ED, 911  Yes   Additional teach back comments  States he was doing well with first chemo then after a few days got weak, trouble eating, and walking.  He had blood work today and repeat tomorrow.  She has talked with a dietician to help get his strength back.  GI and cardiologist appt tomorrow.  Advise to discuss with Dr. Daley if home health PT was be indicated due to decline. and have PCP order.  States she will discuss this at his appt tomorrow.   Week 4 Call  Completed?  Yes   Would the patient like one additional call?  No   Graduated  Yes   Did the patient feel the follow up calls were helpful during their recovery period?  Yes   Was the number of calls appropriate?  Yes   Wrap up additional comments  Daughter will discuss if PT home health is an option with oncologist tomorrow.          Radha Ragland LPN

## 2020-11-25 NOTE — TELEPHONE ENCOUNTER
Pt's daughter Nazia is calling stating that Dr. Garcia with [facility] is wanting to place a feeding tube on this coming Monday 11/30 and wants labs done in morning to determine plt level. Pt had a lab appointment scheduled for Tuesday I changed this appointment to Monday. I also added appointment note to fax results to Dr. Garcia's office. Nazia verbalized understanding.

## 2020-11-25 NOTE — TELEPHONE ENCOUNTER
NEMO  Pt's daughter called to cx an annalisent w/ Dr Humphrey this afternoon because pt was being admitted to hospital. I don't see where he was scheduled to see cardiology

## 2020-11-25 NOTE — TELEPHONE ENCOUNTER
Pt here today for CBC to check PLT level. Family is concerned due to pt not eating/drinking and is extremely weak not being able to even stand. I spoke with Carol Fragoso NP and she ordered for pt to receive a unit of plt's and be admitted. Per family they also states that pt seen Dr. Garcia this morning and she is wanting to place a feeding tube on pt Monday. Pt and family verbalized understanding of pt being admitted.

## 2020-11-27 PROBLEM — E44.0 MODERATE MALNUTRITION (HCC): Status: ACTIVE | Noted: 2020-01-01

## 2020-11-27 PROBLEM — R63.30 FEEDING DIFFICULTIES: Status: ACTIVE | Noted: 2020-01-01

## 2020-11-27 NOTE — ANESTHESIA PREPROCEDURE EVALUATION
Anesthesia Evaluation     Patient summary reviewed and Nursing notes reviewed   NPO Solid Status: > 8 hours  NPO Liquid Status: > 8 hours           Airway   Mallampati: II  TM distance: >3 FB  Neck ROM: full  No difficulty expected  Dental - normal exam     Pulmonary - normal exam   (+) lung cancer, COPD, shortness of breath,   Cardiovascular     ECG reviewed  Rhythm: irregular    (+) dysrhythmias Atrial Fib,       Neuro/Psych  (+) weakness,     GI/Hepatic/Renal/Endo      Musculoskeletal     Abdominal  - normal exam    Bowel sounds: normal.   Substance History      OB/GYN          Other   blood dyscrasia thrombocytopenia,   history of cancer    ROS/Med Hx Other: AF    · The left atrial cavity is severely dilated.  · Left ventricular diastolic function is consistent with (grade II w/high LAP) pseudonormalization.  · Left ventricular wall thickness is consistent with concentric hypertrophy.  · Estimated left ventricular EF was in agreement with the calculated left ventricular EF. Left ventricular ejection fraction appears to be 61 - 65%. Left ventricular systolic function is normal.  · There is moderate calcification of the aortic valve mainly affecting the non-coronary cusp(s).  · There is mild, bileaflet mitral valve thickening present.  · Mild to moderate mitral valve regurgitation is present with a centrally-directed jet noted.  · Mild tricuspid valve regurgitation is present.  · Estimated right ventricular systolic pressure from tricuspid regurgitation is mildly elevated (35-45 mmHg).                     Anesthesia Plan    ASA 4     MAC     intravenous induction     Anesthetic plan, all risks, benefits, and alternatives have been provided, discussed and informed consent has been obtained with: patient.

## 2020-11-28 NOTE — ANESTHESIA POSTPROCEDURE EVALUATION
Patient: Shay Hernandez    Procedure Summary     Date: 11/28/20 Room / Location: [facility] ENDOSCOPY 1 / [facility] ENDOSCOPY    Anesthesia Start: 1248 Anesthesia Stop: 1317    Procedure: ESOPHAGOGASTRODUODENOSCOPY WITH 20F PERCUTANEOUS ENDOSCOPIC GASTROSTOMY TUBE INSERTION WITH ANESTHESIA (N/A ) Diagnosis:       Feeding difficulties      (Feeding difficulties [R63.3])    Surgeon: Lizeth Nance MD Provider: Alonzo Flowers MD    Anesthesia Type: MAC ASA Status: 4          Anesthesia Type: MAC    Vitals  Vitals Value Taken Time   /53 11/28/20 1335   Temp     Pulse 115 11/28/20 1335   Resp 24 11/28/20 1335   SpO2 94 % 11/28/20 1335           Post Anesthesia Care and Evaluation    Patient location during evaluation: bedside  Patient participation: complete - patient participated  Level of consciousness: sleepy but conscious  Pain score: 0  Pain management: adequate  Airway patency: patent  Anesthetic complications: No anesthetic complications  PONV Status: none  Cardiovascular status: acceptable  Respiratory status: acceptable  Hydration status: acceptable

## 2020-12-01 NOTE — PROGRESS NOTES
[facility] Medicine Services Daily Progress Note      Hospitalist Team  LOS 4 days      Patient Care Team:  Ambar Hassan APRN as PCP - General (Family Medicine)    Patient Location: 2119/1      Subjective   Subjective     Chief Complaint / Subjective  No chief complaint on file.    Patient did well with PEG tube insertion and able to be used starting tube feeds.  Patient is tolerating no abdominal distention or pain.  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  IVIG being started by hematology.    Brief Synopsis of Hospital Course/HPI  Mr. Hernandez is a 80 y.o. with history of atrial fibrillation and COPD with new diagnosis of lung cancer presenting as direct admission from oncologist office for progressive generalized weakness as well as thrombocytopenia.  Patient had initiated chemotherapy and reported progressive weakness as well as loss of appetite over the last few weeks.  Patient had been getting more thrombocytopenic with levels in the 30s the day prior to admission down to 13 on day of admission.  Patient denied hematuria, hematochezia, melena or significant bruising though did report a small amount of blood when he blew his nose in the morning.  Currently patient reports he feels weak though otherwise asymptomatic.  No shortness of breath or chest pain, no nausea or vomiting.  Anorexia noted.     Patient found to be in atrial fibrillation with RVR once reaching the floor.  Stat EKG ordered and ordering Cardizem.  Patient received unit of platelets in ambulatory care center prior to coming up to the floor.  Patient scheduled to have feeding tube placement with GI in the next few days.    11/26/2020: Patient reports feeling better than yesterday.  Heart rate improving.  Patient reported self-limiting epistaxis.  Receiving more platelets per hematology.  Received vitamin K for procedure tomorrow.    11/27/2020: Patient overall doing well.  Heart rate still  elevated in the 110s, starting oral Cardizem trial to wean off IV Cardizem.  Patient's platelets in the 20s but no signs of bleeding.      11/28/2020: Patient reports doing well no new symptoms.  No chest pain or shortness of breath.  Heart rate improving borderline controlled.  Patient going for PEG tube today.  Platelets in the 30s.    11/30/2020  Today the patient continues to have feeding difficulty as well as difficulty with some shortness of breath.  He remains weak.  The patient's platelet count remains extremely low despite hematology oncology's efforts.    Review of Systems   Constitution: Positive for malaise/fatigue. Negative for chills and fever.   HENT: Negative.  Negative for hoarse voice and sore throat.    Eyes: Negative.  Negative for double vision and photophobia.   Cardiovascular: Positive for dyspnea on exertion. Negative for chest pain and leg swelling.   Respiratory: Positive for shortness of breath. Negative for sputum production.    Endocrine: Negative.    Hematologic/Lymphatic: Negative.    Skin: Negative.    Musculoskeletal: Negative.  Negative for myalgias and stiffness.   Gastrointestinal: Negative.  Negative for nausea and vomiting.   Genitourinary: Negative.  Negative for dysuria and flank pain.   Neurological: Positive for weakness. Negative for dizziness and focal weakness.   Psychiatric/Behavioral: Negative.  Negative for altered mental status. The patient is not nervous/anxious.    Allergic/Immunologic: Negative.    All other systems reviewed and are negative.        Objective   Objective      Vital Signs  Temp:  [97.7 °F (36.5 °C)-98.2 °F (36.8 °C)] 98 °F (36.7 °C)  Heart Rate:  [] 97  Resp:  [18-20] 18  BP: (119-126)/(67-97) 123/97  Oxygen Therapy  SpO2: 91 %  Pulse Oximetry Type: Intermittent  Device (Oxygen Therapy): nasal cannula  Device (Oxygen Therapy): nasal cannula  Flow (L/min): 3  Oxygen Concentration (%): 3  ETCO2 (mmHg): 36 mmHg  Flowsheet Rows      First Filed  "Value   Admission Height  188 cm (74\") Documented at 11/25/2020 1510   Admission Weight  79.7 kg (175 lb 11.3 oz) Documented at 11/25/2020 1510        Intake & Output (last 3 days)       11/28 0701 - 11/29 0700 11/29 0701 - 11/30 0700 11/30 0701 - 12/01 0700    P.O. 120 780 0    I.V. (mL/kg) 900 (11.4)      Blood  235     Other 240 561 60    NG/GT  156 510    Total Intake(mL/kg) 1260 (16) 1732 (21.7) 570 (7.1)    Urine (mL/kg/hr)  250 (0.1) 50 (0)    Stool  0     Total Output  250 50    Net +1260 +1482 +520           Urine Unmeasured Occurrence 3 x  3 x    Stool Unmeasured Occurrence 1 x          Lines, Drains & Airways    Active LDAs     Name:   Placement date:   Placement time:   Site:   Days:    Peripheral IV 11/25/20 1822 Distal;Posterior;Right Forearm   11/25/20 1822    Forearm   less than 1    Single Lumen Implantable Port 11/16/20 Left Subclavian   11/16/20    1002    Subclavian   10            Physical Exam:  Physical Exam    General: Frail elderly male lying in bed breathing comfortably on 3 L nasal cannula no acute distress.  The patient does have some bruises throughout but no evidence of bleeding at this time.  HEENT: NC/AT, EOMI, mucosa moist  Heart: Irregular, rate controlled  Chest: Normal work of breathing, moving air well in all lung fields no wheezing or crackles  Abdominal: Soft. NT/ND.   Musculoskeletal: Normal ROM.  No cyanosis. No calf tenderness.  Neurological: AAOx3, no focal deficits  Skin: Skin is warm and dry.  No petechiae  Psychiatric: Normal mood and affect.    Procedures:    Results Review:     I reviewed the patient's new clinical results.      Lab Results (last 24 hours)     Procedure Component Value Units Date/Time    POC Glucose Once [[id number]]  (Abnormal) Collected: 11/30/20 0555    Specimen: Blood Updated: 11/30/20 0955     Glucose 147 mg/dL      Comment: Serial Number: [id number]:  [id number]       POC Glucose Once [[id number]]  (Abnormal) Collected: 11/30/20 0027    " Specimen: Blood Updated: 11/30/20 0954     Glucose 166 mg/dL      Comment: Serial Number: [id number]:  [id number]       CBC & Differential [[id number]]  (Abnormal) Collected: 11/30/20 0612    Specimen: Blood Updated: 11/30/20 0807    Narrative:      The following orders were created for panel order CBC & Differential.  Procedure                               Abnormality         Status                     ---------                               -----------         ------                     Scan Slide[[id number]]                                       Final result               CBC Auto Differential[[id number]]        Abnormal            Final result                 Please view results for these tests on the individual orders.    Scan Slide [[id number]] Collected: 11/30/20 0612    Specimen: Blood Updated: 11/30/20 0807     Scan Slide --     Comment: See Manual Differential Results       Manual Differential [[id number]]  (Abnormal) Collected: 11/30/20 0612    Specimen: Blood Updated: 11/30/20 0807     Neutrophil % 48.0 %      Lymphocyte % 30.0 %      Monocyte % 8.0 %      Eosinophil % 8.0 %      Bands %  4.0 %      Atypical Lymphocyte % 2.0 %      Neutrophils Absolute 1.09 10*3/mm3      Lymphocytes Absolute 0.63 10*3/mm3      Monocytes Absolute 0.17 10*3/mm3      Eosinophils Absolute 0.17 10*3/mm3      RBC Morphology Normal     WBC Morphology Normal     Platelet Morphology Normal    Narrative:      Reviewed by Pathologist within the past 30 days on 11/24/2020 .    CBC Auto Differential [[id number]]  (Abnormal) Collected: 11/30/20 0612    Specimen: Blood Updated: 11/30/20 0807     WBC 2.10 10*3/mm3      RBC 4.11 10*6/mm3      Hemoglobin 11.9 g/dL      Hematocrit 36.3 %      MCV 88.4 fL      MCH 28.9 pg      MCHC 32.7 g/dL      RDW 14.2 %      RDW-SD 43.8 fl      MPV 7.9 fL      Platelets 9 10*3/mm3     Phosphorus [[id number]]  (Abnormal) Collected: 11/30/20 0612    Specimen: Blood Updated: 11/30/20 0647     Phosphorus  1.6 mg/dL     Basic Metabolic Panel [[id number]]  (Abnormal) Collected: 11/30/20 0612    Specimen: Blood Updated: 11/30/20 0643     Glucose 154 mg/dL      BUN 25 mg/dL      Creatinine 0.54 mg/dL      Sodium 133 mmol/L      Potassium 3.8 mmol/L      Chloride 96 mmol/L      CO2 29.0 mmol/L      Calcium 7.8 mg/dL      eGFR Non African Amer 147 mL/min/1.73      BUN/Creatinine Ratio 46.3     Anion Gap 8.0 mmol/L     Narrative:      GFR Normal >60  Chronic Kidney Disease <60  Kidney Failure <15      Magnesium [[id number]]  (Normal) Collected: 11/30/20 0612    Specimen: Blood Updated: 11/30/20 0643     Magnesium 1.9 mg/dL     Phosphorus [[id number]]  (Abnormal) Collected: 11/29/20 2312    Specimen: Blood Updated: 11/29/20 2335     Phosphorus 2.1 mg/dL     Blood Culture - Blood, Arm, Left [[id number]] Collected: 11/25/20 2013    Specimen: Blood from Arm, Left Updated: 11/29/20 2245     Blood Culture No growth at 4 days    Blood Culture - Blood, Hand, Left [[id number]] Collected: 11/25/20 2035    Specimen: Blood from Hand, Left Updated: 11/29/20 2115     Blood Culture No growth at 4 days        No results found for: HGBA1C  Results from last 7 days   Lab Units 11/28/20  0525 11/27/20  0358 11/26/20  1352   INR  1.22* 1.35* 2.38*           Lab Results   Component Value Date    LIPASE 8 (L) 10/26/2020     No results found for: CHOL, CHLPL, TRIG, HDL, LDL, LDLDIRECT    Lab Results   Lab Value Date/Time    INTRAOP  10/27/2020 1203     FNA of subcarinal lymph node, immediate evaluation    Pass 1: Positive for malignant cells.    FNA of left hilar lymph node, immediate evaluation    Pass 1: Mostly blood, no malignancy.  Pass 2: Mostly blood, no malignancy.  Pass 3: Mostly blood, no malignancy.  Pass 4: Mostly blood, no malignancy.    Initial cytology interpretation performed by Maurilio Watsno MD.        FINALDX  11/25/2020 1146     Leukopenia with left shift and relative eosinophilia  Thrombocytopenia  No blasts identified      FINALDX   10/27/2020 1203     Specimens #1 and #3 (Lymph node, subcarinal, endobronchial fine needle aspiration):    Positive for metastatic poorly differentiated adenocarcinoma, see comment    Specimens #2 and #4 (Lymph node, left hilar, endobronchial fine needle aspiration, smears and cell block preparation):    Rare atypical cells, see comment    JPR/sms           COMDX  10/27/2020 1203     Specimens #1 and #3: Immunohistochemistry was performed utilizing appropriate controls and the tumor is positive for CK7 and TTF-1 and is negative for napsin A, p63, and CK5/6. 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 Many of the tumor cells display significant pleomorphism with bizarre appearing nuclei and mitotic figures. This raises the possibility of a sarcomatoid component, however, that would have to be evaluated on the resection specimen.     Specimens #2 and #4: One of the smear passes displays scattered atypical cells in a background of lymphoid tissue. Immunohistochemistry was performed utilizing appropriate controls on the cell block for cytokeratin AE1/3. The cytokeratin AE1/3 stain is negative which excludes the presence of atypical cells in the cell block. The significance of the atypical cells on the smears is unknown, however, malignancy cannot be entirely excluded. Further clinical workup should be considered.     JPR/sms          Microbiology Results (last 10 days)     Procedure Component Value - Date/Time    COVID PRE-OP / PRE-PROCEDURE SCREENING ORDER (NO ISOLATION) - Swab, Nasopharynx [[id number]]  (Normal) Collected: 11/27/20 0802    Lab Status: Final result Specimen: Swab from Nasopharynx Updated: 11/27/20 0855    Narrative:      The following orders were created for panel order COVID PRE-OP / PRE-PROCEDURE SCREENING ORDER (NO ISOLATION) - Swab, Nasopharynx.  Procedure                               Abnormality         Status                     ---------                                -----------         ------                     COVID-19,CEPHEID,COR/UDAY...[[id number]]  Normal              Final result                 Please view results for these tests on the individual orders.    COVID-19,CEPHEID,COR/UDAY/PAD IN-HOUSE(OR EMERGENT/ADD-ON),NP SWAB IN TRANSPORT MEDIA 3-4 HR TAT - Swab, Nasopharynx [[id number]]  (Normal) Collected: 11/27/20 0802    Lab Status: Final result Specimen: Swab from Nasopharynx Updated: 11/27/20 0855     COVID19 Not Detected    Narrative:      Fact sheet for providers: https://www.fda.gov/media/003725/download     Fact sheet for patients: https://www.fda.gov/media/518132/download    Blood Culture - Blood, Hand, Left [[id number]] Collected: 11/25/20 2035    Lab Status: Preliminary result Specimen: Blood from Hand, Left Updated: 11/29/20 2115     Blood Culture No growth at 4 days    Blood Culture - Blood, Arm, Left [[id number]] Collected: 11/25/20 2013    Lab Status: Preliminary result Specimen: Blood from Arm, Left Updated: 11/29/20 2245     Blood Culture No growth at 4 days          ECG/EMG Results (most recent)     Procedure Component Value Units Date/Time    ECG 12 Lead [[id number]] Collected: 11/25/20 1614     Updated: 11/29/20 1102     QT Interval 355 ms     Narrative:      HEART RATE= 116  bpm  RR Interval= 516  ms  UT Interval=   ms  P Horizontal Axis=   deg  P Front Axis=   deg  QRSD Interval= 108  ms  QT Interval= 355  ms  QRS Axis= 33  deg  T Wave Axis= 33  deg  - ABNORMAL ECG -  Atrial fibrillation  Low voltage, extremity leads  When compared with ECG of 11-Nov-2020 10:10:57,  NO SIGNIFICANT CHANGE FROM PREVIOUS ECG  Electronically Signed By: Alex Mtz ([facility]) 29-Nov-2020 10:51:45  Date and Time of Study: 2020-11-25 16:14:21               Results for orders placed during the hospital encounter of 10/26/20   Adult Transthoracic Echo Complete W/ Cont if Necessary Per Protocol    Narrative · The left atrial cavity is severely dilated.  · Left ventricular  diastolic function is consistent with (grade II w/high   LAP) pseudonormalization.  · Left ventricular wall thickness is consistent with concentric   hypertrophy.  · Estimated left ventricular EF was in agreement with the calculated left   ventricular EF. Left ventricular ejection fraction appears to be 61 - 65%.   Left ventricular systolic function is normal.  · There is moderate calcification of the aortic valve mainly affecting the   non-coronary cusp(s).  · There is mild, bileaflet mitral valve thickening present.  · Mild to moderate mitral valve regurgitation is present with a   centrally-directed jet noted.  · Mild tricuspid valve regurgitation is present.  · Estimated right ventricular systolic pressure from tricuspid   regurgitation is mildly elevated (35-45 mmHg).     Moderate MR  Severe left atrial enlargement  Grade 2 diastolic dysfunction  Preserved LV systolic function EF 60 to 65%  Normal RV size and function  Normal IVC  Mild pulmonary hypertension by TR gradient  If clinically indicated would recommend transesophageal echo for   evaluation of mitral regurgitation with severe left atrial enlargement,   spectral Doppler and color Doppler indicate not more than moderate but   suboptimal acoustic windows, possibly underestimated, recommend ESE if   clinically indicated and were suspicious of severe MR         Ct Chest Without Contrast    Result Date: 11/26/2020  1.Compared to previous examinations the paratracheal and mediastinal lymphadenopathy is larger. 2.There is a moderate size right pleural effusion and small left pleural effusion which are increased in size from previous study. 3.The right hilar mass is not definite change in size. 4.There are some new nodules in the right lower lobe. 5.There is increased opacity in the right lower lobe. 6.Cardiomegaly and severe atherosclerotic disease and coronary artery disease. 7.No change in a low-density lesion at the right hepatic dome. 8.Diffuse stomach  wall thickening similar previous exam.    Electronically Signed By-Meg Marie MD On:11/26/2020 1:38 PM This report was finalized on [id number] by  Meg Marie MD.    Xr Chest Pa & Lateral    Result Date: 11/25/2020  1 increasing right lower lobe airspace disease may represent atelectasis or pneumonia and small right pleural effusion. 2. Patient has right lower lobe lung lesion consistent with the appearance of malignancy on previous PET/CT. Right paratracheal adenopathy is observed on today's x-ray. 3. FINDINGS consistent with developing interstitial edema in comparison to the 10/26/2020 exam.  Electronically Signed By-Aranza Martinez MD On:11/25/2020 5:01 PM This report was finalized on [id number] by  Aranza Martinez MD.          Xrays, labs reviewed personally by physician.    Medication Review:   I have reviewed the patient's current medication list      Scheduled Meds  budesonide-formoterol, 2 puff, Inhalation, BID - RT  dilTIAZem, 30 mg, Oral, Q6H  filgrastim (NEUPOGEN) injection, 300 mcg, Subcutaneous, Q PM  folic acid, 1 mg, Oral, Daily  ipratropium-albuterol, 3 mL, Nebulization, 4x Daily - RT  metoclopramide, 10 mg, Oral, TID AC  metoprolol tartrate, 50 mg, Oral, Q12H  mirtazapine, 15 mg, Oral, Nightly  nicotine, 1 patch, Transdermal, Daily  nystatin, 5 mL, Swish & Swallow, 4x Daily  pantoprazole, 40 mg, Oral, Q AM  sodium chloride, 10 mL, Intravenous, Q12H  sucralfate, 1 g, Oral, 4x Daily AC & at Bedtime        Meds Infusions  dilTIAZem, 5-15 mg/hr, Last Rate: 5 mg/hr (11/27/20 1244)  sodium chloride, 30 mL/hr, Last Rate: 30 mL/hr (11/28/20 2152)        Meds PRN  •  acetaminophen **OR** acetaminophen  •  acetaminophen  •  benzonatate  •  bisacodyl  •  flumazenil  •  HYDROmorphone  •  HYDROmorphone  •  ipratropium-albuterol  •  labetalol  •  magnesium hydroxide  •  magnesium sulfate **OR** magnesium sulfate **OR** magnesium sulfate  •  melatonin  •  naloxone  •  nitroglycerin  •  ondansetron **OR**  ondansetron  •  ondansetron  •  oxyCODONE  •  potassium & sodium phosphates **OR** potassium & sodium phosphates  •  potassium chloride **OR** potassium chloride **OR** potassium chloride  •  promethazine  •  sodium chloride  •  sodium chloride  •  sodium chloride        Assessment/Plan   Assessment/Plan     Active Hospital Problems    Diagnosis  POA   • **Feeding difficulties [R63.3]  Unknown   • Moderate malnutrition (CMS/HCC) [E44.0]  Yes   • Thrombocytopenia (CMS/HCC) [D69.6]  Yes      Resolved Hospital Problems   No resolved problems to display.       MEDICAL DECISION MAKING COMPLEXITY BY PROBLEM:     Generalized weakness-likely multifactorial given cancer diagnosis and chemotherapy as well as tachyarrhythmia  -PT/OT  -Fall risk  -Monitor for sources of infection  -Telemetry  -COVID-19 negative  -Heart rate control  -Nutrition consult for tube feeding  -Patient received a PEG on 11/28/2020     Atrial fibrillation-with RVR.  Patient with established history denies chest pain.  Patient able to be weaned off overnight 10/26/2020 but went back into RVR this 10/27/2020, slowly improving  -Telemetry  -Increase metoprolol  -Monitor electrolytes  -Off Cardizem drip on oral Cardizem  -Echo as of 10/26/2020 showing dilated atrial cavity with grade 2 diastolic dysfunction    Chronic respiratory failure-patient on 3 L baseline  -Chest x-ray appearing show signs of volume overload  -Ordering CT further evaluate underlying infection versus inflammation versus edema  -Off diuretics     Thrombocytopenia-patient with no signs of overt bleeding though did report small amounts of blood per nasal passages, slowly improving.  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    Leukopenia-likely due to underlying chemotherapy  -Neupogen per hematology  -Neutropenic precaution  -Monitor for signs of occult infection  -Daily CBC  -If his platelet count continues to be this low may need to consider additional platelet therapy.    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 with various different valvular diseases  -Monitor volume status     COPD-no signs of acute exacerbation currently  -Bronchodilators  -Mucolytic's  -Wean O2 as tolerated     Tobacco abuse-cessation advised    VTE Prophylaxis -   Mechanical Order History:      Ordered        11/25/20 1608  Place Sequential Compression Device  Once         11/25/20 1608  Maintain Sequential Compression Device  Continuous                 Pharmalogical Order History:     None            Code Status -   Code Status and Medical Interventions:   Ordered at: 11/25/20 1608     Code Status:    CPR     Medical Interventions (Level of Support Prior to Arrest):    Full       This patient has been examined wearing appropriate Personal Protective Equipment and discussed with hospital infection control department. 11/30/20        Discharge Planning  pending        Electronically signed by Farrah Grimm MD, 11/30/20, 19:45 EST.  [facility]ist [facility]

## 2020-12-01 NOTE — THERAPY EVALUATION
Acute Care - Speech Language Pathology   Swallow Initial Evaluation [facility]     Patient Name: Shay Hernandez  : 1940  MRN: [id number]  Today's Date: 2020               Admit Date: 2020    Visit Dx:     ICD-10-CM ICD-9-CM   1. Feeding difficulties  R63.3 783.3     Patient Active Problem List   Diagnosis   • COVID-19 ruled out   • COPD (chronic obstructive pulmonary disease) (CMS/HCC)   • Weight loss   • Weakness   • Chest pain   • Cough   • Shortness of breath   • Lung mass   • Liver mass   • Atrial fibrillation (CMS/HCC)   • Adenocarcinoma, lung, unspecified laterality (CMS/HCC)   • Encounter for fitting and adjustment of vascular catheter   • Thrombocytopenia (CMS/HCC)   • Feeding difficulties   • Moderate malnutrition (CMS/HCC)     Past Medical History:   Diagnosis Date   • Adenocarcinoma, lung, unspecified laterality (CMS/HCC) 10/27/2020   • Atrial fibrillation (CMS/HCC)    • COPD (chronic obstructive pulmonary disease) (CMS/HCC)      Past Surgical History:   Procedure Laterality Date   • APPENDECTOMY     • BRONCHOSCOPY N/A 10/27/2020    Procedure: BRONCHOSCOPY WITH BRONCHOALVEOLAR LAVAGE AND ENDOBRONCHIAL ULTRASOUND WITH FINE NEEDLE ASPIRATION X2 AREAS;  Surgeon: Johnny Waldrop MD;  Location: [facility] ENDOSCOPY;  Service: Pulmonary;  Laterality: N/A;  POST:    • ENDOSCOPY W/ PEG TUBE PLACEMENT N/A 2020    Procedure: ESOPHAGOGASTRODUODENOSCOPY WITH 20F PERCUTANEOUS ENDOSCOPIC GASTROSTOMY TUBE INSERTION WITH ANESTHESIA;  Surgeon: Lizeth Nance MD;  Location: [facility] ENDOSCOPY;  Service: Gastroenterology;  Laterality: N/A;  ulcerative esophagitis, hiatal hernia, PEG placement   • VENOUS ACCESS DEVICE (PORT) INSERTION N/A 2020    Procedure: INSERTION VENOUS ACCESS DEVICE, LEFT SUBCLAVIAN UNDER FLOUROSCOPIC GUIDANCE ;  Surgeon: Jan Richards MD;  Location: [facility] MAIN OR;  Service: Cardiothoracic;  Laterality: N/A;        SWALLOW EVALUATION (last 72 hours)      SLP Adult  "Swallow Evaluation     Row Name 12/01/20 1500          Document Type  evaluation  -          Patient Profile Reviewed  yes  -MC    Pertinent History Of Current Problem  The following information gathered from H&P and MD notes: \"80 y.o. with history of stage IIIc poorly differentiated adenocarcinoma lung, atrial fibrillation and COPD  presenting as direct admission from oncologist office for progressive generalized weakness as well as thrombocytopenia.\" Pt underwent PEG placement with GI on 11/27. Per GI consult note, pt \"complains of no appetite. He has dysphagia with phlegm and food x 3 weeks. He drinks 1 Ensure per day.\"  CT of chest from 11/26 included but not limited to the following: \" paratracheal and mediastinal lymphadenopathy is larger. There is a moderate size right pleural effusion and small left pleural effusion which are increased in size from previous study.There is increased opacity in the right lower lobe.\"ST consulted for clinical swallow eval to establish swallow status for supplemental PO. He was cleared by GI for PO.   -    Current Method of Nutrition  NPO;gastrostomy feedings  -          Pain: FACES Scale, Pretreatment  0-->no hurt  -    Posttreatment Pain Rating  0-->no hurt  -          Oral Motor, Comment  Pt with dentures that appear to fit well.   -          Respiratory Support Currently in Use  nasal cannula 3 LPM  -    Eating/Swallowing Skills  fed by SLP;self-fed  -    Positioning During Eating  upright 90 degree  -    Consistencies Trialed  regular textures;pureed;thin liquids  -    Pre SpO2 (%)  93 02 sats fluctuated significantly throughout exam  -    Post SpO2 (%)  90  -MC          Oral Prep Phase  WFL  -MC    Oral Transit  WFL  -MC    Oral Residue  WFL  -MC    Pharyngeal Phase  no overt signs/symptoms of pharyngeal impairment  -    Esophageal Phase  suspected esophageal impairment  -    Clinical Swallow Evaluation Summary  Clinical swallow eval conducted " with ice chips, water by all presentation methods, applesauce, and cracker. Limited trials accepted as consistent with reports of poor appetite & PO intake. There were no clinical indications oropharyngeal dysphagia with the exception of slowed but functional mastication. Pt clears oral cavity (I) with no anterior loss or stasis appreciated. No s/s aspiration at any time. It should be noted however that pt's 02 sats fluctuated significantly with activity including both eating & moving around in bed. Pt demonstrated fluctuations from mid 90's down to 80 with apparent good pleth wave. Each time sats dropped, pt cued to breath via nose, and sats steadily returned to 90's.  Recommend regular diet with thin liquids for pleasure. Recommend pt preferred/chosen items. Pt should eat only when amenable to full HOB elevation during and for min of 30 minutes following PO in the setting of esophageal issues. He remains at risk of aspiration due to potential for reflux of TF, refeeding syndrome per RD. Recommend RD continue to follow. ST will follow for meal assessment to establish tolerance of PO diet with further recs as indicated.   -          SLP Swallowing Diagnosis  swallow WFL  -    Functional Impact  risk of aspiration/pneumonia;risk of malnutrition due to poor PO intake, refeeding risk, esophageal concerns  -    Rehab Potential/Prognosis, Swallowing  good, to achieve stated therapy goals  -          Therapy Frequency (Swallow)  PRN  -    Predicted Duration Therapy Intervention (Days)  until discharge  -    SLP Diet Recommendation  regular textures;thin liquids  -    Recommended Precautions and Strategies  upright posture during/after eating;small bites of food and sips of liquid;reflux precautions;fatigue precautions  -    Oral Care Recommendations  Oral Care before breakfast, after meals and PRN  -    SLP Rec. for Method of Medication Administration  as tolerated  -    Monitor for Signs of Aspiration   yes;notify SLP if any concerns;cough  -          Oral Nutrition/Hydration Goal Selection (SLP)  oral nutrition/hydration, SLP goal 1;oral nutrition/hydration, SLP goal 2;oral nutrition/hydration, SLP goal (free text)  -          Oral Nutrition/Hydration Goal 1, SLP  Pt will tolerate least restrictive diet with no s/s aspiration clinically  -    Time Frame (Oral Nutrition/Hydration Goal 1, SLP)  by discharge  -          Oral Nutrition/Hydration Goal 2, SLP  Pt will verbalize/demonstrate understanding of safe swallow positioning & aspiration/reflux precautions  -    Time Frame (Oral Nutrition/Hydration Goal 2, SLP)  by discharge  -          Oral Nutrition/Hydration Goal, SLP  Pt will participate in diagnostic PO assessment via meal or snack to establish diet tolerance & identify any further skilled needs.   -    Time Frame (Oral Nutrition/Hydration Goal, SLP)  1 day;2 days;3 days  -      User Key  (r) = Recorded By, (t) = Taken By, (c) = Cosigned By    Initials Name Effective Dates    Kirti Alvarez, SLP 03/01/19 -           EDUCATION  The patient and pt's dtr have been educated in the following areas:   Risks of aspiration, ST goals/POC, Safe swallow positioning and Reflux precautions      SLP Recommendation and Plan  SLP Swallowing Diagnosis: swallow WFL  SLP Diet Recommendation: regular textures, thin liquids  Recommended Precautions and Strategies: upright posture during/after eating, small bites of food and sips of liquid, reflux precautions, fatigue precautions  SLP Rec. for Method of Medication Administration: as tolerated     Monitor for Signs of Aspiration: yes, notify SLP if any concerns, cough           Rehab Potential/Prognosis, Swallowing: good, to achieve stated therapy goals  Therapy Frequency (Swallow): PRN  Predicted Duration Therapy Intervention (Days): until discharge       Patient was not wearing a face mask during this therapy encounter. Therapist used appropriate personal  protective equipment including mask, eye protection and gloves.  Mask used was standard procedure mask. Appropriate PPE was worn during the entire therapy session. Hand hygiene was completed before and after therapy session. Patient is not in enhanced droplet precautions.            Kirti Jones, ALEXUS  12/1/2020

## 2020-12-01 NOTE — PLAN OF CARE
Problem: Adult Inpatient Plan of Care  Goal: Plan of Care Review  Outcome: Ongoing, Progressing  Flowsheets (Taken 12/1/2020 1528)  Plan of Care Reviewed With:   patient   daughter    80 y.o. with history of stage IIIc adenocarcinoma lung, a-fib and COPD  direct admission from oncologist office for progressive generalized weakness. Pt underwent PEG placement with GI on 11/27 due to poor intake and nondescript c/o dysphagia. ST consulted for clinical swallow eval to establish swallow status.     Clinical swallow eval revealed no indications oropharyngeal dysphagia; no s/s aspiration at any time. It should be noted however that pt's 02 sats fluctuated significantly with activity from mid 90's down to 80. Sats steadily returned to 90's each time with breathing through nose.    Recommend regular diet with thin liquids for pleasure only when amenable to full HOB elevation during and for min of 30 minutes following PO. He remains at risk of aspiration due to potential for reflux of TF, refeeding syndrome per RD. Recommend RD continue to follow. ST will follow for meal assessment to establish tolerance of PO diet with further recs as indicated.

## 2020-12-01 NOTE — PROGRESS NOTES
Nutrition Services    Patient Name: Shay Hernandez  YOB: 1940  MRN: [id number]  Admission date: 11/25/2020    PPE Documentation        PPE Worn By Provider Did not enter room - care provided remotely   PPE Worn By Patient  N/A     PROGRESS NOTE      Encounter Information: Progress note to check on TF.        PO Diet: NPO Diet     RN reports ST to evaluate patient today for PO diet. GI has cleared pt to eat as long as he passes evaluation.    PO Supplements: -    PO Intake:  -       Nutrition support orders: Isosource 1.5 at 35mL/hour with 20mL/hour free water flush.    Nutrition support review: Per EMR is infusing per above orders, residuals WNL        Labs (reviewed below): Hyponatremia- Will lower water flush   Hypophosphatemia- prn replacement given        GI Function:  + BM 11/29        Nutrition Intervention: Increasing TF slightly towards goal rate today, cautious given malnutrition & continued Hypophosphatemia. Lowering water flush given hyponatremia. Secure chatted RN about new TF orders.     New TF orders: Isosource 1.5 at 55 ml/hr + 10 ml/hr water flush.        Results from last 7 days   Lab Units 11/30/20  0612 11/29/20  0501 11/28/20  0525  11/27/20  0358  11/25/20  1834   SODIUM mmol/L 133* 137 139  --  138   < > 137   POTASSIUM mmol/L 3.8 3.5 3.6   < > 3.2*   < > 3.2*   CHLORIDE mmol/L 96* 98 98  --  98   < > 99   CO2 mmol/L 29.0 30.0* 30.0*  --  28.0   < > 27.0   BUN mg/dL 25* 24* 20  --  19   < > 21   CREATININE mg/dL 0.54* 0.61* 0.58*  --  0.67*   < > 0.77   CALCIUM mg/dL 7.8* 7.9* 8.2*  --  8.0*   < > 8.0*   BILIRUBIN mg/dL  --   --   --   --  0.9  --  0.8   ALK PHOS U/L  --   --   --   --  86  --  86   ALT (SGPT) U/L  --   --   --   --  19  --  27   AST (SGOT) U/L  --   --   --   --  8  --  12   GLUCOSE mg/dL 154* 144* 194*  --  89   < > 70    < > = values in this interval not displayed.     Results from last 7 days   Lab Units 12/01/20  0619 11/30/20  0612 11/29/20  0508    MAGNESIUM mg/dL 1.7 1.9  --  2.0   PHOSPHORUS mg/dL 1.8* 1.6*   < > 2.0*   HEMOGLOBIN g/dL 11.5* 11.9*   < >  --    HEMATOCRIT % 34.3* 36.3*   < >  --     < > = values in this interval not displayed.     COVID19   Date Value Ref Range Status   11/27/2020 Not Detected Not Detected - Ref. Range Final     No results found for: HGBA1C    RD to follow up per protocol.    Electronically signed by:  Jelena Ocampo RD  12/01/20 13:00 EST

## 2020-12-01 NOTE — PROGRESS NOTES
ONCOLOGY/HEMATOLOGY    PATIENT: Shay Hernandez  YOB: 1940  MEDICAL RECORD NUMBER: [id number] OF SERVICE: 11/30/20      CHIEF COMPLAINT: thrombocytopenia; lung cancer    HISTORY OF PRESENT ILLNESS:     Mr. Hernandez is a 80 y.o. with history of stage IIIc poorly differentiated adenocarcinoma lung, atrial fibrillation and COPD  presenting as direct admission from oncologist office for progressive generalized weakness as well as thrombocytopenia.  Patient had initiated chemotherapy recently 11/17/2020 and reported progressive weakness as well as loss of appetite over the last few weeks.  Patient had been getting more thrombocytopenic with levels in the 30s the day prior to admission down to 13 on day of admission.  Patient denied hematuria, hematochezia, melena or significant bruising though did report a small amount of blood when he blew his nose in the morning.  .    Patient found to be in atrial fibrillation with RVR once reaching the floor.  Stat EKG ordered and ordering Cardizem.  Patient received unit of platelets in ambulatory care center prior to coming up to the floor.  Patient scheduled to have feeding tube placement with GI in the next few days.       He received his PEG 11/28/2020 platelet count improved nominally from 26->32 with steroids and transfusion.  However, despite 2 doses of prednisone, today the platelets have dropped to 13.  He is interested in potentially eating something by mouth.  EGD done with PEG showed ulcerated GEJ suggesting potentially metastatic involvement by the lung cancer.  bx was deferred given thrombocytopenia.         Oncological history:  ·   Stage IIIc poorly differentiated adenocarcinoma of lung -patient presented with a bulky right hilar mass and also bulky mediastinal lymph nodes.    Also has left hilar lymph nodes consistent with metastasis and borderline enlarged left supraclavicular lymph node. . MRI abdomen 10/28/2020 - 2.2 cm lesion in the dome of  liver with features of a cyst. Negative for evidence of hepatic metastases. S/p bronchoscopy with EBUS on 10/27/2020 - subcarinal lymph node FNA positive for metastatic poorly differentiated adenocarcinoma. Staining pattern is consistent with adenocarcinoma of pulmonary origin. Many of the tumor cells display significant pleomorphism with bizarre appearing nuclei and mitotic figures. This raises the possibility of a sarcomatoid component. MRI brain 10/29/2020 - no metastatic disease.   · 11/17/2020 patient received cycle 1 of cisplatin and pemetrexed.  Cisplatin 75 mg/m², pemetrexed 400 mg per metered square..  WBC 15.6, hemoglobin 13.7, platelets 129, creatinine 0.7, albumin 2.8  ·  11/5/2020: PET CT scan: Hypermetabolic right hilar mass consistent with malignancy.  Hypermetabolic nodular densities within the right lower lobe consistent with metastatic disease.  Bulky pathological enlarged hypermetabolic mediastinal adenopathy, consistent with metastatic disease.  No left hilar adenopathy.  Moderate FDG accumulation within the proximal stomach with gastric wall thickening.  This could be physiologic uptake..  Could be gastritis.  Gastric malignancy cannot be excluded.  Endoscopy recommended.  Enlarged hypermetabolic bilateral supraclavicular lymph nodes in the lower neck consistent with metastasis.    Review of systems:  Constitutional: + fatigue sweats; + weight loss; + decreased appetite, weakness  HEENT: Denies headache, vision changes,   Lymph nodes: Denies lymphadenopathy  Heme: + bruising  Respiratory: + dyspnea  Cardiovascular: Denies chest pain, palpitations  GI: Denies abdominal pain, nausea, vomiting, diarrhea, constipation, hematochezia or melena  : Denies dysuria or hematuria  Neurologic: generalized weakness  Endocrine: denies hot flashes  Psychologic: Denies depression or anxiety    Past Medical History:   Diagnosis Date   • Adenocarcinoma, lung, unspecified laterality (CMS/HCC) 10/27/2020   •  Atrial fibrillation (CMS/[facility])    • COPD (chronic obstructive pulmonary disease) (CMS/HCC)        Past Surgical History:   Procedure Laterality Date   • APPENDECTOMY     • BRONCHOSCOPY N/A 10/27/2020    Procedure: BRONCHOSCOPY WITH BRONCHOALVEOLAR LAVAGE AND ENDOBRONCHIAL ULTRASOUND WITH FINE NEEDLE ASPIRATION X2 AREAS;  Surgeon: Johnny Waldrop MD;  [facility];  Service: Pulmonary;  Laterality: N/A;  POST:    • ENDOSCOPY W/ PEG TUBE PLACEMENT N/A 2020    Procedure: ESOPHAGOGASTRODUODENOSCOPY WITH 20F PERCUTANEOUS ENDOSCOPIC GASTROSTOMY TUBE INSERTION WITH ANESTHESIA;  Surgeon: Lizeth Nance MD;  [facility];  Service: Gastroenterology;  Laterality: N/A;  ulcerative esophagitis, hiatal hernia, PEG placement   • VENOUS ACCESS DEVICE (PORT) INSERTION N/A 2020    Procedure: INSERTION VENOUS ACCESS DEVICE, LEFT SUBCLAVIAN UNDER FLOUROSCOPIC GUIDANCE ;  Surgeon: Jan Richards MD;  [facility];  Service: Cardiothoracic;  Laterality: N/A;       Family History   Problem Relation Age of Onset   • Heart attack Mother    • Lung disease Other         Cryptococcus       Social History     Socioeconomic History   • Marital status:      Spouse name: Not on file   • Number of children: Not on file   • Years of education: Not on file   • Highest education level: Not on file   Tobacco Use   • Smoking status: Former Smoker     Packs/day: 2.00     Years: 60.00     Pack years: 120.00     Types: Cigarettes     Quit date: 10/27/2020     Years since quittin.0   • Smokeless tobacco: Never Used   Substance and Sexual Activity   • Alcohol use: Not Currently     Alcohol/week: 4.0 standard drinks     Types: 4 Cans of beer per week   • Drug use: Not Currently   • Sexual activity: Defer   Social History Narrative    Lives with wife       ALLERGIES:  Patient has no known allergies.    MEDICATION:  Current Facility-Administered Medications   Medication Dose Route Frequency  Provider Last Rate Last Admin   • acetaminophen (TYLENOL) tablet 1,000 mg  1,000 mg Oral Once PRN Ric Mcnamara MD        Or   • acetaminophen (TYLENOL) suppository 650 mg  650 mg Rectal Once PRN Ric Mcnamara MD       • acetaminophen (TYLENOL) tablet 650 mg  650 mg Oral Q4H PRN Lizeth Nance MD       • benzonatate (TESSALON) capsule 200 mg  200 mg Oral TID PRN Elder Tineo MD       • bisacodyl (DULCOLAX) EC tablet 5 mg  5 mg Oral Daily PRN Lizeth Nance MD       • budesonide-formoterol (SYMBICORT) 160-4.5 MCG/ACT inhaler 2 puff  2 puff Inhalation BID - RT Lizeth Nance MD   2 puff at 11/30/20 2021   • dilTIAZem (CARDIZEM) 100 mg in 100 mL NS infusion (ADV)  5-15 mg/hr Intravenous Titrated Lizeth Nance MD 5 mL/hr at 11/27/20 1244 5 mg/hr at 11/27/20 1244   • dilTIAZem (CARDIZEM) tablet 30 mg  30 mg Oral Q6H Lizeth Nance MD   30 mg at 11/30/20 1711   • Filgrastim (NEUPOGEN) injection 300 mcg  300 mcg Subcutaneous Q PM Lizeth Nance MD   300 mcg at 11/30/20 1711   • flumazenil (ROMAZICON) injection 0.1 mg  0.1 mg Intravenous PRN Ric Mcnamara MD       • folic acid (FOLVITE) tablet 1 mg  1 mg Oral Daily Lizeth Nance MD   1 mg at 11/30/20 0858   • HYDROmorphone (DILAUDID) injection 0.2 mg  0.2 mg Intravenous Q15 Min PRN Ric Mcnamara MD       • HYDROmorphone (DILAUDID) injection 0.25 mg  0.25 mg Intravenous Q15 Min PRN Ric Mcnamara MD   0.25 mg at 11/30/20 1152   • ipratropium-albuterol (DUO-NEB) nebulizer solution 3 mL  3 mL Nebulization 4x Daily - RT Lizeth Nance MD   3 mL at 11/30/20 2021   • ipratropium-albuterol (DUO-NEB) nebulizer solution 3 mL  3 mL Nebulization Once PRN Ric Mcnamara MD       • labetalol (NORMODYNE,TRANDATE) injection 5 mg  5 mg Intravenous Q5 Min PRN Ric Mcnamara MD       • magnesium hydroxide (MILK OF MAGNESIA) suspension 2400 mg/10mL 10 mL  10 mL Oral Daily PRN Lizeth Nance MD        • Magnesium Sulfate 2 gram Bolus, followed by 8 gram infusion (total Mg dose 10 grams)- Mg less than or equal to 1mg/dL  2 g Intravenous PRN Lizeth Nance MD        Or   • Magnesium Sulfate 2 gram / 50mL Infusion (GIVE X 3 BAGS TO EQUAL 6GM TOTAL DOSE) - Mg 1.1 - 1.5 mg/dl  2 g Intravenous PRN Lizeth Nance MD 25 mL/hr at 11/28/20 0230 2 g at 11/28/20 0230    Or   • Magnesium Sulfate 4 gram infusion- Mg 1.6-1.9 mg/dL  4 g Intravenous PRN Lizeth Nance MD       • melatonin tablet 5 mg  5 mg Oral Nightly PRN Lizeth Nance MD       • metoclopramide (REGLAN) tablet 10 mg  10 mg Oral TID AC Lizeth Nance MD   10 mg at 11/30/20 1711   • metoprolol tartrate (LOPRESSOR) tablet 50 mg  50 mg Oral Q12H Lizeth Nnace MD   50 mg at 11/30/20 0858   • mirtazapine (REMERON) tablet 15 mg  15 mg Oral Nightly Lizeth Nance MD   15 mg at 11/29/20 2129   • naloxone (NARCAN) injection 0.4 mg  0.4 mg Intravenous PRN Ric Mcnamara MD       • nicotine (NICODERM CQ) 7 MG/24HR patch 1 patch  1 patch Transdermal Daily Lizeth Nance MD       • nitroglycerin (NITROSTAT) SL tablet 0.4 mg  0.4 mg Sublingual Q5 Min PRN Lizeth Nance MD       • nystatin (MYCOSTATIN) 090632 UNIT/ML suspension 500,000 Units  5 mL Swish & Swallow 4x Daily Lizeth Nance MD   500,000 Units at 11/30/20 1711   • ondansetron (ZOFRAN) tablet 4 mg  4 mg Oral Q6H PRN Lizeth Nance MD        Or   • ondansetron (ZOFRAN) injection 4 mg  4 mg Intravenous Q6H PRN Lizeth Nance MD       • ondansetron (ZOFRAN) injection 4 mg  4 mg Intravenous Once PRN Ric Mcnamara MD       • oxyCODONE (ROXICODONE) immediate release tablet 10 mg  10 mg Oral Once PRN Ric Mcnamara MD       • pantoprazole (PROTONIX) EC tablet 40 mg  40 mg Oral Q AM Lizeth Nance MD   40 mg at 11/30/20 0601   • potassium & sodium phosphates (PHOS-NAK) 280-160-250 MG packet - for Phosphorus less than 1.25 mg/dL  2 packet Oral Q6H PRN Lee Ann  "Montez Sampson MD   2 packet at 11/30/20 0122    Or   • potassium & sodium phosphates (PHOS-NAK) 280-160-250 MG packet - for Phosphorus 1.25 - 2.5 mg/dL  2 packet Oral Q6H PRN Lizeth Nance MD   2 packet at 11/30/20 0858   • potassium chloride (K-DUR,KLOR-CON) CR tablet 40 mEq  40 mEq Oral PRN Lizeth Nance MD   40 mEq at 11/26/20 1807    Or   • potassium chloride (KLOR-CON) packet 40 mEq  40 mEq Oral PRN Lizeth Nance MD        Or   • potassium chloride 10 mEq in 100 mL IVPB  10 mEq Intravenous Q1H PRN Lizeth Nance  mL/hr at 11/27/20 1024 10 mEq at 11/27/20 1024   • promethazine (PHENERGAN) tablet 25 mg  25 mg Oral Once PRN Ric Mcnamara MD       • sodium chloride 0.9 % flush 10 mL  10 mL Intravenous Q12H Lizeth Nance MD   10 mL at 11/30/20 0858   • sodium chloride 0.9 % flush 10 mL  10 mL Intravenous PRN Lizeth Nance MD       • sodium chloride 0.9 % infusion  30 mL/hr Intravenous Continuous PRN Lizeth Nance MD 30 mL/hr at 11/28/20 2152 30 mL/hr at 11/28/20 2152   • sodium chloride nasal spray 2 spray  2 spray Each Nare PRN Lizeth Nance MD       • sucralfate (CARAFATE) tablet 1 g  1 g Oral 4x Daily AC & at Bedtime Lizeth aNnce MD   1 g at 11/30/20 1710           PHYSICAL EXAM:    Current Vitals:  /97 (BP Location: Right arm, Patient Position: Lying)   Pulse (!) 121   Temp 98 °F (36.7 °C) (Axillary)   Resp 18   Ht 188 cm (74\")   Wt 79.8 kg (175 lb 14.8 oz)   SpO2 92%   BMI 22.59 kg/m²       VITAL signs in the last 24 hours: [unfilled]       11/29/20  0422 11/30/20  0500   Weight: 78.7 kg (173 lb 8 oz) 79.8 kg (175 lb 14.8 oz)         Physical Exam  Vitals signs and nursing note reviewed.   Constitutional:       General: He is not in acute distress.     Appearance: Normal appearance. He is not diaphoretic.   HENT:      Head: Normocephalic and atraumatic.   Eyes:      General: No scleral icterus.        Right eye: No discharge.         Left eye: No " discharge.      Conjunctiva/sclera: Conjunctivae normal.   Neck:      Musculoskeletal: Normal range of motion and neck supple.      Thyroid: No thyromegaly.   Cardiovascular:      Rate and Rhythm: Normal rate and regular rhythm.      Pulses: Normal pulses.      Heart sounds: Normal heart sounds. No friction rub. No gallop.       Comments: Port-A-Cath chest  Pulmonary:      Effort: Pulmonary effort is normal. No respiratory distress.      Breath sounds: Normal breath sounds. No stridor. No wheezing.   Abdominal:      General: Bowel sounds are normal.      Palpations: Abdomen is soft. There is no mass.      Tenderness: There is no abdominal tenderness. There is no guarding or rebound.   Musculoskeletal: Normal range of motion.         General: No tenderness.   Lymphadenopathy:      Cervical: No cervical adenopathy.   Skin:     General: Skin is warm.      Findings: No erythema or rash.   Neurological:      General: No focal deficit present.      Mental Status: He is alert and oriented to person, place, and time.      Sensory: No sensory deficit.      Motor: No abnormal muscle tone.   Psychiatric:         Mood and Affect: Mood normal.         Behavior: Behavior normal.         Thought Content: Thought content normal.         Judgment: Judgment normal.          ECOG PERFORMANCE STATUS:3    LABORATORY/DATA:  WBC   Date Value Ref Range Status   11/30/2020 2.10 (L) 3.40 - 10.80 10*3/mm3 Final     RBC   Date Value Ref Range Status   11/30/2020 4.11 (L) 4.14 - 5.80 10*6/mm3 Final     Hemoglobin   Date Value Ref Range Status   11/30/2020 11.9 (L) 13.0 - 17.7 g/dL Final     Hematocrit   Date Value Ref Range Status   11/30/2020 36.3 (L) 37.5 - 51.0 % Final     MCV   Date Value Ref Range Status   11/30/2020 88.4 79.0 - 97.0 fL Final     MCH   Date Value Ref Range Status   11/30/2020 28.9 26.6 - 33.0 pg Final     MCHC   Date Value Ref Range Status   11/30/2020 32.7 31.5 - 35.7 g/dL Final     RDW   Date Value Ref Range Status    11/30/2020 14.2 12.3 - 15.4 % Final     RDW-SD   Date Value Ref Range Status   11/30/2020 43.8 37.0 - 54.0 fl Final     MPV   Date Value Ref Range Status   11/30/2020 7.9 6.0 - 12.0 fL Final     Platelets   Date Value Ref Range Status   11/30/2020 9 (C) 140 - 450 10*3/mm3 Final     Neutrophils Absolute   Date Value Ref Range Status   11/30/2020 1.09 (L) 1.70 - 7.00 10*3/mm3 Final     Eosinophils Absolute   Date Value Ref Range Status   11/30/2020 0.17 0.00 - 0.40 10*3/mm3 Final     nRBC   Date Value Ref Range Status   11/29/2020 1.0 (H) 0.0 - 0.2 /100 WBC Final     Lab Results   Component Value Date    GLUCOSE 154 (H) 11/30/2020    BUN 25 (H) 11/30/2020    CREATININE 0.54 (L) 11/30/2020    EGFRIFNONA 147 11/30/2020    BCR 46.3 (H) 11/30/2020    K 3.8 11/30/2020    CO2 29.0 11/30/2020    CALCIUM 7.8 (L) 11/30/2020    ALBUMIN 2.40 (L) 11/27/2020    AST 8 11/27/2020    ALT 19 11/27/2020     No results found for:  No results found for: SPEP, UPEP No results found for: LDH, URICACID   Lab Results   Component Value Date    IRON 23 (L) 11/02/2020    TIBC 231 (L) 11/02/2020    FERRITIN 334.90 10/26/2020          IMAGING:  Ct Chest Without Contrast    Result Date: 11/26/2020  Narrative: CT CHEST WO CONTRAST-  Date of Exam: 11/26/2020 1:17 PM  Indication: Pneumonia, effusion or abscess suspected, xray done; R63.3-Feeding difficulties  smoker. Atrial fibrillation. Recent diagnosis of lung cancer.  Comparison: PET/CT 11/05/2020, CT chest 10/27/2020.  Technique: Serial and axial CT images of the chest were obtained. Reconstructions in the coronal and sagittal planes were performed. Automated exposure control and iterative reconstruction methods were used.  FINDINGS: Hilum and Mediastinum: Right paratracheal lymph node has increased in size. It measures approximately 2.3 x 3 cm on image 18. Previously, it measured about 1.6 x 2.5 cm. Lymph node mass right mid peritracheal is larger. On image 32 measures about 3.5 x 5 cm.  Previously was about 3.6 x 4.7 cm. Subcarinal lymph node is larger.. Its more difficult to measure on this noncontrast enhanced study but appears to measure about 3.9 x 5.9 cm. Right hilar mass more difficult to measure without contrast. However, when measured at similar location to previous exam, image #61 and measures approximately 3.9 x 6.3 cm. This is not definitely changed. There are enlarged bilateral hilar lymph nodes. There is cardiomegaly. There is severe coronary artery calcination. There are annular calcination of the mitral valve. There is small pericardial effusion.  Lung Parenchyma and Pleura: There is a moderate size right pleural effusion which is increased from previous study. Small left pleural effusion which is slightly larger as well. There is a right lower lobe pulmonary nodule measuring 1.3 cm it is pleural-based on image #71. The pleural effusion has changed its location but previously it measured about 1 cm. There is a nodule in the right lower lobe on image #56 measuring about 8 mm. This is not definitely seen on previous study. There is a nodule in the right lower lobe on image #68 measuring about 8 mm. This also appears new. There is increased right lower lobe airspace opacity blending with the pleural effusion. There is right infrahilar opacity and diffuse bronchial wall thickening. There is septal thickening in the right upper lobe there is partial atelectasis of the right middle lobe which appears increased.  There is septal thickening in the left lung is less pronounced in the right. There is bronchial wall thickening. There is airspace opacity in the central left lower lobe similar previous exam. There is mild subsegmental atelectasis in the left lower lobe similar to previous study. There is moderate emphysema.  Upper Abdomen: Low density nodule in the right posterior hepatic lobe near the hepatic dome measuring about 2.2 cm similar to previous study. Probable cholelithiasis without  acute inflammation. Severe atherosclerotic calcination abdominal aorta. Diffuse gastric wall thickening.  Soft tissues: There is a left anterior chest wall port. Tip terminates in superior vena cava.  Osseous structures: No aggressive focal lytic or sclerotic osseous lesions. Osteopenia.      Impression: 1.Compared to previous examinations the paratracheal and mediastinal lymphadenopathy is larger. 2.There is a moderate size right pleural effusion and small left pleural effusion which are increased in size from previous study. 3.The right hilar mass is not definite change in size. 4.There are some new nodules in the right lower lobe. 5.There is increased opacity in the right lower lobe. 6.Cardiomegaly and severe atherosclerotic disease and coronary artery disease. 7.No change in a low-density lesion at the right hepatic dome. 8.Diffuse stomach wall thickening similar previous exam.    Electronically Signed By-Meg Marie MD On:11/26/2020 1:38 PM This report was finalized on [id number] by  Meg Marie MD.    Nm Pet Skull Base To Mid Thigh    Result Date: 11/5/2020  Narrative: NM PET SKULL BASE TO MID THIGH-  Date of Exam: 11/5/2020 1:56 PM  Indication: lung cancer initial staging; C34.32-Malignant neoplasm of lower lobe, left bronchus or lung; C34.90-Malignant neoplasm of unspecified part of unspecified bronchus or lung  Comparison: MRI brain 10/29/2020. MRI abdomen 10/27/2020. CT chest 10/27/2020. CT chest abdomen and pelvis 10/26/2020  Technique:  Whole body PET/CT imaging was performed with positron emission tomography (PET with concurrently acquired computed tomography (CT) for attenuation correction and anatomical localization); field of view imaged was the skull base to midthigh.  Fasting Blood glucose level: 111 mg/dl.  FDG dosage: 20 mCi F-18 Images were obtained after one hour of equilibrium.  FINDINGS:   NECK: Metabolically active bilateral supraclavicular lymph nodes are present. An index left  supraclavicular lymph node measures 1.5 x 1.0 cm (SUV max 11.5. An index right supraclavicular lymph node measures 1.3 x 0.8 cm (SUV max 7.1).  CHEST: Right hilar mass extending into the right lower lobe lung, along the bronchovascular bundles, is consistent with the patient's known malignancy. The right hilar mass SUV max is 10.6. Right lower lobe 2 cm nodular density along the bronchovascular bundles is FDG avid (SUV max 5.6 degrees malignancy. There is an additional focal 1.9 cm hypermetabolic nodule within the posterior right lower lobe , partially obscured by adjacent atelectasis, with metastatic disease (SUV max 10.9) .  Bulky, pathologically enlarged, hypermetabolic lymph nodes are seen within the mediastinum, greatest in the right paratracheal region (SUV max 16.1), precarinal (SUV max 7.2) and subcarinal (SUV max 13.5) distributions. Smaller lymph nodes are scattered within the anterior mediastinal fat, measuring less than a centimeter short axis, but are FDG avid and consistent with metastatic disease. No pathologically enlarged or hypermetabolic left hilar adenopathy is seen.   ABDOMEN AND PELVIS: There is moderate FDG accumulation within the proximal stomach with wall thickening (SUV max 5.8), which could represent physiologic uptake, focal gastritis, with metastatic disease or malignancy not excluded but thought less likely. There appears to be some misregistration artifact over the left hepatic segment, but no definite abnormal FDG uptake is seen within the liver. Cyst is seen within the hepatic dome measuring slightly over 2 cm.    Skeletal: There is a displaced left seventh rib fracture laterally which demonstrates moderate FDG uptake (SUV max 3.7). No osteolytic or osteoblastic abnormality is seen in this location, and may represent posttreatment etiology. No convincing evidence of osseous metastatic disease.       Impression:  1. Hypermetabolic right hilar mass consistent with malignancy. 2.  Hypermetabolic nodular densities within the right lower lobe consistent with metastatic disease. 3. Bulky pathologically enlarged hypermetabolic mediastinal adenopathy, consistent with metastatic disease. No left hilar adenopathy. 4. Displaced left rib fracture is favored to represent a posttraumatic etiology rather than that of malignancy. No convincing evidence of skeletal metastatic disease. 5. There is moderate FDG accumulation within the proximal stomach with gastric wall thickening. This may simply be physiologic uptake. Gastritis could have a similar imaging appearance. However, primary gastric malignancy or metastatic disease is not excluded. Consider endoscopic correlation. 6. Enlarged hypermetabolic bilateral supraclavicular lymph nodes in the lower neck, consistent with metastases.  Electronically Signed By-Dr. Aranza Martinez MD On:11/5/2020 4:06 PM This report was finalized on [id number] by Dr. Aranza Martinez MD.    Xr Chest Pa & Lateral    Result Date: 11/25/2020  Narrative: DATE OF EXAM: 11/25/2020 4:50 PM  PROCEDURE: XR CHEST PA AND LATERAL-  INDICATIONS: 81 yo with lung cancer and underlying copd w shortness of breath   COMPARISON: AP portable chest 10/26/2020. PET/CT 11/5/2020.  TECHNIQUE: Two radiologic views of the chest.  FINDINGS: Increased right basilar opacity, and may represent worsening atelectasis/pneumonia superimposed upon right lower lobe lung lesion, seen to better advantage on previous PET/CT. Patient's mediastinal and right hilar adenopathy is seen to better than PET/CT, with slight widening of the right paratracheal stripe consistent with known adenopathy. Central interstitial thickening is present in both lungs favored to reflect changes of evolving interstitial edema. Stable mild cardiac enlargement. Left chest wall valerie catheter has been placed with the tip terminating in the lower SVC level. No visible pneumothorax. Osteopenia. No acute osseous abnormalities.       Impression: 1 increasing right lower lobe airspace disease may represent atelectasis or pneumonia and small right pleural effusion. 2. Patient has right lower lobe lung lesion consistent with the appearance of malignancy on previous PET/CT. Right paratracheal adenopathy is observed on today's x-ray. 3. FINDINGS consistent with developing interstitial edema in comparison to the 10/26/2020 exam.  Electronically Signed By-Aranza Martinez MD On:11/25/2020 5:01 PM This report was finalized on [id number] by  Aranza Martinez MD.      PROBLEM LIST:  Patient Active Problem List   Diagnosis   • COVID-19 ruled out   • COPD (chronic obstructive pulmonary disease) (CMS/HCC)   • Weight loss   • Weakness   • Chest pain   • Cough   • Shortness of breath   • Lung mass   • Liver mass   • Atrial fibrillation (CMS/HCC)   • Adenocarcinoma, lung, unspecified laterality (CMS/HCC)   • Encounter for fitting and adjustment of vascular catheter   • Thrombocytopenia (CMS/HCC)   • Feeding difficulties   • Moderate malnutrition (CMS/HCC)       Assessment & Plan    1. Stage IIIc poorly differentiated adenocarcinoma of lung -patient could potentially have stage IV disease with gastric involvement.  Patient originally presented with a bulky right hilar mass and also bulky mediastinal lymph nodes.     Started on cisplatin/Alimta and consideration of radiation therapy concurrently at a later point.     2. Thrombocytopenia: Could be multifactorial due to combination of chemotherapy-induced myelosuppression and occult GI bleeding from gastric ulceration.  Although ITP is one of the differential diagnosis it seems less likely considering the setting.  He recently had chemotherapy, and now has severe thrombocytopenia.  Bone marrow metastases should also be considered.  Haptoglobin is not indicative of hemolysis.  Status post Nplate.  No signs of DIC.  3. Leukopenia/neutropenia: Due to chemo induced myelosuppression.  4. Gastric wall thickening and  hypermetabolic activity on recent PET scan:EGD done with PEG showed ulcerated GEJ suggesting potentially metastatic involvement by the lung cancer.  bx was deferred given thrombocytopenia.     5. Previous hospital admission for pneumonia/pneumonia: Status post antibiotics and steroids recently.  Finished outpatient doxycycline  6. Atrial fibrillation with RVR: Currently off Cardizem drip.  7. Diastolic cardiomyopathy: Noted on previous echo.  8. COPD  .        PLAN    1. Continue to monitor CBC closely.  Platelet transfusions largely not successful.  Patient not bleeding actively at this time.  Continue observation.  Platelet count is expected to improve as myelosuppression resolves  2. Transfuse platelets ASAP if develops bleeding   3. Continue to hold anticoagulants and antiplatelet agents  4. Gastric esophageal junction ulceration: Will need repeat EGD in 1 to 2 weeks once thrombocytopenia resolves.  5. Prognosis is guarded.  6. Continue G-CSF  7. Nutrition: We will use PEG tube  8. Will need significant and substantial dose reduction with the next cycle.  9. Prognosis is guarded    Of note, I had a long conversation with his daughter, Eliane, and updated her on the overall situation.                Electronically signed by Ziggy Daley MD, 11/30/20, 8:49 PM EST.

## 2020-12-01 NOTE — PLAN OF CARE
Pt's platelets continue to decrease. Now down to 3.  OT will hold therapy today, monitoring daily to see when stable for therapy. OT did not enter room.

## 2020-12-01 NOTE — PLAN OF CARE
Goal Outcome Evaluation:  Plan of Care Reviewed With: other (see comments)(RN)  Progress: no change  Outcome Summary: Pt's platelets continue to decrease. Now down to 3.  PT will hold therapy today, monitoring daily to see when stable for therapy. PT did not enter room.

## 2020-12-01 NOTE — PROGRESS NOTES
Continued Stay Note  JONAS Lyons     Patient Name: Shay Hernandez  MRN: [id number]  Today's Date: 12/1/2020    Admit Date: 11/25/2020    Discharge Plan     Row Name 12/01/20 1452       Plan    Plan  DC Plan: [facility] accepted pending bed availability at d/c & chemo on hold. PASRR approved. Will require pre-cert (unless waived). 2nd: [facility] (would need to check OON benefits). Current with [facility], [facility] following for tube feds.    Plan Comments  SW informed pt is accepted to [facility] at d/c pending bed availability. Secure chat to oncologist regarding plan for treatment & regarding if it will be placed on hold.     No physical contact with patient on this date.    NISHI Rodriguez    Phone: [phone]  Cell: [phone]  Fax: [phone]  [email or web address]

## 2020-12-01 NOTE — PLAN OF CARE
Pt rested throughout the night with no distress. Pt will have a speech eval today; will continue to monitor     Problem: Adult Inpatient Plan of Care  Goal: Plan of Care Review  Outcome: Ongoing, Progressing  Goal: Patient-Specific Goal (Individualized)  Outcome: Ongoing, Progressing  Goal: Absence of Hospital-Acquired Illness or Injury  Outcome: Ongoing, Progressing  Intervention: Identify and Manage Fall Risk  Recent Flowsheet Documentation  Taken 12/1/2020 0400 by Dorinda Gutierrez RN  Safety Promotion/Fall Prevention:   activity supervised   assistive device/personal items within reach   clutter free environment maintained   safety round/check completed  Taken 12/1/2020 0200 by Dorinda Gutierrez RN  Safety Promotion/Fall Prevention:   activity supervised   assistive device/personal items within reach   clutter free environment maintained   safety round/check completed  Taken 12/1/2020 0000 by Dorinda Gutierrez RN  Safety Promotion/Fall Prevention:   activity supervised   assistive device/personal items within reach   clutter free environment maintained   safety round/check completed  Taken 11/30/2020 2200 by Dorinda Gutierrez RN  Safety Promotion/Fall Prevention:   activity supervised   assistive device/personal items within reach   clutter free environment maintained   safety round/check completed  Taken 11/30/2020 2000 by Dorinda Gutierrez RN  Safety Promotion/Fall Prevention:   activity supervised   assistive device/personal items within reach   clutter free environment maintained   safety round/check completed  Goal: Optimal Comfort and Wellbeing  Outcome: Ongoing, Progressing  Intervention: Provide Person-Centered Care  Recent Flowsheet Documentation  Taken 11/30/2020 2000 by Dorinda Gutierrez RN  Trust Relationship/Rapport:   care explained   choices provided   emotional support provided  Goal: Readiness for Transition of Care  Outcome: Ongoing, Progressing     Problem: Skin Injury Risk Increased  Goal: Skin Health and  Integrity  Outcome: Ongoing, Progressing     Problem: Fall Injury Risk  Goal: Absence of Fall and Fall-Related Injury  Outcome: Ongoing, Progressing  Intervention: Identify and Manage Contributors to Fall Injury Risk  Recent Flowsheet Documentation  Taken 11/30/2020 2000 by Dorinda Gutierrez RN  Medication Review/Management: medications reviewed  Intervention: Promote Injury-Free Environment  Recent Flowsheet Documentation  Taken 12/1/2020 0400 by Dorinda Gutierrez RN  Safety Promotion/Fall Prevention:   activity supervised   assistive device/personal items within reach   clutter free environment maintained   safety round/check completed  Taken 12/1/2020 0200 by Dorinda Gutierrez RN  Safety Promotion/Fall Prevention:   activity supervised   assistive device/personal items within reach   clutter free environment maintained   safety round/check completed  Taken 12/1/2020 0000 by Dorinda Gutierrez RN  Safety Promotion/Fall Prevention:   activity supervised   assistive device/personal items within reach   clutter free environment maintained   safety round/check completed  Taken 11/30/2020 2200 by Dorinda Gutierrez RN  Safety Promotion/Fall Prevention:   activity supervised   assistive device/personal items within reach   clutter free environment maintained   safety round/check completed  Taken 11/30/2020 2000 by Dorinda Gutierrez RN  Safety Promotion/Fall Prevention:   activity supervised   assistive device/personal items within reach   clutter free environment maintained   safety round/check completed     Problem: Malnutrition  Goal: Improved Nutritional Intake  Outcome: Ongoing, Progressing   Goal Outcome Evaluation:  Plan of Care Reviewed With: patient  Progress: no change

## 2020-12-01 NOTE — PLAN OF CARE
Goal Outcome Evaluation:  Plan of Care Reviewed With: patient, daughter  Progress: no change  Pt seen by speech therapy and put on a regular diet. Pt also on supplemental tube feedings. Rate increased to 55ml/hr. Plts were 9 and 1 unit of plts given. Will continue to monitor.     Problem: Adult Inpatient Plan of Care  Goal: Plan of Care Review  Outcome: Ongoing, Progressing  Goal: Patient-Specific Goal (Individualized)  Outcome: Ongoing, Progressing  Goal: Absence of Hospital-Acquired Illness or Injury  Outcome: Ongoing, Progressing  Intervention: Identify and Manage Fall Risk  Recent Flowsheet Documentation  Taken 12/1/2020 1220 by Amara Farmer, RN  Safety Promotion/Fall Prevention:   activity supervised   clutter free environment maintained   nonskid shoes/slippers when out of bed   safety round/check completed  Taken 12/1/2020 0805 by Amara Farmer, RN  Safety Promotion/Fall Prevention:   clutter free environment maintained   activity supervised   nonskid shoes/slippers when out of bed   safety round/check completed  Goal: Optimal Comfort and Wellbeing  Outcome: Ongoing, Progressing  Goal: Readiness for Transition of Care  Outcome: Ongoing, Progressing

## 2020-12-01 NOTE — PROGRESS NOTES
ONCOLOGY/HEMATOLOGY    PATIENT: Shay Hernandez  YOB: 1940  MEDICAL RECORD NUMBER: [id number] OF SERVICE: 12/01/20    CHIEF COMPLAINT: thrombocytopenia; lung cancer    HISTORY OF PRESENT ILLNESS:   Mr. Hernandez is a 80 y.o. with history of stage IIIc poorly differentiated adenocarcinoma lung, atrial fibrillation and COPD  presenting as direct admission from oncologist office for progressive generalized weakness as well as thrombocytopenia.  Patient had initiated chemotherapy recently 11/17/2020 and reported progressive weakness as well as loss of appetite over the last few weeks.  Patient had been getting more thrombocytopenic with levels in the 30s the day prior to admission down to 13 on day of admission.  Patient denied hematuria, hematochezia, melena or significant bruising though did report a small amount of blood when he blew his nose in the morning.  .    Patient found to be in atrial fibrillation with RVR once reaching the floor.  Stat EKG ordered and ordering Cardizem.  Patient received unit of platelets in ambulatory care center prior to coming up to the floor.  Patient scheduled to have feeding tube placement with GI in the next few days.    He received his PEG 11/28/2020 platelet count improved nominally from 26->32 with steroids and transfusion.  However, despite 2 doses of prednisone, today the platelets have dropped to 13.  He is interested in potentially eating something by mouth.  EGD done with PEG showed ulcerated GEJ suggesting potentially metastatic involvement by the lung cancer.  bx was deferred given thrombocytopenia.         Oncological history:  ·   Stage IIIc poorly differentiated adenocarcinoma of lung -patient presented with a bulky right hilar mass and also bulky mediastinal lymph nodes.    Also has left hilar lymph nodes consistent with metastasis and borderline enlarged left supraclavicular lymph node. . MRI abdomen 10/28/2020 - 2.2 cm lesion in the dome of liver  with features of a cyst. Negative for evidence of hepatic metastases. S/p bronchoscopy with EBUS on 10/27/2020 - subcarinal lymph node FNA positive for metastatic poorly differentiated adenocarcinoma. Staining pattern is consistent with adenocarcinoma of pulmonary origin. Many of the tumor cells display significant pleomorphism with bizarre appearing nuclei and mitotic figures. This raises the possibility of a sarcomatoid component. MRI brain 10/29/2020 - no metastatic disease.   · 11/17/2020 patient received cycle 1 of cisplatin and pemetrexed.  Cisplatin 75 mg/m², pemetrexed 400 mg per metered square..  WBC 15.6, hemoglobin 13.7, platelets 129, creatinine 0.7, albumin 2.8  ·  11/5/2020: PET CT scan: Hypermetabolic right hilar mass consistent with malignancy.  Hypermetabolic nodular densities within the right lower lobe consistent with metastatic disease.  Bulky pathological enlarged hypermetabolic mediastinal adenopathy, consistent with metastatic disease.  No left hilar adenopathy.  Moderate FDG accumulation within the proximal stomach with gastric wall thickening.  This could be physiologic uptake..  Could be gastritis.  Gastric malignancy cannot be excluded.  Endoscopy recommended.  Enlarged hypermetabolic bilateral supraclavicular lymph nodes in the lower neck consistent with metastasis.    Subjective:  Platelet counts are critically low.  Given 1 unit of platelet transfusion this morning.        History of present illness was reviewed and is unchanged from the previous visit. 12/01/20    Review of Systems   Constitutional: Positive for fatigue. Negative for activity change, appetite change, chills, fever and unexpected weight change.   HENT: Negative for mouth sores, sore throat and tinnitus.    Eyes: Negative for photophobia, pain, redness and visual disturbance.   Respiratory: Negative for cough and shortness of breath.    Cardiovascular: Negative for chest pain, palpitations and leg swelling.    Gastrointestinal: Negative for abdominal pain, constipation, diarrhea, nausea and vomiting.   Genitourinary: Negative for dysuria and hematuria.   Musculoskeletal: Negative for arthralgias, back pain, myalgias and neck pain.   Skin: Negative for rash and wound.   Allergic/Immunologic: Negative for environmental allergies.   Neurological: Negative for dizziness, speech difficulty, weakness, light-headedness, numbness and headaches.   Hematological: Negative for adenopathy. Does not bruise/bleed easily.   Psychiatric/Behavioral: Negative for dysphoric mood. The patient is not nervous/anxious.    All other systems reviewed and are negative.    Past Medical History:   Diagnosis Date   • Adenocarcinoma, lung, unspecified laterality (CMS/HCC) 10/27/2020   • Atrial fibrillation (CMS/[facility])    • COPD (chronic obstructive pulmonary disease) (CMS/Formerly [facility])        Past Surgical History:   Procedure Laterality Date   • APPENDECTOMY     • BRONCHOSCOPY N/A 10/27/2020    Procedure: BRONCHOSCOPY WITH BRONCHOALVEOLAR LAVAGE AND ENDOBRONCHIAL ULTRASOUND WITH FINE NEEDLE ASPIRATION X2 AREAS;  Surgeon: Johnny Waldrop MD;  Location: [facility] ENDOSCOPY;  Service: Pulmonary;  Laterality: N/A;  POST:    • ENDOSCOPY W/ PEG TUBE PLACEMENT N/A 11/28/2020    Procedure: ESOPHAGOGASTRODUODENOSCOPY WITH 20F PERCUTANEOUS ENDOSCOPIC GASTROSTOMY TUBE INSERTION WITH ANESTHESIA;  Surgeon: Lizeth Nance MD;  Location: [facility] ENDOSCOPY;  Service: Gastroenterology;  Laterality: N/A;  ulcerative esophagitis, hiatal hernia, PEG placement   • VENOUS ACCESS DEVICE (PORT) INSERTION N/A 11/16/2020    Procedure: INSERTION VENOUS ACCESS DEVICE, LEFT SUBCLAVIAN UNDER FLOUROSCOPIC GUIDANCE ;  Surgeon: Jan Richards MD;  Location: [facility] MAIN OR;  Service: Cardiothoracic;  Laterality: N/A;       Family History   Problem Relation Age of Onset   • Heart attack Mother    • Lung disease Other         Cryptococcus       Social History     Socioeconomic History   •  Marital status:      Spouse name: Not on file   • Number of children: Not on file   • Years of education: Not on file   • Highest education level: Not on file   Tobacco Use   • Smoking status: Former Smoker     Packs/day: 2.00     Years: 60.00     Pack years: 120.00     Types: Cigarettes     Quit date: 10/27/2020     Years since quittin.0   • Smokeless tobacco: Never Used   Substance and Sexual Activity   • Alcohol use: Not Currently     Alcohol/week: 4.0 standard drinks     Types: 4 Cans of beer per week   • Drug use: Not Currently   • Sexual activity: Defer   Social History Narrative    Lives with wife       ALLERGIES:  Patient has no known allergies.    MEDICATION:  Current Facility-Administered Medications   Medication Dose Route Frequency Provider Last Rate Last Admin   • acetaminophen (TYLENOL) tablet 1,000 mg  1,000 mg Oral Once PRN Ric Mcnamara MD        Or   • acetaminophen (TYLENOL) suppository 650 mg  650 mg Rectal Once PRN Ric Mcnamara MD       • acetaminophen (TYLENOL) tablet 650 mg  650 mg Oral Q4H PRN Lizeth Nance MD       • benzonatate (TESSALON) capsule 200 mg  200 mg Oral TID PRN Elder Tineo MD       • bisacodyl (DULCOLAX) EC tablet 5 mg  5 mg Oral Daily PRN Lizeth Nance MD       • budesonide-formoterol (SYMBICORT) 160-4.5 MCG/ACT inhaler 2 puff  2 puff Inhalation BID - RT Lizeth Nance MD   2 puff at 20 0631   • dilTIAZem (CARDIZEM) 100 mg in 100 mL NS infusion (ADV)  5-15 mg/hr Intravenous Titrated Lizeth Nance MD 5 mL/hr at 20 1244 5 mg/hr at 20 1244   • dilTIAZem (CARDIZEM) tablet 30 mg  30 mg Oral Q6H Lizeth Nance MD   30 mg at 20 1632   • Filgrastim (NEUPOGEN) injection 300 mcg  300 mcg Subcutaneous Q PM Lizeth Nance MD   300 mcg at 20 1632   • flumazenil (ROMAZICON) injection 0.1 mg  0.1 mg Intravenous PRN Ric Mcnamara MD       • folic acid (FOLVITE) tablet 1 mg  1 mg Oral Daily  Lizeth Nance MD   1 mg at 12/01/20 0838   • HYDROmorphone (DILAUDID) injection 0.2 mg  0.2 mg Intravenous Q15 Min PRN Ric Mcnamara MD       • HYDROmorphone (DILAUDID) injection 0.25 mg  0.25 mg Intravenous Q15 Min PRN Ric Mcnamara MD   0.25 mg at 11/30/20 1152   • ipratropium-albuterol (DUO-NEB) nebulizer solution 3 mL  3 mL Nebulization 4x Daily - RT Lizeth Nance MD   3 mL at 12/01/20 1600   • ipratropium-albuterol (DUO-NEB) nebulizer solution 3 mL  3 mL Nebulization Once PRN Ric Mcnamara MD       • labetalol (NORMODYNE,TRANDATE) injection 5 mg  5 mg Intravenous Q5 Min PRN Ric Mcnamara MD       • magnesium hydroxide (MILK OF MAGNESIA) suspension 2400 mg/10mL 10 mL  10 mL Oral Daily PRN Lizeth Nance MD       • Magnesium Sulfate 2 gram Bolus, followed by 8 gram infusion (total Mg dose 10 grams)- Mg less than or equal to 1mg/dL  2 g Intravenous PRN Lizeth Nance MD        Or   • Magnesium Sulfate 2 gram / 50mL Infusion (GIVE X 3 BAGS TO EQUAL 6GM TOTAL DOSE) - Mg 1.1 - 1.5 mg/dl  2 g Intravenous PRN Lizeth Nance MD 25 mL/hr at 11/28/20 0230 2 g at 11/28/20 0230    Or   • Magnesium Sulfate 4 gram infusion- Mg 1.6-1.9 mg/dL  4 g Intravenous PRN Lizeth Nance MD 25 mL/hr at 12/01/20 0839 4 g at 12/01/20 0839   • melatonin tablet 5 mg  5 mg Oral Nightly PRN Lizeth Nance MD       • metoclopramide (REGLAN) tablet 10 mg  10 mg Oral TID AC Lizeth Nance MD   10 mg at 12/01/20 1632   • metoprolol tartrate (LOPRESSOR) tablet 50 mg  50 mg Oral Q12H Lizeth Nance MD   50 mg at 12/01/20 0838   • mirtazapine (REMERON) tablet 15 mg  15 mg Oral Nightly Lizeth Nance MD   15 mg at 11/30/20 2058   • naloxone (NARCAN) injection 0.4 mg  0.4 mg Intravenous PRN Ric Mcnamara MD       • nicotine (NICODERM CQ) 7 MG/24HR patch 1 patch  1 patch Transdermal Daily Lizeth Nance MD       • nitroglycerin (NITROSTAT) SL tablet 0.4 mg  0.4 mg Sublingual Q5  Min PRN Lizeth Nance MD       • nystatin (MYCOSTATIN) 861258 UNIT/ML suspension 500,000 Units  5 mL Swish & Swallow 4x Daily Lizeth Nance MD   500,000 Units at 12/01/20 1632   • ondansetron (ZOFRAN) tablet 4 mg  4 mg Oral Q6H PRN Lizeth Nance MD        Or   • ondansetron (ZOFRAN) injection 4 mg  4 mg Intravenous Q6H PRN Lizeth Nance MD       • ondansetron (ZOFRAN) injection 4 mg  4 mg Intravenous Once PRN Ric Mcnamara MD       • pantoprazole (PROTONIX) EC tablet 40 mg  40 mg Oral Q AM Lizeth Nance MD   40 mg at 12/01/20 0607   • potassium & sodium phosphates (PHOS-NAK) 280-160-250 MG packet - for Phosphorus less than 1.25 mg/dL  2 packet Oral Q6H PRN Lizeth Nance MD   2 packet at 11/30/20 0122    Or   • potassium & sodium phosphates (PHOS-NAK) 280-160-250 MG packet - for Phosphorus 1.25 - 2.5 mg/dL  2 packet Oral Q6H PRN Lizeth Nance MD   2 packet at 12/01/20 0838   • potassium chloride (K-DUR,KLOR-CON) CR tablet 40 mEq  40 mEq Oral PRN Lizeth Nance MD   40 mEq at 11/26/20 1807    Or   • potassium chloride (KLOR-CON) packet 40 mEq  40 mEq Oral PRN Lizeth Nance MD        Or   • potassium chloride 10 mEq in 100 mL IVPB  10 mEq Intravenous Q1H PRN Lizeth Nance  mL/hr at 11/27/20 1024 10 mEq at 11/27/20 1024   • promethazine (PHENERGAN) tablet 25 mg  25 mg Oral Once PRN Ric Mcnamara MD       • sodium chloride 0.9 % flush 10 mL  10 mL Intravenous Q12H Lizeth Nance MD   10 mL at 12/01/20 0839   • sodium chloride 0.9 % flush 10 mL  10 mL Intravenous PRN Lizeth Nance MD       • sodium chloride 0.9 % infusion  30 mL/hr Intravenous Continuous PRN Lizeth Nance MD 30 mL/hr at 11/28/20 2152 30 mL/hr at 11/28/20 2152   • sodium chloride nasal spray 2 spray  2 spray Each Nare PRN Lizeth Nance MD       • sucralfate (CARAFATE) tablet 1 g  1 g Oral 4x Daily AC & at Bedtime Lizeth Nance MD   1 g at 12/01/20 1632           PHYSICAL  "EXAM:    Current Vitals:  BP 99/56 (BP Location: Right arm, Patient Position: Lying)   Pulse 100   Temp 97.8 °F (36.6 °C) (Oral)   Resp 18   Ht 188 cm (74\")   Wt 80.9 kg (178 lb 5.6 oz)   SpO2 98%   BMI 22.90 kg/m²       VITAL signs in the last 24 hours: [unfilled]       11/30/20  0500 12/01/20  0437   Weight: 79.8 kg (175 lb 14.8 oz) 80.9 kg (178 lb 5.6 oz)       Physical Exam  Vitals signs and nursing note reviewed.   Constitutional:       General: He is not in acute distress.     Appearance: Normal appearance. He is ill-appearing. He is not diaphoretic.   HENT:      Head: Normocephalic and atraumatic.   Eyes:      General: No scleral icterus.        Right eye: No discharge.         Left eye: No discharge.      Conjunctiva/sclera: Conjunctivae normal.   Neck:      Musculoskeletal: Normal range of motion and neck supple.      Thyroid: No thyromegaly.   Cardiovascular:      Rate and Rhythm: Normal rate and regular rhythm.      Pulses: Normal pulses.      Heart sounds: Normal heart sounds. No friction rub. No gallop.       Comments: Port-A-Cath chest  Pulmonary:      Effort: Pulmonary effort is normal. No respiratory distress.      Breath sounds: Normal breath sounds. No stridor. No wheezing.   Abdominal:      General: Bowel sounds are normal.      Palpations: Abdomen is soft. There is no mass.      Tenderness: There is no abdominal tenderness. There is no guarding or rebound.   Musculoskeletal: Normal range of motion.         General: No tenderness.   Lymphadenopathy:      Cervical: No cervical adenopathy.   Skin:     General: Skin is warm.      Findings: No erythema or rash.   Neurological:      General: No focal deficit present.      Mental Status: He is alert and oriented to person, place, and time.      Sensory: No sensory deficit.      Motor: No abnormal muscle tone.   Psychiatric:         Mood and Affect: Mood normal.         Behavior: Behavior normal.         Thought Content: Thought content normal.  "        Judgment: Judgment normal.        ECOG PERFORMANCE STATUS:3    LABORATORY/DATA:  WBC   Date Value Ref Range Status   12/01/2020 4.20 3.40 - 10.80 10*3/mm3 Final     RBC   Date Value Ref Range Status   12/01/2020 3.92 (L) 4.14 - 5.80 10*6/mm3 Final     Hemoglobin   Date Value Ref Range Status   12/01/2020 11.5 (L) 13.0 - 17.7 g/dL Final     Hematocrit   Date Value Ref Range Status   12/01/2020 34.3 (L) 37.5 - 51.0 % Final     MCV   Date Value Ref Range Status   12/01/2020 87.6 79.0 - 97.0 fL Final     MCH   Date Value Ref Range Status   12/01/2020 29.4 26.6 - 33.0 pg Final     MCHC   Date Value Ref Range Status   12/01/2020 33.6 31.5 - 35.7 g/dL Final     RDW   Date Value Ref Range Status   12/01/2020 14.0 12.3 - 15.4 % Final     RDW-SD   Date Value Ref Range Status   12/01/2020 42.9 37.0 - 54.0 fl Final     MPV   Date Value Ref Range Status   12/01/2020 9.9 6.0 - 12.0 fL Final     Platelets   Date Value Ref Range Status   12/01/2020 3 (C) 140 - 450 10*3/mm3 Final     Neutrophils Absolute   Date Value Ref Range Status   12/01/2020 1.43 (L) 1.70 - 7.00 10*3/mm3 Final     Eosinophils Absolute   Date Value Ref Range Status   12/01/2020 0.67 (H) 0.00 - 0.40 10*3/mm3 Final     nRBC   Date Value Ref Range Status   12/01/2020 2.0 (H) 0.0 - 0.2 /100 WBC Final     Lab Results   Component Value Date    GLUCOSE 154 (H) 11/30/2020    BUN 25 (H) 11/30/2020    CREATININE 0.54 (L) 11/30/2020    EGFRIFNONA 147 11/30/2020    BCR 46.3 (H) 11/30/2020    K 3.8 11/30/2020    CO2 29.0 11/30/2020    CALCIUM 7.8 (L) 11/30/2020    ALBUMIN 2.40 (L) 11/27/2020    AST 8 11/27/2020    ALT 19 11/27/2020     No results found for:  No results found for: SPEP, UPEP No results found for: LDH, URICACID   Lab Results   Component Value Date    IRON 23 (L) 11/02/2020    TIBC 231 (L) 11/02/2020    FERRITIN 334.90 10/26/2020          IMAGING:  Ct Chest Without Contrast    Result Date: 11/26/2020  Narrative: CT CHEST WO CONTRAST-  Date of Exam:  11/26/2020 1:17 PM  Indication: Pneumonia, effusion or abscess suspected, xray done; R63.3-Feeding difficulties  smoker. Atrial fibrillation. Recent diagnosis of lung cancer.  Comparison: PET/CT 11/05/2020, CT chest 10/27/2020.  Technique: Serial and axial CT images of the chest were obtained. Reconstructions in the coronal and sagittal planes were performed. Automated exposure control and iterative reconstruction methods were used.  FINDINGS: Hilum and Mediastinum: Right paratracheal lymph node has increased in size. It measures approximately 2.3 x 3 cm on image 18. Previously, it measured about 1.6 x 2.5 cm. Lymph node mass right mid peritracheal is larger. On image 32 measures about 3.5 x 5 cm. Previously was about 3.6 x 4.7 cm. Subcarinal lymph node is larger.. Its more difficult to measure on this noncontrast enhanced study but appears to measure about 3.9 x 5.9 cm. Right hilar mass more difficult to measure without contrast. However, when measured at similar location to previous exam, image #61 and measures approximately 3.9 x 6.3 cm. This is not definitely changed. There are enlarged bilateral hilar lymph nodes. There is cardiomegaly. There is severe coronary artery calcination. There are annular calcination of the mitral valve. There is small pericardial effusion.  Lung Parenchyma and Pleura: There is a moderate size right pleural effusion which is increased from previous study. Small left pleural effusion which is slightly larger as well. There is a right lower lobe pulmonary nodule measuring 1.3 cm it is pleural-based on image #71. The pleural effusion has changed its location but previously it measured about 1 cm. There is a nodule in the right lower lobe on image #56 measuring about 8 mm. This is not definitely seen on previous study. There is a nodule in the right lower lobe on image #68 measuring about 8 mm. This also appears new. There is increased right lower lobe airspace opacity blending with the  pleural effusion. There is right infrahilar opacity and diffuse bronchial wall thickening. There is septal thickening in the right upper lobe there is partial atelectasis of the right middle lobe which appears increased.  There is septal thickening in the left lung is less pronounced in the right. There is bronchial wall thickening. There is airspace opacity in the central left lower lobe similar previous exam. There is mild subsegmental atelectasis in the left lower lobe similar to previous study. There is moderate emphysema.  Upper Abdomen: Low density nodule in the right posterior hepatic lobe near the hepatic dome measuring about 2.2 cm similar to previous study. Probable cholelithiasis without acute inflammation. Severe atherosclerotic calcination abdominal aorta. Diffuse gastric wall thickening.  Soft tissues: There is a left anterior chest wall port. Tip terminates in superior vena cava.  Osseous structures: No aggressive focal lytic or sclerotic osseous lesions. Osteopenia.      Impression: 1.Compared to previous examinations the paratracheal and mediastinal lymphadenopathy is larger. 2.There is a moderate size right pleural effusion and small left pleural effusion which are increased in size from previous study. 3.The right hilar mass is not definite change in size. 4.There are some new nodules in the right lower lobe. 5.There is increased opacity in the right lower lobe. 6.Cardiomegaly and severe atherosclerotic disease and coronary artery disease. 7.No change in a low-density lesion at the right hepatic dome. 8.Diffuse stomach wall thickening similar previous exam.    Electronically Signed By-Meg Marie MD On:11/26/2020 1:38 PM This report was finalized on [id number] by  Meg Marie MD.    Nm Pet Skull Base To Mid Thigh    Result Date: 11/5/2020  Narrative: NM PET SKULL BASE TO MID THIGH-  Date of Exam: 11/5/2020 1:56 PM  Indication: lung cancer initial staging; C34.32-Malignant neoplasm of lower  lobe, left bronchus or lung; C34.90-Malignant neoplasm of unspecified part of unspecified bronchus or lung  Comparison: MRI brain 10/29/2020. MRI abdomen 10/27/2020. CT chest 10/27/2020. CT chest abdomen and pelvis 10/26/2020  Technique:  Whole body PET/CT imaging was performed with positron emission tomography (PET with concurrently acquired computed tomography (CT) for attenuation correction and anatomical localization); field of view imaged was the skull base to midthigh.  Fasting Blood glucose level: 111 mg/dl.  FDG dosage: 20 mCi F-18 Images were obtained after one hour of equilibrium.  FINDINGS:   NECK: Metabolically active bilateral supraclavicular lymph nodes are present. An index left supraclavicular lymph node measures 1.5 x 1.0 cm (SUV max 11.5. An index right supraclavicular lymph node measures 1.3 x 0.8 cm (SUV max 7.1).  CHEST: Right hilar mass extending into the right lower lobe lung, along the bronchovascular bundles, is consistent with the patient's known malignancy. The right hilar mass SUV max is 10.6. Right lower lobe 2 cm nodular density along the bronchovascular bundles is FDG avid (SUV max 5.6 degrees malignancy. There is an additional focal 1.9 cm hypermetabolic nodule within the posterior right lower lobe , partially obscured by adjacent atelectasis, with metastatic disease (SUV max 10.9) .  Bulky, pathologically enlarged, hypermetabolic lymph nodes are seen within the mediastinum, greatest in the right paratracheal region (SUV max 16.1), precarinal (SUV max 7.2) and subcarinal (SUV max 13.5) distributions. Smaller lymph nodes are scattered within the anterior mediastinal fat, measuring less than a centimeter short axis, but are FDG avid and consistent with metastatic disease. No pathologically enlarged or hypermetabolic left hilar adenopathy is seen.   ABDOMEN AND PELVIS: There is moderate FDG accumulation within the proximal stomach with wall thickening (SUV max 5.8), which could represent  physiologic uptake, focal gastritis, with metastatic disease or malignancy not excluded but thought less likely. There appears to be some misregistration artifact over the left hepatic segment, but no definite abnormal FDG uptake is seen within the liver. Cyst is seen within the hepatic dome measuring slightly over 2 cm.    Skeletal: There is a displaced left seventh rib fracture laterally which demonstrates moderate FDG uptake (SUV max 3.7). No osteolytic or osteoblastic abnormality is seen in this location, and may represent posttreatment etiology. No convincing evidence of osseous metastatic disease.       Impression:  1. Hypermetabolic right hilar mass consistent with malignancy. 2. Hypermetabolic nodular densities within the right lower lobe consistent with metastatic disease. 3. Bulky pathologically enlarged hypermetabolic mediastinal adenopathy, consistent with metastatic disease. No left hilar adenopathy. 4. Displaced left rib fracture is favored to represent a posttraumatic etiology rather than that of malignancy. No convincing evidence of skeletal metastatic disease. 5. There is moderate FDG accumulation within the proximal stomach with gastric wall thickening. This may simply be physiologic uptake. Gastritis could have a similar imaging appearance. However, primary gastric malignancy or metastatic disease is not excluded. Consider endoscopic correlation. 6. Enlarged hypermetabolic bilateral supraclavicular lymph nodes in the lower neck, consistent with metastases.  Electronically Signed By-Dr. Aranza Martinez MD On:11/5/2020 4:06 PM This report was finalized on [id number] by Dr. Aranza Martinez MD.    Xr Chest Pa & Lateral    Result Date: 11/25/2020  Narrative: DATE OF EXAM: 11/25/2020 4:50 PM  PROCEDURE: XR CHEST PA AND LATERAL-  INDICATIONS: 79 yo with lung cancer and underlying copd w shortness of breath   COMPARISON: AP portable chest 10/26/2020. PET/CT 11/5/2020.  TECHNIQUE: Two radiologic views of  the chest.  FINDINGS: Increased right basilar opacity, and may represent worsening atelectasis/pneumonia superimposed upon right lower lobe lung lesion, seen to better advantage on previous PET/CT. Patient's mediastinal and right hilar adenopathy is seen to better than PET/CT, with slight widening of the right paratracheal stripe consistent with known adenopathy. Central interstitial thickening is present in both lungs favored to reflect changes of evolving interstitial edema. Stable mild cardiac enlargement. Left chest wall valerie catheter has been placed with the tip terminating in the lower SVC level. No visible pneumothorax. Osteopenia. No acute osseous abnormalities.      Impression: 1 increasing right lower lobe airspace disease may represent atelectasis or pneumonia and small right pleural effusion. 2. Patient has right lower lobe lung lesion consistent with the appearance of malignancy on previous PET/CT. Right paratracheal adenopathy is observed on today's x-ray. 3. FINDINGS consistent with developing interstitial edema in comparison to the 10/26/2020 exam.  Electronically Signed By-Aranza Martinez MD On:11/25/2020 5:01 PM This report was finalized on [id number] by  Aranza Martinez MD.      PROBLEM LIST:  Patient Active Problem List   Diagnosis   • COVID-19 ruled out   • COPD (chronic obstructive pulmonary disease) (CMS/HCC)   • Weight loss   • Weakness   • Chest pain   • Cough   • Shortness of breath   • Lung mass   • Liver mass   • Atrial fibrillation (CMS/HCC)   • Adenocarcinoma, lung, unspecified laterality (CMS/HCC)   • Encounter for fitting and adjustment of vascular catheter   • Thrombocytopenia (CMS/HCC)   • Feeding difficulties   • Moderate malnutrition (CMS/HCC)       Assessment & Plan  ASSESSMENT  1. Stage IIIc poorly differentiated adenocarcinoma of lung -patient could potentially have stage IV disease with gastric involvement.  Patient originally presented with a bulky right hilar mass and  also bulky mediastinal lymph nodes.     Started on cisplatin/Alimta and consideration of radiation therapy concurrently at a later point.   2. Thrombocytopenia: Could be multifactorial due to combination of chemotherapy-induced myelosuppression and occult GI bleeding from gastric ulceration.  Although ITP is one of the differential diagnosis it seems less likely considering the setting.  He recently had chemotherapy, and now has severe thrombocytopenia.  Bone marrow metastases should also be considered.  Haptoglobin is not indicative of hemolysis.  Status post Nplate.  No signs of DIC.  3. Leukopenia/Neutropenia: Resolved.  Due to chemo induced myelosuppression.    4. Gastric wall thickening and hypermetabolic activity: visualized on recent PET scan:EGD done with PEG showed ulcerated GEJ suggesting potentially metastatic involvement by the lung cancer.  bx was deferred given thrombocytopenia.     5. Previous hospital admission for pneumonia/Pneumonia: Status post antibiotics and steroids recently.  Finished outpatient doxycycline  6. Atrial fibrillation with RVR: Currently off Cardizem drip.  7. Diastolic cardiomyopathy: Noted on previous echo.  8. COPD:  management per Primary team  9. Prognosis is guarded.      PLAN  1. CBC daily   2. Transfuse platelets ASAP if develops bleeding. Platelet transfusions largely not successful.  1 unit ordered this morning since platelets were very critically low.  3. Continue to hold anticoagulants and antiplatelet agents  4. Will need repeat EGD in 1 to 2 weeks once thrombocytopenia resolves.  5. WBC has recovered.  May discontinue G-CSF  6. Use PEG tube for supplemental nutrition - now is on regular diet   7. Will need significant and substantial dose reduction with the next cycle  8. Discharge plan is to [facility]       Electronically signed by MANDI Latham, 12/01/20, 6:20 PM EST.      I personally reviewed all pertinent labs, related documentation  and the  imaging. ROS performed and physical exam done during face to face enounter with patient. I agree with  nurse practitioner's doumentation, assessment and plan.  Platelet transfusion ordered this morning.  Unfortunately platelets have not shown recovery yet.  Thrombocytopenia is very severe.  Although myelosuppression is still very likely the cause of thrombocytopenia, platelet recovery has not happened despite the WBC recovery.  Other etiologies are also being considered.  No evidence of any TTP.  No implicating medications.  Status post Nplate for empiric diagnosis of ITP.    Prognosis may be poor.  Patient could have metastatic disease involving the stomach.    Also chemotherapy doses will have to be reduced substantially.    Electronically signed by Ziggy Daley MD, 12/01/20, 6:56 PM EST.      Electronically signed by Ziggy Daley MD, 12/01/20, 6:55 PM EST.

## 2020-12-02 NOTE — PLAN OF CARE
Goal Outcome Evaluation:  Plan of Care Reviewed With: patient, daughter  Progress: no change  Outcome Summary: Pt's platelets remain critically low at 6.  Will hold PT today. Will continue to monitor to see when stable for PT.  PT did not enter room.

## 2020-12-02 NOTE — PROGRESS NOTES
ONCOLOGY/HEMATOLOGY    PATIENT: Shay Hernandez  YOB: 1940  MEDICAL RECORD NUMBER: [id number] OF SERVICE: 12/02/20    CHIEF COMPLAINT: thrombocytopenia; lung cancer    HISTORY OF PRESENT ILLNESS:   Mr. Hernandez is a 80 y.o. with history of stage IIIc poorly differentiated adenocarcinoma lung, atrial fibrillation and COPD  presenting as direct admission from oncologist office for progressive generalized weakness as well as thrombocytopenia.  Patient had initiated chemotherapy recently 11/17/2020 and reported progressive weakness as well as loss of appetite over the last few weeks.  Patient had been getting more thrombocytopenic with levels in the 30s the day prior to admission down to 13 on day of admission.  Patient denied hematuria, hematochezia, melena or significant bruising though did report a small amount of blood when he blew his nose in the morning.  .    Patient found to be in atrial fibrillation with RVR once reaching the floor.  Stat EKG ordered and ordering Cardizem.  Patient received unit of platelets in ambulatory care center prior to coming up to the floor.  Patient scheduled to have feeding tube placement with GI in the next few days.    He received his PEG 11/28/2020 platelet count improved nominally from 26->32 with steroids and transfusion.  However, despite 2 doses of prednisone, today the platelets have dropped to 13.  He is interested in potentially eating something by mouth.  EGD done with PEG showed ulcerated GEJ suggesting potentially metastatic involvement by the lung cancer.  bx was deferred given thrombocytopenia.       Oncological history:  Stage IIIc poorly differentiated adenocarcinoma of lung -patient presented with a bulky right hilar mass and also bulky mediastinal lymph nodes.    Also has left hilar lymph nodes consistent with metastasis and borderline enlarged left supraclavicular lymph node. . MRI abdomen 10/28/2020 - 2.2 cm lesion in the dome of liver with  features of a cyst. Negative for evidence of hepatic metastases. S/p bronchoscopy with EBUS on 10/27/2020 - subcarinal lymph node FNA positive for metastatic poorly differentiated adenocarcinoma. Staining pattern is consistent with adenocarcinoma of pulmonary origin. Many of the tumor cells display significant pleomorphism with bizarre appearing nuclei and mitotic figures. This raises the possibility of a sarcomatoid component. MRI brain 10/29/2020 - no metastatic disease.   · 11/17/2020 patient received cycle 1 of cisplatin and pemetrexed.  Cisplatin 75 mg/m², pemetrexed 400 mg per metered square..  WBC 15.6, hemoglobin 13.7, platelets 129, creatinine 0.7, albumin 2.8  ·  11/5/2020: PET CT scan: Hypermetabolic right hilar mass consistent with malignancy.  Hypermetabolic nodular densities within the right lower lobe consistent with metastatic disease.  Bulky pathological enlarged hypermetabolic mediastinal adenopathy, consistent with metastatic disease.  No left hilar adenopathy.  Moderate FDG accumulation within the proximal stomach with gastric wall thickening.  This could be physiologic uptake..  Could be gastritis.  Gastric malignancy cannot be excluded.  Endoscopy recommended.  Enlarged hypermetabolic bilateral supraclavicular lymph nodes in the lower neck consistent with metastasis.    History of present illness was reviewed and is unchanged from the previous visit. 12/02/20    Subjective:  Patient seen this morning.  He was coughing and O2 sats were less than 90.  Discussed with daughter on the phone his overall condition.  Discussed the PD-L1 results.  Platelet counts are critically low.  No bleeding.      Review of Systems   Constitutional: Positive for fatigue. Negative for activity change, appetite change, chills, fever and unexpected weight change.   HENT: Negative for mouth sores, sore throat and tinnitus.    Eyes: Negative for photophobia, pain, redness and visual disturbance.   Respiratory: Positive  for cough and shortness of breath.    Cardiovascular: Negative for chest pain, palpitations and leg swelling.   Gastrointestinal: Negative for abdominal pain, constipation, diarrhea, nausea and vomiting.   Genitourinary: Negative for dysuria and hematuria.   Musculoskeletal: Negative for arthralgias, back pain, myalgias and neck pain.   Skin: Negative for rash and wound.   Allergic/Immunologic: Negative for environmental allergies.   Neurological: Negative for dizziness, speech difficulty, weakness, light-headedness, numbness and headaches.   Hematological: Negative for adenopathy. Does not bruise/bleed easily.   Psychiatric/Behavioral: Negative for confusion and dysphoric mood. The patient is not nervous/anxious.    All other systems reviewed and are negative.    Past Medical History:   Diagnosis Date   • Adenocarcinoma, lung, unspecified laterality (CMS/HCC) 10/27/2020   • Atrial fibrillation (CMS/[facility])    • COPD (chronic obstructive pulmonary disease) (CMS/[facility])        Past Surgical History:   Procedure Laterality Date   • APPENDECTOMY     • BRONCHOSCOPY N/A 10/27/2020    Procedure: BRONCHOSCOPY WITH BRONCHOALVEOLAR LAVAGE AND ENDOBRONCHIAL ULTRASOUND WITH FINE NEEDLE ASPIRATION X2 AREAS;  Surgeon: Johnny Waldrop MD;  Location: [facility] ENDOSCOPY;  Service: Pulmonary;  Laterality: N/A;  POST:    • ENDOSCOPY W/ PEG TUBE PLACEMENT N/A 11/28/2020    Procedure: ESOPHAGOGASTRODUODENOSCOPY WITH 20F PERCUTANEOUS ENDOSCOPIC GASTROSTOMY TUBE INSERTION WITH ANESTHESIA;  Surgeon: Lizeth Nance MD;  Location: [facility] ENDOSCOPY;  Service: Gastroenterology;  Laterality: N/A;  ulcerative esophagitis, hiatal hernia, PEG placement   • VENOUS ACCESS DEVICE (PORT) INSERTION N/A 11/16/2020    Procedure: INSERTION VENOUS ACCESS DEVICE, LEFT SUBCLAVIAN UNDER FLOUROSCOPIC GUIDANCE ;  Surgeon: Jan Richards MD;  Location: [facility] MAIN OR;  Service: Cardiothoracic;  Laterality: N/A;       Family History   Problem Relation Age of  Onset   • Heart attack Mother    • Lung disease Other         Cryptococcus       Social History     Socioeconomic History   • Marital status:      Spouse name: Not on file   • Number of children: Not on file   • Years of education: Not on file   • Highest education level: Not on file   Tobacco Use   • Smoking status: Former Smoker     Packs/day: 2.00     Years: 60.00     Pack years: 120.00     Types: Cigarettes     Quit date: 10/27/2020     Years since quittin.0   • Smokeless tobacco: Never Used   Substance and Sexual Activity   • Alcohol use: Not Currently     Alcohol/week: 4.0 standard drinks     Types: 4 Cans of beer per week   • Drug use: Not Currently   • Sexual activity: Defer   Social History Narrative    Lives with wife       ALLERGIES:  Patient has no known allergies.    MEDICATION:  Current Facility-Administered Medications   Medication Dose Route Frequency Provider Last Rate Last Admin   • acetaminophen (TYLENOL) tablet 1,000 mg  1,000 mg Oral Once PRN Ric Mcnamara MD        Or   • acetaminophen (TYLENOL) suppository 650 mg  650 mg Rectal Once PRN Ric Mcnamara MD       • acetaminophen (TYLENOL) tablet 650 mg  650 mg Oral Q4H PRN Lizeth Nance MD       • benzonatate (TESSALON) capsule 200 mg  200 mg Oral TID PRN Elder Tineo MD       • bisacodyl (DULCOLAX) EC tablet 5 mg  5 mg Oral Daily PRN Lizeth Nance MD       • budesonide-formoterol (SYMBICORT) 160-4.5 MCG/ACT inhaler 2 puff  2 puff Inhalation BID - RT Lizeth Nance MD   2 puff at 20 195   • dilTIAZem (CARDIZEM) 100 mg in 100 mL NS infusion (ADV)  5-15 mg/hr Intravenous Titrated Lizeth Nance MD 5 mL/hr at 20 1244 5 mg/hr at 20 1244   • dilTIAZem (CARDIZEM) tablet 30 mg  30 mg Oral Q6H Lizeth Nance MD   30 mg at 20 1707   • flumazenil (ROMAZICON) injection 0.1 mg  0.1 mg Intravenous PRN Ric Mcnamara MD       • folic acid (FOLVITE) tablet 1 mg  1 mg Oral  Daily Lizeth Nance MD   1 mg at 12/02/20 0916   • furosemide (LASIX) injection 40 mg  40 mg Intravenous Q12H Farrah Grimm MD   40 mg at 12/02/20 1707   • HYDROmorphone (DILAUDID) injection 0.2 mg  0.2 mg Intravenous Q15 Min PRN Ric Mcnamara MD       • HYDROmorphone (DILAUDID) injection 0.25 mg  0.25 mg Intravenous Q15 Min PRN Ric Mcnamara MD   0.25 mg at 11/30/20 1152   • ipratropium-albuterol (DUO-NEB) nebulizer solution 3 mL  3 mL Nebulization 4x Daily - RT Lizeth Nance MD   3 mL at 12/02/20 1604   • ipratropium-albuterol (DUO-NEB) nebulizer solution 3 mL  3 mL Nebulization Once PRN Ric Mcnamara MD       • labetalol (NORMODYNE,TRANDATE) injection 5 mg  5 mg Intravenous Q5 Min PRN Ric Mcnamara MD       • magnesium hydroxide (MILK OF MAGNESIA) suspension 2400 mg/10mL 10 mL  10 mL Oral Daily PRN Lizeth Nance MD       • Magnesium Sulfate 2 gram Bolus, followed by 8 gram infusion (total Mg dose 10 grams)- Mg less than or equal to 1mg/dL  2 g Intravenous PRN Lizeth Nance MD        Or   • Magnesium Sulfate 2 gram / 50mL Infusion (GIVE X 3 BAGS TO EQUAL 6GM TOTAL DOSE) - Mg 1.1 - 1.5 mg/dl  2 g Intravenous PRN Lizeth Nance MD 25 mL/hr at 11/28/20 0230 2 g at 11/28/20 0230    Or   • Magnesium Sulfate 4 gram infusion- Mg 1.6-1.9 mg/dL  4 g Intravenous PRN Lizeth Nance MD 25 mL/hr at 12/02/20 0916 4 g at 12/02/20 0916   • melatonin tablet 5 mg  5 mg Oral Nightly PRN Lizeth Nance MD       • metoclopramide (REGLAN) tablet 10 mg  10 mg Oral TID AC Lizeth Nance MD   10 mg at 12/02/20 1707   • metoprolol tartrate (LOPRESSOR) tablet 50 mg  50 mg Oral Q12H Lizeth Nance MD   50 mg at 12/02/20 0916   • mirtazapine (REMERON) tablet 15 mg  15 mg Oral Nightly Lizeth Nance MD   15 mg at 12/01/20 2012   • naloxone (NARCAN) injection 0.4 mg  0.4 mg Intravenous PRN Ric Mcnamara MD       • nicotine (NICODERM CQ) 7 MG/24HR patch 1 patch  1 patch  Transdermal Daily Lizeth Nance MD       • nitroglycerin (NITROSTAT) SL tablet 0.4 mg  0.4 mg Sublingual Q5 Min PRN Lizeth Nance MD       • nystatin (MYCOSTATIN) 377173 UNIT/ML suspension 500,000 Units  5 mL Swish & Swallow 4x Daily Lizeth Nance MD   500,000 Units at 12/02/20 1707   • ondansetron (ZOFRAN) tablet 4 mg  4 mg Oral Q6H PRN Lizeth Nance MD        Or   • ondansetron (ZOFRAN) injection 4 mg  4 mg Intravenous Q6H PRN Lizeth Nance MD       • ondansetron (ZOFRAN) injection 4 mg  4 mg Intravenous Once PRN Ric Mcnamara MD       • pantoprazole (PROTONIX) EC tablet 40 mg  40 mg Oral Q AM Lizteh Nacne MD   40 mg at 12/02/20 0557   • potassium & sodium phosphates (PHOS-NAK) 280-160-250 MG packet - for Phosphorus less than 1.25 mg/dL  2 packet Oral Q6H PRN Lizeth Nance MD   2 packet at 11/30/20 0122    Or   • potassium & sodium phosphates (PHOS-NAK) 280-160-250 MG packet - for Phosphorus 1.25 - 2.5 mg/dL  2 packet Oral Q6H PRN Lizeth Nance MD   2 packet at 12/02/20 0916   • potassium chloride (K-DUR,KLOR-CON) CR tablet 40 mEq  40 mEq Oral PRN Lizeth Nance MD   40 mEq at 11/26/20 1807    Or   • potassium chloride (KLOR-CON) packet 40 mEq  40 mEq Oral PRN Lizeth Nance MD        Or   • potassium chloride 10 mEq in 100 mL IVPB  10 mEq Intravenous Q1H PRN Lizeth Nance  mL/hr at 11/27/20 1024 10 mEq at 11/27/20 1024   • promethazine (PHENERGAN) tablet 25 mg  25 mg Oral Once PRN Ric Mcnamara MD       • sodium chloride 0.9 % flush 10 mL  10 mL Intravenous Q12H Lizeth Nance MD   10 mL at 12/02/20 0916   • sodium chloride 0.9 % flush 10 mL  10 mL Intravenous PRN Lizeth Nance MD       • sodium chloride 0.9 % infusion  30 mL/hr Intravenous Continuous PRN Lizeth Nance MD 30 mL/hr at 11/28/20 2152 30 mL/hr at 11/28/20 2152   • sodium chloride nasal spray 2 spray  2 spray Each Nare PRN Lizeth Nance MD       • sucralfate (CARAFATE)  "tablet 1 g  1 g Oral 4x Daily AC & at Bedtime Lizeth Nance MD   1 g at 12/02/20 1707   • traZODone (DESYREL) tablet 50 mg  50 mg Oral Nightly Ziggy Daley MD           PHYSICAL EXAM:  Current Vitals:  /61   Pulse 102   Temp 97.6 °F (36.4 °C) (Axillary)   Resp 16   Ht 188 cm (74\")   Wt 83 kg (182 lb 15.7 oz)   SpO2 91%   BMI 23.49 kg/m²     VITAL signs in the last 24 hours: [unfilled]       12/01/20  0437 12/02/20  0500   Weight: 80.9 kg (178 lb 5.6 oz) 83 kg (182 lb 15.7 oz)     Physical Exam  Vitals signs and nursing note reviewed.   Constitutional:       General: He is not in acute distress.     Appearance: Normal appearance. He is ill-appearing. He is not diaphoretic.   HENT:      Head: Normocephalic and atraumatic.   Eyes:      General: No scleral icterus.        Right eye: No discharge.         Left eye: No discharge.      Conjunctiva/sclera: Conjunctivae normal.   Neck:      Musculoskeletal: Normal range of motion and neck supple.      Thyroid: No thyromegaly.   Cardiovascular:      Rate and Rhythm: Normal rate and regular rhythm.      Pulses: Normal pulses.      Heart sounds: Normal heart sounds. No friction rub. No gallop.       Comments: Port-A-Cath chest  Pulmonary:      Effort: Pulmonary effort is normal. No respiratory distress.      Breath sounds: No stridor. Rales present. No wheezing.   Abdominal:      General: Bowel sounds are normal.      Palpations: Abdomen is soft. There is no mass.      Tenderness: There is no abdominal tenderness. There is no guarding or rebound.   Musculoskeletal: Normal range of motion.         General: No tenderness.   Lymphadenopathy:      Cervical: No cervical adenopathy.   Skin:     General: Skin is warm.      Findings: No erythema or rash.   Neurological:      General: No focal deficit present.      Mental Status: He is alert and oriented to person, place, and time.      Sensory: No sensory deficit.      Motor: No abnormal muscle tone.   Psychiatric:  "        Mood and Affect: Mood normal.         Behavior: Behavior normal.         Thought Content: Thought content normal.         Judgment: Judgment normal.      ECOG PERFORMANCE STATUS:3    LABORATORY/DATA:  WBC   Date Value Ref Range Status   12/02/2020 8.10 3.40 - 10.80 10*3/mm3 Final     RBC   Date Value Ref Range Status   12/02/2020 5.23 4.14 - 5.80 10*6/mm3 Final     Hemoglobin   Date Value Ref Range Status   12/02/2020 15.2 13.0 - 17.7 g/dL Final     Hematocrit   Date Value Ref Range Status   12/02/2020 45.9 37.5 - 51.0 % Final     MCV   Date Value Ref Range Status   12/02/2020 87.9 79.0 - 97.0 fL Final     MCH   Date Value Ref Range Status   12/02/2020 29.0 26.6 - 33.0 pg Final     MCHC   Date Value Ref Range Status   12/02/2020 33.1 31.5 - 35.7 g/dL Final     RDW   Date Value Ref Range Status   12/02/2020 14.5 12.3 - 15.4 % Final     RDW-SD   Date Value Ref Range Status   12/02/2020 43.8 37.0 - 54.0 fl Final     MPV   Date Value Ref Range Status   12/02/2020 10.1 6.0 - 12.0 fL Final     Platelets   Date Value Ref Range Status   12/02/2020 6 (C) 140 - 450 10*3/mm3 Final     Comment:     Reviewed by Pathologist within the past 30 days on [id number] .      Neutrophils Absolute   Date Value Ref Range Status   12/02/2020 6.64 1.70 - 7.00 10*3/mm3 Final     Eosinophils Absolute   Date Value Ref Range Status   12/02/2020 0.16 0.00 - 0.40 10*3/mm3 Final     Basophils Absolute   Date Value Ref Range Status   12/02/2020 0.08 0.00 - 0.20 10*3/mm3 Final     nRBC   Date Value Ref Range Status   12/01/2020 2.0 (H) 0.0 - 0.2 /100 WBC Final     Lab Results   Component Value Date    GLUCOSE 139 (H) 12/02/2020    BUN 13 12/02/2020    CREATININE 0.46 (L) 12/02/2020    EGFRIFNONA >150 12/02/2020    BCR 28.3 (H) 12/02/2020    K 3.6 12/02/2020    CO2 26.0 12/02/2020    CALCIUM 7.4 (L) 12/02/2020    ALBUMIN 2.50 (L) 12/02/2020    AST 17 12/02/2020    ALT 19 12/02/2020     No results found for:  No results found for: SPEP, UPEP No  results found for: LDH, URICACID   Lab Results   Component Value Date    IRON 23 (L) 11/02/2020    TIBC 231 (L) 11/02/2020    FERRITIN 334.90 10/26/2020      IMAGING:  Ct Chest Without Contrast    Result Date: 11/26/2020  Narrative: CT CHEST WO CONTRAST-  Date of Exam: 11/26/2020 1:17 PM  Indication: Pneumonia, effusion or abscess suspected, xray done; R63.3-Feeding difficulties  smoker. Atrial fibrillation. Recent diagnosis of lung cancer.  Comparison: PET/CT 11/05/2020, CT chest 10/27/2020.  Technique: Serial and axial CT images of the chest were obtained. Reconstructions in the coronal and sagittal planes were performed. Automated exposure control and iterative reconstruction methods were used.  FINDINGS: Hilum and Mediastinum: Right paratracheal lymph node has increased in size. It measures approximately 2.3 x 3 cm on image 18. Previously, it measured about 1.6 x 2.5 cm. Lymph node mass right mid peritracheal is larger. On image 32 measures about 3.5 x 5 cm. Previously was about 3.6 x 4.7 cm. Subcarinal lymph node is larger.. Its more difficult to measure on this noncontrast enhanced study but appears to measure about 3.9 x 5.9 cm. Right hilar mass more difficult to measure without contrast. However, when measured at similar location to previous exam, image #61 and measures approximately 3.9 x 6.3 cm. This is not definitely changed. There are enlarged bilateral hilar lymph nodes. There is cardiomegaly. There is severe coronary artery calcination. There are annular calcination of the mitral valve. There is small pericardial effusion.  Lung Parenchyma and Pleura: There is a moderate size right pleural effusion which is increased from previous study. Small left pleural effusion which is slightly larger as well. There is a right lower lobe pulmonary nodule measuring 1.3 cm it is pleural-based on image #71. The pleural effusion has changed its location but previously it measured about 1 cm. There is a nodule in the  right lower lobe on image #56 measuring about 8 mm. This is not definitely seen on previous study. There is a nodule in the right lower lobe on image #68 measuring about 8 mm. This also appears new. There is increased right lower lobe airspace opacity blending with the pleural effusion. There is right infrahilar opacity and diffuse bronchial wall thickening. There is septal thickening in the right upper lobe there is partial atelectasis of the right middle lobe which appears increased.  There is septal thickening in the left lung is less pronounced in the right. There is bronchial wall thickening. There is airspace opacity in the central left lower lobe similar previous exam. There is mild subsegmental atelectasis in the left lower lobe similar to previous study. There is moderate emphysema.  Upper Abdomen: Low density nodule in the right posterior hepatic lobe near the hepatic dome measuring about 2.2 cm similar to previous study. Probable cholelithiasis without acute inflammation. Severe atherosclerotic calcination abdominal aorta. Diffuse gastric wall thickening.  Soft tissues: There is a left anterior chest wall port. Tip terminates in superior vena cava.  Osseous structures: No aggressive focal lytic or sclerotic osseous lesions. Osteopenia.      Impression: 1.Compared to previous examinations the paratracheal and mediastinal lymphadenopathy is larger. 2.There is a moderate size right pleural effusion and small left pleural effusion which are increased in size from previous study. 3.The right hilar mass is not definite change in size. 4.There are some new nodules in the right lower lobe. 5.There is increased opacity in the right lower lobe. 6.Cardiomegaly and severe atherosclerotic disease and coronary artery disease. 7.No change in a low-density lesion at the right hepatic dome. 8.Diffuse stomach wall thickening similar previous exam.    Electronically Signed By-Meg Marie MD On:11/26/2020 1:38 PM This report  was finalized on [id number] by  Meg Marie MD.    Xr Chest 1 View    Result Date: 12/2/2020  Narrative: DATE OF EXAM: 12/2/2020 9:31 AM  PROCEDURE: XR CHEST 1 VW-  INDICATIONS: Worsening shortness of breath, difficulty eating. Recent diagnosis of lung cancer.  COMPARISON: 11/25/2020.  TECHNIQUE: Single radiographic view of the chest was obtained.  FINDINGS: The heart is enlarged. There is bilateral mixed interstitial/airspace disease which has worsened from the previous exam. There is also dense consolidation in the right lower chest with what appears to be either a small to moderate subpulmonic pleural effusion or elevation of the right hemidiaphragm unchanged from the previous study. There is thickening of the right paratracheal stripe consistent with the known adenopathy there is fullness in the right hilum secondary to the patient's known lung cancer. The patient has a stable port in place. There are chronic age-related changes involving the bony thorax and thoracic aorta.      Impression:  1. Bilateral mixed interstitial/airspace disease which is worsened from the previous study. This can be seen with multifocal pneumonia or pulmonary edema. 2. The patient either has a small to moderate subpulmonic pleural effusion or elevation of the right hemidiaphragm. This is unchanged from the previous exam. There is also unchanged dense consolidation in the right lower chest. 3. Thickening of the right paratracheal stripe consistent with known adenopathy. 4. Fullness in the right hilum consistent with a known lung cancer.  Electronically Signed By-Antonio Graham MD On:12/2/2020 10:37 AM This report was finalized on [id number] by  Antonio Graham MD.    Nm Pet Skull Base To Mid Thigh    Result Date: 11/5/2020  Narrative: NM PET SKULL BASE TO MID THIGH-  Date of Exam: 11/5/2020 1:56 PM  Indication: lung cancer initial staging; C34.32-Malignant neoplasm of lower lobe, left bronchus or lung; C34.90-Malignant neoplasm of  unspecified part of unspecified bronchus or lung  Comparison: MRI brain 10/29/2020. MRI abdomen 10/27/2020. CT chest 10/27/2020. CT chest abdomen and pelvis 10/26/2020  Technique:  Whole body PET/CT imaging was performed with positron emission tomography (PET with concurrently acquired computed tomography (CT) for attenuation correction and anatomical localization); field of view imaged was the skull base to midthigh.  Fasting Blood glucose level: 111 mg/dl.  FDG dosage: 20 mCi F-18 Images were obtained after one hour of equilibrium.  FINDINGS:   NECK: Metabolically active bilateral supraclavicular lymph nodes are present. An index left supraclavicular lymph node measures 1.5 x 1.0 cm (SUV max 11.5. An index right supraclavicular lymph node measures 1.3 x 0.8 cm (SUV max 7.1).  CHEST: Right hilar mass extending into the right lower lobe lung, along the bronchovascular bundles, is consistent with the patient's known malignancy. The right hilar mass SUV max is 10.6. Right lower lobe 2 cm nodular density along the bronchovascular bundles is FDG avid (SUV max 5.6 degrees malignancy. There is an additional focal 1.9 cm hypermetabolic nodule within the posterior right lower lobe , partially obscured by adjacent atelectasis, with metastatic disease (SUV max 10.9) .  Bulky, pathologically enlarged, hypermetabolic lymph nodes are seen within the mediastinum, greatest in the right paratracheal region (SUV max 16.1), precarinal (SUV max 7.2) and subcarinal (SUV max 13.5) distributions. Smaller lymph nodes are scattered within the anterior mediastinal fat, measuring less than a centimeter short axis, but are FDG avid and consistent with metastatic disease. No pathologically enlarged or hypermetabolic left hilar adenopathy is seen.   ABDOMEN AND PELVIS: There is moderate FDG accumulation within the proximal stomach with wall thickening (SUV max 5.8), which could represent physiologic uptake, focal gastritis, with metastatic  disease or malignancy not excluded but thought less likely. There appears to be some misregistration artifact over the left hepatic segment, but no definite abnormal FDG uptake is seen within the liver. Cyst is seen within the hepatic dome measuring slightly over 2 cm.    Skeletal: There is a displaced left seventh rib fracture laterally which demonstrates moderate FDG uptake (SUV max 3.7). No osteolytic or osteoblastic abnormality is seen in this location, and may represent posttreatment etiology. No convincing evidence of osseous metastatic disease.       Impression:  1. Hypermetabolic right hilar mass consistent with malignancy. 2. Hypermetabolic nodular densities within the right lower lobe consistent with metastatic disease. 3. Bulky pathologically enlarged hypermetabolic mediastinal adenopathy, consistent with metastatic disease. No left hilar adenopathy. 4. Displaced left rib fracture is favored to represent a posttraumatic etiology rather than that of malignancy. No convincing evidence of skeletal metastatic disease. 5. There is moderate FDG accumulation within the proximal stomach with gastric wall thickening. This may simply be physiologic uptake. Gastritis could have a similar imaging appearance. However, primary gastric malignancy or metastatic disease is not excluded. Consider endoscopic correlation. 6. Enlarged hypermetabolic bilateral supraclavicular lymph nodes in the lower neck, consistent with metastases.  Electronically Signed By-Dr. Aranza Martinez MD On:11/5/2020 4:06 PM This report was finalized on [id number] by Dr. Aranza Martinez MD.    Xr Chest Pa & Lateral    Result Date: 11/25/2020  Narrative: DATE OF EXAM: 11/25/2020 4:50 PM  PROCEDURE: XR CHEST PA AND LATERAL-  INDICATIONS: 79 yo with lung cancer and underlying copd w shortness of breath   COMPARISON: AP portable chest 10/26/2020. PET/CT 11/5/2020.  TECHNIQUE: Two radiologic views of the chest.  FINDINGS: Increased right basilar  opacity, and may represent worsening atelectasis/pneumonia superimposed upon right lower lobe lung lesion, seen to better advantage on previous PET/CT. Patient's mediastinal and right hilar adenopathy is seen to better than PET/CT, with slight widening of the right paratracheal stripe consistent with known adenopathy. Central interstitial thickening is present in both lungs favored to reflect changes of evolving interstitial edema. Stable mild cardiac enlargement. Left chest wall valerie catheter has been placed with the tip terminating in the lower SVC level. No visible pneumothorax. Osteopenia. No acute osseous abnormalities.      Impression: 1 increasing right lower lobe airspace disease may represent atelectasis or pneumonia and small right pleural effusion. 2. Patient has right lower lobe lung lesion consistent with the appearance of malignancy on previous PET/CT. Right paratracheal adenopathy is observed on today's x-ray. 3. FINDINGS consistent with developing interstitial edema in comparison to the 10/26/2020 exam.  Electronically Signed By-Aranza Martinez MD On:11/25/2020 5:01 PM This report was finalized on [id number] by  Aranza Martinez MD.    PROBLEM LIST:  Patient Active Problem List   Diagnosis   • COVID-19 ruled out   • COPD (chronic obstructive pulmonary disease) (CMS/HCC)   • Weight loss   • Weakness   • Chest pain   • Cough   • Shortness of breath   • Lung mass   • Liver mass   • Atrial fibrillation (CMS/HCC)   • Adenocarcinoma, lung, unspecified laterality (CMS/HCC)   • Encounter for fitting and adjustment of vascular catheter   • Thrombocytopenia (CMS/HCC)   • Feeding difficulties   • Moderate malnutrition (CMS/HCC)     Assessment & Plan  ASSESSMENT  1. Stage IIIc poorly differentiated adenocarcinoma of lung -patient could potentially have stage IV disease with gastric involvement.  Patient originally presented with a bulky right hilar mass and also bulky mediastinal lymph nodes.  Started on  cisplatin/Alimta and consideration of radiation therapy concurrently at a later point.   2. Thrombocytopenia: Could be multifactorial due to combination of chemotherapy-induced myelosuppression and occult GI bleeding from gastric ulceration.  Although ITP is one of the differential diagnosis it seems less likely considering the setting.  He recently had chemotherapy, and now has severe thrombocytopenia.  Bone marrow metastases should also be considered.  Haptoglobin is not indicative of hemolysis.  Status post Nplate.  No signs of DIC.  3. Leukopenia/Neutropenia: Resolved.  Due to chemo induced myelosuppression.  GCSF given and discontinued.   4. Gastric wall thickening and hypermetabolic activity: visualized on recent PET scan:EGD done with PEG showed ulcerated GEJ suggesting potentially metastatic involvement by the lung cancer.  Biopsy was deferred given thrombocytopenia.     5. Previous hospital admission for pneumonia/Pneumonia: Status post antibiotics and steroids recently.  Finished outpatient doxycycline.   6. Atrial fibrillation with RVR: Currently off Cardizem drip.  7. Diastolic cardiomyopathy: Noted on previous echo.  8. COPD:  management per Primary team  9. Prognosis is guarded.      PLAN  1. CBC daily   2. CBC at 1300 today   3. Transfuse platelets ASAP if develops bleeding. Platelet transfusions largely not successful.  4. Continue to hold anticoagulants and antiplatelet agents  5. Continue folic acid 1 mg po once daily   6. Will need repeat EGD in 1 to 2 weeks once thrombocytopenia resolves  7. Use PEG tube for supplemental nutrition - now is on regular diet   8. Will need significant and substantial dose reduction with the next cycle  9. Discharge plan is to [facility]     Electronically signed by MANDI Latham, 12/02/20, 6:44 PM EST.        I personally reviewed all pertinent labs, related documentation and the  imaging. ROS performed and physical exam done during face  to face enounter with patient. I agree with  nurse practitioner's doumentation, assessment and plan.    Repeated CBC again this afternoon.  Platelets remain at 6000.  It is possible platelet counts may be recovering.  Thrombocytopenia could also be paraneoplastic.  Will transfuse only if patient has bleeding at this point.  Continue to hold anticoagulants and antiplatelet agents.    BNP ordered and it is elevated up to 2797.  Chest x-ray shows pulmonary edema versus multifocal pneumonia.  Diuretics ordered.  Lasix ordered      Electronically signed by Ziggy Daley MD, 12/02/20, 7:44 PM EST.

## 2020-12-02 NOTE — PROGRESS NOTES
[facility] Medicine Services Daily Progress Note      Hospitalist Team  LOS 6 days      Patient Care Team:  Ambar Hassan APRN as PCP - General (Family Medicine)    Patient Location: 2119/1      Subjective   Subjective     Chief Complaint / Subjective  No chief complaint on file.    Patient did well with PEG tube insertion and able to be used starting tube feeds.  Patient is tolerating no abdominal distention or pain.  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  IVIG being started by hematology.    Brief Synopsis of Hospital Course/HPI  Mr. Hernandez is a 80 y.o. with history of atrial fibrillation and COPD with new diagnosis of lung cancer presenting as direct admission from oncologist office for progressive generalized weakness as well as thrombocytopenia.  Patient had initiated chemotherapy and reported progressive weakness as well as loss of appetite over the last few weeks.  Patient had been getting more thrombocytopenic with levels in the 30s the day prior to admission down to 13 on day of admission.  Patient denied hematuria, hematochezia, melena or significant bruising though did report a small amount of blood when he blew his nose in the morning.  Currently patient reports he feels weak though otherwise asymptomatic.  No shortness of breath or chest pain, no nausea or vomiting.  Anorexia noted.     Patient found to be in atrial fibrillation with RVR once reaching the floor.  Stat EKG ordered and ordering Cardizem.  Patient received unit of platelets in ambulatory care center prior to coming up to the floor.  Patient scheduled to have feeding tube placement with GI in the next few days.    11/26/2020: Patient reports feeling better than yesterday.  Heart rate improving.  Patient reported self-limiting epistaxis.  Receiving more platelets per hematology.  Received vitamin K for procedure tomorrow.    11/27/2020: Patient overall doing well.  Heart rate still  elevated in the 110s, starting oral Cardizem trial to wean off IV Cardizem.  Patient's platelets in the 20s but no signs of bleeding.      11/28/2020: Patient reports doing well no new symptoms.  No chest pain or shortness of breath.  Heart rate improving borderline controlled.  Patient going for PEG tube today.  Platelets in the 30s.    11/30/2020  Today the patient continues to have feeding difficulty as well as difficulty with some shortness of breath.  He remains weak.  The patient's platelet count remains extremely low despite hematology oncology's efforts.    12/1/2020  The patient seemed very fatigued and very weak today.  The family's wife is at the bedside and was trying to encourage him to continue to fight.  She is aware that the patient's issues with his platelets as well as his white count is related to the chemotherapy.  She asked me when the patient was going to have received additional chemotherapy and I told her probably not until he recovers from this session of chemo.    12/2/2020  Today the patient is having increased congestion.  His cough reflex is extremely poor.  He is not mobilizing secretions.  His weight is also up 3 kg just over yesterday and his sodium is declining.  The patient does not have a fever does not have an elevation in his white count.    Review of Systems   Constitution: Positive for decreased appetite and malaise/fatigue. Negative for chills and fever.   HENT: Negative.  Negative for hoarse voice and sore throat.    Eyes: Negative.  Negative for double vision and photophobia.   Cardiovascular: Negative for chest pain and leg swelling.   Respiratory: Positive for shortness of breath. Negative for sputum production.    Endocrine: Negative.    Hematologic/Lymphatic: Negative.    Skin: Negative.    Musculoskeletal: Negative.  Negative for myalgias and stiffness.   Gastrointestinal: Negative.  Negative for nausea and vomiting.   Genitourinary: Negative.  Negative for dysuria and  "flank pain.   Neurological: Positive for weakness. Negative for dizziness and focal weakness.   Psychiatric/Behavioral: Negative.  Negative for altered mental status. The patient is not nervous/anxious.    Allergic/Immunologic: Negative.    All other systems reviewed and are negative.    Objective   Objective      Vital Signs  Temp:  [97.8 °F (36.6 °C)-99.5 °F (37.5 °C)] 98.4 °F (36.9 °C)  Heart Rate:  [] 90  Resp:  [16-22] 18  BP: ()/(56-82) 112/71  Oxygen Therapy  SpO2: 95 %  Pulse Oximetry Type: Continuous  Device (Oxygen Therapy): nasal cannula  Device (Oxygen Therapy): high-flow nasal cannula  Flow (L/min): 6  Oxygen Concentration (%): 3  ETCO2 (mmHg): 36 mmHg  Flowsheet Rows      First Filed Value   Admission Height  188 cm (74\") Documented at 11/25/2020 1510   Admission Weight  79.7 kg (175 lb 11.3 oz) Documented at 11/25/2020 1510        Intake & Output (last 3 days)       11/29 0701 - 11/30 0700 11/30 0701 - 12/01 0700 12/01 0701 - 12/02 0700 12/02 0701 - 12/03 0700    P.O. 780 300 360 100    I.V. (mL/kg)        Blood 235  446     Other 561 60 60 20    NG/ 510      Total Intake(mL/kg) 1732 (21.7) 870 (10.8) 866 (10.4) 120 (1.4)    Urine (mL/kg/hr) 250 (0.1) 150 (0.1) 800 (0.4)     Stool 0 0 0     Total Output 250 150 800     Net +1482 +720 +66 +120            Urine Unmeasured Occurrence  4 x 4 x 4 x        Lines, Drains & Airways    Active LDAs     Name:   Placement date:   Placement time:   Site:   Days:    Peripheral IV 11/25/20 1822 Distal;Posterior;Right Forearm   11/25/20 1822    Forearm   less than 1    Single Lumen Implantable Port 11/16/20 Left Subclavian   11/16/20    1002    Subclavian   10            Physical Exam:  Physical Exam    General: Frail elderly male lying in bed breathing comfortably on 3 L nasal cannula no acute distress.  The patient does have some bruises throughout but no evidence of bleeding at this time.  The patient was a bit more interactive today but has a " limited ability to move or converse.  HEENT: NC/AT, EOMI, mucosa moist  Heart: Irregular, rate controlled  Chest: Normal work of breathing, moving air well in all lung fields no wheezing but the patient does have a lot of rhonchi with rales in the bases bilaterally.  Abdominal: Soft. NT/ND.   Musculoskeletal: Normal ROM.  No cyanosis. No calf tenderness.  Neurological: AAOx3, no focal deficits  Skin: Skin is warm and dry.  No petechiae  Psychiatric: Normal mood and affect.    Procedures:    Results Review:     I reviewed the patient's new clinical results.      Lab Results (last 24 hours)     Procedure Component Value Units Date/Time    CBC & Differential [[id number]]  (Abnormal) Collected: 12/02/20 1315    Specimen: Blood Updated: 12/02/20 1350    Narrative:      The following orders were created for panel order CBC & Differential.  Procedure                               Abnormality         Status                     ---------                               -----------         ------                     Scan Slide[[id number]]                                       Final result               CBC Auto Differential[[id number]]        Abnormal            Final result                 Please view results for these tests on the individual orders.    CBC Auto Differential [[id number]]  (Abnormal) Collected: 12/02/20 1315    Specimen: Blood Updated: 12/02/20 1350     WBC 8.10 10*3/mm3      RBC 5.23 10*6/mm3      Hemoglobin 15.2 g/dL      Hematocrit 45.9 %      MCV 87.9 fL      MCH 29.0 pg      MCHC 33.1 g/dL      RDW 14.5 %      RDW-SD 43.8 fl      MPV 10.1 fL      Platelets 6 10*3/mm3      Comment: Reviewed by Pathologist within the past 30 days on [id number] .        Scan Slide [[id number]] Collected: 12/02/20 1315    Specimen: Blood Updated: 12/02/20 1350     Scan Slide --     Comment: See Manual Differential Results       Manual Differential [[id number]]  (Abnormal) Collected: 12/02/20 1315    Specimen: Blood Updated: 12/02/20  "1350     Neutrophil % 72.0 %      Lymphocyte % 11.0 %      Monocyte % 4.0 %      Eosinophil % 2.0 %      Basophil % 1.0 %      Bands %  10.0 %      Neutrophils Absolute 6.64 10*3/mm3      Lymphocytes Absolute 0.89 10*3/mm3      Monocytes Absolute 0.32 10*3/mm3      Eosinophils Absolute 0.16 10*3/mm3      Basophils Absolute 0.08 10*3/mm3      RBC Morphology Normal     Toxic Granulation Slight/1+     Vacuolated Neutrophils Slight/1+     Platelet Estimate Decreased    Narrative:      Reviewed by Pathologist within the past 30 days on [id number] .      Blood Gas, Venous - [[id number]] Collected: 12/02/20 1310    Specimen: Venous Blood Updated: 12/02/20 1313    Procalcitonin [[id number]]  (Normal) Collected: 12/02/20 0923    Specimen: Blood Updated: 12/02/20 1148     Procalcitonin 0.23 ng/mL     Narrative:      As a Marker for Sepsis (Non-Neonates):   1. <0.5 ng/mL represents a low risk of severe sepsis and/or septic shock.  1. >2 ng/mL represents a high risk of severe sepsis and/or septic shock.    As a Marker for Lower Respiratory Tract Infections that require antibiotic therapy:  PCT on Admission     Antibiotic Therapy             6-12 Hrs later  > 0.5                Strongly Recommended            >0.25 - <0.5         Recommended  0.1 - 0.25           Discouraged                   Remeasure/reassess PCT  <0.1                 Strongly Discouraged          Remeasure/reassess PCT      As 28 day mortality risk marker: \"Change in Procalcitonin Result\" (> 80 % or <=80 %) if Day 0 (or Day 1) and Day 4 values are available. Refer to http://[email or web address]-pct-[email or web address]/   Change in PCT <=80 %   A decrease of PCT levels below or equal to 80 % defines a positive change in PCT test result representing a higher risk for 28-day all-cause mortality of patients diagnosed with severe sepsis or septic shock.  Change in PCT > 80 %   A decrease of PCT levels of more than 80 % defines a negative change in PCT result representing a lower risk " for 28-day all-cause mortality of patients diagnosed with severe sepsis or septic shock.                Results may be falsely decreased if patient taking Biotin.     Comprehensive Metabolic Panel [[id number]]  (Abnormal) Collected: 12/02/20 0923    Specimen: Blood Updated: 12/02/20 1009     Glucose 139 mg/dL      BUN 13 mg/dL      Creatinine 0.46 mg/dL      Sodium 130 mmol/L      Potassium 3.6 mmol/L      Chloride 94 mmol/L      CO2 26.0 mmol/L      Calcium 7.4 mg/dL      Total Protein 5.3 g/dL      Albumin 2.50 g/dL      ALT (SGPT) 19 U/L      AST (SGOT) 17 U/L      Alkaline Phosphatase 133 U/L      Total Bilirubin 0.8 mg/dL      eGFR Non African Amer >150 mL/min/1.73      Globulin 2.8 gm/dL      A/G Ratio 0.9 g/dL      BUN/Creatinine Ratio 28.3     Anion Gap 10.0 mmol/L     Narrative:      GFR Normal >60  Chronic Kidney Disease <60  Kidney Failure <15      BNP [[id number]]  (Abnormal) Collected: 12/02/20 0600    Specimen: Blood Updated: 12/02/20 0939     proBNP 2,797.0 pg/mL     Narrative:      Among patients with dyspnea, NT-proBNP is highly sensitive for the detection of acute congestive heart failure. In addition NT-proBNP of <300 pg/ml effectively rules out acute congestive heart failure with 99% negative predictive value.    Results may be falsely decreased if patient taking Biotin.      CBC & Differential [[id number]]  (Abnormal) Collected: 12/02/20 0600    Specimen: Blood Updated: 12/02/20 0653    Narrative:      The following orders were created for panel order CBC & Differential.  Procedure                               Abnormality         Status                     ---------                               -----------         ------                     Scan Slide[[id number]]                                       Final result               CBC Auto Differential[[id number]]        Abnormal            Final result                 Please view results for these tests on the individual orders.    CBC Auto  Differential [[id number]]  (Abnormal) Collected: 12/02/20 0600    Specimen: Blood Updated: 12/02/20 0653     WBC 12.50 10*3/mm3      RBC 3.72 10*6/mm3      Hemoglobin 10.7 g/dL      Hematocrit 32.3 %      MCV 86.8 fL      MCH 28.7 pg      MCHC 33.0 g/dL      RDW 14.2 %      RDW-SD 42.9 fl      MPV 9.1 fL      Platelets 6 10*3/mm3      Comment: Reviewed by Pathologist within the past 30 days on [id number] .        Scan Slide [[id number]] Collected: 12/02/20 0600    Specimen: Blood Updated: 12/02/20 0653     Scan Slide --     Comment: See Manual Differential Results       Manual Differential [[id number]]  (Abnormal) Collected: 12/02/20 0600    Specimen: Blood Updated: 12/02/20 0653     Neutrophil % 41.0 %      Lymphocyte % 16.0 %      Monocyte % 13.0 %      Eosinophil % 4.0 %      Basophil % 1.0 %      Bands %  23.0 %      Metamyelocyte % 1.0 %      Atypical Lymphocyte % 1.0 %      Neutrophils Absolute 8.00 10*3/mm3      Lymphocytes Absolute 2.00 10*3/mm3      Monocytes Absolute 1.63 10*3/mm3      Eosinophils Absolute 0.50 10*3/mm3      Basophils Absolute 0.13 10*3/mm3      RBC Morphology Normal     Dohle Bodies Present     Toxic Granulation Slight/1+     Vacuolated Neutrophils Slight/1+     Platelet Estimate Decreased    Narrative:      Reviewed by Pathologist within the past 30 days on [id number] .      Phosphorus [[id number]]  (Abnormal) Collected: 12/02/20 0600    Specimen: Blood Updated: 12/02/20 0639     Phosphorus 1.7 mg/dL     Magnesium [[id number]]  (Normal) Collected: 12/02/20 0600    Specimen: Blood Updated: 12/02/20 0634     Magnesium 1.8 mg/dL         No results found for: HGBA1C  Results from last 7 days   Lab Units 11/28/20  0525 11/27/20  0358 11/26/20  1352   INR  1.22* 1.35* 2.38*           Lab Results   Component Value Date    LIPASE 8 (L) 10/26/2020     No results found for: CHOL, CHLPL, TRIG, HDL, LDL, LDLDIRECT    Lab Results   Lab Value Date/Time    INTRAOP  10/27/2020 1203     FNA of subcarinal lymph node,  immediate evaluation    Pass 1: Positive for malignant cells.    FNA of left hilar lymph node, immediate evaluation    Pass 1: Mostly blood, no malignancy.  Pass 2: Mostly blood, no malignancy.  Pass 3: Mostly blood, no malignancy.  Pass 4: Mostly blood, no malignancy.    Initial cytology interpretation performed by Maurilio Watson MD.        FINALDX  11/25/2020 1146     Leukopenia with left shift and relative eosinophilia  Thrombocytopenia  No blasts identified      FINALDX  10/27/2020 1203     Specimens #1 and #3 (Lymph node, subcarinal, endobronchial fine needle aspiration):    Positive for metastatic poorly differentiated adenocarcinoma, see comment    Specimens #2 and #4 (Lymph node, left hilar, endobronchial fine needle aspiration, smears and cell block preparation):    Rare atypical cells, see comment    JPR/sms           COMDX  10/27/2020 1203     Specimens #1 and #3: Immunohistochemistry was performed utilizing appropriate controls and the tumor is positive for CK7 and TTF-1 and is negative for napsin A, p63, and CK5/6. 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 Many of the tumor cells display significant pleomorphism with bizarre appearing nuclei and mitotic figures. This raises the possibility of a sarcomatoid component, however, that would have to be evaluated on the resection specimen.     Specimens #2 and #4: One of the smear passes displays scattered atypical cells in a background of lymphoid tissue. Immunohistochemistry was performed utilizing appropriate controls on the cell block for cytokeratin AE1/3. The cytokeratin AE1/3 stain is negative which excludes the presence of atypical cells in the cell block. The significance of the atypical cells on the smears is unknown, however, malignancy cannot be entirely excluded. Further clinical workup should be considered.     JPR/sms          Microbiology Results (last 10 days)     Procedure Component Value - Date/Time    COVID  PRE-OP / PRE-PROCEDURE SCREENING ORDER (NO ISOLATION) - Swab, Nasopharynx [[id number]]  (Normal) Collected: 11/27/20 0802    Lab Status: Final result Specimen: Swab from Nasopharynx Updated: 11/27/20 0855    Narrative:      The following orders were created for panel order COVID PRE-OP / PRE-PROCEDURE SCREENING ORDER (NO ISOLATION) - Swab, Nasopharynx.  Procedure                               Abnormality         Status                     ---------                               -----------         ------                     COVID-19,CEPHEID,COR/UDAY...[[id number]]  Normal              Final result                 Please view results for these tests on the individual orders.    COVID-19,CEPHEID,COR/UDAY/PAD IN-HOUSE(OR EMERGENT/ADD-ON),NP SWAB IN TRANSPORT MEDIA 3-4 HR TAT - Swab, Nasopharynx [[id number]]  (Normal) Collected: 11/27/20 0802    Lab Status: Final result Specimen: Swab from Nasopharynx Updated: 11/27/20 0855     COVID19 Not Detected    Narrative:      Fact sheet for providers: https://www.fda.gov/media/287543/download     Fact sheet for patients: https://www.fda.gov/media/973657/download    Blood Culture - Blood, Hand, Left [[id number]] Collected: 11/25/20 2035    Lab Status: Final result Specimen: Blood from Hand, Left Updated: 11/30/20 2115     Blood Culture No growth at 5 days    Blood Culture - Blood, Arm, Left [[id number]] Collected: 11/25/20 2013    Lab Status: Final result Specimen: Blood from Arm, Left Updated: 11/30/20 2245     Blood Culture No growth at 5 days          ECG/EMG Results (most recent)     Procedure Component Value Units Date/Time    ECG 12 Lead [[id number]] Collected: 11/25/20 1614     Updated: 11/29/20 1102     QT Interval 355 ms     Narrative:      HEART RATE= 116  bpm  RR Interval= 516  ms  ME Interval=   ms  P Horizontal Axis=   deg  P Front Axis=   deg  QRSD Interval= 108  ms  QT Interval= 355  ms  QRS Axis= 33  deg  T Wave Axis= 33  deg  - ABNORMAL ECG -  Atrial fibrillation  Low  voltage, extremity leads  When compared with ECG of 11-Nov-2020 10:10:57,  NO SIGNIFICANT CHANGE FROM PREVIOUS ECG  Electronically Signed By: Alex Mtz (UDAY) 29-Nov-2020 10:51:45  Date and Time of Study: 2020-11-25 16:14:21               Results for orders placed during the hospital encounter of 10/26/20   Adult Transthoracic Echo Complete W/ Cont if Necessary Per Protocol    Narrative · The left atrial cavity is severely dilated.  · Left ventricular diastolic function is consistent with (grade II w/high   LAP) pseudonormalization.  · Left ventricular wall thickness is consistent with concentric   hypertrophy.  · Estimated left ventricular EF was in agreement with the calculated left   ventricular EF. Left ventricular ejection fraction appears to be 61 - 65%.   Left ventricular systolic function is normal.  · There is moderate calcification of the aortic valve mainly affecting the   non-coronary cusp(s).  · There is mild, bileaflet mitral valve thickening present.  · Mild to moderate mitral valve regurgitation is present with a   centrally-directed jet noted.  · Mild tricuspid valve regurgitation is present.  · Estimated right ventricular systolic pressure from tricuspid   regurgitation is mildly elevated (35-45 mmHg).     Moderate MR  Severe left atrial enlargement  Grade 2 diastolic dysfunction  Preserved LV systolic function EF 60 to 65%  Normal RV size and function  Normal IVC  Mild pulmonary hypertension by TR gradient  If clinically indicated would recommend transesophageal echo for   evaluation of mitral regurgitation with severe left atrial enlargement,   spectral Doppler and color Doppler indicate not more than moderate but   suboptimal acoustic windows, possibly underestimated, recommend ESE if   clinically indicated and were suspicious of severe MR         Ct Chest Without Contrast    Result Date: 11/26/2020  1.Compared to previous examinations the paratracheal and mediastinal lymphadenopathy is  larger. 2.There is a moderate size right pleural effusion and small left pleural effusion which are increased in size from previous study. 3.The right hilar mass is not definite change in size. 4.There are some new nodules in the right lower lobe. 5.There is increased opacity in the right lower lobe. 6.Cardiomegaly and severe atherosclerotic disease and coronary artery disease. 7.No change in a low-density lesion at the right hepatic dome. 8.Diffuse stomach wall thickening similar previous exam.    Electronically Signed By-Meg Marie MD On:11/26/2020 1:38 PM This report was finalized on [id number] by  Meg Marie MD.    Xr Chest 1 View    Result Date: 12/2/2020   1. Bilateral mixed interstitial/airspace disease which is worsened from the previous study. This can be seen with multifocal pneumonia or pulmonary edema. 2. The patient either has a small to moderate subpulmonic pleural effusion or elevation of the right hemidiaphragm. This is unchanged from the previous exam. There is also unchanged dense consolidation in the right lower chest. 3. Thickening of the right paratracheal stripe consistent with known adenopathy. 4. Fullness in the right hilum consistent with a known lung cancer.  Electronically Signed By-Antonio Graham MD On:12/2/2020 10:37 AM This report was finalized on [id number] by  Antonio Graham MD.    Xr Chest Pa & Lateral    Result Date: 11/25/2020  1 increasing right lower lobe airspace disease may represent atelectasis or pneumonia and small right pleural effusion. 2. Patient has right lower lobe lung lesion consistent with the appearance of malignancy on previous PET/CT. Right paratracheal adenopathy is observed on today's x-ray. 3. FINDINGS consistent with developing interstitial edema in comparison to the 10/26/2020 exam.  Electronically Signed By-Aranza Martinez MD On:11/25/2020 5:01 PM This report was finalized on [id number] by  Aranza Martinez MD.          Xrays, labs reviewed personally  by physician.    Medication Review:   I have reviewed the patient's current medication list      Scheduled Meds  budesonide-formoterol, 2 puff, Inhalation, BID - RT  dilTIAZem, 30 mg, Oral, Q6H  folic acid, 1 mg, Oral, Daily  furosemide, 40 mg, Intravenous, Q12H  ipratropium-albuterol, 3 mL, Nebulization, 4x Daily - RT  metoclopramide, 10 mg, Oral, TID AC  metoprolol tartrate, 50 mg, Oral, Q12H  mirtazapine, 15 mg, Oral, Nightly  nicotine, 1 patch, Transdermal, Daily  nystatin, 5 mL, Swish & Swallow, 4x Daily  pantoprazole, 40 mg, Oral, Q AM  sodium chloride, 10 mL, Intravenous, Q12H  sucralfate, 1 g, Oral, 4x Daily AC & at Bedtime  traZODone, 50 mg, Oral, Nightly        Meds Infusions  dilTIAZem, 5-15 mg/hr, Last Rate: 5 mg/hr (11/27/20 1244)  sodium chloride, 30 mL/hr, Last Rate: 30 mL/hr (11/28/20 2152)        Meds PRN  •  acetaminophen **OR** acetaminophen  •  acetaminophen  •  benzonatate  •  bisacodyl  •  flumazenil  •  HYDROmorphone  •  HYDROmorphone  •  ipratropium-albuterol  •  labetalol  •  magnesium hydroxide  •  magnesium sulfate **OR** magnesium sulfate **OR** magnesium sulfate  •  melatonin  •  naloxone  •  nitroglycerin  •  ondansetron **OR** ondansetron  •  ondansetron  •  potassium & sodium phosphates **OR** potassium & sodium phosphates  •  potassium chloride **OR** potassium chloride **OR** potassium chloride  •  promethazine  •  sodium chloride  •  sodium chloride  •  sodium chloride        Assessment/Plan   Assessment/Plan     Active Hospital Problems    Diagnosis  POA   • **Feeding difficulties [R63.3]  Unknown   • Moderate malnutrition (CMS/HCC) [E44.0]  Yes   • Thrombocytopenia (CMS/HCC) [D69.6]  Yes      Resolved Hospital Problems   No resolved problems to display.       MEDICAL DECISION MAKING COMPLEXITY BY PROBLEM:     Generalized weakness-likely multifactorial given cancer diagnosis and chemotherapy as well as tachyarrhythmia  -PT/OT  -Fall risk  -Monitor for sources of  infection  -Telemetry  -COVID-19 negative  -Heart rate control  -Nutrition consult for tube feeding  -Patient received a PEG on 11/28/2020     Atrial fibrillation-with RVR.  Patient with established history denies chest pain.  Patient able to be weaned off overnight 10/26/2020 but went back into RVR this 10/27/2020, slowly improving  -Telemetry  -Increase metoprolol  -Monitor electrolytes  -Off Cardizem drip on oral Cardizem  -Echo as of 10/26/2020 showing dilated atrial cavity with grade 2 diastolic dysfunction    Chronic respiratory failure-patient on 3 L baseline  -Chest x-ray appearing show signs of volume overload  -Ordering CT further evaluate underlying infection versus inflammation versus edema  -Off diuretics       -The patient's oxygenation is not proving.  The patient is up 3 kg and has a negative pro calcitonin.  The patient likely has congestive failure which has not been addressed.  We will start the patient on Lasix 40 mg IV twice daily and follow his weight, renal function and electrolytes.  Would hold off on antibiotics for now as his chest x-ray is more consistent with pulmonary edema.     Thrombocytopenia-patient with no signs of overt bleeding though did report small amounts of blood per nasal passages, slowly improving.  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  -Patient to receive platelets later today.    Leukopenia-likely due to underlying chemotherapy  -Neupogen per hematology  -Neutropenic precaution  -Monitor for signs of occult infection  -Daily CBC  -If his platelet count continues to be this low may need to consider additional platelet  therapy.    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 with various different valvular diseases  -Monitor volume status     COPD-no signs of acute exacerbation currently  -Bronchodilators  -Mucolytic's  -Wean O2 as tolerated     Tobacco abuse-cessation advised    VTE Prophylaxis -   Mechanical Order History:      Ordered        11/25/20 1608  Place Sequential Compression Device  Once         11/25/20 1608  Maintain Sequential Compression Device  Continuous                 Pharmalogical Order History:     None            Code Status -   Code Status and Medical Interventions:   Ordered at: 11/25/20 1608     Code Status:    CPR     Medical Interventions (Level of Support Prior to Arrest):    Full       This patient has been examined wearing appropriate Personal Protective Equipment and discussed with hospital infection control department. 12/02/20        Discharge Planning  pending        Electronically signed by Farrah Grimm MD, 12/02/20, 15:39 EST.  [facility]

## 2020-12-02 NOTE — PROGRESS NOTES
[facility] Medicine Services Daily Progress Note      Hospitalist Team  LOS 5 days      Patient Care Team:  Ambar Hassan APRN as PCP - General (Family Medicine)    Patient Location: 2119/1      Subjective   Subjective     Chief Complaint / Subjective  No chief complaint on file.    Patient did well with PEG tube insertion and able to be used starting tube feeds.  Patient is tolerating no abdominal distention or pain.  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  IVIG being started by hematology.    Brief Synopsis of Hospital Course/HPI  Mr. Hernandez is a 80 y.o. with history of atrial fibrillation and COPD with new diagnosis of lung cancer presenting as direct admission from oncologist office for progressive generalized weakness as well as thrombocytopenia.  Patient had initiated chemotherapy and reported progressive weakness as well as loss of appetite over the last few weeks.  Patient had been getting more thrombocytopenic with levels in the 30s the day prior to admission down to 13 on day of admission.  Patient denied hematuria, hematochezia, melena or significant bruising though did report a small amount of blood when he blew his nose in the morning.  Currently patient reports he feels weak though otherwise asymptomatic.  No shortness of breath or chest pain, no nausea or vomiting.  Anorexia noted.     Patient found to be in atrial fibrillation with RVR once reaching the floor.  Stat EKG ordered and ordering Cardizem.  Patient received unit of platelets in ambulatory care center prior to coming up to the floor.  Patient scheduled to have feeding tube placement with GI in the next few days.    11/26/2020: Patient reports feeling better than yesterday.  Heart rate improving.  Patient reported self-limiting epistaxis.  Receiving more platelets per hematology.  Received vitamin K for procedure tomorrow.    11/27/2020: Patient overall doing well.  Heart rate still  elevated in the 110s, starting oral Cardizem trial to wean off IV Cardizem.  Patient's platelets in the 20s but no signs of bleeding.      11/28/2020: Patient reports doing well no new symptoms.  No chest pain or shortness of breath.  Heart rate improving borderline controlled.  Patient going for PEG tube today.  Platelets in the 30s.    11/30/2020  Today the patient continues to have feeding difficulty as well as difficulty with some shortness of breath.  He remains weak.  The patient's platelet count remains extremely low despite hematology oncology's efforts.    12/1/2020  The patient seemed very fatigued and very weak today.  The family's wife is at the bedside and was trying to encourage him to continue to fight.  She is aware that the patient's issues with his platelets as well as his white count is related to the chemotherapy.  She asked me when the patient was going to have received additional chemotherapy and I told her probably not until he recovers from this session of chemo.    Review of Systems   Constitution: Positive for malaise/fatigue. Negative for chills and fever.   HENT: Negative.  Negative for hoarse voice and sore throat.    Eyes: Negative.  Negative for double vision and photophobia.   Cardiovascular: Positive for dyspnea on exertion. Negative for chest pain and leg swelling.   Respiratory: Positive for shortness of breath. Negative for sputum production.    Endocrine: Negative.    Hematologic/Lymphatic: Negative.    Skin: Negative.    Musculoskeletal: Negative.  Negative for myalgias and stiffness.   Gastrointestinal: Negative.  Negative for nausea and vomiting.   Genitourinary: Negative.  Negative for dysuria and flank pain.   Neurological: Positive for weakness. Negative for dizziness and focal weakness.   Psychiatric/Behavioral: Negative.  Negative for altered mental status. The patient is not nervous/anxious.    Allergic/Immunologic: Negative.    All other systems reviewed and are  "negative.    Objective   Objective      Vital Signs  Temp:  [97.6 °F (36.4 °C)-98.8 °F (37.1 °C)] 97.8 °F (36.6 °C)  Heart Rate:  [100-127] 100  Resp:  [16-20] 18  BP: ()/(55-73) 99/56  Oxygen Therapy  SpO2: 98 %  Pulse Oximetry Type: Continuous  Device (Oxygen Therapy): nasal cannula  Device (Oxygen Therapy): nasal cannula  Flow (L/min): 4  Oxygen Concentration (%): 3  ETCO2 (mmHg): 36 mmHg  Flowsheet Rows      First Filed Value   Admission Height  188 cm (74\") Documented at 11/25/2020 1510   Admission Weight  79.7 kg (175 lb 11.3 oz) Documented at 11/25/2020 1510        Intake & Output (last 3 days)       11/29 0701 - 11/30 0700 11/30 0701 - 12/01 0700 12/01 0701 - 12/02 0700    P.O. 780 300 0    I.V. (mL/kg)       Blood 235  446    Other 561 60 60    NG/ 510     Total Intake(mL/kg) 1732 (21.7) 870 (10.8) 506 (6.3)    Urine (mL/kg/hr) 250 (0.1) 150 (0.1)     Stool 0 0     Total Output 250 150     Net +1482 +720 +506           Urine Unmeasured Occurrence  4 x         Lines, Drains & Airways    Active LDAs     Name:   Placement date:   Placement time:   Site:   Days:    Peripheral IV 11/25/20 1822 Distal;Posterior;Right Forearm   11/25/20 1822    Forearm   less than 1    Single Lumen Implantable Port 11/16/20 Left Subclavian   11/16/20    1002    Subclavian   10            Physical Exam:  Physical Exam    General: Frail elderly male lying in bed breathing comfortably on 3 L nasal cannula no acute distress.  The patient does have some bruises throughout but no evidence of bleeding at this time.  The patient was a bit more interactive today but has a limited ability to move or converse.  HEENT: NC/AT, EOMI, mucosa moist  Heart: Irregular, rate controlled  Chest: Normal work of breathing, moving air well in all lung fields no wheezing or crackles  Abdominal: Soft. NT/ND.   Musculoskeletal: Normal ROM.  No cyanosis. No calf tenderness.  Neurological: AAOx3, no focal deficits  Skin: Skin is warm and dry.  " No petechiae  Psychiatric: Normal mood and affect.    Procedures:    Results Review:     I reviewed the patient's new clinical results.      Lab Results (last 24 hours)     Procedure Component Value Units Date/Time    CBC & Differential [[id number]]  (Abnormal) Collected: 12/01/20 0619    Specimen: Blood Updated: 12/01/20 0722    Narrative:      The following orders were created for panel order CBC & Differential.  Procedure                               Abnormality         Status                     ---------                               -----------         ------                     Scan Slide[[id number]]                                       Final result               CBC Auto Differential[[id number]]        Abnormal            Final result                 Please view results for these tests on the individual orders.    CBC Auto Differential [[id number]]  (Abnormal) Collected: 12/01/20 0619    Specimen: Blood Updated: 12/01/20 0722     WBC 4.20 10*3/mm3      RBC 3.92 10*6/mm3      Hemoglobin 11.5 g/dL      Hematocrit 34.3 %      MCV 87.6 fL      MCH 29.4 pg      MCHC 33.6 g/dL      RDW 14.0 %      RDW-SD 42.9 fl      MPV 9.9 fL      Platelets 3 10*3/mm3     Scan Slide [[id number]] Collected: 12/01/20 0619    Specimen: Blood Updated: 12/01/20 0722     Scan Slide --     Comment: See Manual Differential Results       Manual Differential [[id number]]  (Abnormal) Collected: 12/01/20 0619    Specimen: Blood Updated: 12/01/20 0722     Neutrophil % 21.0 %      Lymphocyte % 21.0 %      Monocyte % 16.0 %      Eosinophil % 16.0 %      Bands %  13.0 %      Metamyelocyte % 9.0 %      Atypical Lymphocyte % 4.0 %      Neutrophils Absolute 1.43 10*3/mm3      Lymphocytes Absolute 0.88 10*3/mm3      Monocytes Absolute 0.67 10*3/mm3      Eosinophils Absolute 0.67 10*3/mm3      nRBC 2.0 /100 WBC      RBC Morphology Normal     Dohle Bodies Present     Platelet Estimate Decreased    Narrative:      Reviewed by Pathologist within the past  30 days on [id number] .      Phosphorus [[id number]]  (Abnormal) Collected: 12/01/20 0619    Specimen: Blood Updated: 12/01/20 0656     Phosphorus 1.8 mg/dL     Magnesium [[id number]]  (Normal) Collected: 12/01/20 0619    Specimen: Blood Updated: 12/01/20 0654     Magnesium 1.7 mg/dL     Blood Culture - Blood, Arm, Left [[id number]] Collected: 11/25/20 2013    Specimen: Blood from Arm, Left Updated: 11/30/20 2245     Blood Culture No growth at 5 days    Blood Culture - Blood, Hand, Left [[id number]] Collected: 11/25/20 2035    Specimen: Blood from Hand, Left Updated: 11/30/20 2115     Blood Culture No growth at 5 days        No results found for: HGBA1C  Results from last 7 days   Lab Units 11/28/20  0525 11/27/20  0358 11/26/20  1352   INR  1.22* 1.35* 2.38*           Lab Results   Component Value Date    LIPASE 8 (L) 10/26/2020     No results found for: CHOL, CHLPL, TRIG, HDL, LDL, LDLDIRECT    Lab Results   Lab Value Date/Time    INTRAOP  10/27/2020 1203     FNA of subcarinal lymph node, immediate evaluation    Pass 1: Positive for malignant cells.    FNA of left hilar lymph node, immediate evaluation    Pass 1: Mostly blood, no malignancy.  Pass 2: Mostly blood, no malignancy.  Pass 3: Mostly blood, no malignancy.  Pass 4: Mostly blood, no malignancy.    Initial cytology interpretation performed by Maurilio Watson MD.        FINALDX  11/25/2020 1146     Leukopenia with left shift and relative eosinophilia  Thrombocytopenia  No blasts identified      FINALDX  10/27/2020 1203     Specimens #1 and #3 (Lymph node, subcarinal, endobronchial fine needle aspiration):    Positive for metastatic poorly differentiated adenocarcinoma, see comment    Specimens #2 and #4 (Lymph node, left hilar, endobronchial fine needle aspiration, smears and cell block preparation):    Rare atypical cells, see comment    JPR/sms           COMDX  10/27/2020 1203     Specimens #1 and #3: Immunohistochemistry was performed utilizing appropriate  controls and the tumor is positive for CK7 and TTF-1 and is negative for napsin A, p63, and CK5/6. 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 Many of the tumor cells display significant pleomorphism with bizarre appearing nuclei and mitotic figures. This raises the possibility of a sarcomatoid component, however, that would have to be evaluated on the resection specimen.     Specimens #2 and #4: One of the smear passes displays scattered atypical cells in a background of lymphoid tissue. Immunohistochemistry was performed utilizing appropriate controls on the cell block for cytokeratin AE1/3. The cytokeratin AE1/3 stain is negative which excludes the presence of atypical cells in the cell block. The significance of the atypical cells on the smears is unknown, however, malignancy cannot be entirely excluded. Further clinical workup should be considered.     JPR/sms          Microbiology Results (last 10 days)     Procedure Component Value - Date/Time    COVID PRE-OP / PRE-PROCEDURE SCREENING ORDER (NO ISOLATION) - Swab, Nasopharynx [[id number]]  (Normal) Collected: 11/27/20 0802    Lab Status: Final result Specimen: Swab from Nasopharynx Updated: 11/27/20 0855    Narrative:      The following orders were created for panel order COVID PRE-OP / PRE-PROCEDURE SCREENING ORDER (NO ISOLATION) - Swab, Nasopharynx.  Procedure                               Abnormality         Status                     ---------                               -----------         ------                     COVID-19,CEPHEID,COR/UDAY...[[id number]]  Normal              Final result                 Please view results for these tests on the individual orders.    COVID-19,CEPHEID,COR/UDAY/PAD IN-HOUSE(OR EMERGENT/ADD-ON),NP SWAB IN TRANSPORT MEDIA 3-4 HR TAT - Swab, Nasopharynx [[id number]]  (Normal) Collected: 11/27/20 0802    Lab Status: Final result Specimen: Swab from Nasopharynx Updated: 11/27/20 0855     COVID19  Not Detected    Narrative:      Fact sheet for providers: https://www.fda.gov/media/018883/download     Fact sheet for patients: https://www.fda.gov/media/323070/download    Blood Culture - Blood, Hand, Left [[id number]] Collected: 11/25/20 2035    Lab Status: Final result Specimen: Blood from Hand, Left Updated: 11/30/20 2115     Blood Culture No growth at 5 days    Blood Culture - Blood, Arm, Left [[id number]] Collected: 11/25/20 2013    Lab Status: Final result Specimen: Blood from Arm, Left Updated: 11/30/20 2245     Blood Culture No growth at 5 days          ECG/EMG Results (most recent)     Procedure Component Value Units Date/Time    ECG 12 Lead [[id number]] Collected: 11/25/20 1614     Updated: 11/29/20 1102     QT Interval 355 ms     Narrative:      HEART RATE= 116  bpm  RR Interval= 516  ms  AR Interval=   ms  P Horizontal Axis=   deg  P Front Axis=   deg  QRSD Interval= 108  ms  QT Interval= 355  ms  QRS Axis= 33  deg  T Wave Axis= 33  deg  - ABNORMAL ECG -  Atrial fibrillation  Low voltage, extremity leads  When compared with ECG of 11-Nov-2020 10:10:57,  NO SIGNIFICANT CHANGE FROM PREVIOUS ECG  Electronically Signed By: Alex Mtz (UDAY) 29-Nov-2020 10:51:45  Date and Time of Study: 2020-11-25 16:14:21               Results for orders placed during the hospital encounter of 10/26/20   Adult Transthoracic Echo Complete W/ Cont if Necessary Per Protocol    Narrative · The left atrial cavity is severely dilated.  · Left ventricular diastolic function is consistent with (grade II w/high   LAP) pseudonormalization.  · Left ventricular wall thickness is consistent with concentric   hypertrophy.  · Estimated left ventricular EF was in agreement with the calculated left   ventricular EF. Left ventricular ejection fraction appears to be 61 - 65%.   Left ventricular systolic function is normal.  · There is moderate calcification of the aortic valve mainly affecting the   non-coronary cusp(s).  · There is mild,  bileaflet mitral valve thickening present.  · Mild to moderate mitral valve regurgitation is present with a   centrally-directed jet noted.  · Mild tricuspid valve regurgitation is present.  · Estimated right ventricular systolic pressure from tricuspid   regurgitation is mildly elevated (35-45 mmHg).     Moderate MR  Severe left atrial enlargement  Grade 2 diastolic dysfunction  Preserved LV systolic function EF 60 to 65%  Normal RV size and function  Normal IVC  Mild pulmonary hypertension by TR gradient  If clinically indicated would recommend transesophageal echo for   evaluation of mitral regurgitation with severe left atrial enlargement,   spectral Doppler and color Doppler indicate not more than moderate but   suboptimal acoustic windows, possibly underestimated, recommend ESE if   clinically indicated and were suspicious of severe MR         Ct Chest Without Contrast    Result Date: 11/26/2020  1.Compared to previous examinations the paratracheal and mediastinal lymphadenopathy is larger. 2.There is a moderate size right pleural effusion and small left pleural effusion which are increased in size from previous study. 3.The right hilar mass is not definite change in size. 4.There are some new nodules in the right lower lobe. 5.There is increased opacity in the right lower lobe. 6.Cardiomegaly and severe atherosclerotic disease and coronary artery disease. 7.No change in a low-density lesion at the right hepatic dome. 8.Diffuse stomach wall thickening similar previous exam.    Electronically Signed By-Meg Marie MD On:11/26/2020 1:38 PM This report was finalized on [id number] by  Meg Marie MD.    Xr Chest Pa & Lateral    Result Date: 11/25/2020  1 increasing right lower lobe airspace disease may represent atelectasis or pneumonia and small right pleural effusion. 2. Patient has right lower lobe lung lesion consistent with the appearance of malignancy on previous PET/CT. Right paratracheal adenopathy is  observed on today's x-ray. 3. FINDINGS consistent with developing interstitial edema in comparison to the 10/26/2020 exam.  Electronically Signed By-Aranza Martinez MD On:11/25/2020 5:01 PM This report was finalized on [id number] by  Aranza Martinez MD.          Xrays, labs reviewed personally by physician.    Medication Review:   I have reviewed the patient's current medication list      Scheduled Meds  budesonide-formoterol, 2 puff, Inhalation, BID - RT  dilTIAZem, 30 mg, Oral, Q6H  filgrastim (NEUPOGEN) injection, 300 mcg, Subcutaneous, Q PM  folic acid, 1 mg, Oral, Daily  ipratropium-albuterol, 3 mL, Nebulization, 4x Daily - RT  metoclopramide, 10 mg, Oral, TID AC  metoprolol tartrate, 50 mg, Oral, Q12H  mirtazapine, 15 mg, Oral, Nightly  nicotine, 1 patch, Transdermal, Daily  nystatin, 5 mL, Swish & Swallow, 4x Daily  pantoprazole, 40 mg, Oral, Q AM  sodium chloride, 10 mL, Intravenous, Q12H  sucralfate, 1 g, Oral, 4x Daily AC & at Bedtime        Meds Infusions  dilTIAZem, 5-15 mg/hr, Last Rate: 5 mg/hr (11/27/20 1244)  sodium chloride, 30 mL/hr, Last Rate: 30 mL/hr (11/28/20 2152)        Meds PRN  •  acetaminophen **OR** acetaminophen  •  acetaminophen  •  benzonatate  •  bisacodyl  •  flumazenil  •  HYDROmorphone  •  HYDROmorphone  •  ipratropium-albuterol  •  labetalol  •  magnesium hydroxide  •  magnesium sulfate **OR** magnesium sulfate **OR** magnesium sulfate  •  melatonin  •  naloxone  •  nitroglycerin  •  ondansetron **OR** ondansetron  •  ondansetron  •  potassium & sodium phosphates **OR** potassium & sodium phosphates  •  potassium chloride **OR** potassium chloride **OR** potassium chloride  •  promethazine  •  sodium chloride  •  sodium chloride  •  sodium chloride        Assessment/Plan   Assessment/Plan     Active Hospital Problems    Diagnosis  POA   • **Feeding difficulties [R63.3]  Unknown   • Moderate malnutrition (CMS/HCC) [E44.0]  Yes   • Thrombocytopenia (CMS/HCC) [D69.6]  Yes       Resolved Hospital Problems   No resolved problems to display.       MEDICAL DECISION MAKING COMPLEXITY BY PROBLEM:     Generalized weakness-likely multifactorial given cancer diagnosis and chemotherapy as well as tachyarrhythmia  -PT/OT  -Fall risk  -Monitor for sources of infection  -Telemetry  -COVID-19 negative  -Heart rate control  -Nutrition consult for tube feeding  -Patient received a PEG on 11/28/2020     Atrial fibrillation-with RVR.  Patient with established history denies chest pain.  Patient able to be weaned off overnight 10/26/2020 but went back into RVR this 10/27/2020, slowly improving  -Telemetry  -Increase metoprolol  -Monitor electrolytes  -Off Cardizem drip on oral Cardizem  -Echo as of 10/26/2020 showing dilated atrial cavity with grade 2 diastolic dysfunction    Chronic respiratory failure-patient on 3 L baseline  -Chest x-ray appearing show signs of volume overload  -Ordering CT further evaluate underlying infection versus inflammation versus edema  -Off diuretics     Thrombocytopenia-patient with no signs of overt bleeding though did report small amounts of blood per nasal passages, slowly improving.  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  -Patient to receive platelets later today.    Leukopenia-likely due to underlying chemotherapy  -Neupogen per hematology  -Neutropenic precaution  -Monitor for signs of occult infection  -Daily CBC  -If his platelet count continues to be this low may need to consider additional platelet therapy.    Lung cancer-patient with recent diagnosis under treatment with oncology  -Pain control  -Antiemetics  -Daily  CBC  -Oncology consult     Diastolic cardiomyopathy-grade 2 noted on echo with various different valvular diseases  -Monitor volume status     COPD-no signs of acute exacerbation currently  -Bronchodilators  -Mucolytic's  -Wean O2 as tolerated     Tobacco abuse-cessation advised    VTE Prophylaxis -   Mechanical Order History:      Ordered        11/25/20 1608  Place Sequential Compression Device  Once         11/25/20 1608  Maintain Sequential Compression Device  Continuous                 Pharmalogical Order History:     None            Code Status -   Code Status and Medical Interventions:   Ordered at: 11/25/20 1608     Code Status:    CPR     Medical Interventions (Level of Support Prior to Arrest):    Full       This patient has been examined wearing appropriate Personal Protective Equipment and discussed with hospital infection control department. 12/01/20        Discharge Planning  pending        Electronically signed by Farrah Grimm MD, 12/01/20, 19:34 EST.  [facility]

## 2020-12-02 NOTE — PLAN OF CARE
Pt platelet count remains critically low.  Therefore, he is not appropriate for therapy this date. Will follow up for therapeutic appropriateness. OT did not enter room.

## 2020-12-02 NOTE — PROGRESS NOTES
Continued Stay Note  [facility]     Patient Name: Shay Hernandez  MRN: [id number]  Today's Date: 12/2/2020    Admit Date: 11/25/2020    Discharge Plan     Row Name 12/02/20 1458       Plan    Plan Comments  DC Barriers: MSW updated [facility] liason that patient is not dc ready at this time. PLTS: 6, hematology/oncology following. Anticipate dc to [facility] pending bed availability once medically stable.      No physical contact with patient.    Amber Ware RN       Office Phone: [phone]  Office Cell: [phone]

## 2020-12-02 NOTE — PLAN OF CARE
Pt rested throughout the night with no distress noted; will continue to monitor    Problem: Adult Inpatient Plan of Care  Goal: Plan of Care Review  Outcome: Ongoing, Progressing  Goal: Patient-Specific Goal (Individualized)  Outcome: Ongoing, Progressing  Goal: Absence of Hospital-Acquired Illness or Injury  Outcome: Ongoing, Progressing  Intervention: Identify and Manage Fall Risk  Recent Flowsheet Documentation  Taken 12/2/2020 0400 by Dorinda Gutierrez RN  Safety Promotion/Fall Prevention:   activity supervised   assistive device/personal items within reach   clutter free environment maintained   safety round/check completed  Taken 12/2/2020 0200 by Dorinda Gutierrez RN  Safety Promotion/Fall Prevention:   activity supervised   assistive device/personal items within reach   clutter free environment maintained   safety round/check completed  Taken 12/2/2020 0000 by Dorinda Gutierrez RN  Safety Promotion/Fall Prevention:   activity supervised   assistive device/personal items within reach   clutter free environment maintained   safety round/check completed  Taken 12/1/2020 2200 by Dorinda Gutierrez RN  Safety Promotion/Fall Prevention:   activity supervised   assistive device/personal items within reach   clutter free environment maintained   safety round/check completed  Taken 12/1/2020 2000 by Dorinda Gutierrez RN  Safety Promotion/Fall Prevention:   activity supervised   assistive device/personal items within reach   clutter free environment maintained   safety round/check completed  Goal: Optimal Comfort and Wellbeing  Outcome: Ongoing, Progressing  Intervention: Provide Person-Centered Care  Recent Flowsheet Documentation  Taken 12/1/2020 2000 by Dorinda Gutierrez RN  Trust Relationship/Rapport:   care explained   choices provided   emotional support provided  Goal: Readiness for Transition of Care  Outcome: Ongoing, Progressing     Problem: Skin Injury Risk Increased  Goal: Skin Health and Integrity  Outcome: Ongoing, Progressing      Problem: Fall Injury Risk  Goal: Absence of Fall and Fall-Related Injury  Outcome: Ongoing, Progressing  Intervention: Identify and Manage Contributors to Fall Injury Risk  Recent Flowsheet Documentation  Taken 12/1/2020 2000 by Dorinda Gutierrez RN  Medication Review/Management: medications reviewed  Intervention: Promote Injury-Free Environment  Recent Flowsheet Documentation  Taken 12/2/2020 0400 by Dorinda Gutierrez RN  Safety Promotion/Fall Prevention:   activity supervised   assistive device/personal items within reach   clutter free environment maintained   safety round/check completed  Taken 12/2/2020 0200 by Dorinda Gutierrez RN  Safety Promotion/Fall Prevention:   activity supervised   assistive device/personal items within reach   clutter free environment maintained   safety round/check completed  Taken 12/2/2020 0000 by Dorinda Gutierrez RN  Safety Promotion/Fall Prevention:   activity supervised   assistive device/personal items within reach   clutter free environment maintained   safety round/check completed  Taken 12/1/2020 2200 by Dorinda Gutierrez RN  Safety Promotion/Fall Prevention:   activity supervised   assistive device/personal items within reach   clutter free environment maintained   safety round/check completed  Taken 12/1/2020 2000 by Dorinda Gutierrez RN  Safety Promotion/Fall Prevention:   activity supervised   assistive device/personal items within reach   clutter free environment maintained   safety round/check completed     Problem: Malnutrition  Goal: Improved Nutritional Intake  Outcome: Ongoing, Progressing   Goal Outcome Evaluation:  Plan of Care Reviewed With: patient, daughter  Progress: no change

## 2020-12-02 NOTE — PLAN OF CARE
Goal Outcome Evaluation:  Plan of Care Reviewed With: patient, daughter  Patient appeared to be in more respiratory distress this morning and MD called. CXR and abgs ordered. Unable to get arterial abgs so venous abgs were obtained. Patient's oxygen demand also increased. Patient currently on 8L HF N/C. Plt count 6. Dr. Daley aware and no plt transfusion ordered for today.      Problem: Adult Inpatient Plan of Care  Goal: Plan of Care Review  Outcome: Ongoing, Progressing  Goal: Patient-Specific Goal (Individualized)  Outcome: Ongoing, Progressing  Goal: Absence of Hospital-Acquired Illness or Injury  Outcome: Ongoing, Progressing  Intervention: Identify and Manage Fall Risk  Recent Flowsheet Documentation  Taken 12/2/2020 1215 by Amara Farmer, RN  Safety Promotion/Fall Prevention:   activity supervised   clutter free environment maintained   nonskid shoes/slippers when out of bed   safety round/check completed  Taken 12/2/2020 0805 by Amara Famrer, RN  Safety Promotion/Fall Prevention:   activity supervised   clutter free environment maintained   nonskid shoes/slippers when out of bed   safety round/check completed  Goal: Optimal Comfort and Wellbeing  Outcome: Ongoing, Progressing  Goal: Readiness for Transition of Care  Outcome: Ongoing, Progressing

## 2020-12-02 NOTE — PROGRESS NOTES
Nutrition Services    Patient Name: Shay Hernandez  YOB: 1940  MRN: [id number]  Admission date: 11/25/2020    --- Continue to monitor/replace electrolytes as clinically indicated. Pt with persistent hypophosphatemia. If level persists below 2mg/dL at re-check, consider IV replacement.    --- Because of electrolyte disturbances today, will keep infusing TF at current rate for today:  Isosource 1.5 at 55 ml/hr, and will reduce free water flush to 10 ml/hr every 2 hours     --- As pt is eating 0% at meals so far, will continue to provide TF for majority of nutrition. Can continue Regular diet order per SLP recommendations for pleasure feeds.    PPE Documentation        PPE Worn By Provider mask and eye protection   PPE Worn By Patient  None      CLINICAL NUTRITION ASSESSMENT       Reason for Assessment 12/2: Follow up protocol     11/27: Unintentional weight loss consult, MST 3 score     H&P:  80 y.o. with history of atrial fibrillation and COPD with new diagnosis of lung cancer presenting as direct admission from oncologist office for progressive generalized weakness as well as thrombocytopenia.  Patient had initiated chemotherapy and reported progressive weakness as well as loss of appetite over the last few weeks.  Patient had been getting more thrombocytopenic with levels in the 30s the day prior to admission down to 13 on day of admission.    Past Medical History:   Diagnosis Date   • Adenocarcinoma, lung, unspecified laterality (CMS/HCC) 10/27/2020   • Atrial fibrillation (CMS/HCC)    • COPD (chronic obstructive pulmonary disease) (CMS/HCC)        Past Surgical History:   Procedure Laterality Date   • APPENDECTOMY     • BRONCHOSCOPY N/A 10/27/2020    Procedure: BRONCHOSCOPY WITH BRONCHOALVEOLAR LAVAGE AND ENDOBRONCHIAL ULTRASOUND WITH FINE NEEDLE ASPIRATION X2 AREAS;  Surgeon: Johnny Waldrop MD;  Location: [facility] ENDOSCOPY;  Service: Pulmonary;  Laterality: N/A;  POST:    • ENDOSCOPY W/ PEG TUBE  PLACEMENT N/A 11/28/2020    Procedure: ESOPHAGOGASTRODUODENOSCOPY WITH 20F PERCUTANEOUS ENDOSCOPIC GASTROSTOMY TUBE INSERTION WITH ANESTHESIA;  Surgeon: Lizeth Nance MD;  Location: [facility] ENDOSCOPY;  Service: Gastroenterology;  Laterality: N/A;  ulcerative esophagitis, hiatal hernia, PEG placement   • VENOUS ACCESS DEVICE (PORT) INSERTION N/A 11/16/2020    Procedure: INSERTION VENOUS ACCESS DEVICE, LEFT SUBCLAVIAN UNDER FLOUROSCOPIC GUIDANCE ;  Surgeon: Jan Richards MD;  Location: [facility] MAIN OR;  Service: Cardiothoracic;  Laterality: N/A;        Current Problems:   Per hospitalist note 12/2:     Generalized weakness-likely multifactorial given cancer diagnosis and chemotherapy as well as tachyarrhythmia  -PT/OT  -Fall risk  -Monitor for sources of infection  -Telemetry  -COVID-19 negative  -Heart rate control  -Nutrition consult for tube feeding  -Patient received a PEG on 11/28/2020     Atrial fibrillation-with RVR.  Patient with established history denies chest pain.  Patient able to be weaned off overnight 10/26/2020 but went back into RVR this 10/27/2020, slowly improving  -Telemetry  -Increase metoprolol  -Monitor electrolytes  -Off Cardizem drip on oral Cardizem  -Echo as of 10/26/2020 showing dilated atrial cavity with grade 2 diastolic dysfunction     Chronic respiratory failure-patient on 3 L baseline  -Chest x-ray appearing show signs of volume overload  -Ordering CT further evaluate underlying infection versus inflammation versus edema  -Off diuretics       -The patient's oxygenation is not proving.  The patient is up 3 kg and has a negative pro calcitonin.  The patient likely has congestive failure which has not been addressed.  We will start the patient on Lasix 40 mg IV twice daily and follow his weight, renal function and electrolytes.  Would hold off on antibiotics for now as his chest x-ray is more consistent with pulmonary edema.     Thrombocytopenia-patient with no signs of overt  "bleeding though did report small amounts of blood per nasal passages, slowly improving.  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  -Patient to receive platelets later today.     Leukopenia-likely due to underlying chemotherapy  -Neupogen per hematology  -Neutropenic precaution  -Monitor for signs of occult infection  -Daily CBC  -If his platelet count continues to be this low may need to consider additional platelet therapy.     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 with various different valvular diseases  -Monitor volume status     COPD-no signs of acute exacerbation currently  -Bronchodilators  -Mucolytic's  -Wean O2 as tolerated     Tobacco abuse-cessation advised     Nutrition/Diet History         Narrative     12/2: Pt seen for follow up.  Pt sleeping very soundly at visit to unit; observed via window that TF is infusing at 55mL/hour. Spoke with RN, who reports pt tolerating TF well.  Had low phos again this morning, which was replaced. Re-check lab pending. Pt has been seen by ST with recommendations for \"regular diet with thin liquids for pleasure only when amenable to full HOB elevation during and for min of 30 minutes following PO.\"  Regular diet is now in place, but pt is eating 0% PO.  Of note, sodium level continues to drop -- may benefit from less fluid volume.     11/30: Visited pt today who was resting in recliner, visually observed correct TF infusing in room. Spoke with RN about patient's PO diet. Uncertain if " "patient should remain NPO given the ulceration at the GEJ junction. Noted ST has not evaluated patient this admission. RN is to seek clarification from attending about PO diet and if ST should be involved.     11/27: S/w patient's RN who reports pt is to get PEG tomorrow. Attempted to visit patient, but patient was sleeping and would not wake to RD repeatedly calling name. Pt would benefit from full NFPE when able. Physical s/s of malnutrition noted to temple, clavicle, orbital area (moderate). Per discussion with RN, pt is getting PEG due to poor oral intake and no issues with dysphagia.     Functional Status Unsure of baseline- PT unable to eval yet, pt reports being weak per PT note     Food Allergies NKFA     Factors Affecting   Nutritional Intake Cancer  Hx of Dysphagia      Anthropometrics        Current Height, Weight Height: 188 cm (74\")  Weight: 83 kg (182 lb 15.7 oz) (12/02/20 0500)        Admit Height, Weight -    Flowsheet Rows      First Filed Value   Admission Height  188 cm (74\") Documented at 11/25/2020 1510   Admission Weight  79.7 kg (175 lb 11.3 oz) Documented at 11/25/2020 1510             Ideal Body Weight (IBW) 190lb   % Ideal Body Weight 96%       Usual Body Weight UTD   % Usual Body Weight UTD   Wt Change Observation 11/24: 8% loss x less than 1 month   Weight Hx    Wt Readings from Last 30 Encounters:   12/02/20 0500 83 kg (182 lb 15.7 oz)   12/01/20 0437 80.9 kg (178 lb 5.6 oz)   11/30/20 0500 79.8 kg (175 lb 14.8 oz)   11/29/20 0422 78.7 kg (173 lb 8 oz)   11/28/20 0532 79.6 kg (175 lb 7.8 oz)   11/27/20 0500 79 kg (174 lb 2.6 oz)   11/26/20 0615 80.1 kg (176 lb 9.4 oz)   11/25/20 1510 79.7 kg (175 lb 11.3 oz)   11/19/20 1305 86.2 kg (190 lb)   11/17/20 0816 82.1 kg (181 lb)   11/16/20 0757 81.3 kg (179 lb 3.7 oz)   11/10/20 1526 83.5 kg (184 lb)   11/13/20 1259 83.5 kg (184 lb)   11/10/20 1421 83.2 kg (183 lb 6.4 oz)   11/10/20 1240 83.5 kg (184 lb)   11/05/20 1519 84.6 kg (186 lb 6.4 oz) "   11/02/20 1502 86.5 kg (190 lb 12.8 oz)   10/26/20 2209 85 kg (187 lb 6.3 oz)   10/26/20 1143 86.4 kg (190 lb 7.6 oz)   10/24/20 1130 88.5 kg (195 lb 1.7 oz)   10/17/20 1943 91.4 kg (201 lb 8 oz)        BMI kg/m2 Body mass index is 23.49 kg/m².       Labs/Medications         Pertinent Labs Continues with hypophosphatemia -- was replaced this morning; re-check lab pending    Results from last 7 days   Lab Units 12/02/20  0923 11/30/20  0612 11/29/20  0501  11/27/20  0358  11/25/20  1834   SODIUM mmol/L 130* 133* 137   < > 138   < > 137   POTASSIUM mmol/L 3.6 3.8 3.5   < > 3.2*   < > 3.2*   CHLORIDE mmol/L 94* 96* 98   < > 98   < > 99   CO2 mmol/L 26.0 29.0 30.0*   < > 28.0   < > 27.0   BUN mg/dL 13 25* 24*   < > 19   < > 21   CREATININE mg/dL 0.46* 0.54* 0.61*   < > 0.67*   < > 0.77   CALCIUM mg/dL 7.4* 7.8* 7.9*   < > 8.0*   < > 8.0*   BILIRUBIN mg/dL 0.8  --   --   --  0.9  --  0.8   ALK PHOS U/L 133*  --   --   --  86  --  86   ALT (SGPT) U/L 19  --   --   --  19  --  27   AST (SGOT) U/L 17  --   --   --  8  --  12   GLUCOSE mg/dL 139* 154* 144*   < > 89   < > 70    < > = values in this interval not displayed.     Results from last 7 days   Lab Units 12/02/20  1315 12/02/20  0600 12/01/20  0619 11/30/20  0612   MAGNESIUM mg/dL  --  1.8 1.7 1.9   PHOSPHORUS mg/dL  --  1.7* 1.8* 1.6*   HEMOGLOBIN g/dL 15.2 10.7* 11.5* 11.9*   HEMATOCRIT % 45.9 32.3* 34.3* 36.3*     COVID19   Date Value Ref Range Status   11/27/2020 Not Detected Not Detected - Ref. Range Final     No results found for: HGBA1C      Pertinent Medications Folvite, lasix, reglan, lopressor, remeron, protonix, carafate     Physical Findings        Overall Physical   Appearance, MSA 12/2: Limited visualization of pt today due to pt positioning  11/30: Pt continues to appear malnourished   11/27: Pt sleeping at visit, visually observed moderate temporal, orbital, clavicle wasting- possible severe with NFPE   -  Edema  No documented     Gastrointestinal  "Large BM 11/29     Tubes PEG      Oral/Mouth Cavity Hx of Dysphagia      Skin No pressure injuries      Estimated/Assessed Needs       Energy Requirements    Height for Calculation  Height: 188 cm (74\")   Weight for Calculation 79kg                                      Method for Estimation  30kcal/kg                               EST Needs (kcal/day) 2370kcal/day --- remains appropriate 12/2  Monitor weight changes with ongoing TF                            Protein Requirements    Weight for Calculation 79kg   EST Protein Needs (g/kg) 1.3g/kg   EST Daily Needs (g/day) 103g/day       Fluid Requirements     Estimated Needs (mL/day) 12/2: Monitor -- sodium has been dropping; may have decreased fluid needs to help correct hyponatremia; current TF provides 1360mL enteral fluid volume       Fluid Deficit    Current Na Level (mEq/L) -   Desired Na Level (mEq/L) -   Estimated Fluid Deficit (L)  -     Current Nutrition Orders & Evaluation of Received Nutrient/Fluid Intake       Oral Nutrition     Current PO Diet Diet Regular   Supplement None    PO Evaluation     % PO Intake 11/27 to present 11/30 0% r/t NPO status   12/1 started regular diet but not taking PO - 0% intake   -  Enteral Nutrition    Enteral Route PEG    TF Modular None    TF Delivery Method Continuous    Current Ordered TF  Isosource 1.5 at 55ml/hr    Current Ordered H2O flush  10 ml/hr     TF Observation  Visited patient and observed correct TF/water flush as per above orders 12/2   EN Evaluation     TF Changes None     TF Residual WNL     TF Tolerance Tolerating Well     Average EN Delivered -       Parenteral Nutrition     TPN Route -   Current Ordered TPN VOL  -   Dextrose (g/kcals)  -   Amino Acid (g/kcals) -   Lipids (mL/Concentration/FREQ)  -   MVI Frequency  -   Trace Element Frequency  -   TPN Observation  -    TPN Evaluation    Total # Days on TPN -   -  Clinical Course    Nutrition Course Details PO diet     11/25-11/26 Regular  11/27 Clear Liquid "   11/28 to 11/30 NPO  12/2 started regular diet     Nutrition Support    11/28 TF began  - Not yet at goal rate given risk for refeeding   12/2 infusing at 55mL/hour, with tolerance       Nutritional Risk Screening        NRS-2002 Score   -       Nutrition Diagnosis         Nutrition Dx Problem 1 Moderate chronic illness malnutrition related to likely inadequate calorie intake in the context of lung cancer as evidenced by report of poor oral intake PTA, visual observation of muscle loss, and 8% weight loss in 1 month.      Nutrition Dx Problem 2 Swallowing difficulty r/t dysphagia AEB NPO diet order & need for EN to meet nutritional needs.        Intervention Goal         Intervention Goal(s) Pt will tolerate EN      Nutrition Intervention        RD/Tech Action Continue with EN as ordered      Nutrition Prescription          Diet Prescription NPO    Supplement Prescription -    -  Enteral Prescription 12/2: Because of markedly low phosphorus and hyponatremia, will continue infusion at 55mL/hour for today, and may increase tomorrow pending further electrolyte status.     END goal:   Isosource 1.5 @70mL/hour to provide 2310kcal (97%), 105g protein (102%), and 1170mL free water over a 22 hour infusion         TPN Prescription -     Monitor/Evaluation        Monitor PO intake, EN delivery/tolerance, Weight, GI status, pertinent labs     Electronically signed by:  Rosalind Kline RD  12/02/20 16:07 EST

## 2020-12-03 NOTE — PLAN OF CARE
Goal Outcome Evaluation:  Plan of Care Reviewed With: patient, spouse, other (see comments)  Progress: declining     Pleasant and cooperative with care.  Family at bedside throughout the day. Dr. Daley pagesrini per Dr. Grimm; waiting return call.

## 2020-12-03 NOTE — PLAN OF CARE
Platelets remain critically low and pt not appropriate for OT at this time as a result.  OT will continue to follow when appropriate.  OT did not enter room.

## 2020-12-03 NOTE — PROGRESS NOTES
Nutrition Services    Patient Name: Shay Henrandez  YOB: 1940  MRN: [id number]  Admission date: 11/25/2020    PPE Documentation        PPE Worn By Provider Did not enter room - care provided remotely due to self-quarantine   PPE Worn By Patient  N/A     PROGRESS NOTE      Encounter Information: Progress note to check on TF. Pt tolerating TF well, with formula infusing at goal rate today. Currently with hyponatremia -- fluid flushes were just decreased yesterday afternoon  -- will keep at 10mL every 2 hours for now, and follow up tomorrow for further recommendations.        PO Diet: NPO Diet     ST continues to follow -- based on documentation, ST suspects esophageal dysphagia and recommends full liquid diet, with continuation of TF for majority of nutrition. Pt requires strict 90 degrees during meals and for 30 minutes after. ST has not yet ordered diet.   PO Supplements: -    PO Intake:  -       Nutrition support orders: Isosource 1.5 at 55mL/hour with 10mL free water flush every 2 hours   Nutrition support review: Per EMR is infusing per above orders, residuals WNL        Labs (reviewed below): Hyponatremia - just reduced water flush yesterday afternoon; will continue to follow -- current feeding provides ~1030mL fluid/24 hours.  Magnesium and phos WNL today       GI Function:  Small BM 12/3       Nutrition Intervention: Continue Isosource 1.5 at 55 ml/hr + 10 ml FW every 2 hours       Results from last 7 days   Lab Units 12/03/20  0527 12/02/20  0923 11/30/20  0612  11/27/20  0358   SODIUM mmol/L 130* 130* 133*   < > 138   POTASSIUM mmol/L 3.1* 3.6 3.8   < > 3.2*   CHLORIDE mmol/L 91* 94* 96*   < > 98   CO2 mmol/L 32.0* 26.0 29.0   < > 28.0   BUN mg/dL 14 13 25*   < > 19   CREATININE mg/dL 0.51* 0.46* 0.54*   < > 0.67*   CALCIUM mg/dL 7.9* 7.4* 7.8*   < > 8.0*   BILIRUBIN mg/dL  --  0.8  --   --  0.9   ALK PHOS U/L  --  133*  --   --  86   ALT (SGPT) U/L  --  19  --   --  19   AST (SGOT) U/L  --   17  --   --  8   GLUCOSE mg/dL 158* 139* 154*   < > 89    < > = values in this interval not displayed.     Results from last 7 days   Lab Units 12/03/20  0527  12/02/20  0600 12/01/20  0619   MAGNESIUM mg/dL 1.9  --  1.8 1.7   PHOSPHORUS mg/dL 2.5  --  1.7* 1.8*   HEMOGLOBIN g/dL 11.1*   < > 10.7* 11.5*   HEMATOCRIT % 34.0*   < > 32.3* 34.3*    < > = values in this interval not displayed.     COVID19   Date Value Ref Range Status   11/27/2020 Not Detected Not Detected - Ref. Range Final     No results found for: HGBA1C    RD to follow up per protocol.    Electronically signed by:  Rosalind Kline RD  12/03/20 15:42 EST

## 2020-12-03 NOTE — PLAN OF CARE
Goal Outcome Evaluation:  Plan of Care Reviewed With: patient  Progress: declining  Outcome Summary: Patient currently on non rebreather at 15 liters to keep oxygen sats in the 90's. Patient taking IV lasix.

## 2020-12-03 NOTE — PROGRESS NOTES
ONCOLOGY/HEMATOLOGY    PATIENT: Shay Hernandez  YOB: 1940  MEDICAL RECORD NUMBER: [id number]   DATE OF SERVICE: 12/03/20    CHIEF COMPLAINT: Thrombocytopenia; lung cancer    HISTORY OF PRESENT ILLNESS:   Mr. Hernandez is a 80 y.o. with history of stage IIIc poorly differentiated adenocarcinoma lung, atrial fibrillation and COPD  presenting as direct admission from oncologist office for progressive generalized weakness as well as thrombocytopenia.  Patient had initiated chemotherapy recently 11/17/2020 and reported progressive weakness as well as loss of appetite over the last few weeks.  Patient had been getting more thrombocytopenic with levels in the 30s the day prior to admission down to 13 on day of admission.  Patient denied hematuria, hematochezia, melena or significant bruising though did report a small amount of blood when he blew his nose in the morning.     Patient found to be in atrial fibrillation with RVR once reaching the floor.  Stat EKG ordered and ordering Cardizem.  Patient received unit of platelets in ambulatory care center prior to coming up to the floor.  Patient scheduled to have feeding tube placement with GI in the next few days.    He received his PEG 11/28/2020 platelet count improved nominally from 26->32 with steroids and transfusion.  However, despite 2 doses of prednisone, today the platelets have dropped to 13.  He is interested in potentially eating something by mouth.  EGD done with PEG showed ulcerated GEJ suggesting potentially metastatic involvement by the lung cancer.  bx was deferred given thrombocytopenia.       Oncological history:  Stage IIIc poorly differentiated adenocarcinoma of lung -patient presented with a bulky right hilar mass and also bulky mediastinal lymph nodes.    Also has left hilar lymph nodes consistent with metastasis and borderline enlarged left supraclavicular lymph node. . MRI abdomen 10/28/2020 - 2.2 cm lesion in the dome of liver with  features of a cyst. Negative for evidence of hepatic metastases. S/p bronchoscopy with EBUS on 10/27/2020 - subcarinal lymph node FNA positive for metastatic poorly differentiated adenocarcinoma. Staining pattern is consistent with adenocarcinoma of pulmonary origin. Many of the tumor cells display significant pleomorphism with bizarre appearing nuclei and mitotic figures. This raises the possibility of a sarcomatoid component. MRI brain 10/29/2020 - no metastatic disease.   · 11/17/2020 patient received cycle 1 of cisplatin and pemetrexed.  Cisplatin 75 mg/m², pemetrexed 400 mg per metered square..  WBC 15.6, hemoglobin 13.7, platelets 129, creatinine 0.7, albumin 2.8  ·  11/5/2020: PET CT scan: Hypermetabolic right hilar mass consistent with malignancy.  Hypermetabolic nodular densities within the right lower lobe consistent with metastatic disease.  Bulky pathological enlarged hypermetabolic mediastinal adenopathy, consistent with metastatic disease.  No left hilar adenopathy.  Moderate FDG accumulation within the proximal stomach with gastric wall thickening.  This could be physiologic uptake..  Could be gastritis.  Gastric malignancy cannot be excluded.  Endoscopy recommended.  Enlarged hypermetabolic bilateral supraclavicular lymph nodes in the lower neck consistent with metastasis.    History of present illness was reviewed and is unchanged from the previous visit. 12/03/20    Subjective:  Patient seen this morning.  He looks much weaker.  He is very hoarse.  Counts are improving.  No bleeding issues.        Review of Systems   Constitutional: Positive for fatigue. Negative for activity change, appetite change, chills, fever and unexpected weight change.   HENT: Positive for voice change ( not able to speak ). Negative for mouth sores, sore throat and tinnitus.    Eyes: Negative for photophobia, pain, redness and visual disturbance.   Respiratory: Positive for cough and shortness of breath.    Cardiovascular:  Negative for chest pain, palpitations and leg swelling.   Gastrointestinal: Negative for abdominal pain, constipation, diarrhea, nausea and vomiting.   Genitourinary: Negative for dysuria and hematuria.   Musculoskeletal: Negative for arthralgias, back pain, myalgias and neck pain.   Skin: Negative for rash and wound.   Allergic/Immunologic: Negative for environmental allergies.   Neurological: Negative for dizziness, speech difficulty, weakness, light-headedness, numbness and headaches.   Hematological: Negative for adenopathy. Does not bruise/bleed easily.   Psychiatric/Behavioral: Negative for confusion and dysphoric mood. The patient is not nervous/anxious.    All other systems reviewed and are negative.    Past Medical History:   Diagnosis Date   • Adenocarcinoma, lung, unspecified laterality (CMS/HCC) 10/27/2020   • Atrial fibrillation (CMS/[facility])    • COPD (chronic obstructive pulmonary disease) (CMS/[facility])        Past Surgical History:   Procedure Laterality Date   • APPENDECTOMY     • BRONCHOSCOPY N/A 10/27/2020    Procedure: BRONCHOSCOPY WITH BRONCHOALVEOLAR LAVAGE AND ENDOBRONCHIAL ULTRASOUND WITH FINE NEEDLE ASPIRATION X2 AREAS;  Surgeon: Johnny Waldrop MD;  Location: [facility] ENDOSCOPY;  Service: Pulmonary;  Laterality: N/A;  POST:    • ENDOSCOPY W/ PEG TUBE PLACEMENT N/A 11/28/2020    Procedure: ESOPHAGOGASTRODUODENOSCOPY WITH 20F PERCUTANEOUS ENDOSCOPIC GASTROSTOMY TUBE INSERTION WITH ANESTHESIA;  Surgeon: Lizeth Nance MD;  Location: [facility] ENDOSCOPY;  Service: Gastroenterology;  Laterality: N/A;  ulcerative esophagitis, hiatal hernia, PEG placement   • VENOUS ACCESS DEVICE (PORT) INSERTION N/A 11/16/2020    Procedure: INSERTION VENOUS ACCESS DEVICE, LEFT SUBCLAVIAN UNDER FLOUROSCOPIC GUIDANCE ;  Surgeon: Jan Richards MD;  Location: [facility] MAIN OR;  Service: Cardiothoracic;  Laterality: N/A;       Family History   Problem Relation Age of Onset   • Heart attack Mother    • Lung disease Other          Cryptococcus       Social History     Socioeconomic History   • Marital status:      Spouse name: Not on file   • Number of children: Not on file   • Years of education: Not on file   • Highest education level: Not on file   Tobacco Use   • Smoking status: Former Smoker     Packs/day: 2.00     Years: 60.00     Pack years: 120.00     Types: Cigarettes     Quit date: 10/27/2020     Years since quittin.1   • Smokeless tobacco: Never Used   Substance and Sexual Activity   • Alcohol use: Not Currently     Alcohol/week: 4.0 standard drinks     Types: 4 Cans of beer per week   • Drug use: Not Currently   • Sexual activity: Defer   Social History Narrative    Lives with wife     ALLERGIES:  Patient has no known allergies.    MEDICATION:  Current Facility-Administered Medications   Medication Dose Route Frequency Provider Last Rate Last Admin   • acetaminophen (TYLENOL) tablet 1,000 mg  1,000 mg Oral Once PRN Ric Mcnamara MD        Or   • acetaminophen (TYLENOL) suppository 650 mg  650 mg Rectal Once PRN Ric Mcnamara MD       • acetaminophen (TYLENOL) tablet 650 mg  650 mg Oral Q4H PRN Lizeth Nance MD       • benzonatate (TESSALON) capsule 200 mg  200 mg Oral TID PRN Elder Tineo MD       • bisacodyl (DULCOLAX) EC tablet 5 mg  5 mg Oral Daily PRN Lizeth Nance MD       • budesonide-formoterol (SYMBICORT) 160-4.5 MCG/ACT inhaler 2 puff  2 puff Inhalation BID - RT Lizeth Nance MD   2 puff at 20 0650   • dilTIAZem (CARDIZEM) 100 mg in 100 mL NS infusion (ADV)  5-15 mg/hr Intravenous Titrated Lizeth Nance MD 5 mL/hr at 20 1244 5 mg/hr at 20 1244   • dilTIAZem (CARDIZEM) tablet 30 mg  30 mg Oral Q6H Lizeth Nance MD   30 mg at 20 1757   • flumazenil (ROMAZICON) injection 0.1 mg  0.1 mg Intravenous PRN Ric Mcnamara MD       • folic acid (FOLVITE) tablet 1 mg  1 mg Oral Daily Lizeth Nance MD   1 mg at 20 1111   •  furosemide (LASIX) injection 40 mg  40 mg Intravenous Q12H Farrah Grimm MD   40 mg at 12/03/20 1756   • HYDROmorphone (DILAUDID) injection 0.2 mg  0.2 mg Intravenous Q15 Min PRN Ric Mcnamara MD       • HYDROmorphone (DILAUDID) injection 0.25 mg  0.25 mg Intravenous Q15 Min PRN Ric Mcnamara MD   0.25 mg at 11/30/20 1152   • ipratropium-albuterol (DUO-NEB) nebulizer solution 3 mL  3 mL Nebulization 4x Daily - RT Lizeth Nance MD   3 mL at 12/03/20 0650   • ipratropium-albuterol (DUO-NEB) nebulizer solution 3 mL  3 mL Nebulization Once PRN Ric Mcnamara MD       • labetalol (NORMODYNE,TRANDATE) injection 5 mg  5 mg Intravenous Q5 Min PRN Ric Mcnamara MD       • magnesium hydroxide (MILK OF MAGNESIA) suspension 2400 mg/10mL 10 mL  10 mL Oral Daily PRN Lizeth Nance MD       • Magnesium Sulfate 2 gram Bolus, followed by 8 gram infusion (total Mg dose 10 grams)- Mg less than or equal to 1mg/dL  2 g Intravenous PRN Lizeth Nance MD        Or   • Magnesium Sulfate 2 gram / 50mL Infusion (GIVE X 3 BAGS TO EQUAL 6GM TOTAL DOSE) - Mg 1.1 - 1.5 mg/dl  2 g Intravenous PRN Lizeth Nance MD 25 mL/hr at 11/28/20 0230 2 g at 11/28/20 0230    Or   • Magnesium Sulfate 4 gram infusion- Mg 1.6-1.9 mg/dL  4 g Intravenous PRN Lizeth Nance MD 25 mL/hr at 12/03/20 1117 4 g at 12/03/20 1117   • melatonin tablet 5 mg  5 mg Oral Nightly PRN Lizeth Nance MD       • metoclopramide (REGLAN) tablet 10 mg  10 mg Oral TID AC Lizeth Nance MD   10 mg at 12/03/20 1757   • metoprolol tartrate (LOPRESSOR) tablet 50 mg  50 mg Oral Q12H Lizeth Nance MD   50 mg at 12/03/20 1112   • mirtazapine (REMERON) tablet 15 mg  15 mg Oral Nightly Lizeth Nance MD   15 mg at 12/02/20 2137   • naloxone (NARCAN) injection 0.4 mg  0.4 mg Intravenous PRN Ric Mcnamara MD       • nicotine (NICODERM CQ) 7 MG/24HR patch 1 patch  1 patch Transdermal Daily Lizeth Nance MD   1 patch at  12/03/20 1112   • nitroglycerin (NITROSTAT) SL tablet 0.4 mg  0.4 mg Sublingual Q5 Min PRN Lizeth Nance MD       • nystatin (MYCOSTATIN) 950770 UNIT/ML suspension 500,000 Units  5 mL Swish & Swallow 4x Daily Lizeth Nance MD   500,000 Units at 12/03/20 1756   • ondansetron (ZOFRAN) tablet 4 mg  4 mg Oral Q6H PRN Lizeth Nance MD        Or   • ondansetron (ZOFRAN) injection 4 mg  4 mg Intravenous Q6H PRN Lizeth Nance MD       • ondansetron (ZOFRAN) injection 4 mg  4 mg Intravenous Once PRN Ric Mcnamara MD       • pantoprazole (PROTONIX) EC tablet 40 mg  40 mg Oral Q AM Lizeth Nance MD   40 mg at 12/02/20 0557   • potassium & sodium phosphates (PHOS-NAK) 280-160-250 MG packet - for Phosphorus less than 1.25 mg/dL  2 packet Oral Q6H PRN Lizeth Nance MD   2 packet at 11/30/20 0122    Or   • potassium & sodium phosphates (PHOS-NAK) 280-160-250 MG packet - for Phosphorus 1.25 - 2.5 mg/dL  2 packet Oral Q6H PRN Lizeth Nance MD   2 packet at 12/02/20 0916   • potassium chloride (K-DUR,KLOR-CON) CR tablet 40 mEq  40 mEq Oral PRN Lizeth Nance MD   40 mEq at 11/26/20 1807    Or   • potassium chloride (KLOR-CON) packet 40 mEq  40 mEq Oral PRN Lizeth Nance MD   40 mEq at 12/03/20 1759    Or   • potassium chloride 10 mEq in 100 mL IVPB  10 mEq Intravenous Q1H PRN Lizeth Nance  mL/hr at 11/27/20 1024 10 mEq at 11/27/20 1024   • promethazine (PHENERGAN) tablet 25 mg  25 mg Oral Once PRN Ric Mcnamara MD       • sodium chloride 0.9 % flush 10 mL  10 mL Intravenous Q12H Lizeth Nance MD   10 mL at 12/03/20 1118   • sodium chloride 0.9 % flush 10 mL  10 mL Intravenous PRN Lizeth Nance MD       • sodium chloride 0.9 % infusion  30 mL/hr Intravenous Continuous PRN Lizeth Nance MD 30 mL/hr at 11/28/20 2152 30 mL/hr at 11/28/20 2152   • sodium chloride nasal spray 2 spray  2 spray Each Nare PRN Lizeth Nance MD       • sucralfate (CARAFATE) tablet 1 g  " 1 g Oral 4x Daily AC & at Bedtime Lizeth Nance MD   1 g at 12/03/20 1756   • traZODone (DESYREL) tablet 50 mg  50 mg Oral Nightly Ziggy Daley MD   50 mg at 12/02/20 2137     PHYSICAL EXAM:  Current Vitals:  /57   Pulse 104   Temp 99.7 °F (37.6 °C)   Resp 24   Ht 188 cm (74\")   Wt 82.4 kg (181 lb 10.5 oz)   SpO2 96%   BMI 23.32 kg/m²     VITAL signs in the last 24 hours: [unfilled]       12/02/20  0500 12/03/20  0530   Weight: 83 kg (182 lb 15.7 oz) 82.4 kg (181 lb 10.5 oz)     Physical Exam  Vitals signs and nursing note reviewed.   Constitutional:       General: He is not in acute distress.     Appearance: Normal appearance. He is ill-appearing. He is not diaphoretic.   HENT:      Head: Normocephalic and atraumatic.      Mouth/Throat:      Comments: Patient very hoarse and unable to speak.  Eyes:      General: No scleral icterus.        Right eye: No discharge.         Left eye: No discharge.      Conjunctiva/sclera: Conjunctivae normal.   Neck:      Musculoskeletal: Normal range of motion and neck supple.      Thyroid: No thyromegaly.   Cardiovascular:      Rate and Rhythm: Normal rate and regular rhythm.      Pulses: Normal pulses.      Heart sounds: Normal heart sounds. No friction rub. No gallop.       Comments: Port-A-Cath chest  Pulmonary:      Effort: Pulmonary effort is normal. No respiratory distress.      Breath sounds: No stridor. Rales present. No wheezing.   Abdominal:      General: Bowel sounds are normal.      Palpations: Abdomen is soft. There is no mass.      Tenderness: There is no abdominal tenderness. There is no guarding or rebound.   Musculoskeletal: Normal range of motion.         General: No tenderness.   Lymphadenopathy:      Cervical: No cervical adenopathy.   Skin:     General: Skin is warm.      Findings: No erythema or rash.   Neurological:      General: No focal deficit present.      Mental Status: He is alert and oriented to person, place, and time.      " Sensory: No sensory deficit.      Motor: No abnormal muscle tone.   Psychiatric:         Mood and Affect: Mood normal.         Behavior: Behavior normal.         Thought Content: Thought content normal.         Judgment: Judgment normal.      ECOG PERFORMANCE STATUS:3    LABORATORY/DATA:  WBC   Date Value Ref Range Status   12/03/2020 17.90 (H) 3.40 - 10.80 10*3/mm3 Final     RBC   Date Value Ref Range Status   12/03/2020 3.93 (L) 4.14 - 5.80 10*6/mm3 Final     Hemoglobin   Date Value Ref Range Status   12/03/2020 11.1 (L) 13.0 - 17.7 g/dL Final     Hematocrit   Date Value Ref Range Status   12/03/2020 34.0 (L) 37.5 - 51.0 % Final     MCV   Date Value Ref Range Status   12/03/2020 86.5 79.0 - 97.0 fL Final     MCH   Date Value Ref Range Status   12/03/2020 28.4 26.6 - 33.0 pg Final     MCHC   Date Value Ref Range Status   12/03/2020 32.8 31.5 - 35.7 g/dL Final     RDW   Date Value Ref Range Status   12/03/2020 14.3 12.3 - 15.4 % Final     RDW-SD   Date Value Ref Range Status   12/03/2020 43.8 37.0 - 54.0 fl Final     MPV   Date Value Ref Range Status   12/03/2020 8.9 6.0 - 12.0 fL Final     Platelets   Date Value Ref Range Status   12/03/2020 10 (C) 140 - 450 10*3/mm3 Final     Neutrophils Absolute   Date Value Ref Range Status   12/03/2020 14.32 (H) 1.70 - 7.00 10*3/mm3 Final     Eosinophils Absolute   Date Value Ref Range Status   12/03/2020 0.90 (H) 0.00 - 0.40 10*3/mm3 Final     Basophils Absolute   Date Value Ref Range Status   12/02/2020 0.08 0.00 - 0.20 10*3/mm3 Final     nRBC   Date Value Ref Range Status   12/01/2020 2.0 (H) 0.0 - 0.2 /100 WBC Final     Lab Results   Component Value Date    GLUCOSE 158 (H) 12/03/2020    BUN 14 12/03/2020    CREATININE 0.51 (L) 12/03/2020    EGFRIFNONA >150 12/03/2020    BCR 27.5 (H) 12/03/2020    K 3.1 (L) 12/03/2020    CO2 32.0 (H) 12/03/2020    CALCIUM 7.9 (L) 12/03/2020    ALBUMIN 2.50 (L) 12/02/2020    AST 17 12/02/2020    ALT 19 12/02/2020     No results found for:   No results found for: SPEP, UPEP No results found for: LDH, URICACID   Lab Results   Component Value Date    IRON 23 (L) 11/02/2020    TIBC 231 (L) 11/02/2020    FERRITIN 334.90 10/26/2020      IMAGING:  Ct Chest Without Contrast    Result Date: 11/26/2020  Narrative: CT CHEST WO CONTRAST-  Date of Exam: 11/26/2020 1:17 PM  Indication: Pneumonia, effusion or abscess suspected, xray done; R63.3-Feeding difficulties  smoker. Atrial fibrillation. Recent diagnosis of lung cancer.  Comparison: PET/CT 11/05/2020, CT chest 10/27/2020.  Technique: Serial and axial CT images of the chest were obtained. Reconstructions in the coronal and sagittal planes were performed. Automated exposure control and iterative reconstruction methods were used.  FINDINGS: Hilum and Mediastinum: Right paratracheal lymph node has increased in size. It measures approximately 2.3 x 3 cm on image 18. Previously, it measured about 1.6 x 2.5 cm. Lymph node mass right mid peritracheal is larger. On image 32 measures about 3.5 x 5 cm. Previously was about 3.6 x 4.7 cm. Subcarinal lymph node is larger.. Its more difficult to measure on this noncontrast enhanced study but appears to measure about 3.9 x 5.9 cm. Right hilar mass more difficult to measure without contrast. However, when measured at similar location to previous exam, image #61 and measures approximately 3.9 x 6.3 cm. This is not definitely changed. There are enlarged bilateral hilar lymph nodes. There is cardiomegaly. There is severe coronary artery calcination. There are annular calcination of the mitral valve. There is small pericardial effusion.  Lung Parenchyma and Pleura: There is a moderate size right pleural effusion which is increased from previous study. Small left pleural effusion which is slightly larger as well. There is a right lower lobe pulmonary nodule measuring 1.3 cm it is pleural-based on image #71. The pleural effusion has changed its location but previously it  measured about 1 cm. There is a nodule in the right lower lobe on image #56 measuring about 8 mm. This is not definitely seen on previous study. There is a nodule in the right lower lobe on image #68 measuring about 8 mm. This also appears new. There is increased right lower lobe airspace opacity blending with the pleural effusion. There is right infrahilar opacity and diffuse bronchial wall thickening. There is septal thickening in the right upper lobe there is partial atelectasis of the right middle lobe which appears increased.  There is septal thickening in the left lung is less pronounced in the right. There is bronchial wall thickening. There is airspace opacity in the central left lower lobe similar previous exam. There is mild subsegmental atelectasis in the left lower lobe similar to previous study. There is moderate emphysema.  Upper Abdomen: Low density nodule in the right posterior hepatic lobe near the hepatic dome measuring about 2.2 cm similar to previous study. Probable cholelithiasis without acute inflammation. Severe atherosclerotic calcination abdominal aorta. Diffuse gastric wall thickening.  Soft tissues: There is a left anterior chest wall port. Tip terminates in superior vena cava.  Osseous structures: No aggressive focal lytic or sclerotic osseous lesions. Osteopenia.      Impression: 1.Compared to previous examinations the paratracheal and mediastinal lymphadenopathy is larger. 2.There is a moderate size right pleural effusion and small left pleural effusion which are increased in size from previous study. 3.The right hilar mass is not definite change in size. 4.There are some new nodules in the right lower lobe. 5.There is increased opacity in the right lower lobe. 6.Cardiomegaly and severe atherosclerotic disease and coronary artery disease. 7.No change in a low-density lesion at the right hepatic dome. 8.Diffuse stomach wall thickening similar previous exam.    Electronically Signed  By-Meg Marie MD On:11/26/2020 1:38 PM This report was finalized on [id number] by  Meg Marie MD.    Xr Chest 1 View    Result Date: 12/2/2020  Narrative: DATE OF EXAM: 12/2/2020 9:31 AM  PROCEDURE: XR CHEST 1 VW-  INDICATIONS: Worsening shortness of breath, difficulty eating. Recent diagnosis of lung cancer.  COMPARISON: 11/25/2020.  TECHNIQUE: Single radiographic view of the chest was obtained.  FINDINGS: The heart is enlarged. There is bilateral mixed interstitial/airspace disease which has worsened from the previous exam. There is also dense consolidation in the right lower chest with what appears to be either a small to moderate subpulmonic pleural effusion or elevation of the right hemidiaphragm unchanged from the previous study. There is thickening of the right paratracheal stripe consistent with the known adenopathy there is fullness in the right hilum secondary to the patient's known lung cancer. The patient has a stable port in place. There are chronic age-related changes involving the bony thorax and thoracic aorta.      Impression:  1. Bilateral mixed interstitial/airspace disease which is worsened from the previous study. This can be seen with multifocal pneumonia or pulmonary edema. 2. The patient either has a small to moderate subpulmonic pleural effusion or elevation of the right hemidiaphragm. This is unchanged from the previous exam. There is also unchanged dense consolidation in the right lower chest. 3. Thickening of the right paratracheal stripe consistent with known adenopathy. 4. Fullness in the right hilum consistent with a known lung cancer.  Electronically Signed By-Antonio Graham MD On:12/2/2020 10:37 AM This report was finalized on [id number] by  Antonio Graham MD.    Nm Pet Skull Base To Mid Thigh    Result Date: 11/5/2020  Narrative: NM PET SKULL BASE TO MID THIGH-  Date of Exam: 11/5/2020 1:56 PM  Indication: lung cancer initial staging; C34.32-Malignant neoplasm of lower lobe,  left bronchus or lung; C34.90-Malignant neoplasm of unspecified part of unspecified bronchus or lung  Comparison: MRI brain 10/29/2020. MRI abdomen 10/27/2020. CT chest 10/27/2020. CT chest abdomen and pelvis 10/26/2020  Technique:  Whole body PET/CT imaging was performed with positron emission tomography (PET with concurrently acquired computed tomography (CT) for attenuation correction and anatomical localization); field of view imaged was the skull base to midthigh.  Fasting Blood glucose level: 111 mg/dl.  FDG dosage: 20 mCi F-18 Images were obtained after one hour of equilibrium.  FINDINGS:   NECK: Metabolically active bilateral supraclavicular lymph nodes are present. An index left supraclavicular lymph node measures 1.5 x 1.0 cm (SUV max 11.5. An index right supraclavicular lymph node measures 1.3 x 0.8 cm (SUV max 7.1).  CHEST: Right hilar mass extending into the right lower lobe lung, along the bronchovascular bundles, is consistent with the patient's known malignancy. The right hilar mass SUV max is 10.6. Right lower lobe 2 cm nodular density along the bronchovascular bundles is FDG avid (SUV max 5.6 degrees malignancy. There is an additional focal 1.9 cm hypermetabolic nodule within the posterior right lower lobe , partially obscured by adjacent atelectasis, with metastatic disease (SUV max 10.9) .  Bulky, pathologically enlarged, hypermetabolic lymph nodes are seen within the mediastinum, greatest in the right paratracheal region (SUV max 16.1), precarinal (SUV max 7.2) and subcarinal (SUV max 13.5) distributions. Smaller lymph nodes are scattered within the anterior mediastinal fat, measuring less than a centimeter short axis, but are FDG avid and consistent with metastatic disease. No pathologically enlarged or hypermetabolic left hilar adenopathy is seen.   ABDOMEN AND PELVIS: There is moderate FDG accumulation within the proximal stomach with wall thickening (SUV max 5.8), which could represent  physiologic uptake, focal gastritis, with metastatic disease or malignancy not excluded but thought less likely. There appears to be some misregistration artifact over the left hepatic segment, but no definite abnormal FDG uptake is seen within the liver. Cyst is seen within the hepatic dome measuring slightly over 2 cm.    Skeletal: There is a displaced left seventh rib fracture laterally which demonstrates moderate FDG uptake (SUV max 3.7). No osteolytic or osteoblastic abnormality is seen in this location, and may represent posttreatment etiology. No convincing evidence of osseous metastatic disease.       Impression:  1. Hypermetabolic right hilar mass consistent with malignancy. 2. Hypermetabolic nodular densities within the right lower lobe consistent with metastatic disease. 3. Bulky pathologically enlarged hypermetabolic mediastinal adenopathy, consistent with metastatic disease. No left hilar adenopathy. 4. Displaced left rib fracture is favored to represent a posttraumatic etiology rather than that of malignancy. No convincing evidence of skeletal metastatic disease. 5. There is moderate FDG accumulation within the proximal stomach with gastric wall thickening. This may simply be physiologic uptake. Gastritis could have a similar imaging appearance. However, primary gastric malignancy or metastatic disease is not excluded. Consider endoscopic correlation. 6. Enlarged hypermetabolic bilateral supraclavicular lymph nodes in the lower neck, consistent with metastases.  Electronically Signed By-Dr. Aranza Martinez MD On:11/5/2020 4:06 PM This report was finalized on [id number] by Dr. Aranza Martinez MD.    Xr Chest Pa & Lateral    Result Date: 11/25/2020  Narrative: DATE OF EXAM: 11/25/2020 4:50 PM  PROCEDURE: XR CHEST PA AND LATERAL-  INDICATIONS: 79 yo with lung cancer and underlying copd w shortness of breath   COMPARISON: AP portable chest 10/26/2020. PET/CT 11/5/2020.  TECHNIQUE: Two radiologic views of  the chest.  FINDINGS: Increased right basilar opacity, and may represent worsening atelectasis/pneumonia superimposed upon right lower lobe lung lesion, seen to better advantage on previous PET/CT. Patient's mediastinal and right hilar adenopathy is seen to better than PET/CT, with slight widening of the right paratracheal stripe consistent with known adenopathy. Central interstitial thickening is present in both lungs favored to reflect changes of evolving interstitial edema. Stable mild cardiac enlargement. Left chest wall valerie catheter has been placed with the tip terminating in the lower SVC level. No visible pneumothorax. Osteopenia. No acute osseous abnormalities.      Impression: 1 increasing right lower lobe airspace disease may represent atelectasis or pneumonia and small right pleural effusion. 2. Patient has right lower lobe lung lesion consistent with the appearance of malignancy on previous PET/CT. Right paratracheal adenopathy is observed on today's x-ray. 3. FINDINGS consistent with developing interstitial edema in comparison to the 10/26/2020 exam.  Electronically Signed By-Aranza Martinez MD On:11/25/2020 5:01 PM This report was finalized on [id number] by  Aranza Martinez MD.    PROBLEM LIST:  Patient Active Problem List   Diagnosis   • COVID-19 ruled out   • COPD (chronic obstructive pulmonary disease) (CMS/HCC)   • Weight loss   • Weakness   • Chest pain   • Cough   • Shortness of breath   • Lung mass   • Liver mass   • Atrial fibrillation (CMS/HCC)   • Adenocarcinoma, lung, unspecified laterality (CMS/HCC)   • Encounter for fitting and adjustment of vascular catheter   • Thrombocytopenia (CMS/HCC)   • Feeding difficulties   • Moderate malnutrition (CMS/HCC)     Assessment & Plan  ASSESSMENT  1. Stage IIIc poorly differentiated adenocarcinoma of lung -patient could potentially have stage IV disease with gastric involvement.  Patient originally presented with a bulky right hilar mass and also  bulky mediastinal lymph nodes.  Started on cisplatin/Alimta and consideration of radiation therapy concurrently at a later point.   2. Thrombocytopenia: Could be multifactorial due to combination of chemotherapy-induced myelosuppression and occult GI bleeding from gastric ulceration, could also be paraneoplastic.  Although ITP is one of the differential diagnosis it seems less likely considering the setting.  He recently had chemotherapy, and now has severe thrombocytopenia.  Bone marrow metastases should also be considered.  Haptoglobin is not indicative of hemolysis.  Status post Nplate.  No signs of DIC.  3. Leukopenia/Neutropenia: Resolved.  Due to chemo induced myelosuppression.  GCSF given and discontinued.   4. Gastric wall thickening and hypermetabolic activity: visualized on recent PET scan:EGD done with PEG showed ulcerated GEJ suggesting potentially metastatic involvement by the lung cancer.  Biopsy was deferred given thrombocytopenia.     5. Previous hospital admission for pneumonia/Pneumonia: Status post antibiotics and steroids recently.  Finished outpatient doxycycline.   6. Atrial fibrillation with RVR: Currently off Cardizem drip.  7. Diastolic cardiomyopathy: Noted on previous echo.  8. COPD:  management per Primary team  9. Esophageal dysphagia/Aphasia:  Primary team considering ENT referral to evaluate loss of speech. Full liquid diet recommended.   10. Prognosis is guarded.      PLAN  1. CBC daily   2. Transfuse platelets ASAP if develops bleeding. Platelet transfusions largely not successful.  3. Continue to hold anticoagulants and antiplatelet agents  4. Continue folic acid 1 mg po daily  5. Will need repeat EGD in 1 to 2 weeks once thrombocytopenia resolves  6. Use PEG tube for supplemental nutrition - now is on supplemental regular diet   7. Will need significant and substantial dose reduction with the next cycle  8. Discharge plan is to [facility]     Electronically signed  by MANDI Latham, 12/03/20, 6:57 PM EST.        I personally reviewed all pertinent labs, related documentation and the  imaging. ROS performed and physical exam done during face to face enounter with patient. I agree with  nurse practitioner's doumentation, assessment and plan.    Patient looks weaker today.  Receiving diuretics. PEG tube for nutrition  Platelets are improving.  His platelet counts appear to be recovering.  Continue supportive care.    Holding transfusions as patient does not have symptoms and is showing improvement.      Electronically signed by Ziggy Daley MD, 12/03/20, 7:51 PM EST.

## 2020-12-03 NOTE — PLAN OF CARE
Problem: Adult Inpatient Plan of Care  Goal: Plan of Care Review  Outcome: Unable to Meet, Plan Revised  Flowsheets (Taken 12/3/2020 1401)  Plan of Care Reviewed With:  • patient  • spouse  • RN     Pt was made NPO last night by RN d/t reported difficulty with nystatin mouth rinse. Pt & family reports minimal intake of PO diet yesterday. Pt requesting ice chips & was seen for re-evaluation this date.      Pt amenable only to ice chips and water, declining all other trials (puree, solids) for re-eval.  Significantly delayed cough (~1 minute following swallow) appreciated x2 which was accompanied by belching. Due to this in conjunction with current medical comorbidities & esophageal mets as well as risk of refeeding syndrome, an esophageal dysphagia is suspected versus any acute pharyngeal involvement. Recommend full liquid diet at this time given limited PO accepted for re-evaluation. Recommend pt continue with TF as primary source of nutrition & full liquids for pleasure feeds as monitored by medical management team including MDs & RD.  Pt must be up at strict 90 degrees during & for 30 minutes following all PO. Will f/u to establish diet tolerance as able with further recs as appropriate.

## 2020-12-03 NOTE — PROGRESS NOTES
[facility] Medicine Services Daily Progress Note      Hospitalist Team  LOS 7 days      Patient Care Team:  Ambar Hassan APRN as PCP - General (Family Medicine)    Patient Location: 2119/1      Subjective   Subjective     Chief Complaint / Subjective  No chief complaint on file.    Patient did well with PEG tube insertion and able to be used starting tube feeds.  Patient is tolerating no abdominal distention or pain.  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  IVIG being started by hematology.    Brief Synopsis of Hospital Course/HPI  Mr. Hernandez is a 80 y.o. with history of atrial fibrillation and COPD with new diagnosis of lung cancer presenting as direct admission from oncologist office for progressive generalized weakness as well as thrombocytopenia.  Patient had initiated chemotherapy and reported progressive weakness as well as loss of appetite over the last few weeks.  Patient had been getting more thrombocytopenic with levels in the 30s the day prior to admission down to 13 on day of admission.  Patient denied hematuria, hematochezia, melena or significant bruising though did report a small amount of blood when he blew his nose in the morning.  Currently patient reports he feels weak though otherwise asymptomatic.  No shortness of breath or chest pain, no nausea or vomiting.  Anorexia noted.     Patient found to be in atrial fibrillation with RVR once reaching the floor.  Stat EKG ordered and ordering Cardizem.  Patient received unit of platelets in ambulatory care center prior to coming up to the floor.  Patient scheduled to have feeding tube placement with GI in the next few days.    11/26/2020: Patient reports feeling better than yesterday.  Heart rate improving.  Patient reported self-limiting epistaxis.  Receiving more platelets per hematology.  Received vitamin K for procedure tomorrow.    11/27/2020: Patient overall doing well.  Heart rate still  elevated in the 110s, starting oral Cardizem trial to wean off IV Cardizem.  Patient's platelets in the 20s but no signs of bleeding.      11/28/2020: Patient reports doing well no new symptoms.  No chest pain or shortness of breath.  Heart rate improving borderline controlled.  Patient going for PEG tube today.  Platelets in the 30s.    11/30/2020  Today the patient continues to have feeding difficulty as well as difficulty with some shortness of breath.  He remains weak.  The patient's platelet count remains extremely low despite hematology oncology's efforts.    12/1/2020  The patient seemed very fatigued and very weak today.  The family's wife is at the bedside and was trying to encourage him to continue to fight.  She is aware that the patient's issues with his platelets as well as his white count is related to the chemotherapy.  She asked me when the patient was going to have received additional chemotherapy and I told her probably not until he recovers from this session of chemo.    12/2/2020  Today the patient is having increased congestion.  His cough reflex is extremely poor.  He is not mobilizing secretions.  His weight is also up 3 kg just over yesterday and his sodium is declining.  The patient does not have a fever does not have an elevation in his white count.    12/3/2020  Today the patient's daughter is present at the time of my visit.  The patient no longer can speak.  It is concerning as the patient may have involvement of his left recurrent laryngeal nerve or the patient may have esophageal issues from the lesion that was seen on upper endoscopy.  He has been seen by speech therapy and is now on a full liquid diet only by mouth and is receiving the majority of his nutrition per his tube feeding.    Review of Systems   Constitution: Positive for decreased appetite and malaise/fatigue. Negative for chills and fever.   HENT: Negative.  Negative for hoarse voice and sore throat.    Eyes: Negative.   "Negative for double vision and photophobia.   Cardiovascular: Negative for chest pain and leg swelling.   Respiratory: Positive for shortness of breath. Negative for sputum production.    Endocrine: Negative.    Hematologic/Lymphatic: Negative.    Skin: Negative.    Musculoskeletal: Negative.  Negative for myalgias and stiffness.   Gastrointestinal: Negative.  Negative for nausea and vomiting.   Genitourinary: Negative.  Negative for dysuria and flank pain.   Neurological: Positive for weakness. Negative for dizziness and focal weakness.   Psychiatric/Behavioral: Negative.  Negative for altered mental status. The patient is not nervous/anxious.    Allergic/Immunologic: Negative.    All other systems reviewed and are negative.    Objective   Objective      Vital Signs  Temp:  [97.7 °F (36.5 °C)-99.7 °F (37.6 °C)] 98.7 °F (37.1 °C)  Heart Rate:  [] 104  Resp:  [16-32] 16  BP: (107-142)/(54-81) 117/54  Oxygen Therapy  SpO2: 95 %  Pulse Oximetry Type: Continuous  Device (Oxygen Therapy): nasal cannula  Device (Oxygen Therapy): nasal cannula  Flow (L/min): 12  Oxygen Concentration (%): 3  ETCO2 (mmHg): 36 mmHg  Flowsheet Rows      First Filed Value   Admission Height  188 cm (74\") Documented at 11/25/2020 1510   Admission Weight  79.7 kg (175 lb 11.3 oz) Documented at 11/25/2020 1510        Intake & Output (last 3 days)       11/30 0701 - 12/01 0700 12/01 0701 - 12/02 0700 12/02 0701 - 12/03 0700 12/03 0701 - 12/04 0700    P.O. 300 360 200     Blood  446      Other 60 60 80     NG/  675     Total Intake(mL/kg) 870 (10.8) 866 (10.4) 955 (11.6)     Urine (mL/kg/hr) 150 (0.1) 800 (0.4) 225 (0.1)     Stool 0 0 0     Total Output 150 800 225     Net +720 +66 +730             Urine Unmeasured Occurrence 4 x 4 x 15 x     Stool Unmeasured Occurrence   1 x         Lines, Drains & Airways    Active LDAs     Name:   Placement date:   Placement time:   Site:   Days:    Peripheral IV 11/25/20 1822 Distal;Posterior;Right " Forearm   11/25/20    1822    Forearm   less than 1    Single Lumen Implantable Port 11/16/20 Left Subclavian   11/16/20    1002    Subclavian   10            Physical Exam:  Physical Exam    General: Frail elderly male lying in bed breathing comfortably on 3 L nasal cannula no acute distress.  The patient does have some bruises throughout but no evidence of bleeding at this time.  The patient was a bit more interactive today but has asked his ability to speak.  HEENT: NC/AT, EOMI, mucosa moist  Heart: Irregular, rate controlled  Chest: Normal work of breathing, moving air well in all lung fields no wheezing but the patient does have a lot of rhonchi with rales in the bases bilaterally.  Abdominal: Soft. NT/ND.   Musculoskeletal: Normal ROM.  No cyanosis. No calf tenderness.  Neurological: AAOx3, no focal deficits.  The patient does not appear to have a neurologic defect response for the loss of speech  Skin: Skin is warm and dry.  No petechiae  Psychiatric: Normal mood and affect.    Procedures:    Results Review:     I reviewed the patient's new clinical results.      Lab Results (last 24 hours)     Procedure Component Value Units Date/Time    Blood Gas, Venous - [[id number]]  (Abnormal) Collected: 12/02/20 1310    Specimen: Venous Blood Updated: 12/03/20 1559     Site Central[facility]     pH, Venous 7.450 pH Units      pCO2, Venous 39.1 mm Hg      pO2, Venous 26.8 mm Hg      HCO3, Venous 27.1 mmol/L      Base Excess, Venous 3.0 mmol/L      Comment: Serial Number: [id number]:  [id number]        O2 Saturation, Venous 53.2 %      CO2 Content 28.3 mmol/L      Barometric Pressure for Blood Gas --     Comment: N/A        Modality Cannula     FIO2 50 %     Manual Differential [[id number]]  (Abnormal) Collected: 12/03/20 0527    Specimen: Blood Updated: 12/03/20 0635     Neutrophil % 59.0 %      Lymphocyte % 7.0 %      Monocyte % 4.0 %      Eosinophil % 5.0 %      Bands %  21.0 %      Metamyelocyte % 1.0 %      Atypical  Lymphocyte % 3.0 %      Neutrophils Absolute 14.32 10*3/mm3      Lymphocytes Absolute 1.25 10*3/mm3      Monocytes Absolute 0.72 10*3/mm3      Eosinophils Absolute 0.90 10*3/mm3      Anisocytosis Slight/1+     Poikilocytes Slight/1+     Toxic Granulation Slight/1+     Platelet Estimate Decreased    Narrative:      Reviewed by Pathologist within the past 30 days on [id number] .      CBC & Differential [[id number]]  (Abnormal) Collected: 12/03/20 0527    Specimen: Blood Updated: 12/03/20 0635    Narrative:      The following orders were created for panel order CBC & Differential.  Procedure                               Abnormality         Status                     ---------                               -----------         ------                     Scan Slide[[id number]]                                       Final result               CBC Auto Differential[[id number]]        Abnormal            Final result                 Please view results for these tests on the individual orders.    CBC Auto Differential [[id number]]  (Abnormal) Collected: 12/03/20 0527    Specimen: Blood Updated: 12/03/20 0635     WBC 17.90 10*3/mm3      RBC 3.93 10*6/mm3      Hemoglobin 11.1 g/dL      Hematocrit 34.0 %      MCV 86.5 fL      MCH 28.4 pg      MCHC 32.8 g/dL      RDW 14.3 %      RDW-SD 43.8 fl      MPV 8.9 fL      Platelets 10 10*3/mm3     Scan Slide [[id number]] Collected: 12/03/20 0527    Specimen: Blood Updated: 12/03/20 0635     Scan Slide --     Comment: See Manual Differential Results       Phosphorus [[id number]]  (Normal) Collected: 12/03/20 0527    Specimen: Blood Updated: 12/03/20 0624     Phosphorus 2.5 mg/dL      Comment: Result checked       Magnesium [[id number]]  (Normal) Collected: 12/03/20 0527    Specimen: Blood Updated: 12/03/20 0612     Magnesium 1.9 mg/dL     Basic Metabolic Panel [[id number]]  (Abnormal) Collected: 12/03/20 0527    Specimen: Blood Updated: 12/03/20 0612     Glucose 158 mg/dL      BUN 14 mg/dL       Creatinine 0.51 mg/dL      Sodium 130 mmol/L      Potassium 3.1 mmol/L      Chloride 91 mmol/L      CO2 32.0 mmol/L      Calcium 7.9 mg/dL      eGFR Non African Amer >150 mL/min/1.73      BUN/Creatinine Ratio 27.5     Anion Gap 7.0 mmol/L     Narrative:      GFR Normal >60  Chronic Kidney Disease <60  Kidney Failure <15      BNP [[id number]]  (Abnormal) Collected: 12/03/20 0527    Specimen: Blood Updated: 12/03/20 0605     proBNP 5,178.0 pg/mL     Narrative:      Among patients with dyspnea, NT-proBNP is highly sensitive for the detection of acute congestive heart failure. In addition NT-proBNP of <300 pg/ml effectively rules out acute congestive heart failure with 99% negative predictive value.    Results may be falsely decreased if patient taking Biotin.          No results found for: HGBA1C  Results from last 7 days   Lab Units 11/28/20  0525 11/27/20  0358   INR  1.22* 1.35*           Lab Results   Component Value Date    LIPASE 8 (L) 10/26/2020     No results found for: CHOL, CHLPL, TRIG, HDL, LDL, LDLDIRECT    Lab Results   Lab Value Date/Time    INTRAOP  10/27/2020 1203     FNA of subcarinal lymph node, immediate evaluation    Pass 1: Positive for malignant cells.    FNA of left hilar lymph node, immediate evaluation    Pass 1: Mostly blood, no malignancy.  Pass 2: Mostly blood, no malignancy.  Pass 3: Mostly blood, no malignancy.  Pass 4: Mostly blood, no malignancy.    Initial cytology interpretation performed by Maurilio Watson MD.        FINALDX  11/25/2020 1146     Leukopenia with left shift and relative eosinophilia  Thrombocytopenia  No blasts identified      FINALDX  10/27/2020 1203     Specimens #1 and #3 (Lymph node, subcarinal, endobronchial fine needle aspiration):    Positive for metastatic poorly differentiated adenocarcinoma, see comment    Specimens #2 and #4 (Lymph node, left hilar, endobronchial fine needle aspiration, smears and cell block preparation):    Rare atypical cells, see  comment    JPR/sms           COMDX  10/27/2020 1203     Specimens #1 and #3: Immunohistochemistry was performed utilizing appropriate controls and the tumor is positive for CK7 and TTF-1 and is negative for napsin A, p63, and CK5/6. 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 Many of the tumor cells display significant pleomorphism with bizarre appearing nuclei and mitotic figures. This raises the possibility of a sarcomatoid component, however, that would have to be evaluated on the resection specimen.     Specimens #2 and #4: One of the smear passes displays scattered atypical cells in a background of lymphoid tissue. Immunohistochemistry was performed utilizing appropriate controls on the cell block for cytokeratin AE1/3. The cytokeratin AE1/3 stain is negative which excludes the presence of atypical cells in the cell block. The significance of the atypical cells on the smears is unknown, however, malignancy cannot be entirely excluded. Further clinical workup should be considered.     JPR/sms          Microbiology Results (last 10 days)     Procedure Component Value - Date/Time    COVID PRE-OP / PRE-PROCEDURE SCREENING ORDER (NO ISOLATION) - Swab, Nasopharynx [[id number]]  (Normal) Collected: 11/27/20 0802    Lab Status: Final result Specimen: Swab from Nasopharynx Updated: 11/27/20 0855    Narrative:      The following orders were created for panel order COVID PRE-OP / PRE-PROCEDURE SCREENING ORDER (NO ISOLATION) - Swab, Nasopharynx.  Procedure                               Abnormality         Status                     ---------                               -----------         ------                     COVID-19,CEPHEID,COR/UDAY...[[id number]]  Normal              Final result                 Please view results for these tests on the individual orders.    COVID-19,CEPHEID,COR/UDAY/PAD IN-HOUSE(OR EMERGENT/ADD-ON),NP SWAB IN TRANSPORT MEDIA 3-4 HR TAT - Swab, Nasopharynx  [[id number]]  (Normal) Collected: 11/27/20 0802    Lab Status: Final result Specimen: Swab from Nasopharynx Updated: 11/27/20 0855     COVID19 Not Detected    Narrative:      Fact sheet for providers: https://www.fda.gov/media/832927/download     Fact sheet for patients: https://www.fda.gov/media/507224/download    Blood Culture - Blood, Hand, Left [[id number]] Collected: 11/25/20 2035    Lab Status: Final result Specimen: Blood from Hand, Left Updated: 11/30/20 2115     Blood Culture No growth at 5 days    Blood Culture - Blood, Arm, Left [[id number]] Collected: 11/25/20 2013    Lab Status: Final result Specimen: Blood from Arm, Left Updated: 11/30/20 2245     Blood Culture No growth at 5 days          ECG/EMG Results (most recent)     Procedure Component Value Units Date/Time    ECG 12 Lead [[id number]] Collected: 11/25/20 1614     Updated: 11/29/20 1102     QT Interval 355 ms     Narrative:      HEART RATE= 116  bpm  RR Interval= 516  ms  NJ Interval=   ms  P Horizontal Axis=   deg  P Front Axis=   deg  QRSD Interval= 108  ms  QT Interval= 355  ms  QRS Axis= 33  deg  T Wave Axis= 33  deg  - ABNORMAL ECG -  Atrial fibrillation  Low voltage, extremity leads  When compared with ECG of 11-Nov-2020 10:10:57,  NO SIGNIFICANT CHANGE FROM PREVIOUS ECG  Electronically Signed By: Alex Mtz (UDAY) 29-Nov-2020 10:51:45  Date and Time of Study: 2020-11-25 16:14:21               Results for orders placed during the hospital encounter of 10/26/20   Adult Transthoracic Echo Complete W/ Cont if Necessary Per Protocol    Narrative · The left atrial cavity is severely dilated.  · Left ventricular diastolic function is consistent with (grade II w/high   LAP) pseudonormalization.  · Left ventricular wall thickness is consistent with concentric   hypertrophy.  · Estimated left ventricular EF was in agreement with the calculated left   ventricular EF. Left ventricular ejection fraction appears to be 61 - 65%.   Left ventricular  systolic function is normal.  · There is moderate calcification of the aortic valve mainly affecting the   non-coronary cusp(s).  · There is mild, bileaflet mitral valve thickening present.  · Mild to moderate mitral valve regurgitation is present with a   centrally-directed jet noted.  · Mild tricuspid valve regurgitation is present.  · Estimated right ventricular systolic pressure from tricuspid   regurgitation is mildly elevated (35-45 mmHg).     Moderate MR  Severe left atrial enlargement  Grade 2 diastolic dysfunction  Preserved LV systolic function EF 60 to 65%  Normal RV size and function  Normal IVC  Mild pulmonary hypertension by TR gradient  If clinically indicated would recommend transesophageal echo for   evaluation of mitral regurgitation with severe left atrial enlargement,   spectral Doppler and color Doppler indicate not more than moderate but   suboptimal acoustic windows, possibly underestimated, recommend ESE if   clinically indicated and were suspicious of severe MR         Ct Chest Without Contrast    Result Date: 11/26/2020  1.Compared to previous examinations the paratracheal and mediastinal lymphadenopathy is larger. 2.There is a moderate size right pleural effusion and small left pleural effusion which are increased in size from previous study. 3.The right hilar mass is not definite change in size. 4.There are some new nodules in the right lower lobe. 5.There is increased opacity in the right lower lobe. 6.Cardiomegaly and severe atherosclerotic disease and coronary artery disease. 7.No change in a low-density lesion at the right hepatic dome. 8.Diffuse stomach wall thickening similar previous exam.    Electronically Signed By-Meg Marie MD On:11/26/2020 1:38 PM This report was finalized on [id number] by  Meg Marie MD.    Xr Chest 1 View    Result Date: 12/2/2020   1. Bilateral mixed interstitial/airspace disease which is worsened from the previous study. This can be seen with  multifocal pneumonia or pulmonary edema. 2. The patient either has a small to moderate subpulmonic pleural effusion or elevation of the right hemidiaphragm. This is unchanged from the previous exam. There is also unchanged dense consolidation in the right lower chest. 3. Thickening of the right paratracheal stripe consistent with known adenopathy. 4. Fullness in the right hilum consistent with a known lung cancer.  Electronically Signed By-Antonio Graham MD On:12/2/2020 10:37 AM This report was finalized on [id number] by  Antonio Graham MD.          Xrays, labs reviewed personally by physician.    Medication Review:   I have reviewed the patient's current medication list      Scheduled Meds  budesonide-formoterol, 2 puff, Inhalation, BID - RT  dilTIAZem, 30 mg, Oral, Q6H  folic acid, 1 mg, Oral, Daily  furosemide, 40 mg, Intravenous, Q12H  ipratropium-albuterol, 3 mL, Nebulization, 4x Daily - RT  metoclopramide, 10 mg, Oral, TID AC  metoprolol tartrate, 50 mg, Oral, Q12H  mirtazapine, 15 mg, Oral, Nightly  nicotine, 1 patch, Transdermal, Daily  nystatin, 5 mL, Swish & Swallow, 4x Daily  pantoprazole, 40 mg, Oral, Q AM  sodium chloride, 10 mL, Intravenous, Q12H  sucralfate, 1 g, Oral, 4x Daily AC & at Bedtime  traZODone, 50 mg, Oral, Nightly        Meds Infusions  dilTIAZem, 5-15 mg/hr, Last Rate: 5 mg/hr (11/27/20 1244)  sodium chloride, 30 mL/hr, Last Rate: 30 mL/hr (11/28/20 2152)        Meds PRN  •  acetaminophen **OR** acetaminophen  •  acetaminophen  •  benzonatate  •  bisacodyl  •  flumazenil  •  HYDROmorphone  •  HYDROmorphone  •  ipratropium-albuterol  •  labetalol  •  magnesium hydroxide  •  magnesium sulfate **OR** magnesium sulfate **OR** magnesium sulfate  •  melatonin  •  naloxone  •  nitroglycerin  •  ondansetron **OR** ondansetron  •  ondansetron  •  potassium & sodium phosphates **OR** potassium & sodium phosphates  •  potassium chloride **OR** potassium chloride **OR** potassium chloride  •   promethazine  •  sodium chloride  •  sodium chloride  •  sodium chloride        Assessment/Plan   Assessment/Plan     Active Hospital Problems    Diagnosis  POA   • **Feeding difficulties [R63.3]  Unknown   • Moderate malnutrition (CMS/HCC) [E44.0]  Yes   • Thrombocytopenia (CMS/HCC) [D69.6]  Yes      Resolved Hospital Problems   No resolved problems to display.       MEDICAL DECISION MAKING COMPLEXITY BY PROBLEM:     Generalized weakness-likely multifactorial given cancer diagnosis and chemotherapy as well as tachyarrhythmia  -PT/OT  -Fall risk  -Monitor for sources of infection  -Telemetry  -COVID-19 negative  -Heart rate control  -Nutrition consult for tube feeding  -Patient received a PEG on 11/28/2020     Atrial fibrillation-with RVR.  Patient with established history denies chest pain.  Patient able to be weaned off overnight 10/26/2020 but went back into RVR this 10/27/2020, slowly improving  -Telemetry  -Increase metoprolol  -Monitor electrolytes  -Off Cardizem drip on oral Cardizem  -Echo as of 10/26/2020 showing dilated atrial cavity with grade 2 diastolic dysfunction    Chronic respiratory failure-patient on 3 L baseline  -Chest x-ray appearing show signs of volume overload  -Ordering CT further evaluate underlying infection versus inflammation versus edema  -Off diuretics       -The patient's oxygenation is not proving.  The patient is up 3 kg and has a negative pro calcitonin.  The patient likely has congestive failure which has not been addressed.  We will start the patient on Lasix 40 mg IV twice daily and follow his weight, renal function and electrolytes.  Would hold off on antibiotics for now as his chest x-ray is more consistent with pulmonary edema.       -Repeat chest x-ray in a.m. and reevaluate    Thrombocytopenia-patient with no signs of overt bleeding though did report small amounts of blood per nasal passages, slowly improving.  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  -Patient to receive platelets later today.    Leukopenia-likely due to underlying chemotherapy  -Neupogen per hematology  -Neutropenic precaution  -Monitor for signs of occult infection  -Daily CBC  -If his platelet count continues to be this low may need to consider additional platelet therapy.    Lung cancer-patient with recent diagnosis under treatment with oncology  -Pain control  -Antiemetics  -Daily CBC  -Oncology consult  -Consider ENT referral for the patient's loss of speech       -1 way to evaluate this would be to do a bedside laryngoscopy and see if the vocal cords approximate and are still moving appropriately.    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  It is suspicious for vocal cord paralysis secondary to the patient's underlying cancer.  Also could be related to esophageal disease.  Will defer to oncology the timing of getting ENT to look at the patient's vocal cords for additional information  -Continue his nutrition through his PEG at this time.    VTE Prophylaxis -   Mechanical Order History:      Ordered        11/25/20 1608  Place Sequential Compression Device  Once         11/25/20 1608  Maintain Sequential Compression Device  Continuous                 Pharmalogical Order History:     None        Code Status -   Code Status and Medical Interventions:   Ordered at: 11/25/20 1608     Code Status:    [facility]     Medical  Interventions (Level of Support Prior to Arrest):    Full     This patient has been examined wearing appropriate Personal Protective Equipment and discussed with hospital infection control department. 12/03/20    Discharge Planning  pending  Electronically signed by Farrah Grimm MD, 12/03/20, 18:18 EST.  [facility]ist [facility]

## 2020-12-03 NOTE — PLAN OF CARE
Platelets remain critically low and pt not appropriate to mobilize at this time as a result.  PT will continue to follow and evaluate when appropriate.  PT did not enter room.    Elena Alarcon PT, DPT, GCS

## 2020-12-04 NOTE — PLAN OF CARE
Goal Outcome Evaluation:  Plan of Care Reviewed With: patient, daughter  Progress: declining  Outcome Summary: 81 y/o male admitted on 11/25 from MD office due to thrombocytopenia. PMH includes new lung Ca diagnosis and on chemo, afib, COPD on 2L/nc at night. During course of this admit, pt's platelets had dropped to 3x 10*3, currently at 11 x 10*3. Pt developed dysphagia and aphasia and had peg tube place on 11/28. Pt required mod A for supine to sit transfer and needed min A to sit EOB due to post leaning. Max A fo 2 for SPS. Pt was able to perform 2 bouts of semi standing with use of arm rests for support. Pt is below his baseline level of function and will need extensive assistance at d/c. IP rehab may be beneficial. PPE gloves, mask with shield, gown.

## 2020-12-04 NOTE — THERAPY EVALUATION
Patient Name: Shay Hernandez  : 1940    MRN: [id number]                              Today's Date: 2020       Admit Date: 2020    Visit Dx:     ICD-10-CM ICD-9-CM   1. Feeding difficulties  R63.3 783.3     Patient Active Problem List   Diagnosis   • COVID-19 ruled out   • COPD (chronic obstructive pulmonary disease) (CMS/HCC)   • Weight loss   • Weakness   • Chest pain   • Cough   • Shortness of breath   • Lung mass   • Liver mass   • Atrial fibrillation (CMS/HCC)   • Adenocarcinoma, lung, unspecified laterality (CMS/HCC)   • Encounter for fitting and adjustment of vascular catheter   • Thrombocytopenia (CMS/HCC)   • Feeding difficulties   • Moderate malnutrition (CMS/HCC)     Past Medical History:   Diagnosis Date   • Adenocarcinoma, lung, unspecified laterality (CMS/HCC) 10/27/2020   • Atrial fibrillation (CMS/HCC)    • COPD (chronic obstructive pulmonary disease) (CMS/HCC)      Past Surgical History:   Procedure Laterality Date   • APPENDECTOMY     • BRONCHOSCOPY N/A 10/27/2020    Procedure: BRONCHOSCOPY WITH BRONCHOALVEOLAR LAVAGE AND ENDOBRONCHIAL ULTRASOUND WITH FINE NEEDLE ASPIRATION X2 AREAS;  Surgeon: Johnny Waldrop MD;  Location: [facility] ENDOSCOPY;  Service: Pulmonary;  Laterality: N/A;  POST:    • ENDOSCOPY W/ PEG TUBE PLACEMENT N/A 2020    Procedure: ESOPHAGOGASTRODUODENOSCOPY WITH 20F PERCUTANEOUS ENDOSCOPIC GASTROSTOMY TUBE INSERTION WITH ANESTHESIA;  Surgeon: Lizeth Nance MD;  Location: [facility] ENDOSCOPY;  Service: Gastroenterology;  Laterality: N/A;  ulcerative esophagitis, hiatal hernia, PEG placement   • VENOUS ACCESS DEVICE (PORT) INSERTION N/A 2020    Procedure: INSERTION VENOUS ACCESS DEVICE, LEFT SUBCLAVIAN UNDER FLOUROSCOPIC GUIDANCE ;  Surgeon: Jan Richards MD;  Location: [facility] MAIN OR;  Service: Cardiothoracic;  Laterality: N/A;     General Information     Row Name 20 1439          Physical Therapy Time and Intention    Document Type   evaluation  -CR     Mode of Treatment  physical therapy  -CR     Row Name 12/04/20 1439          General Information    Patient Profile Reviewed  yes  -CR     Prior Level of Function  independent:;all household mobility;transfer  -CR     Existing Precautions/Restrictions  fall;oxygen therapy device and L/min thrombocytopenia - reverse isolation  -CR     Barriers to Rehab  medically complex  -CR     Row Name 12/04/20 1439          Cognition    Orientation Status (Cognition)  oriented x 3  -CR     Row Name 12/04/20 1439          Safety Issues, Functional Mobility    Impairments Affecting Function (Mobility)  balance;postural/trunk control;endurance/activity tolerance;strength;shortness of breath;coordination;muscle tone abnormal  -CR       User Key  (r) = Recorded By, (t) = Taken By, (c) = Cosigned By    Initials Name Provider Type    CR Reyes, Carmela, PT Physical Therapist        Mobility     Row Name 12/04/20 1441          Bed Mobility    Bed Mobility  sit-supine  -CR     Supine-Sit [place] (Bed Mobility)  moderate assist (50% patient effort);verbal cues  -CR     Assistive Device (Bed Mobility)  bed rails;draw sheet  -CR     Comment (Bed Mobility)  post leaning while sitting EOB , can correct with min A  -CR     Row Name 12/04/20 1441          Bed-Chair Transfer    Bed-Chair [place] (Transfers)  maximum assist (25% patient effort);2 person assist  -CR     Row Name 12/04/20 1441          Sit-Stand Transfer    Sit-Stand [place] (Transfers)  maximum assist (25% patient effort);2 person assist  -CR       User Key  (r) = Recorded By, (t) = Taken By, (c) = Cosigned By    Initials Name Provider Type    CR Reyes, Carmela, PT Physical Therapist        Obj/Interventions     Row Name 12/04/20 1442          Range of Motion Comprehensive    Comment, General Range of Motion  AROM BLE mod limit  -CR     Row Name 12/04/20 1442          Strength Comprehensive (MMT)    Comment, General Manual Muscle Testing (MMT)  Assessment  BLE mm grossly 2/5  -CR     Row Name 12/04/20 1442          Balance    Static Sitting Balance  moderate impairment  -CR     Static Standing Balance  severe impairment  -CR     Row Name 12/04/20 1442          Sensory Assessment (Somatosensory)    Sensory Assessment (Somatosensory)  sensation intact  -CR       User Key  (r) = Recorded By, (t) = Taken By, (c) = Cosigned By    Initials Name Provider Type    CR Reyes, Carmela, PT Physical Therapist        Goals/Plan     Row Name 12/04/20 1451          Bed Mobility Goal 1 (PT)    Activity/Assistive Device (Bed Mobility Goal 1, PT)  sit to supine/supine to sit  -CR     [place] Level/Cues Needed (Bed Mobility Goal 1, PT)  minimum assist (75% or more patient effort)  -CR     Time Frame (Bed Mobility Goal 1, PT)  2 weeks  -CR     Row Name 12/04/20 1451          Transfer Goal 1 (PT)    Activity/Assistive Device (Transfer Goal 1, PT)  transfers, all;walker, rolling  -CR     [place] Level/Cues Needed (Transfer Goal 1, PT)  minimum assist (75% or more patient effort)  -CR     Time Frame (Transfer Goal 1, PT)  2 weeks  -CR     Row Name 12/04/20 1451          Gait Training Goal 1 (PT)    Activity/Assistive Device (Gait Training Goal 1, PT)  assistive device use;gait (walking locomotion);improve balance and speed;forward stepping;walker, rolling  -CR     [place] Level (Gait Training Goal 1, PT)  minimum assist (75% or more patient effort)  -CR     Distance (Gait Training Goal 1, PT)  50  -CR     Time Frame (Gait Training Goal 1, PT)  2 weeks  -CR       User Key  (r) = Recorded By, (t) = Taken By, (c) = Cosigned By    Initials Name Provider Type    CR Reyes, Carmela, PT Physical Therapist        Clinical Impression     Row Name 12/04/20 1442          Pain    Additional Documentation  Pain Scale: Numbers Pre/Post-Treatment (Group)  -CR     Row Name 12/04/20 1442          Pain Scale: Numbers Pre/Post-Treatment    Pretreatment Pain Rating  0/10 - no pain  -CR      Posttreatment Pain Rating  0/10 - no pain  -CR     Row Name 12/04/20 1442          Plan of Care Review    Plan of Care Reviewed With  patient;daughter  -CR     Outcome Summary  79 y/o male admitted on 11/25 from MD office due to thrombocytopenia. PMH includes new lung Ca diagnosis and on chemo, afib, COPD on 2L/nc at night. During course of this admit, pt's platelets had dropped to 3x 10*3, currently at 11 x 10*3. Pt developed dysphagia and aphasia and had peg tube place on 11/28. Pt required mod A for supine to sit transfer and needed min A to sit EOB due to post leaning. Max A fo 2 for SPS. Pt was able to perform 2 bouts of semi standing with use of arm rests for support. Pt is below his baseline level of function and will need extensive assistance at d/c. IP rehab may be beneficial. PPE gloves, mask with shield, gown.  -CR     Row Name 12/04/20 1442          Therapy Assessment/Plan (PT)    Rehab Potential (PT)  fair, will monitor progress closely  -CR     Criteria for Skilled Interventions Met (PT)  yes;skilled treatment is necessary  -CR     Predicted Duration of Therapy Intervention (PT)  dc  -CR     Row Name 12/04/20 1442          Vital Signs    Pre SpO2 (%)  93  -CR     O2 Delivery Pre Treatment  supplemental O2  -CR     Post SpO2 (%)  92  -CR     O2 Delivery Post Treatment  supplemental O2  -CR     Pre Patient Position  Supine  -CR     Post Patient Position  Sitting  -CR     Row Name 12/04/20 1442          Positioning and Restraints    Pre-Treatment Position  in bed  -CR     Post Treatment Position  chair  -CR     In Chair  notified nsg;reclined;call light within reach;waffle cushion  -CR       User Key  (r) = Recorded By, (t) = Taken By, (c) = Cosigned By    Initials Name Provider Type    CR Reyes, Carmela, PT Physical Therapist        Outcome Measures     Row Name 12/04/20 1451          How much help from another person do you currently need...    Turning from your back to your side while in flat bed  without using bedrails?  3  -CR     Moving from lying on back to sitting on the side of a flat bed without bedrails?  2  -CR     Moving to and from a bed to a chair (including a wheelchair)?  2  -CR     Standing up from a chair using your arms (e.g., wheelchair, bedside chair)?  2  -CR     Climbing 3-5 steps with a railing?  1  -CR     To walk in hospital room?  1  -CR     AM-PAC 6 Clicks Score (PT)  11  -CR     Row Name 12/04/20 1451          Modified [place] Scale    Modified [place] Scale  5 - Severe disability.  Bedridden, incontinent, and requiring constant nursing care and attention.  -CR     Row Name 12/04/20 1451          Functional Assessment    Outcome Measure Options  Modified [place]  -CR       User Key  (r) = Recorded By, (t) = Taken By, (c) = Cosigned By    Initials Name Provider Type    CR Reyes, Carmela, PT Physical Therapist        Physical Therapy Education                 Title: PT OT SLP Therapies (In Progress)     Topic: Physical Therapy (In Progress)     Point: Mobility training (Done)     Learning Progress Summary           Patient Acceptance, E, VU,NR by CR at 12/4/2020 1452   Family Acceptance, E, VU,NR by CR at 12/4/2020 1452                   Point: Home exercise program (Not Started)     Learner Progress:  Not documented in this visit.                      User Key     Initials Effective Dates Name Provider Type Discipline    CR 03/01/19 -  Reyes, Carmela, PT Physical Therapist PT              PT Recommendation and Plan  Planned Therapy Interventions (PT): balance training, bed mobility training, gait training, neuromuscular re-education, patient/family education, strengthening, transfer training  Plan of Care Reviewed With: patient, daughter  Outcome Summary: 81 y/o male admitted on 11/25 from MD office due to thrombocytopenia. PMH includes new lung Ca diagnosis and on chemo, afib, COPD on 2L/nc at night. During course of this admit, pt's platelets had dropped to 3x 10*3, currently at 11 x  10*3. Pt developed dysphagia and aphasia and had peg tube place on 11/28. Pt required mod A for supine to sit transfer and needed min A to sit EOB due to post leaning. Max A fo 2 for SPS. Pt was able to perform 2 bouts of semi standing with use of arm rests for support. Pt is below his baseline level of function and will need extensive assistance at d/c. IP rehab may be beneficial. PPE gloves, mask with shield, gown.     Time Calculation:   PT Charges     Row Name 12/04/20 1453             Time Calculation    Start Time  0952  -CR      Stop Time  1030  -CR      Time Calculation (min)  38 min  -CR      PT Received On  12/04/20  -CR      PT - Next Appointment  12/05/20  -CR      PT Goal Re-Cert Due Date  12/18/20  -CR         Time Calculation- PT    Total Timed Code Minutes- PT  10 minute(s)  -CR        User Key  (r) = Recorded By, (t) = Taken By, (c) = Cosigned By    Initials Name Provider Type    CR Reyes, Carmela, PT Physical Therapist        Therapy Charges for Today     Code Description Service Date Service Provider Modifiers Qty    89971750042  PT THERAPEUTIC ACT EA 15 MIN 12/4/2020 Reyes, Carmela, PT GP 1    [id number] HC PT EVAL HIGH COMPLEXITY 3 12/4/2020 Reyes, Carmela, PT GP 1          PT G-Codes  Outcome Measure Options: Modified Jun  AM-PAC 6 Clicks Score (PT): 11  AM-PAC 6 Clicks Score (OT): 14  Modified [place] Scale: 5 - Severe disability.  Bedridden, incontinent, and requiring constant nursing care and attention.    Carmela Reyes, PT  12/4/2020

## 2020-12-04 NOTE — THERAPY TREATMENT NOTE
Patient Name: Shay Hernandez  : 1940    MRN: [id number]                              Today's Date: 2020       Admit Date: 2020    Visit Dx:     ICD-10-CM ICD-9-CM   1. Feeding difficulties  R63.3 783.3     Patient Active Problem List   Diagnosis   • COVID-19 ruled out   • COPD (chronic obstructive pulmonary disease) (CMS/HCC)   • Weight loss   • Weakness   • Chest pain   • Cough   • Shortness of breath   • Lung mass   • Liver mass   • Atrial fibrillation (CMS/HCC)   • Adenocarcinoma, lung, unspecified laterality (CMS/HCC)   • Encounter for fitting and adjustment of vascular catheter   • Thrombocytopenia (CMS/HCC)   • Feeding difficulties   • Moderate malnutrition (CMS/HCC)     Past Medical History:   Diagnosis Date   • Adenocarcinoma, lung, unspecified laterality (CMS/HCC) 10/27/2020   • Atrial fibrillation (CMS/HCC)    • COPD (chronic obstructive pulmonary disease) (CMS/HCC)      Past Surgical History:   Procedure Laterality Date   • APPENDECTOMY     • BRONCHOSCOPY N/A 10/27/2020    Procedure: BRONCHOSCOPY WITH BRONCHOALVEOLAR LAVAGE AND ENDOBRONCHIAL ULTRASOUND WITH FINE NEEDLE ASPIRATION X2 AREAS;  Surgeon: Johnny Waldrop MD;  Location: [facility] ENDOSCOPY;  Service: Pulmonary;  Laterality: N/A;  POST:    • ENDOSCOPY W/ PEG TUBE PLACEMENT N/A 2020    Procedure: ESOPHAGOGASTRODUODENOSCOPY WITH 20F PERCUTANEOUS ENDOSCOPIC GASTROSTOMY TUBE INSERTION WITH ANESTHESIA;  Surgeon: Lizeth Nance MD;  Location: [facility] ENDOSCOPY;  Service: Gastroenterology;  Laterality: N/A;  ulcerative esophagitis, hiatal hernia, PEG placement   • VENOUS ACCESS DEVICE (PORT) INSERTION N/A 2020    Procedure: INSERTION VENOUS ACCESS DEVICE, LEFT SUBCLAVIAN UNDER FLOUROSCOPIC GUIDANCE ;  Surgeon: Jan Richards MD;  [facility];  Service: Cardiothoracic;  Laterality: N/A;     General Information     Row Name 20 1317          OT Time and Intention    Document Type  therapy note (daily  note)  -SR     Mode of Treatment  occupational therapy  -SR     Row Name 12/04/20 1317          General Information    Existing Precautions/Restrictions  fall;oxygen therapy device and L/min  -SR       User Key  (r) = Recorded By, (t) = Taken By, (c) = Cosigned By    Initials Name Provider Type    SR Brigida Butler OT Occupational Therapist        Mobility/ADL's     Row Name 12/04/20 1317          Bed Mobility    Bed Mobility  sit-supine  -SR     Sit-Supine [place] (Bed Mobility)  maximum assist (25% patient effort);2 person assist  -SR     Row Name 12/04/20 1317          Transfers    Transfers  bed-chair transfer  -SR     Comment (Transfers)  Pt up in chair when OT arrived.  Platelets 11, though Dr. Pereira asked for pt to sit up in chair.  PT assisted pt to chair this AM.  At this time OT attmepted to use gait belt for stand pivot to bed, though unable to safely complete. [place] lift was utilized for transfer back to bed.  -SR       User Key  (r) = Recorded By, (t) = Taken By, (c) = Cosigned By    Initials Name Provider Type    Brigida Pena OT Occupational Therapist        Obj/Interventions     Row Name 12/04/20 1325          Balance    Static Sitting Balance  mild impairment  -SR     Static Standing Balance  severe impairment  -SR       User Key  (r) = Recorded By, (t) = Taken By, (c) = Cosigned By    Initials Name Provider Type    SR Brigida Butler OT Occupational Therapist        Goals/Plan    No documentation.       Clinical Impression     Row Name 12/04/20 1326          Pain Scale: FACES Pre/Post-Treatment    Pain: FACES Scale, Pretreatment  0-->no hurt  -SR     Posttreatment Pain Rating  0-->no hurt  -SR     Row Name 12/04/20 1326          Plan of Care Review    Plan of Care Reviewed With  patient  -SR     Outcome Summary  Pt unable to transfer back to bed safely without use of shirley lift.  He tolerated well, though reported mild SOA once returned to fowlers position.  Very  difficult to obtain O2 saturation.  After warming hand O2 WFL.  RN in room.  Pt will require IP rehab at discharge.  PPE: mask, shield, gloves.  -     Row Name 12/04/20 1326          Therapy Plan Review/Discharge Plan (OT)    Anticipated Discharge Disposition (OT)  inpatient rehabilitation facility  -       User Key  (r) = Recorded By, (t) = Taken By, (c) = Cosigned By    Initials Name Provider Type    SR Brigida Butler OT Occupational Therapist        Outcome Measures    No documentation.       Occupational Therapy Education                 Title: PT OT SLP Therapies (In Progress)     Topic: Occupational Therapy (In Progress)     Point: ADL training (Done)     Description:   Instruct learner(s) on proper safety adaptation and remediation techniques during self care or transfers.   Instruct in proper use of assistive devices.              Learning Progress Summary           Patient Acceptance, E, VU,DU by JD at 11/27/2020 1223                   Point: Home exercise program (Not Started)     Description:   Instruct learner(s) on appropriate technique for monitoring, assisting and/or progressing therapeutic exercises/activities.              Learner Progress:  Not documented in this visit.          Point: Precautions (Not Started)     Description:   Instruct learner(s) on prescribed precautions during self-care and functional transfers.              Learner Progress:  Not documented in this visit.          Point: Body mechanics (In Progress)     Description:   Instruct learner(s) on proper positioning and spine alignment during self-care, functional mobility activities and/or exercises.              Learning Progress Summary           Patient Acceptance, E,TB, NR by  at 12/4/2020 1337                               User Key     Initials Effective Dates Name Provider Type Discipline     03/01/19 -  Brigida Butler OT Occupational Therapist OT    JD 07/15/20 -  Amara Altamirano OT  Occupational Therapist OT              OT Recommendation and Plan     Plan of Care Review  Plan of Care Reviewed With: patient  Outcome Summary: Pt unable to transfer back to bed safely without use of bean lift.  He tolerated well, though reported mild SOA once returned to fowlers position.  Very difficult to obtain O2 saturation.  After warming hand O2 WFL.  RN in room.  Pt will require IP rehab at discharge.  PPE: mask, shield, gloves.     Time Calculation:   Time Calculation- OT     Row Name 12/04/20 1338             Time Calculation- OT    OT Start Time  1138  -SR      OT Stop Time  1223  -SR      OT Time Calculation (min)  45 min  -SR      Total Timed Code Minutes- OT  45 minute(s)  -SR      OT Received On  12/04/20  -SR      OT - Next Appointment  12/07/20  -SR        User Key  (r) = Recorded By, (t) = Taken By, (c) = Cosigned By    Initials Name Provider Type    SR Brigida Butler OT Occupational Therapist        Therapy Charges for Today     Code Description Service Date Service Provider Modifiers Qty    [id number]  OT THERAPEUTIC ACT EA 15 MIN 12/4/2020 Brigida Butler OT GO 3               Brigida Butler OT  12/4/2020

## 2020-12-04 NOTE — PROGRESS NOTES
Nutrition Services    Patient Name: Shay Hernandez  YOB: 1940  MRN: [id number]  Admission date: 11/25/2020    PPE Documentation        PPE Worn By Provider Did not enter room - care provided remotely due to self-quarantine   PPE Worn By Patient  N/A     PROGRESS NOTE      Encounter Information: Progress note to check on TF.        PO Diet: NPO Diet     ST continues to follow -- based on documentation, ST suspects esophageal dysphagia and recommends full liquid diet, with continuation of TF for majority of nutrition. Pt requires strict 90 degrees during meals and for 30 minutes after. ST has not yet ordered diet.   PO Supplements: -    PO Intake:  -       TF orders: Isosource 1.5 at 55mL/hour with 10mL free water flush every 2 hours   TF review: Per EMR Isosource 1.5 is infusing at 55 ml/hr, spoke with RN via secure chat who reports pt is tolerating well. Residuals wNL.        Labs (reviewed below): Hyponatremia-improved  Hypophosphatemia- secure chatted RN who is to give prn phos        GI Function:  Small BM 12/3       Nutrition Intervention: Increasing TF slightly towards goal rate today, will continue to monitor electrolyte trends. Will secure chat RN new TF orders:    Isosource 1.5 at 60 ml/hr + 10 ml q 2 hrs water flush        Results from last 7 days   Lab Units 12/04/20  0403 12/03/20  0527 12/02/20  0923   SODIUM mmol/L 133* 130* 130*   POTASSIUM mmol/L 4.0 3.1* 3.6   CHLORIDE mmol/L 96* 91* 94*   CO2 mmol/L 29.0 32.0* 26.0   BUN mg/dL 15 14 13   CREATININE mg/dL 0.59* 0.51* 0.46*   CALCIUM mg/dL 7.5* 7.9* 7.4*   BILIRUBIN mg/dL  --   --  0.8   ALK PHOS U/L  --   --  133*   ALT (SGPT) U/L  --   --  19   AST (SGOT) U/L  --   --  17   GLUCOSE mg/dL 155* 158* 139*     Results from last 7 days   Lab Units 12/04/20  0403 12/04/20  0402 12/03/20  0527 12/02/20  0600   MAGNESIUM mg/dL  --  2.0 1.9  --  1.8   PHOSPHORUS mg/dL  --  1.6* 2.5  --  1.7*   HEMOGLOBIN g/dL 10.6*  --  11.1*   < > 10.7*    HEMATOCRIT % 31.7*  --  34.0*   < > 32.3*    < > = values in this interval not displayed.     COVID19   Date Value Ref Range Status   11/27/2020 Not Detected Not Detected - Ref. Range Final     No results found for: HGBA1C    RD to follow up per protocol.    Electronically signed by:  Jelena Ocampo RD  12/04/20 10:32 EST

## 2020-12-04 NOTE — PLAN OF CARE
Goal Outcome Evaluation:  Plan of Care Reviewed With: patient  Progress: declining  Outcome Summary: Pt unable to transfer back to bed safely without use of bean lift.  He tolerated well, though reported mild SOA once returned to fowlers position.  Very difficult to obtain O2 saturation.  After warming hand O2 WFL.  RN in room.  Pt will require IP rehab at discharge.  PPE: mask, shield, gloves.

## 2020-12-04 NOTE — PLAN OF CARE
Goal Outcome Evaluation:  Plan of Care Reviewed With: patient, spouse, other (see comments)  Progress: declining

## 2020-12-04 NOTE — PROGRESS NOTES
ONCOLOGY/HEMATOLOGY    PATIENT: Shay Hernandez  YOB: 1940  MEDICAL RECORD NUMBER: [id number]   DATE OF SERVICE: 12/04/20    CHIEF COMPLAINT: Thrombocytopenia; lung cancer    HISTORY OF PRESENT ILLNESS:   Mr. Hernandez is a 80 y.o. with history of stage IIIc poorly differentiated adenocarcinoma lung, atrial fibrillation and COPD  presenting as direct admission from oncologist office for progressive generalized weakness as well as thrombocytopenia.  Patient had initiated chemotherapy recently 11/17/2020 and reported progressive weakness as well as loss of appetite over the last few weeks.  Patient had been getting more thrombocytopenic with levels in the 30s the day prior to admission down to 13 on day of admission.  Patient denied hematuria, hematochezia, melena or significant bruising though did report a small amount of blood when he blew his nose in the morning.     Patient found to be in atrial fibrillation with RVR once reaching the floor.  Stat EKG ordered and ordering Cardizem.  Patient received unit of platelets in ambulatory care center prior to coming up to the floor.  Patient scheduled to have feeding tube placement with GI in the next few days.    He received his PEG 11/28/2020 platelet count improved nominally from 26->32 with steroids and transfusion.  However, despite 2 doses of prednisone, today the platelets have dropped to 13.  He is interested in potentially eating something by mouth.  EGD done with PEG showed ulcerated GEJ suggesting potentially metastatic involvement by the lung cancer.  bx was deferred given thrombocytopenia.       Oncological history:  Stage IIIc poorly differentiated adenocarcinoma of lung -patient presented with a bulky right hilar mass and also bulky mediastinal lymph nodes.    Also has left hilar lymph nodes consistent with metastasis and borderline enlarged left supraclavicular lymph node. . MRI abdomen 10/28/2020 - 2.2 cm lesion in the dome of liver with  features of a cyst. Negative for evidence of hepatic metastases. S/p bronchoscopy with EBUS on 10/27/2020 - subcarinal lymph node FNA positive for metastatic poorly differentiated adenocarcinoma. Staining pattern is consistent with adenocarcinoma of pulmonary origin. Many of the tumor cells display significant pleomorphism with bizarre appearing nuclei and mitotic figures. This raises the possibility of a sarcomatoid component. MRI brain 10/29/2020 - no metastatic disease.   · 11/17/2020 patient received cycle 1 of cisplatin and pemetrexed.  Cisplatin 75 mg/m², pemetrexed 400 mg per metered square..  WBC 15.6, hemoglobin 13.7, platelets 129, creatinine 0.7, albumin 2.8  ·  11/5/2020: PET CT scan: Hypermetabolic right hilar mass consistent with malignancy.  Hypermetabolic nodular densities within the right lower lobe consistent with metastatic disease.  Bulky pathological enlarged hypermetabolic mediastinal adenopathy, consistent with metastatic disease.  No left hilar adenopathy.  Moderate FDG accumulation within the proximal stomach with gastric wall thickening.  This could be physiologic uptake..  Could be gastritis.  Gastric malignancy cannot be excluded.  Endoscopy recommended.  Enlarged hypermetabolic bilateral supraclavicular lymph nodes in the lower neck consistent with metastasis.    History of present illness was reviewed and is unchanged from the previous visit. 12/03/20    Subjective:  Patient seen this morning.  He cannot speak.  He looks very restless.  Breathing appears to have improved compared to yesterday.  O2 sats better.      Review of Systems   Constitutional: Positive for fatigue. Negative for activity change, appetite change, chills, fever and unexpected weight change.   HENT: Positive for voice change ( not able to speak ). Negative for mouth sores, sore throat and tinnitus.    Eyes: Negative for photophobia, pain, redness and visual disturbance.   Respiratory: Positive for cough and shortness  of breath.    Cardiovascular: Negative for chest pain, palpitations and leg swelling.   Gastrointestinal: Negative for abdominal pain, constipation, diarrhea, nausea and vomiting.   Genitourinary: Negative for dysuria and hematuria.   Musculoskeletal: Negative for arthralgias, back pain, myalgias and neck pain.   Skin: Negative for rash and wound.   Allergic/Immunologic: Negative for environmental allergies.   Neurological: Negative for dizziness, speech difficulty, weakness, light-headedness, numbness and headaches.   Hematological: Negative for adenopathy. Does not bruise/bleed easily.   Psychiatric/Behavioral: Negative for confusion and dysphoric mood. The patient is not nervous/anxious.    All other systems reviewed and are negative.    Past Medical History:   Diagnosis Date   • Adenocarcinoma, lung, unspecified laterality (CMS/[facility]) 10/27/2020   • Atrial fibrillation (CMS/[facility])    • COPD (chronic obstructive pulmonary disease) (CMS/[facility])        Past Surgical History:   Procedure Laterality Date   • APPENDECTOMY     • BRONCHOSCOPY N/A 10/27/2020    Procedure: BRONCHOSCOPY WITH BRONCHOALVEOLAR LAVAGE AND ENDOBRONCHIAL ULTRASOUND WITH FINE NEEDLE ASPIRATION X2 AREAS;  Surgeon: Johnny Waldrop MD;  Location: [facility] ENDOSCOPY;  Service: Pulmonary;  Laterality: N/A;  POST:    • ENDOSCOPY W/ PEG TUBE PLACEMENT N/A 11/28/2020    Procedure: ESOPHAGOGASTRODUODENOSCOPY WITH 20F PERCUTANEOUS ENDOSCOPIC GASTROSTOMY TUBE INSERTION WITH ANESTHESIA;  Surgeon: Lizeth Nance MD;  Location: [facility] ENDOSCOPY;  Service: Gastroenterology;  Laterality: N/A;  ulcerative esophagitis, hiatal hernia, PEG placement   • VENOUS ACCESS DEVICE (PORT) INSERTION N/A 11/16/2020    Procedure: INSERTION VENOUS ACCESS DEVICE, LEFT SUBCLAVIAN UNDER FLOUROSCOPIC GUIDANCE ;  Surgeon: Jan Richards MD;  Location: [facility] MAIN OR;  Service: Cardiothoracic;  Laterality: N/A;       Family History   Problem Relation Age of Onset   • Heart attack  Mother    • Lung disease Other         Cryptococcus       Social History     Socioeconomic History   • Marital status:      Spouse name: Not on file   • Number of children: Not on file   • Years of education: Not on file   • Highest education level: Not on file   Tobacco Use   • Smoking status: Former Smoker     Packs/day: 2.00     Years: 60.00     Pack years: 120.00     Types: Cigarettes     Quit date: 10/27/2020     Years since quittin.1   • Smokeless tobacco: Never Used   Substance and Sexual Activity   • Alcohol use: Not Currently     Alcohol/week: 4.0 standard drinks     Types: 4 Cans of beer per week   • Drug use: Not Currently   • Sexual activity: Defer   Social History Narrative    Lives with wife     ALLERGIES:  Patient has no known allergies.    MEDICATION:  Current Facility-Administered Medications   Medication Dose Route Frequency Provider Last Rate Last Admin   • acetaminophen (TYLENOL) tablet 1,000 mg  1,000 mg Oral Once PRN Ric Mcnamara MD        Or   • acetaminophen (TYLENOL) suppository 650 mg  650 mg Rectal Once PRN Ric Mcnamara MD       • acetaminophen (TYLENOL) tablet 650 mg  650 mg Oral Q4H PRN Lizeth Nance MD       • benzonatate (TESSALON) capsule 200 mg  200 mg Oral TID PRN Elder Tineo MD       • bisacodyl (DULCOLAX) EC tablet 5 mg  5 mg Oral Daily PRN Lizteh Nance MD       • budesonide-formoterol (SYMBICORT) 160-4.5 MCG/ACT inhaler 2 puff  2 puff Inhalation BID - RT Lizeth Nance MD   2 puff at 20 0839   • dilTIAZem (CARDIZEM) 100 mg in 100 mL NS infusion (ADV)  5-15 mg/hr Intravenous Titrated Lizeth Nance MD 5 mL/hr at 20 1244 5 mg/hr at 20 1244   • dilTIAZem (CARDIZEM) tablet 30 mg  30 mg Oral Q6H Lizeth Nance MD   30 mg at 20 1336   • flumazenil (ROMAZICON) injection 0.1 mg  0.1 mg Intravenous PRN Ric Mcnamara MD       • folic acid (FOLVITE) tablet 1 mg  1 mg Oral Daily Lizeth Nance MD    1 mg at 12/04/20 0925   • furosemide (LASIX) injection 40 mg  40 mg Intravenous Q12H Farrah Grimm MD   40 mg at 12/04/20 0442   • HYDROmorphone (DILAUDID) injection 0.2 mg  0.2 mg Intravenous Q15 Min PRN Ric Mcnamara MD       • HYDROmorphone (DILAUDID) injection 0.25 mg  0.25 mg Intravenous Q15 Min PRN Ric Mcnamara MD   0.25 mg at 11/30/20 1152   • ipratropium-albuterol (DUO-NEB) nebulizer solution 3 mL  3 mL Nebulization 4x Daily - RT Lizeth Nance MD   3 mL at 12/04/20 1254   • ipratropium-albuterol (DUO-NEB) nebulizer solution 3 mL  3 mL Nebulization Once PRN Ric Mcnamara MD       • labetalol (NORMODYNE,TRANDATE) injection 5 mg  5 mg Intravenous Q5 Min PRN Ric Mcnamara MD       • magnesium hydroxide (MILK OF MAGNESIA) suspension 2400 mg/10mL 10 mL  10 mL Oral Daily PRN Lizeth Nance MD       • Magnesium Sulfate 2 gram Bolus, followed by 8 gram infusion (total Mg dose 10 grams)- Mg less than or equal to 1mg/dL  2 g Intravenous PRN Lizeth Nance MD        Or   • Magnesium Sulfate 2 gram / 50mL Infusion (GIVE X 3 BAGS TO EQUAL 6GM TOTAL DOSE) - Mg 1.1 - 1.5 mg/dl  2 g Intravenous PRN Lizeth Nance MD 25 mL/hr at 11/28/20 0230 2 g at 11/28/20 0230    Or   • Magnesium Sulfate 4 gram infusion- Mg 1.6-1.9 mg/dL  4 g Intravenous PRN Lizeth Nance MD 25 mL/hr at 12/03/20 1117 4 g at 12/03/20 1117   • melatonin tablet 5 mg  5 mg Oral Nightly PRN Lizeth Nance MD       • metoclopramide (REGLAN) tablet 10 mg  10 mg Oral TID AC Lizeth Nance MD   10 mg at 12/04/20 1337   • metoprolol tartrate (LOPRESSOR) tablet 50 mg  50 mg Oral Q12H Lizeth Nance MD   50 mg at 12/04/20 0925   • mirtazapine (REMERON) tablet 15 mg  15 mg Oral Nightly Lizeth Nance MD   15 mg at 12/03/20 2110   • naloxone (NARCAN) injection 0.4 mg  0.4 mg Intravenous PRN Ric Mcnamara MD       • nicotine (NICODERM CQ) 7 MG/24HR patch 1 patch  1 patch Transdermal Daily Montez Nance  The, MD   1 patch at 12/03/20 1112   • nitroglycerin (NITROSTAT) SL tablet 0.4 mg  0.4 mg Sublingual Q5 Min PRN Lizeth Nance MD       • nystatin (MYCOSTATIN) 913228 UNIT/ML suspension 500,000 Units  5 mL Swish & Swallow 4x Daily Lizeth Nance MD   500,000 Units at 12/04/20 1337   • ondansetron (ZOFRAN) tablet 4 mg  4 mg Oral Q6H PRN Lizeth Nance MD        Or   • ondansetron (ZOFRAN) injection 4 mg  4 mg Intravenous Q6H PRN Lizeth Nance MD       • ondansetron (ZOFRAN) injection 4 mg  4 mg Intravenous Once PRN Ric Mcnamara MD       • pantoprazole (PROTONIX) EC tablet 40 mg  40 mg Oral Q AM Lizeth Nance MD   40 mg at 12/04/20 0558   • potassium & sodium phosphates (PHOS-NAK) 280-160-250 MG packet - for Phosphorus less than 1.25 mg/dL  2 packet Oral Q6H PRN Lizeth Nance MD   2 packet at 11/30/20 0122    Or   • potassium & sodium phosphates (PHOS-NAK) 280-160-250 MG packet - for Phosphorus 1.25 - 2.5 mg/dL  2 packet Oral Q6H PRN Lizeth Nance MD   2 packet at 12/02/20 0916   • potassium chloride (K-DUR,KLOR-CON) CR tablet 40 mEq  40 mEq Oral PRN Lizeth Nance MD   40 mEq at 11/26/20 1807    Or   • potassium chloride (KLOR-CON) packet 40 mEq  40 mEq Oral PRN Lizeth Nance MD   40 mEq at 12/03/20 1759    Or   • potassium chloride 10 mEq in 100 mL IVPB  10 mEq Intravenous Q1H PRN Lizeth Nance  mL/hr at 11/27/20 1024 10 mEq at 11/27/20 1024   • promethazine (PHENERGAN) tablet 25 mg  25 mg Oral Once PRN Ric Mcnamara MD       • sodium chloride 0.9 % flush 10 mL  10 mL Intravenous Q12H Lizeth Nance MD   10 mL at 12/04/20 0926   • sodium chloride 0.9 % flush 10 mL  10 mL Intravenous PRN Lizeth Nance MD       • sodium chloride 0.9 % infusion  30 mL/hr Intravenous Continuous PRN Lizeth Nance MD 30 mL/hr at 11/28/20 2152 30 mL/hr at 11/28/20 2152   • sodium chloride nasal spray 2 spray  2 spray Each Nare PRN Lizeth Nance MD       • sucralfate  "(CARAFATE) tablet 1 g  1 g Oral 4x Daily AC & at Bedtime Lizeth Nance MD   1 g at 12/04/20 1337   • traZODone (DESYREL) tablet 50 mg  50 mg Oral Nightly Ziggy Daley MD   50 mg at 12/03/20 2110     PHYSICAL EXAM:  Current Vitals:  /71 (Patient Position: Lying)   Pulse 120   Temp 97.9 °F (36.6 °C) (Oral)   Resp 20   Ht 188 cm (74\")   Wt 78.4 kg (172 lb 13.5 oz)   SpO2 94%   BMI 22.19 kg/m²     VITAL signs in the last 24 hours: [unfilled]       12/03/20  0530 12/04/20  0500   Weight: 82.4 kg (181 lb 10.5 oz) 78.4 kg (172 lb 13.5 oz)     Physical Exam  Vitals signs and nursing note reviewed.   Constitutional:       General: He is not in acute distress.     Appearance: Normal appearance. He is ill-appearing. He is not diaphoretic.   HENT:      Head: Normocephalic and atraumatic.      Mouth/Throat:      Comments: Patient very hoarse and unable to speak.  Eyes:      General: No scleral icterus.        Right eye: No discharge.         Left eye: No discharge.      Conjunctiva/sclera: Conjunctivae normal.   Neck:      Musculoskeletal: Normal range of motion and neck supple.      Thyroid: No thyromegaly.   Cardiovascular:      Rate and Rhythm: Normal rate and regular rhythm.      Pulses: Normal pulses.      Heart sounds: Normal heart sounds. No friction rub. No gallop.       Comments: Port-A-Cath chest  Pulmonary:      Effort: Pulmonary effort is normal. No respiratory distress.      Breath sounds: Normal breath sounds. No stridor. No wheezing.   Abdominal:      General: Bowel sounds are normal.      Palpations: Abdomen is soft. There is no mass.      Tenderness: There is no abdominal tenderness. There is no guarding or rebound.   Musculoskeletal: Normal range of motion.         General: No tenderness.   Lymphadenopathy:      Cervical: No cervical adenopathy.   Skin:     General: Skin is warm.      Findings: No erythema or rash.   Neurological:      General: No focal deficit present.      Mental Status: " He is alert and oriented to person, place, and time.      Sensory: No sensory deficit.      Motor: No abnormal muscle tone.   Psychiatric:         Mood and Affect: Mood normal.         Behavior: Behavior normal.         Thought Content: Thought content normal.      Comments: Patient looks very restless      ECOG PERFORMANCE STATUS:3    LABORATORY/DATA:  WBC   Date Value Ref Range Status   12/04/2020 14.70 (H) 3.40 - 10.80 10*3/mm3 Final     RBC   Date Value Ref Range Status   12/04/2020 3.61 (L) 4.14 - 5.80 10*6/mm3 Final     Hemoglobin   Date Value Ref Range Status   12/04/2020 10.6 (L) 13.0 - 17.7 g/dL Final     Hematocrit   Date Value Ref Range Status   12/04/2020 31.7 (L) 37.5 - 51.0 % Final     MCV   Date Value Ref Range Status   12/04/2020 87.7 79.0 - 97.0 fL Final     MCH   Date Value Ref Range Status   12/04/2020 29.3 26.6 - 33.0 pg Final     MCHC   Date Value Ref Range Status   12/04/2020 33.3 31.5 - 35.7 g/dL Final     RDW   Date Value Ref Range Status   12/04/2020 14.1 12.3 - 15.4 % Final     RDW-SD   Date Value Ref Range Status   12/04/2020 42.9 37.0 - 54.0 fl Final     MPV   Date Value Ref Range Status   12/04/2020 9.4 6.0 - 12.0 fL Final     Platelets   Date Value Ref Range Status   12/04/2020 11 (C) 140 - 450 10*3/mm3 Final     Neutrophils Absolute   Date Value Ref Range Status   12/04/2020 10.44 (H) 1.70 - 7.00 10*3/mm3 Final     Eosinophils Absolute   Date Value Ref Range Status   12/04/2020 0.74 (H) 0.00 - 0.40 10*3/mm3 Final     Basophils Absolute   Date Value Ref Range Status   12/02/2020 0.08 0.00 - 0.20 10*3/mm3 Final     nRBC   Date Value Ref Range Status   12/04/2020 1.0 (H) 0.0 - 0.2 /100 WBC Final     Lab Results   Component Value Date    GLUCOSE 155 (H) 12/04/2020    BUN 15 12/04/2020    CREATININE 0.59 (L) 12/04/2020    EGFRIFNONA 133 12/04/2020    BCR 25.4 (H) 12/04/2020    K 4.0 12/04/2020    CO2 29.0 12/04/2020    CALCIUM 7.5 (L) 12/04/2020    ALBUMIN 2.50 (L) 12/02/2020    AST 17  12/02/2020    ALT 19 12/02/2020     No results found for:  No results found for: SPEP, UPEP No results found for: LDH, URICACID   Lab Results   Component Value Date    IRON 23 (L) 11/02/2020    TIBC 231 (L) 11/02/2020    FERRITIN 334.90 10/26/2020      IMAGING:  Ct Chest Without Contrast    Result Date: 11/26/2020  Narrative: CT CHEST WO CONTRAST-  Date of Exam: 11/26/2020 1:17 PM  Indication: Pneumonia, effusion or abscess suspected, xray done; R63.3-Feeding difficulties  smoker. Atrial fibrillation. Recent diagnosis of lung cancer.  Comparison: PET/CT 11/05/2020, CT chest 10/27/2020.  Technique: Serial and axial CT images of the chest were obtained. Reconstructions in the coronal and sagittal planes were performed. Automated exposure control and iterative reconstruction methods were used.  FINDINGS: Hilum and Mediastinum: Right paratracheal lymph node has increased in size. It measures approximately 2.3 x 3 cm on image 18. Previously, it measured about 1.6 x 2.5 cm. Lymph node mass right mid peritracheal is larger. On image 32 measures about 3.5 x 5 cm. Previously was about 3.6 x 4.7 cm. Subcarinal lymph node is larger.. Its more difficult to measure on this noncontrast enhanced study but appears to measure about 3.9 x 5.9 cm. Right hilar mass more difficult to measure without contrast. However, when measured at similar location to previous exam, image #61 and measures approximately 3.9 x 6.3 cm. This is not definitely changed. There are enlarged bilateral hilar lymph nodes. There is cardiomegaly. There is severe coronary artery calcination. There are annular calcination of the mitral valve. There is small pericardial effusion.  Lung Parenchyma and Pleura: There is a moderate size right pleural effusion which is increased from previous study. Small left pleural effusion which is slightly larger as well. There is a right lower lobe pulmonary nodule measuring 1.3 cm it is pleural-based on image #71. The pleural  effusion has changed its location but previously it measured about 1 cm. There is a nodule in the right lower lobe on image #56 measuring about 8 mm. This is not definitely seen on previous study. There is a nodule in the right lower lobe on image #68 measuring about 8 mm. This also appears new. There is increased right lower lobe airspace opacity blending with the pleural effusion. There is right infrahilar opacity and diffuse bronchial wall thickening. There is septal thickening in the right upper lobe there is partial atelectasis of the right middle lobe which appears increased.  There is septal thickening in the left lung is less pronounced in the right. There is bronchial wall thickening. There is airspace opacity in the central left lower lobe similar previous exam. There is mild subsegmental atelectasis in the left lower lobe similar to previous study. There is moderate emphysema.  Upper Abdomen: Low density nodule in the right posterior hepatic lobe near the hepatic dome measuring about 2.2 cm similar to previous study. Probable cholelithiasis without acute inflammation. Severe atherosclerotic calcination abdominal aorta. Diffuse gastric wall thickening.  Soft tissues: There is a left anterior chest wall port. Tip terminates in superior vena cava.  Osseous structures: No aggressive focal lytic or sclerotic osseous lesions. Osteopenia.      Impression: 1.Compared to previous examinations the paratracheal and mediastinal lymphadenopathy is larger. 2.There is a moderate size right pleural effusion and small left pleural effusion which are increased in size from previous study. 3.The right hilar mass is not definite change in size. 4.There are some new nodules in the right lower lobe. 5.There is increased opacity in the right lower lobe. 6.Cardiomegaly and severe atherosclerotic disease and coronary artery disease. 7.No change in a low-density lesion at the right hepatic dome. 8.Diffuse stomach wall thickening  similar previous exam.    Electronically Signed By-Meg Marie MD On:11/26/2020 1:38 PM This report was finalized on [id number] by  Meg Marie MD.    Xr Chest 1 View    Result Date: 12/2/2020  Narrative: DATE OF EXAM: 12/2/2020 9:31 AM  PROCEDURE: XR CHEST 1 VW-  INDICATIONS: Worsening shortness of breath, difficulty eating. Recent diagnosis of lung cancer.  COMPARISON: 11/25/2020.  TECHNIQUE: Single radiographic view of the chest was obtained.  FINDINGS: The heart is enlarged. There is bilateral mixed interstitial/airspace disease which has worsened from the previous exam. There is also dense consolidation in the right lower chest with what appears to be either a small to moderate subpulmonic pleural effusion or elevation of the right hemidiaphragm unchanged from the previous study. There is thickening of the right paratracheal stripe consistent with the known adenopathy there is fullness in the right hilum secondary to the patient's known lung cancer. The patient has a stable port in place. There are chronic age-related changes involving the bony thorax and thoracic aorta.      Impression:  1. Bilateral mixed interstitial/airspace disease which is worsened from the previous study. This can be seen with multifocal pneumonia or pulmonary edema. 2. The patient either has a small to moderate subpulmonic pleural effusion or elevation of the right hemidiaphragm. This is unchanged from the previous exam. There is also unchanged dense consolidation in the right lower chest. 3. Thickening of the right paratracheal stripe consistent with known adenopathy. 4. Fullness in the right hilum consistent with a known lung cancer.  Electronically Signed By-Antonio Graham MD On:12/2/2020 10:37 AM This report was finalized on [id number] by  Antonio Graham MD.    Nm Pet Skull Base To Mid Thigh    Result Date: 11/5/2020  Narrative: NM PET SKULL BASE TO MID THIGH-  Date of Exam: 11/5/2020 1:56 PM  Indication: lung cancer initial  staging; C34.32-Malignant neoplasm of lower lobe, left bronchus or lung; C34.90-Malignant neoplasm of unspecified part of unspecified bronchus or lung  Comparison: MRI brain 10/29/2020. MRI abdomen 10/27/2020. CT chest 10/27/2020. CT chest abdomen and pelvis 10/26/2020  Technique:  Whole body PET/CT imaging was performed with positron emission tomography (PET with concurrently acquired computed tomography (CT) for attenuation correction and anatomical localization); field of view imaged was the skull base to midthigh.  Fasting Blood glucose level: 111 mg/dl.  FDG dosage: 20 mCi F-18 Images were obtained after one hour of equilibrium.  FINDINGS:   NECK: Metabolically active bilateral supraclavicular lymph nodes are present. An index left supraclavicular lymph node measures 1.5 x 1.0 cm (SUV max 11.5. An index right supraclavicular lymph node measures 1.3 x 0.8 cm (SUV max 7.1).  CHEST: Right hilar mass extending into the right lower lobe lung, along the bronchovascular bundles, is consistent with the patient's known malignancy. The right hilar mass SUV max is 10.6. Right lower lobe 2 cm nodular density along the bronchovascular bundles is FDG avid (SUV max 5.6 degrees malignancy. There is an additional focal 1.9 cm hypermetabolic nodule within the posterior right lower lobe , partially obscured by adjacent atelectasis, with metastatic disease (SUV max 10.9) .  Bulky, pathologically enlarged, hypermetabolic lymph nodes are seen within the mediastinum, greatest in the right paratracheal region (SUV max 16.1), precarinal (SUV max 7.2) and subcarinal (SUV max 13.5) distributions. Smaller lymph nodes are scattered within the anterior mediastinal fat, measuring less than a centimeter short axis, but are FDG avid and consistent with metastatic disease. No pathologically enlarged or hypermetabolic left hilar adenopathy is seen.   ABDOMEN AND PELVIS: There is moderate FDG accumulation within the proximal stomach with wall  thickening (SUV max 5.8), which could represent physiologic uptake, focal gastritis, with metastatic disease or malignancy not excluded but thought less likely. There appears to be some misregistration artifact over the left hepatic segment, but no definite abnormal FDG uptake is seen within the liver. Cyst is seen within the hepatic dome measuring slightly over 2 cm.    Skeletal: There is a displaced left seventh rib fracture laterally which demonstrates moderate FDG uptake (SUV max 3.7). No osteolytic or osteoblastic abnormality is seen in this location, and may represent posttreatment etiology. No convincing evidence of osseous metastatic disease.       Impression:  1. Hypermetabolic right hilar mass consistent with malignancy. 2. Hypermetabolic nodular densities within the right lower lobe consistent with metastatic disease. 3. Bulky pathologically enlarged hypermetabolic mediastinal adenopathy, consistent with metastatic disease. No left hilar adenopathy. 4. Displaced left rib fracture is favored to represent a posttraumatic etiology rather than that of malignancy. No convincing evidence of skeletal metastatic disease. 5. There is moderate FDG accumulation within the proximal stomach with gastric wall thickening. This may simply be physiologic uptake. Gastritis could have a similar imaging appearance. However, primary gastric malignancy or metastatic disease is not excluded. Consider endoscopic correlation. 6. Enlarged hypermetabolic bilateral supraclavicular lymph nodes in the lower neck, consistent with metastases.  Electronically Signed By-Dr. Aranza Martinez MD On:11/5/2020 4:06 PM This report was finalized on [id number] by Dr. Aranza Martinez MD.    Xr Chest Pa & Lateral    Result Date: 11/25/2020  Narrative: DATE OF EXAM: 11/25/2020 4:50 PM  PROCEDURE: XR CHEST PA AND LATERAL-  INDICATIONS: 79 yo with lung cancer and underlying copd w shortness of breath   COMPARISON: AP portable chest 10/26/2020.  PET/CT 11/5/2020.  TECHNIQUE: Two radiologic views of the chest.  FINDINGS: Increased right basilar opacity, and may represent worsening atelectasis/pneumonia superimposed upon right lower lobe lung lesion, seen to better advantage on previous PET/CT. Patient's mediastinal and right hilar adenopathy is seen to better than PET/CT, with slight widening of the right paratracheal stripe consistent with known adenopathy. Central interstitial thickening is present in both lungs favored to reflect changes of evolving interstitial edema. Stable mild cardiac enlargement. Left chest wall valerie catheter has been placed with the tip terminating in the lower SVC level. No visible pneumothorax. Osteopenia. No acute osseous abnormalities.      Impression: 1 increasing right lower lobe airspace disease may represent atelectasis or pneumonia and small right pleural effusion. 2. Patient has right lower lobe lung lesion consistent with the appearance of malignancy on previous PET/CT. Right paratracheal adenopathy is observed on today's x-ray. 3. FINDINGS consistent with developing interstitial edema in comparison to the 10/26/2020 exam.  Electronically Signed By-Aranza Martinez MD On:11/25/2020 5:01 PM This report was finalized on [id number] by  Aranza Martinez MD.    PROBLEM LIST:  Patient Active Problem List   Diagnosis   • COVID-19 ruled out   • COPD (chronic obstructive pulmonary disease) (CMS/HCC)   • Weight loss   • Weakness   • Chest pain   • Cough   • Shortness of breath   • Lung mass   • Liver mass   • Atrial fibrillation (CMS/HCC)   • Adenocarcinoma, lung, unspecified laterality (CMS/HCC)   • Encounter for fitting and adjustment of vascular catheter   • Thrombocytopenia (CMS/HCC)   • Feeding difficulties   • Moderate malnutrition (CMS/HCC)           Assessment & Plan  ASSESSMENT  1. Stage IIIc poorly differentiated adenocarcinoma of lung -patient could potentially have stage IV disease with gastric involvement.  Patient  originally presented with a bulky right hilar mass and also bulky mediastinal lymph nodes.  Started on cisplatin/Alimta and consideration of radiation therapy concurrently at a later point.  However patient experienced severe myelosuppression.  2. Acute hoarseness: Could be mediastinal disease progression  3. Shortness of air: Fluid overload  2. Thrombocytopenia: Could be multifactorial due to combination of chemotherapy-induced myelosuppression and occult GI bleeding from gastric ulceration, could also be paraneoplastic.  Although ITP is one of the differential diagnosis it seems less likely considering the setting.  He recently had chemotherapy, and now has severe thrombocytopenia.  Bone marrow metastases should also be considered.  Haptoglobin is not indicative of hemolysis.  Status post Nplate.  No signs of DIC.  3. Leukopenia/Neutropenia: Resolved.  Due to chemo induced myelosuppression.  GCSF given and discontinued.   4. Gastric wall thickening and hypermetabolic activity: visualized on recent PET scan:EGD done with PEG showed ulcerated GEJ suggesting potentially metastatic involvement by the lung cancer.  Biopsy was deferred given thrombocytopenia.     5. Previous hospital admission for pneumonia/Pneumonia: Status post antibiotics and steroids recently.  Finished outpatient doxycycline.   6. Atrial fibrillation with RVR: Currently off Cardizem drip.  7. Diastolic cardiomyopathy: Noted on previous echo.  8. COPD:  management per Primary team  9. Esophageal dysphagia/Aphasia:  Primary team considering ENT referral to evaluate loss of speech. Full liquid diet recommended.   10. Prognosis is guarded.      PLAN  1. CBC daily   2. Acute hoarseness: We will order CT scan of the chest with contrast.  I am concerned patient is having mediastinal disease progression causing laryngeal nerve paralysis.    3. Continue diuretics.  4. Platelets slowly improving.  No transfusion today.  5. Transfuse platelets ASAP if  develops bleeding. Platelet transfusions largely not successful.  6. Continue to hold anticoagulants and antiplatelet agents  7. Continue folic acid 1 mg po daily  8. Will need repeat EGD in 1 to 2 weeks once thrombocytopenia resolves  9. Use PEG tube for supplemental nutrition - now is on supplemental regular diet   10. Will need significant and substantial dose reduction with the next cycle  11. Will eventually need inpatient rehab.        Electronically signed by Ziggy Daley MD, 12/04/20, 5:08 PM EST.

## 2020-12-04 NOTE — PROGRESS NOTES
[facility] Medicine Services Daily Progress Note      Hospitalist Team  LOS 8 days      Patient Care Team:  Ambar Hassan APRN as PCP - General (Family Medicine)    Patient Location: 2119/1      Subjective   Subjective     Chief Complaint / Subjective  No chief complaint on file.    Patient did well with PEG tube insertion and able to be used starting tube feeds.  Patient is tolerating no abdominal distention or pain.  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  IVIG being started by hematology.    Brief Synopsis of Hospital Course/HPI  Mr. Hernandez is a 80 y.o. with history of atrial fibrillation and COPD with new diagnosis of lung cancer presenting as direct admission from oncologist office for progressive generalized weakness as well as thrombocytopenia.  Patient had initiated chemotherapy and reported progressive weakness as well as loss of appetite over the last few weeks.  Patient had been getting more thrombocytopenic with levels in the 30s the day prior to admission down to 13 on day of admission.  Patient denied hematuria, hematochezia, melena or significant bruising though did report a small amount of blood when he blew his nose in the morning.  Currently patient reports he feels weak though otherwise asymptomatic.  No shortness of breath or chest pain, no nausea or vomiting.  Anorexia noted.     Patient found to be in atrial fibrillation with RVR once reaching the floor.  Stat EKG ordered and ordering Cardizem.  Patient received unit of platelets in ambulatory care center prior to coming up to the floor.  Patient scheduled to have feeding tube placement with GI in the next few days.    11/26/2020: Patient reports feeling better than yesterday.  Heart rate improving.  Patient reported self-limiting epistaxis.  Receiving more platelets per hematology.  Received vitamin K for procedure tomorrow.    11/27/2020: Patient overall doing well.  Heart rate still  elevated in the 110s, starting oral Cardizem trial to wean off IV Cardizem.  Patient's platelets in the 20s but no signs of bleeding.      11/28/2020: Patient reports doing well no new symptoms.  No chest pain or shortness of breath.  Heart rate improving borderline controlled.  Patient going for PEG tube today.  Platelets in the 30s.    11/30/2020  Today the patient continues to have feeding difficulty as well as difficulty with some shortness of breath.  He remains weak.  The patient's platelet count remains extremely low despite hematology oncology's efforts.    12/1/2020  The patient seemed very fatigued and very weak today.  The family's wife is at the bedside and was trying to encourage him to continue to fight.  She is aware that the patient's issues with his platelets as well as his white count is related to the chemotherapy.  She asked me when the patient was going to have received additional chemotherapy and I told her probably not until he recovers from this session of chemo.    12/2/2020  Today the patient is having increased congestion.  His cough reflex is extremely poor.  He is not mobilizing secretions.  His weight is also up 3 kg just over yesterday and his sodium is declining.  The patient does not have a fever does not have an elevation in his white count.    12/3/2020  Today the patient's daughter is present at the time of my visit.  The patient no longer can speak.  It is concerning as the patient may have involvement of his left recurrent laryngeal nerve or the patient may have esophageal issues from the lesion that was seen on upper endoscopy.  He has been seen by speech therapy and is now on a full liquid diet only by mouth and is receiving the majority of his nutrition per his tube feeding.    12/4/2020  Today the patient is really not able to speak very much.  He could make an effort and did make some sounds.  Family was not present at the time of my examination.  He offers no additional  "complaints.    Review of Systems   Constitution: Positive for decreased appetite and malaise/fatigue. Negative for chills and fever.   HENT: Negative.  Negative for hoarse voice and sore throat.    Eyes: Negative.  Negative for double vision and photophobia.   Cardiovascular: Negative for chest pain and leg swelling.   Respiratory: Positive for shortness of breath. Negative for sputum production.    Endocrine: Negative.    Hematologic/Lymphatic: Negative.    Skin: Negative.    Musculoskeletal: Negative.  Negative for myalgias and stiffness.   Gastrointestinal: Negative.  Negative for nausea and vomiting.   Genitourinary: Negative.  Negative for dysuria and flank pain.   Neurological: Positive for weakness. Negative for dizziness and focal weakness.   Psychiatric/Behavioral: Negative.  Negative for altered mental status. The patient is not nervous/anxious.    Allergic/Immunologic: Negative.    All other systems reviewed and are negative.    Objective   Objective      Vital Signs  Temp:  [97.2 °F (36.2 °C)-98.4 °F (36.9 °C)] 97.2 °F (36.2 °C)  Heart Rate:  [] 112  Resp:  [16-27] 17  BP: (103-130)/(61-71) 109/62  Oxygen Therapy  SpO2: 95 %  Pulse Oximetry Type: Continuous  Device (Oxygen Therapy): nasal cannula  Device (Oxygen Therapy): high-flow nasal cannula  Flow (L/min): 12  Oxygen Concentration (%): 4  ETCO2 (mmHg): 36 mmHg  Flowsheet Rows      First Filed Value   Admission Height  188 cm (74\") Documented at 11/25/2020 1510   Admission Weight  79.7 kg (175 lb 11.3 oz) Documented at 11/25/2020 1510        Intake & Output (last 3 days)       12/01 0701 - 12/02 0700 12/02 0701 - 12/03 0700 12/03 0701 - 12/04 0700 12/04 0701 - 12/05 0700    P.O. 360 200  0    Blood 446       Other 60 80      NG/GT  675      Total Intake(mL/kg) 866 (10.4) 955 (11.6)  0 (0)    Urine (mL/kg/hr) 800 (0.4) 225 (0.1)      Stool 0 0      Total Output 800 225      Net +66 +730  0            Urine Unmeasured Occurrence 4 x 15 x 3 x 1 x "    Stool Unmeasured Occurrence  1 x  1 x        Lines, Drains & Airways    Active LDAs     Name:   Placement date:   Placement time:   Site:   Days:    Peripheral IV 11/25/20 1822 Distal;Posterior;Right Forearm   11/25/20 1822    Forearm   less than 1    Single Lumen Implantable Port 11/16/20 Left Subclavian   11/16/20    1002    Subclavian   10            Physical Exam:  Physical Exam    General: Frail elderly male lying in bed breathing comfortably on 3 L nasal cannula no acute distress.  The patient does have some bruises throughout but no evidence of bleeding at this time.  He really cannot speak to express himself.  HEENT: NC/AT, EOMI, mucosa moist  Heart: Irregular, rate controlled  Chest: Normal work of breathing, moving air well in all lung fields no wheezing but the patient does have a lot of rhonchi with rales in the bases bilaterally.  Abdominal: Soft. NT/ND.  Palpable organomegaly or masses.  Musculoskeletal: Normal ROM.  No cyanosis. No calf tenderness.  Neurological: AAOx3, no focal deficits.  The patient does not appear to have a neurologic defect response for the loss of speech  Skin: Skin is warm and dry.  No petechiae  Psychiatric: Normal mood and affect.    Procedures:    Results Review:     I reviewed the patient's new clinical results.      Lab Results (last 24 hours)     Procedure Component Value Units Date/Time    POC Glucose Once [[id number]]  (Abnormal) Collected: 12/04/20 1716    Specimen: Blood Updated: 12/04/20 1717     Glucose 179 mg/dL      Comment: Serial Number: [id number]:  [id number]       POC Glucose Once [[id number]]  (Abnormal) Collected: 12/04/20 1248    Specimen: Blood Updated: 12/04/20 1250     Glucose 164 mg/dL      Comment: Serial Number: [id number]:  [id number]       Basic Metabolic Panel [[id number]]  (Abnormal) Collected: 12/04/20 0403    Specimen: Blood Updated: 12/04/20 0713     Glucose 155 mg/dL      BUN 15 mg/dL      Creatinine 0.59 mg/dL      Sodium 133  mmol/L      Potassium 4.0 mmol/L      Chloride 96 mmol/L      CO2 29.0 mmol/L      Calcium 7.5 mg/dL      eGFR Non African Amer 133 mL/min/1.73      BUN/Creatinine Ratio 25.4     Anion Gap 8.0 mmol/L     Narrative:      GFR Normal >60  Chronic Kidney Disease <60  Kidney Failure <15      Manual Differential [[id number]]  (Abnormal) Collected: 12/04/20 0403    Specimen: Blood Updated: 12/04/20 0648     Neutrophil % 45.0 %      Lymphocyte % 11.0 %      Monocyte % 9.0 %      Eosinophil % 5.0 %      Bands %  26.0 %      Metamyelocyte % 2.0 %      Myelocyte % 2.0 %      Neutrophils Absolute 10.44 10*3/mm3      Lymphocytes Absolute 1.62 10*3/mm3      Monocytes Absolute 1.32 10*3/mm3      Eosinophils Absolute 0.74 10*3/mm3      nRBC 1.0 /100 WBC      Anisocytosis Slight/1+     Poikilocytes Slight/1+     Toxic Granulation Slight/1+     Platelet Morphology Normal    Narrative:      Reviewed by Pathologist within the past 30 days on 11/25/2020.    CBC & Differential [[id number]]  (Abnormal) Collected: 12/04/20 0403    Specimen: Blood Updated: 12/04/20 0648    Narrative:      The following orders were created for panel order CBC & Differential.  Procedure                               Abnormality         Status                     ---------                               -----------         ------                     Scan Slide[[id number]]                                       Final result               CBC Auto Differential[[id number]]        Abnormal            Final result                 Please view results for these tests on the individual orders.    CBC Auto Differential [[id number]]  (Abnormal) Collected: 12/04/20 0403    Specimen: Blood Updated: 12/04/20 0648     WBC 14.70 10*3/mm3      RBC 3.61 10*6/mm3      Hemoglobin 10.6 g/dL      Hematocrit 31.7 %      MCV 87.7 fL      MCH 29.3 pg      MCHC 33.3 g/dL      RDW 14.1 %      RDW-SD 42.9 fl      MPV 9.4 fL      Platelets 11 10*3/mm3     Scan Slide [[id number]] Collected:  12/04/20 0403    Specimen: Blood Updated: 12/04/20 0648     Scan Slide --     Comment: See Manual Differential Results       POC Glucose Once [[id number]]  (Abnormal) Collected: 12/04/20 0555    Specimen: Blood Updated: 12/04/20 0607     Glucose 187 mg/dL      Comment: Serial Number: [id number]:  [id number]       Phosphorus [[id number]]  (Abnormal) Collected: 12/04/20 0402    Specimen: Blood Updated: 12/04/20 0447     Phosphorus 1.6 mg/dL     Magnesium [[id number]]  (Normal) Collected: 12/04/20 0402    Specimen: Blood Updated: 12/04/20 0442     Magnesium 2.0 mg/dL     BNP [[id number]]  (Abnormal) Collected: 12/04/20 0402    Specimen: Blood Updated: 12/04/20 0438     proBNP 2,557.0 pg/mL     Narrative:      Among patients with dyspnea, NT-proBNP is highly sensitive for the detection of acute congestive heart failure. In addition NT-proBNP of <300 pg/ml effectively rules out acute congestive heart failure with 99% negative predictive value.    Results may be falsely decreased if patient taking Biotin.      POC Glucose Once [[id number]]  (Abnormal) Collected: 12/04/20 0046    Specimen: Blood Updated: 12/04/20 0101     Glucose 186 mg/dL      Comment: Serial Number: [id number]:  [id number]           No results found for: HGBA1C  Results from last 7 days   Lab Units 11/28/20  0525   INR  1.22*           Lab Results   Component Value Date    LIPASE 8 (L) 10/26/2020     No results found for: CHOL, CHLPL, TRIG, HDL, LDL, LDLDIRECT    Lab Results   Lab Value Date/Time    INTRAOP  10/27/2020 1203     FNA of subcarinal lymph node, immediate evaluation    Pass 1: Positive for malignant cells.    FNA of left hilar lymph node, immediate evaluation    Pass 1: Mostly blood, no malignancy.  Pass 2: Mostly blood, no malignancy.  Pass 3: Mostly blood, no malignancy.  Pass 4: Mostly blood, no malignancy.    Initial cytology interpretation performed by Maurilio Watson MD.        FINALDX  11/25/2020 1146     Leukopenia with left shift  and relative eosinophilia  Thrombocytopenia  No blasts identified      FINALDX  10/27/2020 1203     Specimens #1 and #3 (Lymph node, subcarinal, endobronchial fine needle aspiration):    Positive for metastatic poorly differentiated adenocarcinoma, see comment    Specimens #2 and #4 (Lymph node, left hilar, endobronchial fine needle aspiration, smears and cell block preparation):    Rare atypical cells, see comment    JPR/sms           COMDX  10/27/2020 1203     Specimens #1 and #3: Immunohistochemistry was performed utilizing appropriate controls and the tumor is positive for CK7 and TTF-1 and is negative for napsin A, p63, and CK5/6. 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 Many of the tumor cells display significant pleomorphism with bizarre appearing nuclei and mitotic figures. This raises the possibility of a sarcomatoid component, however, that would have to be evaluated on the resection specimen.     Specimens #2 and #4: One of the smear passes displays scattered atypical cells in a background of lymphoid tissue. Immunohistochemistry was performed utilizing appropriate controls on the cell block for cytokeratin AE1/3. The cytokeratin AE1/3 stain is negative which excludes the presence of atypical cells in the cell block. The significance of the atypical cells on the smears is unknown, however, malignancy cannot be entirely excluded. Further clinical workup should be considered.     JPR/sms          Microbiology Results (last 10 days)     Procedure Component Value - Date/Time    COVID PRE-OP / PRE-PROCEDURE SCREENING ORDER (NO ISOLATION) - Swab, Nasopharynx [[id number]]  (Normal) Collected: 11/27/20 0802    Lab Status: Final result Specimen: Swab from Nasopharynx Updated: 11/27/20 0855    Narrative:      The following orders were created for panel order COVID PRE-OP / PRE-PROCEDURE SCREENING ORDER (NO ISOLATION) - Swab, Nasopharynx.  Procedure                                Abnormality         Status                     ---------                               -----------         ------                     COVID-19,CEPHEID,COR/UDAY...[[id number]]  Normal              Final result                 Please view results for these tests on the individual orders.    COVID-19,CEPHEID,COR/UDAY/PAD IN-HOUSE(OR EMERGENT/ADD-ON),NP SWAB IN TRANSPORT MEDIA 3-4 HR TAT - Swab, Nasopharynx [[id number]]  (Normal) Collected: 11/27/20 0802    Lab Status: Final result Specimen: Swab from Nasopharynx Updated: 11/27/20 0855     COVID19 Not Detected    Narrative:      Fact sheet for providers: https://www.fda.gov/media/874611/download     Fact sheet for patients: https://www.fda.gov/media/733824/download    Blood Culture - Blood, Hand, Left [[id number]] Collected: 11/25/20 2035    Lab Status: Final result Specimen: Blood from Hand, Left Updated: 11/30/20 2115     Blood Culture No growth at 5 days    Blood Culture - Blood, Arm, Left [[id number]] Collected: 11/25/20 2013    Lab Status: Final result Specimen: Blood from Arm, Left Updated: 11/30/20 2245     Blood Culture No growth at 5 days          ECG/EMG Results (most recent)     Procedure Component Value Units Date/Time    ECG 12 Lead [[id number]] Collected: 11/25/20 1614     Updated: 11/29/20 1102     QT Interval 355 ms     Narrative:      HEART RATE= 116  bpm  RR Interval= 516  ms  [place] Interval=   ms  P Horizontal Axis=   deg  P Front Axis=   deg  QRSD Interval= 108  ms  QT Interval= 355  ms  QRS Axis= 33  deg  T Wave Axis= 33  deg  - ABNORMAL ECG -  Atrial fibrillation  Low voltage, extremity leads  When compared with ECG of 11-Nov-2020 10:10:57,  NO SIGNIFICANT CHANGE FROM PREVIOUS ECG  Electronically Signed By: Alex Mtz (UDAY) 29-Nov-2020 10:51:45  Date and Time of Study: 2020-11-25 16:14:21               Results for orders placed during the hospital encounter of 10/26/20   Adult Transthoracic Echo Complete W/ Cont if Necessary Per Protocol    Narrative ·  The left atrial cavity is severely dilated.  · Left ventricular diastolic function is consistent with (grade II w/high   LAP) pseudonormalization.  · Left ventricular wall thickness is consistent with concentric   hypertrophy.  · Estimated left ventricular EF was in agreement with the calculated left   ventricular EF. Left ventricular ejection fraction appears to be 61 - 65%.   Left ventricular systolic function is normal.  · There is moderate calcification of the aortic valve mainly affecting the   non-coronary cusp(s).  · There is mild, bileaflet mitral valve thickening present.  · Mild to moderate mitral valve regurgitation is present with a   centrally-directed jet noted.  · Mild tricuspid valve regurgitation is present.  · Estimated right ventricular systolic pressure from tricuspid   regurgitation is mildly elevated (35-45 mmHg).     Moderate MR  Severe left atrial enlargement  Grade 2 diastolic dysfunction  Preserved LV systolic function EF 60 to 65%  Normal RV size and function  Normal IVC  Mild pulmonary hypertension by TR gradient  If clinically indicated would recommend transesophageal echo for   evaluation of mitral regurgitation with severe left atrial enlargement,   spectral Doppler and color Doppler indicate not more than moderate but   suboptimal acoustic windows, possibly underestimated, recommend ESE if   clinically indicated and were suspicious of severe MR         Xr Chest 1 View    Result Date: 12/2/2020   1. Bilateral mixed interstitial/airspace disease which is worsened from the previous study. This can be seen with multifocal pneumonia or pulmonary edema. 2. The patient either has a small to moderate subpulmonic pleural effusion or elevation of the right hemidiaphragm. This is unchanged from the previous exam. There is also unchanged dense consolidation in the right lower chest. 3. Thickening of the right paratracheal stripe consistent with known adenopathy. 4. Fullness in the right hilum  consistent with a known lung cancer.  Electronically Signed By-Antonio Graham MD On:12/2/2020 10:37 AM This report was finalized on [id number] by  Antonio Graham MD.          Xrays, labs reviewed personally by physician.    Medication Review:   I have reviewed the patient's current medication list      Scheduled Meds  budesonide-formoterol, 2 puff, Inhalation, BID - RT  dilTIAZem, 30 mg, Oral, Q6H  folic acid, 1 mg, Oral, Daily  furosemide, 40 mg, Intravenous, Q12H  ipratropium-albuterol, 3 mL, Nebulization, 4x Daily - RT  metoclopramide, 10 mg, Oral, TID AC  metoprolol tartrate, 50 mg, Oral, Q12H  mirtazapine, 15 mg, Oral, Nightly  nicotine, 1 patch, Transdermal, Daily  nystatin, 5 mL, Swish & Swallow, 4x Daily  pantoprazole, 40 mg, Oral, Q AM  sodium chloride, 10 mL, Intravenous, Q12H  sucralfate, 1 g, Oral, 4x Daily AC & at Bedtime  traZODone, 50 mg, Oral, Nightly        Meds Infusions  dilTIAZem, 5-15 mg/hr, Last Rate: 5 mg/hr (11/27/20 1244)  sodium chloride, 30 mL/hr, Last Rate: 30 mL/hr (11/28/20 2152)        Meds PRN  •  acetaminophen **OR** acetaminophen  •  acetaminophen  •  benzonatate  •  bisacodyl  •  flumazenil  •  HYDROmorphone  •  HYDROmorphone  •  ipratropium-albuterol  •  labetalol  •  magnesium hydroxide  •  magnesium sulfate **OR** magnesium sulfate **OR** magnesium sulfate  •  melatonin  •  naloxone  •  nitroglycerin  •  ondansetron **OR** ondansetron  •  ondansetron  •  potassium & sodium phosphates **OR** potassium & sodium phosphates  •  potassium chloride **OR** potassium chloride **OR** potassium chloride  •  promethazine  •  sodium chloride  •  sodium chloride  •  sodium chloride        Assessment/Plan   Assessment/Plan     Active Hospital Problems    Diagnosis  POA   • **Feeding difficulties [R63.3]  Unknown   • Moderate malnutrition (CMS/HCC) [E44.0]  Yes   • Thrombocytopenia (CMS/HCC) [D69.6]  Yes      Resolved Hospital Problems   No resolved problems to display.       MEDICAL DECISION  MAKING COMPLEXITY BY PROBLEM:     Generalized weakness  -likely multifactorial given cancer diagnosis and chemotherapy as well as tachyarrhythmia  -PT/OT  -Fall risk  -Monitor for sources of infection  -Telemetry  -COVID-19 negative  -Heart rate control  -Nutrition consult for tube feeding  -Patient received a PEG on 11/28/2020     Atrial fibrillation  -with RVR.  Patient with established history denies chest pain.  Patient able to be weaned off overnight 10/26/2020 but went back into RVR this 10/27/2020, slowly improving  -Telemetry  -Increase metoprolol  -Monitor electrolytes  -Off Cardizem drip on oral Cardizem  -Echo as of 10/26/2020 showing dilated atrial cavity with grade 2 diastolic dysfunction    Chronic respiratory failure-patient on 3 L baseline  -Chest x-ray appearing show signs of volume overload  -Ordering CT further evaluate underlying infection versus inflammation versus edema  -Off diuretics       -The patient's oxygenation is not proving.  The patient is up 3 kg and has a negative pro calcitonin.  The patient likely has congestive failure which has not been addressed.  We will start the patient on Lasix 40 mg IV twice daily and follow his weight, renal function and electrolytes.  Would hold off on antibiotics for now as his chest x-ray is more consistent with pulmonary edema.       -Repeat chest x-ray in a.m. and reevaluate    Thrombocytopenia-patient with no signs of overt bleeding though did report small amounts of blood per nasal passages, slowly improving.  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  -Patient to receive platelets later  today.    Leukopenia-likely due to underlying chemotherapy  -Neupogen per hematology  -Neutropenic precaution  -Monitor for signs of occult infection  -Daily CBC  -If his platelet count continues to be this low may need to consider additional platelet therapy.    Lung cancer-patient with recent diagnosis under treatment with oncology  -Pain control  -Antiemetics  -Daily CBC  -Oncology consult  -Consider ENT referral for the patient's loss of speech       -1 way to evaluate this would be to do a bedside laryngoscopy and see if the vocal cords approximate and are still moving appropriately.    Diastolic cardiomyopathy  -grade 2 noted on echo with various different valvular diseases  -Monitor volume status     COPD-no signs of acute exacerbation currently  -Bronchodilators  -Mucolytic's  -Wean O2 as tolerated     Tobacco abuse-cessation advised    Aphasia  -This may be multifactorial.  It is suspicious for vocal cord paralysis secondary to the patient's underlying cancer.  Also could be related to esophageal disease.  Will defer to oncology the timing of getting ENT to look at the patient's vocal cords for additional information  -Continue his nutrition through his PEG at this time.  -May be slightly improved today.    VTE Prophylaxis -   Mechanical Order History:      Ordered        11/25/20 1608  Place Sequential Compression Device  Once         11/25/20 1608  Maintain Sequential Compression Device  Continuous                 Pharmalogical Order History:     None        Code Status -   Code Status and Medical Interventions:   Ordered at: 11/25/20 1608     Code Status:    CPR     Medical Interventions (Level of Support Prior to Arrest):    Full     This patient has been examined wearing appropriate Personal Protective Equipment and discussed with hospital infection control department. 12/04/20    Discharge Planning  pending  Electronically signed by Farrah Grimm MD, 12/04/20, 18:39 EST.  [facility] Hospitalist  Team

## 2020-12-04 NOTE — THERAPY TREATMENT NOTE
Acute Care - Speech Language Pathology   Swallow Treatment Note [facility]     Patient Name: Shay Hernandez  : 1940  MRN: [id number]  Today's Date: 2020               Admit Date: 2020  Patient was not wearing a face mask during this therapy encounter. Therapist used appropriate personal protective equipment including mask, eye protection and gloves.  Mask used was standard procedure mask. Appropriate PPE was worn during the entire therapy session. Hand hygiene was completed before and after therapy session. Patient is not in enhanced droplet precautions.     Visit Dx:     ICD-10-CM ICD-9-CM   1. Feeding difficulties  R63.3 783.3     Patient Active Problem List   Diagnosis   • COVID-19 ruled out   • COPD (chronic obstructive pulmonary disease) (CMS/HCC)   • Weight loss   • Weakness   • Chest pain   • Cough   • Shortness of breath   • Lung mass   • Liver mass   • Atrial fibrillation (CMS/HCC)   • Adenocarcinoma, lung, unspecified laterality (CMS/HCC)   • Encounter for fitting and adjustment of vascular catheter   • Thrombocytopenia (CMS/HCC)   • Feeding difficulties   • Moderate malnutrition (CMS/HCC)     Past Medical History:   Diagnosis Date   • Adenocarcinoma, lung, unspecified laterality (CMS/HCC) 10/27/2020   • Atrial fibrillation (CMS/HCC)    • COPD (chronic obstructive pulmonary disease) (CMS/HCC)      Past Surgical History:   Procedure Laterality Date   • APPENDECTOMY     • BRONCHOSCOPY N/A 10/27/2020    Procedure: BRONCHOSCOPY WITH BRONCHOALVEOLAR LAVAGE AND ENDOBRONCHIAL ULTRASOUND WITH FINE NEEDLE ASPIRATION X2 AREAS;  Surgeon: Johnny Waldrop MD;  Location: [facility] ENDOSCOPY;  Service: Pulmonary;  Laterality: N/A;  POST:    • ENDOSCOPY W/ PEG TUBE PLACEMENT N/A 2020    Procedure: ESOPHAGOGASTRODUODENOSCOPY WITH 20F PERCUTANEOUS ENDOSCOPIC GASTROSTOMY TUBE INSERTION WITH ANESTHESIA;  Surgeon: Lizeth Nance MD;  Location: [facility] ENDOSCOPY;  Service: Gastroenterology;  Laterality:  N/A;  ulcerative esophagitis, hiatal hernia, PEG placement   • VENOUS ACCESS DEVICE (PORT) INSERTION N/A 11/16/2020    Procedure: INSERTION VENOUS ACCESS DEVICE, LEFT SUBCLAVIAN UNDER FLOUROSCOPIC GUIDANCE ;  Surgeon: Jan Richards MD;  Location: [facility] MAIN OR;  Service: Cardiothoracic;  Laterality: N/A;        SWALLOW EVALUATION (last 72 hours)      SLP Adult Swallow Evaluation     Row Name 12/04/20 1500          Document Type  therapy note (daily note)  -CP    Subjective Information  complains of;dyspnea  -CP    Patient Observations  alert  -CP    Patient/Family/Caregiver Comments/Observations  Pt was on nasal cannula and a simple mask. Pt able to follow commands. pt refused all PO trials except multiple sips of water by straw.   -CP    Patient Effort  fair  -CP    Comment  Pt seen for tolerance of current full liquid diet. Pt given trials of ice cream and water by straw. Pt refused the ice cream. Pt took multiple large sips of water by straw. pt exhibited timely swallow, clear vocal quality and no overt s/s of aspiration. Pt also getting tube feeds for nutrition. Education provided on the rationale for diet recs. Pt verbalized understanding.  ST will continue to follow to assure tolerance of diet and make further recs as indicated.   -CP         User Key  (r) = Recorded By, (t) = Taken By, (c) = Cosigned By    Initials Name Effective Dates    CP Yennifer Pandey, ALEXUS 03/01/19 -           EDUCATION  The patient has been educated in the following areas:   Modified Diet Instruction.         SLP GOALS     Row Name 12/04/20 1600 12/03/20 1300          Oral Nutrition/Hydration Goal 1 (SLP)    Oral Nutrition/Hydration Goal 1, SLP  Pt will tolerate least restrictive diet with no s/s aspiration clinically  -CP  Pt will tolerate least restrictive diet with no s/s aspiration clinically  -MC     Time Frame (Oral Nutrition/Hydration Goal 1, SLP)  by discharge  -CP  by discharge  -MC     Barriers (Oral Nutrition/Hydration  Goal 1, SLP)  See above note  -CP  See above. Diet changed this date to full liquids following re-evaluation  -MC     Progress/Outcomes (Oral Nutrition/Hydration Goal 1, SLP)  medical status inhibited participation  -CP  medical status inhibited participation;progress slower than expected  -MC        Oral Nutrition/Hydration Goal 2 (SLP)    Oral Nutrition/Hydration Goal 2, SLP  Pt will verbalize/demonstrate understanding of safe swallow positioning & aspiration/reflux precautions  -CP  Pt will verbalize/demonstrate understanding of safe swallow positioning & aspiration/reflux precautions  -MC     Time Frame (Oral Nutrition/Hydration Goal 2, SLP)  by discharge  -CP  by discharge  -MC     Barriers (Oral Nutrition/Hydration Goal 2, SLP)  See above note  -CP  --     Progress/Outcomes (Oral Nutrition/Hydration Goal 2, SLP)  goal ongoing  -CP  --        Oral Nutrition/Hydration Goal (SLP)    Oral Nutrition/Hydration Goal, SLP  Pt will participate in diagnostic PO assessment via meal or snack to establish diet tolerance & identify any further skilled needs.   -CP  Pt will participate in diagnostic PO assessment via meal or snack to establish diet tolerance & identify any further skilled needs.   -MC     Time Frame (Oral Nutrition/Hydration Goal, SLP)  2 days;3 days;1 day  -CP  2 days;3 days;1 day  -MC     Barriers (Oral Nutrition/Hydration Goal, SLP)  See above note  -CP  Pt accepted only liquids during re-evaluation  -MC     Progress/Outcomes (Oral Nutrition/Hydration Goal, SLP)  goal ongoing  -CP  --       User Key  (r) = Recorded By, (t) = Taken By, (c) = Cosigned By    Initials Name Provider Type    CP Yennifer Pandey, SLP Speech and Language Pathologist    Kirti Alvarez, SLP Speech and Language Pathologist             Time Calculation:                ALEXUS Aceves  12/4/2020

## 2020-12-04 NOTE — PROGRESS NOTES
Continued Stay Note   [facility]     Patient Name: Shay Hernandez  MRN: [id number]  Today's Date: 12/4/2020    Admit Date: 11/25/2020    Discharge Plan     Row Name 12/04/20 1611       Plan    Plan  DC Plan: [facility] accepted pending pre-cert. Pre-cert started 12/4 (anticipated to be waived). PASRR approved. Current with [facility], [facility] following for tube feeds.    Plan Comments   requested liaison start pre-cert today, 12/4, as pt is possible d/c ready over weekend. [facility] reports bed available on Sunday. Liaison confirmed she would start.     Phone communication only - no physical contact with patient or family.    NISHI Rodriguez    Phone: [phone]  Cell: [phone]  Fax: [phone]  [email or web address]

## 2020-12-05 NOTE — THERAPY TREATMENT NOTE
Patient Name: Shay Hernandez  : 1940    MRN: [id number]                              Today's Date: 2020       Admit Date: 2020    Visit Dx:     ICD-10-CM ICD-9-CM   1. Feeding difficulties  R63.3 783.3     Patient Active Problem List   Diagnosis   • COVID-19 ruled out   • COPD (chronic obstructive pulmonary disease) (CMS/HCC)   • Weight loss   • Weakness   • Chest pain   • Cough   • Shortness of breath   • Lung mass   • Liver mass   • Atrial fibrillation (CMS/HCC)   • Adenocarcinoma, lung, unspecified laterality (CMS/HCC)   • Encounter for fitting and adjustment of vascular catheter   • Thrombocytopenia (CMS/HCC)   • Feeding difficulties   • Moderate malnutrition (CMS/HCC)     Past Medical History:   Diagnosis Date   • Adenocarcinoma, lung, unspecified laterality (CMS/HCC) 10/27/2020   • Atrial fibrillation (CMS/HCC)    • COPD (chronic obstructive pulmonary disease) (CMS/HCC)      Past Surgical History:   Procedure Laterality Date   • APPENDECTOMY     • BRONCHOSCOPY N/A 10/27/2020    Procedure: BRONCHOSCOPY WITH BRONCHOALVEOLAR LAVAGE AND ENDOBRONCHIAL ULTRASOUND WITH FINE NEEDLE ASPIRATION X2 AREAS;  Surgeon: Johnny Waldrop MD;  Location: [facility] ENDOSCOPY;  Service: Pulmonary;  Laterality: N/A;  POST:    • ENDOSCOPY W/ PEG TUBE PLACEMENT N/A 2020    Procedure: ESOPHAGOGASTRODUODENOSCOPY WITH 20F PERCUTANEOUS ENDOSCOPIC GASTROSTOMY TUBE INSERTION WITH ANESTHESIA;  Surgeon: Lizeth Nance MD;  Location: [facility] ENDOSCOPY;  Service: Gastroenterology;  Laterality: N/A;  ulcerative esophagitis, hiatal hernia, PEG placement   • VENOUS ACCESS DEVICE (PORT) INSERTION N/A 2020    Procedure: INSERTION VENOUS ACCESS DEVICE, LEFT SUBCLAVIAN UNDER FLOUROSCOPIC GUIDANCE ;  Surgeon: Jan Richards MD;  Location: [facility] MAIN OR;  Service: Cardiothoracic;  Laterality: N/A;     General Information     Row Name 20 5788          Physical Therapy Time and Intention    Document Type  therapy  note (daily note)  -     Mode of Treatment  physical therapy  -       User Key  (r) = Recorded By, (t) = Taken By, (c) = Cosigned By    Initials Name Provider Type    Yina Mckeon PTA Physical Therapy Assistant        Mobility     Row Name 12/05/20 1647          Bed Mobility    Bed Mobility  sit-supine  -     Supine-Sit [place] (Bed Mobility)  moderate assist (50% patient effort);verbal cues;2 person assist  -     Assistive Device (Bed Mobility)  bed rails;draw sheet  -[facility] Name 12/05/20 1647          Bed-Chair Transfer    Bed-Chair [place] (Transfers)  maximum assist (25% patient effort);2 person assist;moderate assist (50% patient effort)  -     Row Name 12/05/20 1647          Sit-Stand Transfer    Sit-Stand [place] (Transfers)  2 person assist;moderate assist (50% patient effort)  -       User Key  (r) = Recorded By, (t) = Taken By, (c) = Cosigned By    Initials Name Provider Type    Yina Mckeon PTA Physical Therapy Assistant        Obj/Interventions     Row Name 12/05/20 1653          Range of Motion Comprehensive    General Range of Motion  no range of motion deficits identified  -[facility] Name 12/05/20 1653          Strength Comprehensive (MMT)    General Manual Muscle Testing (MMT) Assessment  no strength deficits identified  -[facility] Name 12/05/20 1653          Balance    Static Sitting Balance  mild impairment  -     Static Standing Balance  moderate impairment  -     Dynamic Standing Balance  moderate impairment  -     Comment, Balance  posterior lean with sitting EOB and difficulty bringing hips over feet with standing  -       User Key  (r) = Recorded By, (t) = Taken By, (c) = Cosigned By    Initials Name Provider Type    Yina Mckeon PTA Physical Therapy Assistant        Goals/Plan     Row Name 12/05/20 1705          Bed Mobility Goal 1 (PT)    Activity/Assistive Device (Bed Mobility Goal 1, PT)  sit to supine/supine to sit  -      [place] Level/Cues Needed (Bed Mobility Goal 1, PT)  minimum assist (75% or more patient effort)  -SM     Time Frame (Bed Mobility Goal 1, PT)  2 weeks  -SM     Progress/Outcomes (Bed Mobility Goal 1, PT)  continuing progress toward goal  -SM     Row Name 12/05/20 1709          Transfer Goal 1 (PT)    Activity/Assistive Device (Transfer Goal 1, PT)  transfers, all;walker, rolling  -SM     [place] Level/Cues Needed (Transfer Goal 1, PT)  minimum assist (75% or more patient effort)  -SM     Time Frame (Transfer Goal 1, PT)  2 weeks  -SM     Progress/Outcome (Transfer Goal 1, PT)  continuing progress toward goal  -SM     Row Name 12/05/20 1702          Gait Training Goal 1 (PT)    Activity/Assistive Device (Gait Training Goal 1, PT)  assistive device use;gait (walking locomotion);improve balance and speed;forward stepping;walker, rolling  -SM     [place] Level (Gait Training Goal 1, PT)  minimum assist (75% or more patient effort)  -SM     Distance (Gait Training Goal 1, PT)  50  -SM     Time Frame (Gait Training Goal 1, PT)  2 weeks  -SM     Progress/Outcome (Gait Training Goal 1, PT)  unable to make needed progress  -       User Key  (r) = Recorded By, (t) = Taken By, (c) = Cosigned By    Initials Name Provider Type     Yina Vogel PTA Physical Therapy Assistant        Clinical Impression     Row Name 12/05/20 6818          Pain Scale: Numbers Pre/Post-Treatment    Pretreatment Pain Rating  0/10 - no pain  -     Posttreatment Pain Rating  0/10 - no pain  -     Row Name 12/05/20 6106          Plan of Care Review    Plan of Care Reviewed With  patient  -     Progress  no change  -     Outcome Summary  Pt continues to demonstrate significant weakness and deconditioning 2*/2 prolonged time in bed (limited for OOB activitiy 2*/2 low platelet counts which are improving). He is requiring Mod A x 2 persons for bed mobility and transfers to the bedside chair.  Shirley lift pad placed as pt  required use of lift to return to bed following last session. Medical condition continues to evolve, and daughter is requesting Palliative Care consult to assist in education of dz process and to assist in goals of care.  Continuing to recommend IP Rehab at d/c, though pts daughter is reporting pts desire to d/c home with family.  -     Row Name 12/05/20 1655          Vital Signs    Intra SpO2 (%)  95 10L  -SM     O2 Delivery Intra Treatment  hi-flow  -SM     Post SpO2 (%)  89 RN requesting wean to 5L  -SM     O2 Delivery Post Treatment  hi-flow  -SM     Intra Patient Position  Sitting  -SM     Post Patient Position  Sitting  -SM     Row Name 12/05/20 5647          Positioning and Restraints    Pre-Treatment Position  in bed  -SM     Post Treatment Position  chair  -SM     In Chair  with family/caregiver;encouraged to call for assist;call light within reach  -       User Key  (r) = Recorded By, (t) = Taken By, (c) = Cosigned By    Initials Name Provider Type    Yina Mckeon PTA Physical Therapy Assistant        Outcome Measures     Row Name 12/05/20 3489          How much help from another person do you currently need...    Turning from your back to your side while in flat bed without using bedrails?  2  -SM     Moving from lying on back to sitting on the side of a flat bed without bedrails?  2  -SM     Moving to and from a bed to a chair (including a wheelchair)?  2  -SM     Standing up from a chair using your arms (e.g., wheelchair, bedside chair)?  2  -SM     Climbing 3-5 steps with a railing?  1  -SM     To walk in hospital room?  1  -SM     AM-PAC 6 Clicks Score (PT)  10  -SM     Row Name 12/05/20 7225          Functional Assessment    Outcome Measure Options  Modified [place]  -       User Key  (r) = Recorded By, (t) = Taken By, (c) = Cosigned By    Initials Name Provider Type    Yina Mckeon PTA Physical Therapy Assistant        Physical Therapy Education                 Title: PT OT SLP  Therapies (In Progress)     Topic: Physical Therapy (In Progress)     Point: Mobility training (Done)     Learning Progress Summary           Patient Acceptance, E,TB, VU by  at 12/5/2020 1706    Acceptance, E, VU,NR by  at 12/4/2020 1452   Family Acceptance, E, VU,NR by  at 12/4/2020 1452                   Point: Home exercise program (Not Started)     Learner Progress:  Not documented in this visit.                      User Key     Initials Effective Dates Name Provider Type Discipline     03/01/19 -  Reyes, Carmela, PT Physical Therapist PT     03/01/19 -  Yina Vogel PTA Physical Therapy Assistant PT              PT Recommendation and Plan     Plan of Care Reviewed With: patient  Progress: no change  Outcome Summary: Pt continues to demonstrate significant weakness and deconditioning 2*/2 prolonged time in bed (limited for OOB activitiy 2*/2 low platelet counts which are improving). He is requiring Mod A x 2 persons for bed mobility and transfers to the bedside chair.  Shirley lift pad placed as pt required use of lift to return to bed following last session. Medical condition continues to evolve, and daughter is requesting Palliative Care consult to assist in education of dz process and to assist in goals of care.  Continuing to recommend IP Rehab at d/c, though pts daughter is reporting pts desire to d/c home with family.     Time Calculation:   PT Charges     Row Name 12/05/20 1706             Time Calculation    Start Time  1430  -      Stop Time  1511  -      Time Calculation (min)  41 min  -      PT Received On  12/05/20  -      PT - Next Appointment  12/07/20  -         Time Calculation- PT    Total Timed Code Minutes- PT  41 minute(s)  -         Timed Charges    04224 - PT Therapeutic Activity Minutes  41  -SM        User Key  (r) = Recorded By, (t) = Taken By, (c) = Cosigned By    Initials Name Provider Type     iYna Vogel PTA Physical Therapy Assistant        Therapy  Charges for Today     Code Description Service Date Service Provider Modifiers Qty    [id number] HC PT THERAPEUTIC ACT EA 15 MIN 12/5/2020 Yina Vogel, PTA GP 3          PT G-Codes  Outcome Measure Options: Modified Jun  AM-PAC 6 Clicks Score (PT): 10  AM-PAC 6 Clicks Score (OT): 14  Modified [place] Scale: 5 - Severe disability.  Bedridden, incontinent, and requiring constant nursing care and attention.    Yina Vogel PTA  12/5/2020

## 2020-12-05 NOTE — PLAN OF CARE
Goal Outcome Evaluation:  Plan of Care Reviewed With: patient  Progress: no change  Outcome Summary: Pt continues to demonstrate significant weakness and deconditioning 2*/2 prolonged time in bed (limited for OOB activitiy 2*/2 low platelet counts which are improving). He is requiring Mod A x 2 persons for bed mobility and transfers to the bedside chair.  Shirley lift pad placed as pt required use of lift to return to bed following last session. Medical condition continues to evolve, and daughter is requesting Palliative Care consult to assist in education of dz process and to assist in goals of care.  Continuing to recommend IP Rehab at d/c, though pts daughter is reporting pts desire to d/c home with family.

## 2020-12-05 NOTE — PLAN OF CARE
Goal Outcome Evaluation:  Plan of Care Reviewed With: patient, daughter  Progress: declining     Patient cooperative and pleasant.  Dr. aDley wants PT to get patient up in chair even with low platelet count.  Patient up for about an hour.  Family at bedside.

## 2020-12-05 NOTE — PROGRESS NOTES
ONCOLOGY/HEMATOLOGY    PATIENT: Shay Hernandez  YOB: 1940  MEDICAL RECORD NUMBER: [id number]   DATE OF SERVICE: 12/05/20    CHIEF COMPLAINT: Thrombocytopenia; lung cancer    HISTORY OF PRESENT ILLNESS:   Mr. Hernandez is a 80 y.o. with history of stage IIIc poorly differentiated adenocarcinoma lung, atrial fibrillation and COPD  presenting as direct admission from oncologist office for progressive generalized weakness as well as thrombocytopenia.  Patient had initiated chemotherapy recently 11/17/2020 and reported progressive weakness as well as loss of appetite over the last few weeks.  Patient had been getting more thrombocytopenic with levels in the 30s the day prior to admission down to 13 on day of admission.  Patient denied hematuria, hematochezia, melena or significant bruising though did report a small amount of blood when he blew his nose in the morning.     Patient found to be in atrial fibrillation with RVR once reaching the floor.  Stat EKG ordered and ordering Cardizem.  Patient received unit of platelets in ambulatory care center prior to coming up to the floor.  Patient scheduled to have feeding tube placement with GI in the next few days.    He received his PEG 11/28/2020 platelet count improved nominally from 26->32 with steroids and transfusion.  However, despite 2 doses of prednisone, today the platelets have dropped to 13.  He is interested in potentially eating something by mouth.  EGD done with PEG showed ulcerated GEJ suggesting potentially metastatic involvement by the lung cancer.  bx was deferred given thrombocytopenia.       Oncological history:  Stage IIIc poorly differentiated adenocarcinoma of lung -patient presented with a bulky right hilar mass and also bulky mediastinal lymph nodes.    Also has left hilar lymph nodes consistent with metastasis and borderline enlarged left supraclavicular lymph node. . MRI abdomen 10/28/2020 - 2.2 cm lesion in the dome of liver with  features of a cyst. Negative for evidence of hepatic metastases. S/p bronchoscopy with EBUS on 10/27/2020 - subcarinal lymph node FNA positive for metastatic poorly differentiated adenocarcinoma. Staining pattern is consistent with adenocarcinoma of pulmonary origin. Many of the tumor cells display significant pleomorphism with bizarre appearing nuclei and mitotic figures. This raises the possibility of a sarcomatoid component. MRI brain 10/29/2020 - no metastatic disease.   ·  11/5/2020: PET CT scan: Hypermetabolic right hilar mass consistent with malignancy.  Hypermetabolic nodular densities within the right lower lobe consistent with metastatic disease.  Bulky pathological enlarged hypermetabolic mediastinal adenopathy, consistent with metastatic disease.  No left hilar adenopathy.  Moderate FDG accumulation within the proximal stomach with gastric wall thickening.  This could be physiologic uptake..  Could be gastritis.  Gastric malignancy cannot be excluded.  Endoscopy recommended.  Enlarged hypermetabolic bilateral supraclavicular lymph nodes in the lower neck consistent with metastasis  · 11/17/2020 patient received cycle 1 of cisplatin and pemetrexed.  Cisplatin 75 mg/m², pemetrexed 400 mg per metered square..  WBC 15.6, hemoglobin 13.7, platelets 129, creatinine 0.7, albumin 2.8  · 11/26/2020 CT chest without -  1.Compared to previous examinations the paratracheal and mediastinal lymphadenopathy is larger. 2.There is a moderate size right pleural effusion and small left pleural effusion which are increased in size from previous study. 3.The right hilar mass is not definite change in size. 4.There are some new nodules in the right lower lobe. 5.There is increased opacity in the right lower lobe. 6.Cardiomegaly and severe atherosclerotic disease and coronary artery disease. 7.No change in a low-density lesion at the right hepatic dome. 8.Diffuse stomach wall thickening similar previous exam    History of  present illness was reviewed and is unchanged from the previous visit. 12/05/20    Subjective:  Patient seen this morning.  He cannot speak.  He looks very restless.  Breathing appears to have improved compared to yesterday.  O2 sats better.    Review of Systems   Constitutional: Positive for fatigue. Negative for activity change, appetite change, chills, fever and unexpected weight change.   HENT: Positive for voice change ( not able to speak ). Negative for mouth sores, sore throat and tinnitus.    Eyes: Negative for photophobia, pain, redness and visual disturbance.   Respiratory: Positive for cough and shortness of breath ( improved ).    Cardiovascular: Negative for chest pain, palpitations and leg swelling.   Gastrointestinal: Negative for abdominal pain, constipation, diarrhea, nausea and vomiting.   Genitourinary: Negative for dysuria and hematuria.   Musculoskeletal: Negative for arthralgias, back pain, myalgias and neck pain.   Skin: Negative for rash and wound.   Allergic/Immunologic: Negative for environmental allergies.   Neurological: Negative for dizziness, speech difficulty, weakness, light-headedness, numbness and headaches.   Hematological: Negative for adenopathy. Does not bruise/bleed easily.   Psychiatric/Behavioral: Negative for confusion and dysphoric mood. The patient is not nervous/anxious.    All other systems reviewed and are negative.    Past Medical History:   Diagnosis Date   • Adenocarcinoma, lung, unspecified laterality (CMS/HCC) 10/27/2020   • Atrial fibrillation (CMS/HCC)    • COPD (chronic obstructive pulmonary disease) (CMS/[facility])        Past Surgical History:   Procedure Laterality Date   • APPENDECTOMY     • BRONCHOSCOPY N/A 10/27/2020    Procedure: BRONCHOSCOPY WITH BRONCHOALVEOLAR LAVAGE AND ENDOBRONCHIAL ULTRASOUND WITH FINE NEEDLE ASPIRATION X2 AREAS;  Surgeon: Johnny Waldrop MD;  Location: [facility] ENDOSCOPY;  Service: Pulmonary;  Laterality: N/A;  POST:    • ENDOSCOPY W/ PEG  TUBE PLACEMENT N/A 2020    Procedure: ESOPHAGOGASTRODUODENOSCOPY WITH 20F PERCUTANEOUS ENDOSCOPIC GASTROSTOMY TUBE INSERTION WITH ANESTHESIA;  Surgeon: Lizeth Nance MD;  Location: [facility] ENDOSCOPY;  Service: Gastroenterology;  Laterality: N/A;  ulcerative esophagitis, hiatal hernia, PEG placement   • VENOUS ACCESS DEVICE (PORT) INSERTION N/A 2020    Procedure: INSERTION VENOUS ACCESS DEVICE, LEFT SUBCLAVIAN UNDER FLOUROSCOPIC GUIDANCE ;  Surgeon: Jan Richards MD;  Location: [facility] MAIN OR;  Service: Cardiothoracic;  Laterality: N/A;       Family History   Problem Relation Age of Onset   • Heart attack Mother    • Lung disease Other         Cryptococcus       Social History     Socioeconomic History   • Marital status:      Spouse name: Not on file   • Number of children: Not on file   • Years of education: Not on file   • Highest education level: Not on file   Tobacco Use   • Smoking status: Former Smoker     Packs/day: 2.00     Years: 60.00     Pack years: 120.00     Types: Cigarettes     Quit date: 10/27/2020     Years since quittin.1   • Smokeless tobacco: Never Used   Substance and Sexual Activity   • Alcohol use: Not Currently     Alcohol/week: 4.0 standard drinks     Types: 4 Cans of beer per week   • Drug use: Not Currently   • Sexual activity: Defer   Social History Narrative    Lives with wife     ALLERGIES:  Patient has no known allergies.    MEDICATION:  Current Facility-Administered Medications   Medication Dose Route Frequency Provider Last Rate Last Admin   • acetaminophen (TYLENOL) tablet 1,000 mg  1,000 mg Oral Once PRN Ric Mcnamara MD        Or   • acetaminophen (TYLENOL) suppository 650 mg  650 mg Rectal Once PRN Ric Mcnamara MD       • acetaminophen (TYLENOL) tablet 650 mg  650 mg Oral Q4H PRN Lizeth Nance MD       • benzonatate (TESSALON) capsule 200 mg  200 mg Oral TID PRN Elder Tineo MD       • bisacodyl (DULCOLAX) EC tablet  5 mg  5 mg Oral Daily PRN Lizeth Nance MD       • budesonide-formoterol (SYMBICORT) 160-4.5 MCG/ACT inhaler 2 puff  2 puff Inhalation BID - RT Lizeth Nance MD   2 puff at 12/05/20 0738   • dilTIAZem (CARDIZEM) 100 mg in 100 mL NS infusion (ADV)  5-15 mg/hr Intravenous Titrated Lizeth Nance MD 5 mL/hr at 11/27/20 1244 5 mg/hr at 11/27/20 1244   • dilTIAZem (CARDIZEM) tablet 30 mg  30 mg Oral Q6H Lizeth Nance MD   30 mg at 12/05/20 0609   • flumazenil (ROMAZICON) injection 0.1 mg  0.1 mg Intravenous PRN Ric Mcnamara MD       • folic acid (FOLVITE) tablet 1 mg  1 mg Oral Daily Lizeth Nance MD   1 mg at 12/05/20 0957   • furosemide (LASIX) injection 40 mg  40 mg Intravenous Q12H Farrah Grimm MD   40 mg at 12/05/20 0609   • HYDROmorphone (DILAUDID) injection 0.2 mg  0.2 mg Intravenous Q15 Min PRN Farrah Grimm MD       • HYDROmorphone (DILAUDID) injection 0.25 mg  0.25 mg Intravenous Q15 Min PRN Farrah Grimm MD   0.25 mg at 11/30/20 1152   • ipratropium-albuterol (DUO-NEB) nebulizer solution 3 mL  3 mL Nebulization 4x Daily - RT Lizeth Nance MD   3 mL at 12/05/20 1150   • ipratropium-albuterol (DUO-NEB) nebulizer solution 3 mL  3 mL Nebulization Once PRN Ric Mcnamara MD       • labetalol (NORMODYNE,TRANDATE) injection 5 mg  5 mg Intravenous Q5 Min PRN Ric Mcnamara MD       • magnesium hydroxide (MILK OF MAGNESIA) suspension 2400 mg/10mL 10 mL  10 mL Oral Daily PRN Lizeth Nance MD       • Magnesium Sulfate 2 gram Bolus, followed by 8 gram infusion (total Mg dose 10 grams)- Mg less than or equal to 1mg/dL  2 g Intravenous PRN Lizeth Nance MD        Or   • Magnesium Sulfate 2 gram / 50mL Infusion (GIVE X 3 BAGS TO EQUAL 6GM TOTAL DOSE) - Mg 1.1 - 1.5 mg/dl  2 g Intravenous PRN Lizeth Nance MD 25 mL/hr at 11/28/20 0230 2 g at 11/28/20 0230    Or   • Magnesium Sulfate 4 gram infusion- Mg 1.6-1.9 mg/dL  4 g Intravenous PRN Lizeth Nance MD 25  mL/hr at 12/05/20 1000 4 g at 12/05/20 1000   • melatonin tablet 5 mg  5 mg Oral Nightly PRN Lizeth Nance MD       • metoclopramide (REGLAN) tablet 10 mg  10 mg Oral TID AC Lizeth Nance MD   10 mg at 12/05/20 0956   • metoprolol tartrate (LOPRESSOR) tablet 50 mg  50 mg Oral Q12H Lizeth Nance MD   50 mg at 12/05/20 0957   • mirtazapine (REMERON) tablet 15 mg  15 mg Oral Nightly Lizeth Nance MD   15 mg at 12/04/20 2100   • naloxone (NARCAN) injection 0.4 mg  0.4 mg Intravenous PRN Ric Mcnamara MD       • nicotine (NICODERM CQ) 7 MG/24HR patch 1 patch  1 patch Transdermal Daily Lizeth Nance MD   1 patch at 12/03/20 1112   • nitroglycerin (NITROSTAT) SL tablet 0.4 mg  0.4 mg Sublingual Q5 Min PRN Lizeth Nance MD       • nystatin (MYCOSTATIN) 828802 UNIT/ML suspension 500,000 Units  5 mL Swish & Swallow 4x Daily Lizeth Nance MD   500,000 Units at 12/05/20 0957   • ondansetron (ZOFRAN) tablet 4 mg  4 mg Oral Q6H PRN Lizeth Nance MD        Or   • ondansetron (ZOFRAN) injection 4 mg  4 mg Intravenous Q6H PRN Lizeth Nance MD       • ondansetron (ZOFRAN) injection 4 mg  4 mg Intravenous Once PRN Ric Mcnamara MD       • pantoprazole (PROTONIX) EC tablet 40 mg  40 mg Oral Q AM Lizeth Nance MD   40 mg at 12/05/20 0609   • potassium & sodium phosphates (PHOS-NAK) 280-160-250 MG packet - for Phosphorus less than 1.25 mg/dL  2 packet Oral Q6H PRN Lizeth Nance MD   2 packet at 11/30/20 0122    Or   • potassium & sodium phosphates (PHOS-NAK) 280-160-250 MG packet - for Phosphorus 1.25 - 2.5 mg/dL  2 packet Oral Q6H PRN Lizeth Nance MD   2 packet at 12/04/20 1755   • potassium chloride (K-DUR,KLOR-CON) CR tablet 40 mEq  40 mEq Oral PRN Lizeth Nance MD   40 mEq at 11/26/20 1807    Or   • potassium chloride (KLOR-CON) packet 40 mEq  40 mEq Oral PRN Lizeth Nance MD   40 mEq at 12/03/20 1759    Or   • potassium chloride 10 mEq in 100 mL IVPB  10 mEq  "Intravenous Q1H PRN Lizeth Nance  mL/hr at 11/27/20 1024 10 mEq at 11/27/20 1024   • promethazine (PHENERGAN) tablet 25 mg  25 mg Oral Once PRN Ric Mcnamara MD       • sodium chloride 0.9 % flush 10 mL  10 mL Intravenous Q12H Lizeth Nance MD   10 mL at 12/05/20 0952   • sodium chloride 0.9 % flush 10 mL  10 mL Intravenous PRN Lizeth Nance MD       • sodium chloride 0.9 % infusion  30 mL/hr Intravenous Continuous PRN Lizeth Nance MD 30 mL/hr at 11/28/20 2152 30 mL/hr at 11/28/20 2152   • sodium chloride nasal spray 2 spray  2 spray Each Nare PRN Lizeth Nance MD       • sucralfate (CARAFATE) tablet 1 g  1 g Oral 4x Daily AC & at Bedtime Lizeth Nance MD   1 g at 12/05/20 0957   • traZODone (DESYREL) tablet 50 mg  50 mg Oral Nightly Ziggy Daley MD   50 mg at 12/04/20 2100     PHYSICAL EXAM:  Current Vitals:  BP 96/52 (BP Location: Right arm, Patient Position: Lying)   Pulse (!) 122   Temp 97.7 °F (36.5 °C) (Oral)   Resp 18   Ht 188 cm (74\")   Wt 78 kg (171 lb 15.3 oz)   SpO2 92%   BMI 22.08 kg/m²     VITAL signs in the last 24 hours: [unfilled]       12/04/20  0500 12/05/20  0526   Weight: 78.4 kg (172 lb 13.5 oz) 78 kg (171 lb 15.3 oz)     Physical Exam  Vitals signs and nursing note reviewed.   Constitutional:       General: He is not in acute distress.     Appearance: Normal appearance. He is ill-appearing. He is not diaphoretic.      Interventions: Nasal cannula in place.   HENT:      Head: Normocephalic and atraumatic.      Mouth/Throat:      Comments: Patient very hoarse and unable to speak..  Eyes:      General: No scleral icterus.        Right eye: No discharge.         Left eye: No discharge.      Conjunctiva/sclera: Conjunctivae normal.   Neck:      Musculoskeletal: Normal range of motion and neck supple.      Thyroid: No thyromegaly.   Cardiovascular:      Rate and Rhythm: Normal rate and regular rhythm.      Pulses: Normal pulses.      Heart sounds: " Normal heart sounds. No friction rub. No gallop.       Comments: Port-A-Cath chest  Pulmonary:      Effort: No respiratory distress.      Breath sounds: No stridor. No wheezing.      Comments: Decreased air entry bilaterally  Abdominal:      General: Bowel sounds are normal.      Palpations: Abdomen is soft. There is no mass.      Tenderness: There is no abdominal tenderness. There is no guarding or rebound.   Genitourinary:     Comments: External urinary catheter   Musculoskeletal: Normal range of motion.         General: No tenderness.   Lymphadenopathy:      Cervical: No cervical adenopathy.   Skin:     General: Skin is warm.      Findings: No erythema or rash.   Neurological:      General: No focal deficit present.      Mental Status: He is alert and oriented to person, place, and time.      Sensory: No sensory deficit.      Motor: Weakness present. No abnormal muscle tone.      Gait: Gait normal.   Psychiatric:      Comments: Patient looks very restless      ECOG PERFORMANCE STATUS:3    LABORATORY/DATA:  WBC   Date Value Ref Range Status   12/05/2020 15.70 (H) 3.40 - 10.80 10*3/mm3 Final     RBC   Date Value Ref Range Status   12/05/2020 3.73 (L) 4.14 - 5.80 10*6/mm3 Final     Hemoglobin   Date Value Ref Range Status   12/05/2020 10.6 (L) 13.0 - 17.7 g/dL Final     Hematocrit   Date Value Ref Range Status   12/05/2020 32.7 (L) 37.5 - 51.0 % Final     MCV   Date Value Ref Range Status   12/05/2020 87.6 79.0 - 97.0 fL Final     MCH   Date Value Ref Range Status   12/05/2020 28.4 26.6 - 33.0 pg Final     MCHC   Date Value Ref Range Status   12/05/2020 32.4 31.5 - 35.7 g/dL Final     RDW   Date Value Ref Range Status   12/05/2020 14.4 12.3 - 15.4 % Final     RDW-SD   Date Value Ref Range Status   12/05/2020 44.2 37.0 - 54.0 fl Final     MPV   Date Value Ref Range Status   12/05/2020 9.1 6.0 - 12.0 fL Final     Platelets   Date Value Ref Range Status   12/05/2020 19 (C) 140 - 450 10*3/mm3 Final     Comment:      Result checked     Neutrophils Absolute   Date Value Ref Range Status   12/05/2020 11.46 (H) 1.70 - 7.00 10*3/mm3 Final     Eosinophils Absolute   Date Value Ref Range Status   12/05/2020 0.16 0.00 - 0.40 10*3/mm3 Final     nRBC   Date Value Ref Range Status   12/04/2020 1.0 (H) 0.0 - 0.2 /100 WBC Final     Lab Results   Component Value Date    GLUCOSE 155 (H) 12/04/2020    BUN 15 12/04/2020    CREATININE 0.59 (L) 12/04/2020    EGFRIFNONA 133 12/04/2020    BCR 25.4 (H) 12/04/2020    K 4.0 12/04/2020    CO2 29.0 12/04/2020    CALCIUM 7.5 (L) 12/04/2020    ALBUMIN 2.50 (L) 12/02/2020    AST 17 12/02/2020    ALT 19 12/02/2020     No results found for:  No results found for: SPEP, UPEP No results found for: LDH, URICACID   Lab Results   Component Value Date    IRON 23 (L) 11/02/2020    TIBC 231 (L) 11/02/2020    FERRITIN 334.90 10/26/2020      PENDING:  CT chest with contrast     IMAGING:  Ct Chest Without Contrast    Result Date: 11/26/2020  Narrative: CT CHEST WO CONTRAST-  Date of Exam: 11/26/2020 1:17 PM  Indication: Pneumonia, effusion or abscess suspected, xray done; R63.3-Feeding difficulties  smoker. Atrial fibrillation. Recent diagnosis of lung cancer.  Comparison: PET/CT 11/05/2020, CT chest 10/27/2020.  Technique: Serial and axial CT images of the chest were obtained. Reconstructions in the coronal and sagittal planes were performed. Automated exposure control and iterative reconstruction methods were used.  FINDINGS: Hilum and Mediastinum: Right paratracheal lymph node has increased in size. It measures approximately 2.3 x 3 cm on image 18. Previously, it measured about 1.6 x 2.5 cm. Lymph node mass right mid peritracheal is larger. On image 32 measures about 3.5 x 5 cm. Previously was about 3.6 x 4.7 cm. Subcarinal lymph node is larger.. Its more difficult to measure on this noncontrast enhanced study but appears to measure about 3.9 x 5.9 cm. Right hilar mass more difficult to measure without  contrast. However, when measured at similar location to previous exam, image #61 and measures approximately 3.9 x 6.3 cm. This is not definitely changed. There are enlarged bilateral hilar lymph nodes. There is cardiomegaly. There is severe coronary artery calcination. There are annular calcination of the mitral valve. There is small pericardial effusion.  Lung Parenchyma and Pleura: There is a moderate size right pleural effusion which is increased from previous study. Small left pleural effusion which is slightly larger as well. There is a right lower lobe pulmonary nodule measuring 1.3 cm it is pleural-based on image #71. The pleural effusion has changed its location but previously it measured about 1 cm. There is a nodule in the right lower lobe on image #56 measuring about 8 mm. This is not definitely seen on previous study. There is a nodule in the right lower lobe on image #68 measuring about 8 mm. This also appears new. There is increased right lower lobe airspace opacity blending with the pleural effusion. There is right infrahilar opacity and diffuse bronchial wall thickening. There is septal thickening in the right upper lobe there is partial atelectasis of the right middle lobe which appears increased.  There is septal thickening in the left lung is less pronounced in the right. There is bronchial wall thickening. There is airspace opacity in the central left lower lobe similar previous exam. There is mild subsegmental atelectasis in the left lower lobe similar to previous study. There is moderate emphysema.  Upper Abdomen: Low density nodule in the right posterior hepatic lobe near the hepatic dome measuring about 2.2 cm similar to previous study. Probable cholelithiasis without acute inflammation. Severe atherosclerotic calcination abdominal aorta. Diffuse gastric wall thickening.  Soft tissues: There is a left anterior chest wall port. Tip terminates in superior vena cava.  Osseous structures: No  aggressive focal lytic or sclerotic osseous lesions. Osteopenia.      Impression: 1.Compared to previous examinations the paratracheal and mediastinal lymphadenopathy is larger. 2.There is a moderate size right pleural effusion and small left pleural effusion which are increased in size from previous study. 3.The right hilar mass is not definite change in size. 4.There are some new nodules in the right lower lobe. 5.There is increased opacity in the right lower lobe. 6.Cardiomegaly and severe atherosclerotic disease and coronary artery disease. 7.No change in a low-density lesion at the right hepatic dome. 8.Diffuse stomach wall thickening similar previous exam.    Electronically Signed By-Meg Marie MD On:11/26/2020 1:38 PM This report was finalized on [id number] by  Meg Marie MD.    Xr Chest 1 View    Result Date: 12/2/2020  Narrative: DATE OF EXAM: 12/2/2020 9:31 AM  PROCEDURE: XR CHEST 1 VW-  INDICATIONS: Worsening shortness of breath, difficulty eating. Recent diagnosis of lung cancer.  COMPARISON: 11/25/2020.  TECHNIQUE: Single radiographic view of the chest was obtained.  FINDINGS: The heart is enlarged. There is bilateral mixed interstitial/airspace disease which has worsened from the previous exam. There is also dense consolidation in the right lower chest with what appears to be either a small to moderate subpulmonic pleural effusion or elevation of the right hemidiaphragm unchanged from the previous study. There is thickening of the right paratracheal stripe consistent with the known adenopathy there is fullness in the right hilum secondary to the patient's known lung cancer. The patient has a stable port in place. There are chronic age-related changes involving the bony thorax and thoracic aorta.      Impression:  1. Bilateral mixed interstitial/airspace disease which is worsened from the previous study. This can be seen with multifocal pneumonia or pulmonary edema. 2. The patient either has a  small to moderate subpulmonic pleural effusion or elevation of the right hemidiaphragm. This is unchanged from the previous exam. There is also unchanged dense consolidation in the right lower chest. 3. Thickening of the right paratracheal stripe consistent with known adenopathy. 4. Fullness in the right hilum consistent with a known lung cancer.  Electronically Signed By-Antonio Graham MD On:12/2/2020 10:37 AM This report was finalized on [id number] by  Antonio Graham MD.    Xr Chest Pa & Lateral    Result Date: 11/25/2020  Narrative: DATE OF EXAM: 11/25/2020 4:50 PM  PROCEDURE: XR CHEST PA AND LATERAL-  INDICATIONS: 79 yo with lung cancer and underlying copd w shortness of breath   COMPARISON: AP portable chest 10/26/2020. PET/CT 11/5/2020.  TECHNIQUE: Two radiologic views of the chest.  FINDINGS: Increased right basilar opacity, and may represent worsening atelectasis/pneumonia superimposed upon right lower lobe lung lesion, seen to better advantage on previous PET/CT. Patient's mediastinal and right hilar adenopathy is seen to better than PET/CT, with slight widening of the right paratracheal stripe consistent with known adenopathy. Central interstitial thickening is present in both lungs favored to reflect changes of evolving interstitial edema. Stable mild cardiac enlargement. Left chest wall valerie catheter has been placed with the tip terminating in the lower SVC level. No visible pneumothorax. Osteopenia. No acute osseous abnormalities.      Impression: 1 increasing right lower lobe airspace disease may represent atelectasis or pneumonia and small right pleural effusion. 2. Patient has right lower lobe lung lesion consistent with the appearance of malignancy on previous PET/CT. Right paratracheal adenopathy is observed on today's x-ray. 3. FINDINGS consistent with developing interstitial edema in comparison to the 10/26/2020 exam.  Electronically Signed By-Aranza Martinez MD On:11/25/2020 5:01 PM This report  was finalized on [id number] by  Aranza Martinez MD.    PROBLEM LIST:  Patient Active Problem List   Diagnosis   • COVID-19 ruled out   • COPD (chronic obstructive pulmonary disease) (CMS/HCC)   • Weight loss   • Weakness   • Chest pain   • Cough   • Shortness of breath   • Lung mass   • Liver mass   • Atrial fibrillation (CMS/HCC)   • Adenocarcinoma, lung, unspecified laterality (CMS/HCC)   • Encounter for fitting and adjustment of vascular catheter   • Thrombocytopenia (CMS/HCC)   • Feeding difficulties   • Moderate malnutrition (CMS/HCC)     Assessment & Plan  ASSESSMENT  1. Stage IIIc poorly differentiated adenocarcinoma of lung -patient could potentially have stage IV disease with gastric involvement.  Patient originally presented with a bulky right hilar mass and also bulky mediastinal lymph nodes.  Started on cisplatin/Alimta and consideration of radiation therapy concurrently at a later point; however, patient experienced severe myelosuppression.  2. Esophageal dysphagia/Aphasia/Acute Hoarseness:   Acute hoarseness could be mediastinal disease progression. Concerned for mediastinal disease progression causing laryngeal nerve paralysis.  Primary team considering ENT referral to evaluate loss of speech. Full liquid diet recommended. CT chest ordered.   3. Shortness of air: Fluid overload  4. Thrombocytopenia: Could be multifactorial due to combination of chemotherapy-induced myelosuppression and occult GI bleeding from gastric ulceration, could also be paraneoplastic.  Although ITP is one of the differential diagnosis it seems less likely considering the setting.  He recently had chemotherapy, and now has severe thrombocytopenia.  Bone marrow metastases should also be considered.  Haptoglobin is not indicative of hemolysis.  Status post Nplate.  No signs of DIC. Platelets continue to slowly improve. No transfusion needed today.   5. Leukopenia/Neutropenia: Resolved.  Due to chemo induced myelosuppression.   GCSF given and discontinued.   6. Gastric wall thickening and hypermetabolic activity: visualized on recent PET scan: EGD done with PEG showed ulcerated GEJ suggesting potentially metastatic involvement by the lung cancer.  Biopsy was deferred given thrombocytopenia.     7. Previous hospital admission for pneumonia/Pneumonia: Status post antibiotics and steroids recently.  Finished outpatient doxycycline.   8. Atrial fibrillation with RVR: Currently off Cardizem drip.  9. Diastolic cardiomyopathy: Noted on previous echo.  10. COPD:  management per Primary team   11. Nutrition: Started on tube feeds.  Dietician following.   12. Prognosis is guarded.      PLAN  1. CBC daily   2. Transfuse platelets ASAP if develops bleeding. Platelet transfusions have largely not been successful.  3. Continue diuretics   4. Continue to hold anticoagulants and antiplatelet agents  5. Continue folic acid 1 mg po daily  6. CT chest with contrast   7. Consider repeat MRI brain after reviewing CT chest   8. Will need repeat EGD in 1 to 2 weeks once thrombocytopenia resolves  9. Use PEG tube for supplemental nutrition - now is on supplemental regular diet   10. Will need significant and substantial dose reduction with the next cycle  11. Dr. Daley discussed patient's poor prognosis with patient's daughter today   12. Discharge plan is inpatient rehab     Electronically signed by MANDI Latham, 12/05/20, 3:33 PM EST.      I personally reviewed all pertinent labs, related documentation and the  imaging. ROS performed and physical exam done during face to face enounter with patient. I agree with  nurse practitioner's doumentation, assessment and plan.    Patient looks very weak.  He continues to have complete hoarseness/aphonia..  Pending CT scan of the chest with contrast.  Also seems to have lost some mental coordination.  He is struggling to do some of his fine tasks.  Will obtain brain MRI.  I had a very lengthy discussion with  the daughter.  Explained that his cancer may be deteriorating and he may be having a very aggressive cancer with poor prognosis.  They understand the chances of a terminal condition.  Discussed that in the next few days we will be able to determine how is clinical course is going to look like.  Discussed that current chemo may not be beneficial.  If he improves, we will may consider immunotherapy.    Electronically signed by Ziggy Daley MD, 12/05/20, 7:29 PM EST.

## 2020-12-06 NOTE — PROGRESS NOTES
[facility] Medicine Services Daily Progress Note      Hospitalist Team  LOS 9 days      Patient Care Team:  Ambar Hassan APRN as PCP - General (Family Medicine)    Patient Location: 2119/1      Subjective   Subjective     Chief Complaint / Subjective  No chief complaint on file.    Patient did well with PEG tube insertion and able to be used starting tube feeds.  Patient is tolerating no abdominal distention or pain.  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  IVIG being started by hematology.    Brief Synopsis of Hospital Course/HPI  Mr. Hernandez is a 80 y.o. with history of atrial fibrillation and COPD with new diagnosis of lung cancer presenting as direct admission from oncologist office for progressive generalized weakness as well as thrombocytopenia.  Patient had initiated chemotherapy and reported progressive weakness as well as loss of appetite over the last few weeks.  Patient had been getting more thrombocytopenic with levels in the 30s the day prior to admission down to 13 on day of admission.  Patient denied hematuria, hematochezia, melena or significant bruising though did report a small amount of blood when he blew his nose in the morning.  Currently patient reports he feels weak though otherwise asymptomatic.  No shortness of breath or chest pain, no nausea or vomiting.  Anorexia noted.     Patient found to be in atrial fibrillation with RVR once reaching the floor.  Stat EKG ordered and ordering Cardizem.  Patient received unit of platelets in ambulatory care center prior to coming up to the floor.  Patient scheduled to have feeding tube placement with GI in the next few days.    11/26/2020: Patient reports feeling better than yesterday.  Heart rate improving.  Patient reported self-limiting epistaxis.  Receiving more platelets per hematology.  Received vitamin K for procedure tomorrow.    11/27/2020: Patient overall doing well.  Heart rate still  elevated in the 110s, starting oral Cardizem trial to wean off IV Cardizem.  Patient's platelets in the 20s but no signs of bleeding.      11/28/2020: Patient reports doing well no new symptoms.  No chest pain or shortness of breath.  Heart rate improving borderline controlled.  Patient going for PEG tube today.  Platelets in the 30s.    11/30/2020  Today the patient continues to have feeding difficulty as well as difficulty with some shortness of breath.  He remains weak.  The patient's platelet count remains extremely low despite hematology oncology's efforts.    12/1/2020  The patient seemed very fatigued and very weak today.  The family's wife is at the bedside and was trying to encourage him to continue to fight.  She is aware that the patient's issues with his platelets as well as his white count is related to the chemotherapy.  She asked me when the patient was going to have received additional chemotherapy and I told her probably not until he recovers from this session of chemo.    12/2/2020  Today the patient is having increased congestion.  His cough reflex is extremely poor.  He is not mobilizing secretions.  His weight is also up 3 kg just over yesterday and his sodium is declining.  The patient does not have a fever does not have an elevation in his white count.    12/3/2020  Today the patient's daughter is present at the time of my visit.  The patient no longer can speak.  It is concerning as the patient may have involvement of his left recurrent laryngeal nerve or the patient may have esophageal issues from the lesion that was seen on upper endoscopy.  He has been seen by speech therapy and is now on a full liquid diet only by mouth and is receiving the majority of his nutrition per his tube feeding.    12/4/2020  Today the patient is really not able to speak very much.  He could make an effort and did make some sounds.  Family was not present at the time of my examination.  He offers no additional  "complaints.    12/5/2020  Today the patient remains terribly weak.  The patient can speak a little bit.  Patient's daughter was there.  All questions were answered to the best of my ability.    Review of Systems   Constitution: Positive for decreased appetite and malaise/fatigue. Negative for chills and fever.        Patient has difficulty speaking.  He can be heard at about the level of a quiet whisper.   HENT: Negative.  Negative for hoarse voice and sore throat.    Eyes: Negative.  Negative for double vision and photophobia.   Cardiovascular: Negative.  Negative for chest pain and leg swelling.   Respiratory: Positive for shortness of breath. Negative for sputum production.    Endocrine: Negative.    Hematologic/Lymphatic: Negative.    Skin: Negative.    Musculoskeletal: Negative.  Negative for myalgias and stiffness.   Gastrointestinal: Negative.  Negative for nausea and vomiting.   Genitourinary: Negative.  Negative for dysuria and flank pain.   Neurological: Positive for weakness. Negative for dizziness and focal weakness.   Psychiatric/Behavioral: Negative.  Negative for altered mental status. The patient is not nervous/anxious.    Allergic/Immunologic: Negative.    All other systems reviewed and are negative.    Objective   Objective      Vital Signs  Temp:  [97.5 °F (36.4 °C)-98.4 °F (36.9 °C)] 97.7 °F (36.5 °C)  Heart Rate:  [] 120  Resp:  [15-20] 18  BP: ()/(52-72) 96/52  Oxygen Therapy  SpO2: 92 %  Pulse Oximetry Type: Continuous  Device (Oxygen Therapy): nasal cannula  Device (Oxygen Therapy): high-flow nasal cannula  Flow (L/min): 5  Oxygen Concentration (%): 4  ETCO2 (mmHg): 36 mmHg  Flowsheet Rows      First Filed Value   Admission Height  188 cm (74\") Documented at 11/25/2020 1510   Admission Weight  79.7 kg (175 lb 11.3 oz) Documented at 11/25/2020 1510        Intake & Output (last 3 days)       12/03 0701 - 12/04 0700 12/04 0701 - 12/05 0700 12/05 0701 - 12/06 0700    P.O.  0     Other "       NG/GT       Total Intake(mL/kg)  0 (0)     Urine (mL/kg/hr)   300 (0.3)    Stool       Total Output   300    Net  0 -300           Urine Unmeasured Occurrence 3 x 1 x 1 x    Stool Unmeasured Occurrence  1 x         Lines, Drains & Airways    Active LDAs     Name:   Placement date:   Placement time:   Site:   Days:    Peripheral IV 11/25/20 1822 Distal;Posterior;Right Forearm   11/25/20 1822    Forearm   less than 1    Single Lumen Implantable Port 11/16/20 Left Subclavian   11/16/20    1002    Subclavian   10            Physical Exam:  Physical Exam  Vitals signs and nursing note reviewed.   Constitutional:       General: He is not in acute distress.     Appearance: He is well-developed. He is not ill-appearing, toxic-appearing or diaphoretic.      Comments: Patient is pale, frail and weak in his appearance.  He can barely speak above at a whisper.   HENT:      Head: Normocephalic and atraumatic.      Right Ear: Ear canal and external ear normal.      Left Ear: Ear canal and external ear normal.      Nose: Nose normal. No congestion or rhinorrhea.      Mouth/Throat:      Mouth: Mucous membranes are moist.      Pharynx: No oropharyngeal exudate.   Eyes:      General: No scleral icterus.        Right eye: No discharge.         Left eye: No discharge.      Extraocular Movements: Extraocular movements intact.      Conjunctiva/sclera: Conjunctivae normal.      Pupils: Pupils are equal, round, and reactive to light.   Neck:      Musculoskeletal: Normal range of motion and neck supple. No neck rigidity or muscular tenderness.      Thyroid: No thyromegaly.      Vascular: No carotid bruit or JVD.      Trachea: No tracheal deviation.   Cardiovascular:      Rate and Rhythm: Normal rate and regular rhythm.      Pulses: Normal pulses.      Heart sounds: Normal heart sounds. No murmur. No friction rub. No gallop.    Pulmonary:      Effort: Pulmonary effort is normal. No respiratory distress.      Breath sounds: Normal  breath sounds. No stridor. No wheezing, rhonchi or rales.      Comments: Decreased breath sounds throughout.  Chest:      Chest wall: No tenderness.   Abdominal:      General: Bowel sounds are normal. There is no distension.      Palpations: Abdomen is soft. There is no mass.      Tenderness: There is no abdominal tenderness. There is no guarding or rebound.      Hernia: No hernia is present.   Musculoskeletal:         General: No swelling, tenderness, deformity or signs of injury.      Right lower leg: No edema.      Left lower leg: No edema.      Comments: The patient has diffuse muscle wasting and weakness.   Lymphadenopathy:      Cervical: No cervical adenopathy.   Skin:     General: Skin is warm and dry.      Coloration: Skin is not jaundiced or pale.      Findings: No bruising, erythema or rash.   Neurological:      General: No focal deficit present.      Mental Status: He is alert and oriented to person, place, and time. Mental status is at baseline.      Cranial Nerves: No cranial nerve deficit.      Sensory: No sensory deficit.      Motor: No weakness or abnormal muscle tone.      Coordination: Coordination normal.   Psychiatric:         Mood and Affect: Mood normal.         Behavior: Behavior normal.         Thought Content: Thought content normal.         Judgment: Judgment normal.           Procedures:    Results Review:     I reviewed the patient's new clinical results.      Lab Results (last 24 hours)     Procedure Component Value Units Date/Time    POC Glucose Once [[id number]]  (Abnormal) Collected: 12/05/20 1708    Specimen: Blood Updated: 12/05/20 1709     Glucose 240 mg/dL      Comment: Serial Number: [id number]:  [id number]       POC Glucose Once [[id number]]  (Abnormal) Collected: 12/05/20 1132    Specimen: Blood Updated: 12/05/20 1134     Glucose 141 mg/dL      Comment: Serial Number: [id number]:  [id number]       Phosphorus [[id number]]  (Normal) Collected: 12/05/20 0609    Specimen:  Blood Updated: 12/05/20 0736     Phosphorus 2.5 mg/dL     CBC & Differential [[id number]]  (Abnormal) Collected: 12/05/20 0609    Specimen: Blood Updated: 12/05/20 0721    Narrative:      The following orders were created for panel order CBC & Differential.  Procedure                               Abnormality         Status                     ---------                               -----------         ------                     Scan Slide[[id number]]                                       Final result               CBC Auto Differential[[id number]]        Abnormal            Final result                 Please view results for these tests on the individual orders.    CBC Auto Differential [[id number]]  (Abnormal) Collected: 12/05/20 0609    Specimen: Blood Updated: 12/05/20 0721     WBC 15.70 10*3/mm3      RBC 3.73 10*6/mm3      Hemoglobin 10.6 g/dL      Hematocrit 32.7 %      MCV 87.6 fL      MCH 28.4 pg      MCHC 32.4 g/dL      RDW 14.4 %      RDW-SD 44.2 fl      MPV 9.1 fL      Platelets 19 10*3/mm3      Comment: Result checked       Scan Slide [[id number]] Collected: 12/05/20 0609    Specimen: Blood Updated: 12/05/20 0721     Scan Slide --     Comment: See Manual Differential Results       Manual Differential [[id number]]  (Abnormal) Collected: 12/05/20 0609    Specimen: Blood Updated: 12/05/20 0721     Neutrophil % 50.0 %      Lymphocyte % 13.0 %      Monocyte % 8.0 %      Eosinophil % 1.0 %      Bands %  23.0 %      Metamyelocyte % 2.0 %      Myelocyte % 3.0 %      Neutrophils Absolute 11.46 10*3/mm3      Lymphocytes Absolute 2.04 10*3/mm3      Monocytes Absolute 1.26 10*3/mm3      Eosinophils Absolute 0.16 10*3/mm3      RBC Morphology Normal     Toxic Granulation Slight/1+     Vacuolated Neutrophils Slight/1+     Platelet Morphology Normal    Narrative:      Reviewed by Pathologist within the past 30 days on 11/25/2020.    Magnesium [[id number]]  (Normal) Collected: 12/05/20 0609    Specimen: Blood Updated: 12/05/20  0642     Magnesium 1.9 mg/dL     BNP [[id number]]  (Normal) Collected: 12/05/20 0609    Specimen: Blood Updated: 12/05/20 0642     proBNP 1,184.0 pg/mL     Narrative:      Among patients with dyspnea, NT-proBNP is highly sensitive for the detection of acute congestive heart failure. In addition NT-proBNP of <300 pg/ml effectively rules out acute congestive heart failure with 99% negative predictive value.    Results may be falsely decreased if patient taking Biotin.      POC Glucose Once [[id number]]  (Abnormal) Collected: 12/05/20 0606    Specimen: Blood Updated: 12/05/20 0607     Glucose 120 mg/dL      Comment: Serial Number: [id number]:  [id number]       POC Glucose Once [[id number]]  (Abnormal) Collected: 12/05/20 0003    Specimen: Blood Updated: 12/05/20 0004     Glucose 178 mg/dL      Comment: Serial Number: [id number]:  [id number]           No results found for: HGBA1C            Lab Results   Component Value Date    LIPASE 8 (L) 10/26/2020     No results found for: CHOL, CHLPL, TRIG, HDL, LDL, LDLDIRECT    Lab Results   Lab Value Date/Time    INTRAOP  10/27/2020 1203     FNA of subcarinal lymph node, immediate evaluation    Pass 1: Positive for malignant cells.    FNA of left hilar lymph node, immediate evaluation    Pass 1: Mostly blood, no malignancy.  Pass 2: Mostly blood, no malignancy.  Pass 3: Mostly blood, no malignancy.  Pass 4: Mostly blood, no malignancy.    Initial cytology interpretation performed by Maurilio Watson MD.        FINALDX  11/25/2020 1146     Leukopenia with left shift and relative eosinophilia  Thrombocytopenia  No blasts identified      FINALDX  10/27/2020 1203     Specimens #1 and #3 (Lymph node, subcarinal, endobronchial fine needle aspiration):    Positive for metastatic poorly differentiated adenocarcinoma, see comment    Specimens #2 and #4 (Lymph node, left hilar, endobronchial fine needle aspiration, smears and cell block preparation):    Rare atypical cells, see  comment    JPR/sms           COMDX  10/27/2020 1203     Specimens #1 and #3: Immunohistochemistry was performed utilizing appropriate controls and the tumor is positive for CK7 and TTF-1 and is negative for napsin A, p63, and CK5/6. 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 Many of the tumor cells display significant pleomorphism with bizarre appearing nuclei and mitotic figures. This raises the possibility of a sarcomatoid component, however, that would have to be evaluated on the resection specimen.     Specimens #2 and #4: One of the smear passes displays scattered atypical cells in a background of lymphoid tissue. Immunohistochemistry was performed utilizing appropriate controls on the cell block for cytokeratin AE1/3. The cytokeratin AE1/3 stain is negative which excludes the presence of atypical cells in the cell block. The significance of the atypical cells on the smears is unknown, however, malignancy cannot be entirely excluded. Further clinical workup should be considered.     JPR/sms          Microbiology Results (last 10 days)     Procedure Component Value - Date/Time    COVID PRE-OP / PRE-PROCEDURE SCREENING ORDER (NO ISOLATION) - Swab, Nasopharynx [[id number]]  (Normal) Collected: 11/27/20 0802    Lab Status: Final result Specimen: Swab from Nasopharynx Updated: 11/27/20 0855    Narrative:      The following orders were created for panel order COVID PRE-OP / PRE-PROCEDURE SCREENING ORDER (NO ISOLATION) - Swab, Nasopharynx.  Procedure                               Abnormality         Status                     ---------                               -----------         ------                     COVID-19,CEPHEID,COR/UDAY...[[id number]]  Normal              Final result                 Please view results for these tests on the individual orders.    COVID-19,CEPHEID,COR/UDAY/PAD IN-HOUSE(OR EMERGENT/ADD-ON),NP SWAB IN TRANSPORT MEDIA 3-4 HR TAT - Swab, Nasopharynx  [[id number]]  (Normal) Collected: 11/27/20 0802    Lab Status: Final result Specimen: Swab from Nasopharynx Updated: 11/27/20 0855     COVID19 Not Detected    Narrative:      Fact sheet for providers: https://www.fda.gov/media/152402/download     Fact sheet for patients: https://www.fda.gov/media/142283/download    Blood Culture - Blood, Hand, Left [[id number]] Collected: 11/25/20 2035    Lab Status: Final result Specimen: Blood from Hand, Left Updated: 11/30/20 2115     Blood Culture No growth at 5 days    Blood Culture - Blood, Arm, Left [[id number]] Collected: 11/25/20 2013    Lab Status: Final result Specimen: Blood from Arm, Left Updated: 11/30/20 2245     Blood Culture No growth at 5 days          ECG/EMG Results (most recent)     Procedure Component Value Units Date/Time    ECG 12 Lead [[id number]] Collected: 11/25/20 1614     Updated: 11/29/20 1102     QT Interval 355 ms     Narrative:      HEART RATE= 116  bpm  RR Interval= 516  ms  MI Interval=   ms  P Horizontal Axis=   deg  P Front Axis=   deg  QRSD Interval= 108  ms  QT Interval= 355  ms  QRS Axis= 33  deg  T Wave Axis= 33  deg  - ABNORMAL ECG -  Atrial fibrillation  Low voltage, extremity leads  When compared with ECG of 11-Nov-2020 10:10:57,  NO SIGNIFICANT CHANGE FROM PREVIOUS ECG  Electronically Signed By: Alex Mtz (UDAY) 29-Nov-2020 10:51:45  Date and Time of Study: 2020-11-25 16:14:21               Results for orders placed during the hospital encounter of 10/26/20   Adult Transthoracic Echo Complete W/ Cont if Necessary Per Protocol    Narrative · The left atrial cavity is severely dilated.  · Left ventricular diastolic function is consistent with (grade II w/high   LAP) pseudonormalization.  · Left ventricular wall thickness is consistent with concentric   hypertrophy.  · Estimated left ventricular EF was in agreement with the calculated left   ventricular EF. Left ventricular ejection fraction appears to be 61 - 65%.   Left ventricular  systolic function is normal.  · There is moderate calcification of the aortic valve mainly affecting the   non-coronary cusp(s).  · There is mild, bileaflet mitral valve thickening present.  · Mild to moderate mitral valve regurgitation is present with a   centrally-directed jet noted.  · Mild tricuspid valve regurgitation is present.  · Estimated right ventricular systolic pressure from tricuspid   regurgitation is mildly elevated (35-45 mmHg).     Moderate MR  Severe left atrial enlargement  Grade 2 diastolic dysfunction  Preserved LV systolic function EF 60 to 65%  Normal RV size and function  Normal IVC  Mild pulmonary hypertension by TR gradient  If clinically indicated would recommend transesophageal echo for   evaluation of mitral regurgitation with severe left atrial enlargement,   spectral Doppler and color Doppler indicate not more than moderate but   suboptimal acoustic windows, possibly underestimated, recommend ESE if   clinically indicated and were suspicious of severe MR         Xr Chest 1 View    Result Date: 12/2/2020   1. Bilateral mixed interstitial/airspace disease which is worsened from the previous study. This can be seen with multifocal pneumonia or pulmonary edema. 2. The patient either has a small to moderate subpulmonic pleural effusion or elevation of the right hemidiaphragm. This is unchanged from the previous exam. There is also unchanged dense consolidation in the right lower chest. 3. Thickening of the right paratracheal stripe consistent with known adenopathy. 4. Fullness in the right hilum consistent with a known lung cancer.  Electronically Signed By-Antonio Graham MD On:12/2/2020 10:37 AM This report was finalized on [id number] by  Antonio Graham MD.          Xrays, labs reviewed personally by physician.    Medication Review:   I have reviewed the patient's current medication list      Scheduled Meds  budesonide-formoterol, 2 puff, Inhalation, BID - RT  dilTIAZem, 30 mg, Oral,  Q6H  folic acid, 1 mg, Oral, Daily  furosemide, 40 mg, Intravenous, Q12H  ipratropium-albuterol, 3 mL, Nebulization, 4x Daily - RT  metoclopramide, 10 mg, Oral, TID AC  metoprolol tartrate, 50 mg, Oral, Q12H  mirtazapine, 15 mg, Oral, Nightly  nicotine, 1 patch, Transdermal, Daily  nystatin, 5 mL, Swish & Swallow, 4x Daily  pantoprazole, 40 mg, Oral, Q AM  sodium chloride, 10 mL, Intravenous, Q12H  sucralfate, 1 g, Oral, 4x Daily AC & at Bedtime  traZODone, 50 mg, Oral, Nightly        Meds Infusions  dilTIAZem, 5-15 mg/hr, Last Rate: 5 mg/hr (11/27/20 1244)  sodium chloride, 30 mL/hr, Last Rate: 30 mL/hr (11/28/20 2152)        Meds PRN  •  acetaminophen **OR** acetaminophen  •  acetaminophen  •  benzonatate  •  bisacodyl  •  flumazenil  •  HYDROmorphone  •  HYDROmorphone  •  ipratropium-albuterol  •  labetalol  •  magnesium hydroxide  •  magnesium sulfate **OR** magnesium sulfate **OR** magnesium sulfate  •  melatonin  •  naloxone  •  nitroglycerin  •  ondansetron **OR** ondansetron  •  ondansetron  •  potassium & sodium phosphates **OR** potassium & sodium phosphates  •  potassium chloride **OR** potassium chloride **OR** potassium chloride  •  promethazine  •  sodium chloride  •  sodium chloride  •  sodium chloride        Assessment/Plan   Assessment/Plan     Active Hospital Problems    Diagnosis  POA   • **Feeding difficulties [R63.3]  Unknown   • Moderate malnutrition (CMS/HCC) [E44.0]  Yes   • Thrombocytopenia (CMS/HCC) [D69.6]  Yes      Resolved Hospital Problems   No resolved problems to display.       MEDICAL DECISION MAKING COMPLEXITY BY PROBLEM:     Generalized weakness  -likely multifactorial given cancer diagnosis and chemotherapy as well as tachyarrhythmia  -PT/OT  -Fall risk  -Monitor for sources of infection  -Telemetry  -COVID-19 negative  -Heart rate control  -Nutrition consult for tube feeding  -Patient received a PEG on 11/28/2020  -Patient will definitely need inpatient rehab.    Atrial  fibrillation  -with RVR.  Patient with established history denies chest pain.  Patient able to be weaned off overnight 10/26/2020 but went back into RVR this 10/27/2020, slowly improving  -Telemetry  -Increase metoprolol  -Monitor electrolytes  -Off Cardizem drip on oral Cardizem  -Echo as of 10/26/2020 showing dilated atrial cavity with grade 2 diastolic dysfunction    Chronic respiratory failure-patient on 3 L baseline  -Chest x-ray appearing show signs of volume overload  -Ordering CT further evaluate underlying infection versus inflammation versus edema  -Off diuretics       -The patient's oxygenation is not proving.  The patient is up 3 kg and has a negative pro calcitonin.  The patient likely has congestive failure which has not been addressed.  We will start the patient on Lasix 40 mg IV twice daily and follow his weight, renal function and electrolytes.  Would hold off on antibiotics for now as his chest x-ray is more consistent with pulmonary edema.       -Repeat chest x-ray in a.m. and reevaluate    Thrombocytopenia-patient with no signs of overt bleeding though did report small amounts of blood per nasal passages, slowly improving.  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  -Patient has been transfused platelets in the past.  The patient now has a platelet count of 19,000.    Leukopenia-likely due to underlying chemotherapy  -Neupogen per hematology  -Neutropenic precaution  -Monitor for signs of occult infection  -Daily CBC  -Patient's leukopenia has resolved.    Lung cancer-patient with recent diagnosis under treatment with oncology  -Pain  control  -Antiemetics  -Daily CBC  -Oncology consult  -Consider ENT referral for the patient's loss of speech       -1 way to evaluate this would be to do a bedside laryngoscopy and see if the vocal cords approximate and are still moving appropriately.  -The patient's family is really struggling with how aggressive to be.  Apparently he just recently moved here to be near to his daughter.  They actually just closed on a house about a month ago which the patient's never even set foot in.  The patient's wife is also having some issues for which the daughter is attempting to find care for her mother.    Diastolic cardiomyopathy  -grade 2 noted on echo with various different valvular diseases  -Monitor volume status     COPD-no signs of acute exacerbation currently  -Bronchodilators  -Mucolytic's  -Wean O2 as tolerated     Tobacco abuse-cessation advised    Aphasia  -This may be multifactorial.  It is suspicious for vocal cord paralysis secondary to the patient's underlying cancer.  Also could be related to esophageal disease.  Will defer to oncology the timing of getting ENT to look at the patient's vocal cords for additional information  -Continue his nutrition through his PEG at this time.  -May be slightly improved today.    VTE Prophylaxis -   Mechanical Order History:      Ordered        11/25/20 1608  Place Sequential Compression Device  Once         11/25/20 1608  Maintain Sequential Compression Device  Continuous                 Pharmalogical Order History:     None        Code Status -   Code Status and Medical Interventions:   Ordered at: 11/25/20 1608     Code Status:    CPR     Medical Interventions (Level of Support Prior to Arrest):    Full     This patient has been examined wearing appropriate Personal Protective Equipment and discussed with hospital infection control department. 12/05/20    Discharge Planning  pending  Electronically signed by Farrah Grimm MD, 12/05/20, 19:06 ROMANA Lyons  Hospitalist Team

## 2020-12-06 NOTE — PROGRESS NOTES
Discharge Planning Assessment   [place]     Patient Name: Shay Hernandez  MRN: [id number]  Today's Date: 12/6/2020    Admit Date: 11/25/2020      Discharge Plan     Row Name 12/06/20 1703       Plan    Plan  DE plan: Anticipate home with hospice. Referral made to [facility]. Pending acceptance.    Provided Post Acute Provider List?  Yes    Post Acute Provider List  Hospice    Delivered To  Support Person    Support Person  Daughter- Eliane    Method of Delivery  In person    Plan Comments  Family requests referral to [facility]. Referral entered via Epic and called to Kia with answering services at [phone].        Continued Care and Services - Admitted Since 11/25/2020     Destination     Service Provider Request Status Selected Services Address Phone Fax Patient Preferred    [facility]  Pending - Request Sent N/A [address] [place] [address] [phone] -- --          Dialysis/Infusion Coordination complete    Service Provider Request Status Selected Services Address Phone Fax Patient Preferred    [facility]   Selected Infusion and IV Therapy [address] [address] [phone]           Home Medical Care Coordination complete    Service Provider Request Status Selected Services Address Phone Fax Patient Preferred    [facility]   Selected Home Health Services [address] [place] [address] [phone] -- --    [facility]  Pending - Request Sent N/A [address] [address] [facility] [place] [address] [phone]           Community Resources     Service Provider Request Status Selected Services Address Phone Fax Patient Preferred    [facility]  Pending - No Request Sent N/A [address] [facility] [place] [id number] [phone] -- --              Fely Rowe RN

## 2020-12-06 NOTE — PROGRESS NOTES
Nutrition Services    Patient Name: Shay Hernandez  YOB: 1940  MRN: [id number]  Admission date: 11/25/2020    PPE Documentation        PPE Worn By Provider RD did not enter room for this encounter   PPE Worn By Patient  N/A     PROGRESS NOTE      Encounter Information: Tube feed check on.       PO Diet: Diet Liquids; Full Liquid     ST continues to follow -- based on documentation, ST suspects esophageal dysphagia and recommends full liquid diet, with continuation of TF for majority of nutrition. Pt requires strict 90 degrees during meals and for 30 minutes after.   PO Supplements: -    PO Intake:  0% of breakfast today on full liquids, EN providing nutrition       TF Orders: Isosource 1.5 at 70 mL/hour with 10 mL free water flush every 2 hours   TF Review: Documented at 70 mL/hr       Labs (reviewed below): -Hyponatremia continues  -Mild hypophosphatemia, PRN replacement may be used       GI Function:  -Residuals WNL  -BM documented 12/4 (x2 days ago)       Nutrition Intervention: Continue current regimen    Isosource 1.5 at 70 mL/hr + 10 ml q 2 hrs water flush        Results from last 7 days   Lab Units 12/06/20  0502 12/04/20  0403 12/03/20  0527 12/02/20  0923   SODIUM mmol/L 131* 133* 130* 130*   POTASSIUM mmol/L 3.5 4.0 3.1* 3.6   CHLORIDE mmol/L 93* 96* 91* 94*   CO2 mmol/L 30.0* 29.0 32.0* 26.0   BUN mg/dL 21 15 14 13   CREATININE mg/dL 0.64* 0.59* 0.51* 0.46*   CALCIUM mg/dL 7.6* 7.5* 7.9* 7.4*   BILIRUBIN mg/dL  --   --   --  0.8   ALK PHOS U/L  --   --   --  133*   ALT (SGPT) U/L  --   --   --  19   AST (SGOT) U/L  --   --   --  17   GLUCOSE mg/dL 244* 155* 158* 139*     Results from last 7 days   Lab Units 12/06/20  0502 12/05/20  0609  12/04/20  0402   MAGNESIUM mg/dL 2.3 1.9  --  2.0   PHOSPHORUS mg/dL 2.3* 2.5  --  1.6*   HEMOGLOBIN g/dL 9.8* 10.6*   < >  --    HEMATOCRIT % 29.8* 32.7*   < >  --     < > = values in this interval not displayed.     COVID19   Date Value Ref Range Status    11/27/2020 Not Detected Not Detected - Ref. Range Final     No results found for: HGBA1C    RD to follow up per protocol.    Electronically signed by:  Monika Olivera RD  12/06/20 11:56 EST

## 2020-12-06 NOTE — DISCHARGE PLACEMENT REQUEST
"Shay Monteiro (80 y.o. Male)     Date of Birth Social Security Number Address Home Phone MRN    1940  [address] [address] [phone] [id number]    [employer] Mar[employer] Status          Evangel[employer]        Admission Date Admission Type Admitting Provider Attending Provider Department, Room/Bed    20 Elective Farrah Grimm MD Lackey, Diana, MD [facility], 1108/1    Discharge Date Discharge Disposition Discharge Destination                       Attending Provider: Farrah Grimm MD    Allergies: No Known Allergies    Isolation: None   Infection: None   Code Status: CPR    Ht: 188 cm (74\")   Wt: 78 kg (171 lb 15.3 oz)    Admission Cmt: None   Principal Problem: Feeding difficulties [R63.3] More...                 Active Insurance as of 2020     Primary Coverage     Payor Plan Insurance Group Employer/Plan Group    HUMANA MEDICARE REPLACEMENT HUMANA MEDICARE REPLACEMENT [id number]     Payor Plan Address Payor Plan Phone Number Payor Plan Fax Number Effective Dates    [address] [address] [phone]  2019 - None Entered    [facility] [address]       Subscriber Name Subscriber Birth Date Member ID       SHAY MONTEIRO 1940 [id number]                 Emergency Contacts      (Rel.) Home Phone Work Phone Mobile Phone    Catrina Monteiro (Spouse) [phone] -- [phone]    Eliane Monteiro (Daughter) -- -- [phone]               History & Physical      Elder Tineo MD at 20 [address] [place] [address] Medicine Services      Patient Name: Shay Monteiro  : 1940  MRN: [id number]  Primary Care Physician: Ambar Hassan APRN  Date of admission: 2020    Patient Care Team:  Ambar Hassan APRN as PCP - General (Family Medicine)          Subjective   History Present Illness     Chief Complaint: No chief complaint on file.    Generalized weakness    Mr. Monteiro is a 80 y.o. with history " of atrial fibrillation and COPD with new diagnosis of lung cancer presenting as direct admission from oncologist office for progressive generalized weakness as well as thrombocytopenia.  Patient had initiated chemotherapy and reported progressive weakness as well as loss of appetite over the last few weeks.  Patient had been getting more thrombocytopenic with levels in the 30s the day prior to admission down to 13 on day of admission.  Patient denied hematuria, hematochezia, melena or significant bruising though did report a small amount of blood when he blew his nose in the morning.  Currently patient reports he feels weak though otherwise asymptomatic.  No shortness of breath or chest pain, no nausea or vomiting.  Anorexia noted.    Patient found to be in atrial fibrillation with RVR once reaching the floor.  Stat EKG ordered and ordering Cardizem.  Patient received unit of platelets in ambulatory care center prior to coming up to the floor.  Patient scheduled to have feeding tube placement with GI in the next few days.         History of Present Illness    Review of Systems   Constitution: Positive for malaise/fatigue. Negative for chills and fever.   HENT: Negative for hoarse voice and sore throat.    Eyes: Negative for double vision and photophobia.   Cardiovascular: Positive for dyspnea on exertion. Negative for chest pain and syncope.   Respiratory: Negative for cough and shortness of breath.    Hematologic/Lymphatic: Positive for bleeding problem. Does not bruise/bleed easily.   Musculoskeletal: Positive for muscle weakness. Negative for falls.   Gastrointestinal: Positive for anorexia. Negative for constipation, diarrhea, hematemesis, hematochezia, melena and nausea.   Genitourinary: Negative for dysuria and flank pain.   Neurological: Negative for dizziness and focal weakness.   Psychiatric/Behavioral: Negative for altered mental status. The patient is not nervous/anxious.            Personal History      Past Medical History:   Past Medical History:   Diagnosis Date   • Adenocarcinoma, lung, unspecified laterality (CMS/HCC) 10/27/2020   • Atrial fibrillation (CMS/[facility])    • COPD (chronic obstructive pulmonary disease) (CMS/[facility])        Surgical History:      Past Surgical History:   Procedure Laterality Date   • APPENDECTOMY     • BRONCHOSCOPY N/A 10/27/2020    Procedure: BRONCHOSCOPY WITH BRONCHOALVEOLAR LAVAGE AND ENDOBRONCHIAL ULTRASOUND WITH FINE NEEDLE ASPIRATION X2 AREAS;  Surgeon: Johnny Waldrop MD;  Location: [facility] ENDOSCOPY;  Service: Pulmonary;  Laterality: N/A;  POST:    • VENOUS ACCESS DEVICE (PORT) INSERTION N/A 11/16/2020    Procedure: INSERTION VENOUS ACCESS DEVICE, LEFT SUBCLAVIAN UNDER FLOUROSCOPIC GUIDANCE ;  Surgeon: Jan Richards MD;  Location: [facility] MAIN OR;  Service: Cardiothoracic;  Laterality: N/A;           Family History: family history includes Heart attack in his mother; Lung disease in an other family member. Otherwise pertinent FHx was reviewed and unremarkable.     Social History:  reports that he quit smoking about 4 weeks ago. His smoking use included cigarettes. He has a 120.00 pack-year smoking history. He has never used smokeless tobacco. He reports previous alcohol use of about 4.0 standard drinks of alcohol per week. He reports previous drug use.      Medications:  Prior to Admission medications    Medication Sig Start Date End Date Taking? Authorizing Provider   albuterol sulfate  (90 Base) MCG/ACT inhaler Inhale 2 puffs Every 4 (Four) Hours As Needed for Wheezing. 10/29/20   Elder Tineo MD   aspirin (aspirin) 81 MG EC tablet Take 81 mg by mouth Daily. Dr. Richards told pt to keep taking until dos    Provider, MD Jared   budesonide-formoterol (SYMBICORT) 160-4.5 MCG/ACT inhaler Inhale 2 puffs 2 (Two) Times a Day. 10/29/20   Elder Tineo MD   dexamethasone (DECADRON) 4 MG tablet Take 4 mg by mouth 2 (Two) Times a Day With Meals.     Provider, MD Jared   Eliquis 5 MG tablet tablet TAKE 1 TABLET BY MOUTH EVERY 12 (TWELVE) HOURS FOR 30 DAYS. 11/18/20   Carol Fragoso APRN   famotidine (Pepcid) 40 MG tablet Take 1 tablet by mouth At Night As Needed for Heartburn. 11/19/20   Ziggy Daley MD   folic acid (FOLVITE) 1 MG tablet Take 1 tablet by mouth Daily. Start 7 days prior to chemotherapy until at least 3 weeks after all chemotherapy. 11/10/20   Ziggy Daley MD   ipratropium-albuterol (DUO-NEB) 0.5-2.5 mg/3 ml nebulizer Take 3 mL by nebulization 4 (Four) Times a Day.  Patient taking differently: Take 3 mL by nebulization 4 (Four) Times a Day. No taking yet 11/13/20   Ke Tran MD   lidocaine-prilocaine (EMLA) 2.5-2.5 % cream Apply  topically to the appropriate area as directed As Needed for Mild Pain  (45min prior to needle insertion). 11/16/20   Carol Fragoso APRN   metoprolol tartrate (LOPRESSOR) 25 MG tablet Take 1 tablet by mouth Every 12 (Twelve) Hours for 30 days. 10/29/20 11/28/20  Elder Tineo MD   mirtazapine (REMERON) 15 MG tablet Take 1 tablet by mouth Every Night. To increase appetite 11/23/20   Carol Fragoso APRN   Multiple Vitamins-Minerals (HEALTHY EYES PO) Take 1 tablet by mouth Daily.    ProviderJared MD   nicotine (NICODERM CQ) 7 MG/24HR patch Place 1 patch on the skin as directed by provider Daily. 1 patch daily X 3 weeks 11/2/20   Ziggy Daley MD   ondansetron (ZOFRAN) 8 MG tablet Take 1 tablet by mouth 3 (Three) Times a Day As Needed for Nausea or Vomiting. 11/10/20   Ziggy Daley MD   pantoprazole (PROTONIX) 40 MG EC tablet Take 1 tablet by mouth Daily. 10/17/20   Carlos Degroot MD   vitamin C (ASCORBIC ACID) 500 MG tablet Take 500 mg by mouth Daily.    ProviderJared MD   Vitamin D, Cholecalciferol, (CHOLECALCIFEROL) 10 MCG (400 UNIT) tablet Take 400 Units by mouth Daily.    Provider, MD Jared   vitamin E 100 UNIT capsule Take 100 Units by mouth Daily.     Provider, Historical, MD       Allergies:  No Known Allergies    Objective   Objective     Vital Signs  Temp:  [97.5 °F (36.4 °C)-99.2 °F (37.3 °C)] 99.2 °F (37.3 °C)  Heart Rate:  [] 127  Resp:  [20-24] 20  BP: (111-121)/(63-75) 118/67  SpO2:  [92 %-99 %] 92 %  on   ;   Device (Oxygen Therapy): room air  Body mass index is 22.56 kg/m².    Physical Exam  Vitals signs and nursing note reviewed.   Constitutional:       Appearance: He is normal weight.      Comments: Ill-appearing elderly male sitting up in bed breathing comfortably on 2 L via nasal cannula no acute distress   HENT:      Head: Normocephalic and atraumatic.      Right Ear: External ear normal.      Left Ear: External ear normal.      Nose: Nose normal.      Mouth/Throat:      Mouth: Mucous membranes are moist.      Pharynx: Oropharynx is clear.   Eyes:      Extraocular Movements: Extraocular movements intact.      Conjunctiva/sclera: Conjunctivae normal.   Neck:      Musculoskeletal: Normal range of motion and neck supple.   Cardiovascular:      Rate and Rhythm: Tachycardia present. Rhythm irregular.   Pulmonary:      Effort: No respiratory distress.      Breath sounds: Normal breath sounds. No stridor. No wheezing.   Abdominal:      General: Abdomen is flat. There is no distension.      Palpations: Abdomen is soft.      Tenderness: There is no abdominal tenderness.   Musculoskeletal: Normal range of motion.         General: No deformity.   Skin:     General: Skin is warm and dry.      Findings: No bruising, erythema or rash.   Neurological:      General: No focal deficit present.      Mental Status: He is alert and oriented to person, place, and time.      Cranial Nerves: No cranial nerve deficit.      Motor: No weakness.   Psychiatric:         Mood and Affect: Mood normal.         Behavior: Behavior normal.         Results Review:  I have personally reviewed most recent lab results and agree with findings, most notably:  Thrombocytopenia.    Results from last 7 days   Lab Units 11/25/20  1146   WBC 10*3/mm3 2.30*   HEMOGLOBIN g/dL 13.6   HEMATOCRIT % 42.1   PLATELETS 10*3/mm3 16*           Invalid input(s):  ALKPHOS  Estimated Creatinine Clearance: 84.4 mL/min (by C-G formula based on SCr of 0.7 mg/dL).  Brief Urine Lab Results  (Last result in the past 365 days)      Color   Clarity   Blood   Leuk Est   Nitrite   Protein   CREAT   Urine HCG        10/26/20 1237 Dark Yellow Clear Negative Negative Negative 30 mg/dL (1+)               Microbiology Results (last 10 days)     ** No results found for the last 240 hours. **          ECG/EMG Results (most recent)     None               Results for orders placed during the hospital encounter of 10/26/20   Adult Transthoracic Echo Complete W/ Cont if Necessary Per Protocol    Narrative · The left atrial cavity is severely dilated.  · Left ventricular diastolic function is consistent with (grade II w/high   LAP) pseudonormalization.  · Left ventricular wall thickness is consistent with concentric   hypertrophy.  · Estimated left ventricular EF was in agreement with the calculated left   ventricular EF. Left ventricular ejection fraction appears to be 61 - 65%.   Left ventricular systolic function is normal.  · There is moderate calcification of the aortic valve mainly affecting the   non-coronary cusp(s).  · There is mild, bileaflet mitral valve thickening present.  · Mild to moderate mitral valve regurgitation is present with a   centrally-directed jet noted.  · Mild tricuspid valve regurgitation is present.  · Estimated right ventricular systolic pressure from tricuspid   regurgitation is mildly elevated (35-45 mmHg).     Moderate MR  Severe left atrial enlargement  Grade 2 diastolic dysfunction  Preserved LV systolic function EF 60 to 65%  Normal RV size and function  Normal IVC  Mild pulmonary hypertension by TR gradient  If clinically indicated would recommend transesophageal echo for    evaluation of mitral regurgitation with severe left atrial enlargement,   spectral Doppler and color Doppler indicate not more than moderate but   suboptimal acoustic windows, possibly underestimated, recommend ESE if   clinically indicated and were suspicious of severe MR         No radiology results for the last 7 days      Estimated Creatinine Clearance: 84.4 mL/min (by C-G formula based on SCr of 0.7 mg/dL).    Assessment/Plan   Assessment/Plan       Active Hospital Problems    Diagnosis  POA   • Thrombocytopenia (CMS/HCC) [D69.6]  Yes      Resolved Hospital Problems   No resolved problems to display.     Generalized weakness-likely multifactorial given cancer diagnosis and chemotherapy as well as tachyarrhythmia  -PT/OT  -Fall risk  -IV fluids  -Monitor for sources of infection  -Telemetry  -Nutrition consult    Atrial fibrillation-with RVR.  Patient with established history denies chest pain  -Telemetry  -Monitor electrolytes  -Start Cardizem    Thrombocytopenia-patient with no signs of overt bleeding though did report small amounts of blood per nasal passages  -Heme-onc consult  -Daily CBC  -Recheck platelets every 8 for at least the next 24 hours  -Received platelets prior to admission  -Hold aspirin and anticoagulation given high risk of bleeding    Leukopenia-likely due to underlying chemotherapy  -Monitor for signs of occult infection  -Daily CBC    Lung cancer-patient with recent diagnosis under treatment with oncology  -Pain control  -Antiemetics  -Daily CBC  -Oncology consult    Diastolic cardiomyopathy-grade 2 noted on echo with various different valvular diseases  -Monitor volume status  -Caution with aggressive IV hydration     COPD-no signs of acute exacerbation currently  -Bronchodilators  -Mucolytic's  -Wean O2 as tolerated     Tobacco abuse-cessation advised    VTE Prophylaxis -   Mechanical Order History:      Ordered        11/25/20 1608  Place Sequential Compression Device  Once          20 1608  Maintain Sequential Compression Device  Continuous                 Pharmalogical Order History:     None          CODE STATUS:    Code Status and Medical Interventions:   Ordered at: 20 1608     Code Status:    CPR     Medical Interventions (Level of Support Prior to Arrest):    Full       This patient has been examined wearing appropriate Personal Protective Equipment and discussed with hospital infection control department. 20      I discussed the patient's findings and my recommendations with patient and nursing staff.      Signature:Electronically signed by Elder Tineo MD, 20, 4:34 PM EST.      [facility] Hospitalist Team          Electronically signed by Elder Tineo MD at 20 1635       [facility]  [address] [facility] [place] [address]  Phone:  [phone]  Fax:  [phone]        Patient:     Shay Hernandez MRN:  [id number]   [address]  [place] [address] :  1940  SSN:    Phone: [phone] Sex:  M      INSURANCE PAYOR PLAN GROUP # SUBSCRIBER ID   Primary:    [employer] MEDICARE REPLACEMENT [id number]   Admitting Diagnosis: Thrombocytopenia (CMS/HCC) [D69.6]  Order Date:  Dec 6, 2020         Inpatient Hospice Consult       (Order ID: [id number])     Diagnosis:         Priority:  Routine Expected Date:   Expiration Date:        Interval:  Once Count:    Reason for Consult? Patient has lung cancer and would like to transition to hospice at home.  Please consult hospice.     Specimen Type:   Specimen Source:   Specimen Taken Date:   Specimen Taken Time:                   Authorizing Provider:Farrah Grimm MD  Authorizing Provider's NPI: [id number]  Order Entered By: Farrah Grimm MD 2020  2:57 PM     Electronically signed by: Farrah Grimm MD 2020  2:57 PM

## 2020-12-06 NOTE — PROGRESS NOTES
Hematology/Oncology Inpatient Progress Note    PATIENT NAME: Shay Hernandez  : 1940  MRN: [id number]    CHIEF COMPLAINT: Thrombocytopenia; lung cancer    HISTORY OF PRESENT ILLNESS:   Mr. Hernandez is a 80 y.o. with history of stage IIIc poorly differentiated adenocarcinoma lung, atrial fibrillation and COPD  presenting as direct admission from oncologist office for progressive generalized weakness as well as thrombocytopenia.  Patient had initiated chemotherapy recently 2020 and reported progressive weakness as well as loss of appetite over the last few weeks. Patient had been getting more thrombocytopenic with levels in the 30s the day prior to admission down to 13 on day of admission.  Patient denied hematuria, hematochezia, melena or significant bruising though did report a small amount of blood when he blew his nose in the morning.      Patient found to be in atrial fibrillation with RVR once reaching the floor.  Stat EKG ordered and ordering Cardizem.  Patient received unit of platelets in ambulatory care center prior to coming up to the floor.  Patient scheduled to have feeding tube placement with GI in the next few days.     He received his PEG 2020 platelet count improved nominally from 26->32 with steroids and transfusion.  However, despite 2 doses of prednisone, today the platelets have dropped to 13.  He is interested in potentially eating something by mouth.  EGD done with PEG showed ulcerated GEJ suggesting potentially metastatic involvement by the lung cancer.  bx was deferred given thrombocytopenia.        Oncological history:  Stage IIIc poorly differentiated adenocarcinoma of lung -patient presented with a bulky right hilar mass and also bulky mediastinal lymph nodes.    Also has left hilar lymph nodes consistent with metastasis and borderline enlarged left supraclavicular lymph node. . MRI abdomen 10/28/2020 - 2.2 cm lesion in the dome of liver with features of a cyst.  Negative for evidence of hepatic metastases. S/p bronchoscopy with EBUS on 10/27/2020 - subcarinal lymph node FNA positive for metastatic poorly differentiated adenocarcinoma. Staining pattern is consistent with adenocarcinoma of pulmonary origin. Many of the tumor cells display significant pleomorphism with bizarre appearing nuclei and mitotic figures. This raises the possibility of a sarcomatoid component. MRI brain 10/29/2020 - no metastatic disease.   ·  11/5/2020: PET CT scan: Hypermetabolic right hilar mass consistent with malignancy.  Hypermetabolic nodular densities within the right lower lobe consistent with metastatic disease.  Bulky pathological enlarged hypermetabolic mediastinal adenopathy, consistent with metastatic disease.  No left hilar adenopathy.  Moderate FDG accumulation within the proximal stomach with gastric wall thickening.  This could be physiologic uptake..  Could be gastritis.  Gastric malignancy cannot be excluded.  Endoscopy recommended.  Enlarged hypermetabolic bilateral supraclavicular lymph nodes in the lower neck consistent with metastasis  · 11/17/2020 patient received cycle 1 of cisplatin and pemetrexed.  Cisplatin 75 mg/m², pemetrexed 400 mg per metered square..  WBC 15.6, hemoglobin 13.7, platelets 129,000 creatinine 0.7, albumin 2.8  · 11/26/2020 CT chest without -  1.Compared to previous examinations the paratracheal and mediastinal lymphadenopathy is larger. 2.There is a moderate size right pleural effusion and small left pleural effusion which are increased in size from previous study. 3.The right hilar mass is not definite change in size. 4.There are some new nodules in the right lower lobe. 5.There is increased opacity in the right lower lobe. 6.Cardiomegaly and severe atherosclerotic disease and coronary artery disease. 7.No change in a low-density lesion at the right hepatic dome. 8.Diffuse stomach wall thickening similar previous exam  · 12/5/2020 Ct Chest With Contrast  - 1.  There is almost complete right lower lung consolidation.  This could represent a postobstructive pneumonitis and/or atelectasis.  There is opacification of the right lower lobe bronchus. 2.  There is moderate infiltrate in the posterior segment right upper lung and mild to moderate right middle lobe atelectasis.  Again, this may represent a postobstructive pneumonitis and atelectasis. 3.  There is very marked mediastinal and right hilar adenopathy.  This adenopathy narrows the right upper and middle lobe bronchi causing postobstructive pneumonitis and atelectasis. 4.  There is a small right upper lung nodule too small to characterize, but more likely a granuloma. 5.  Mild to moderate left lower lung infiltrate and consolidation may represent pneumonia     History of present illness was reviewed and is unchanged from the previous visit. 12/06/20    Subjective   Patient's daughter is at bedside today.  Patient was actually able to speak at the end of my conversation.  He was able to complete 2 full sentences.  Breathing is stable.  ROS:  Review of Systems   Constitutional: Positive for fatigue. Negative for chills and fever.   HENT: Positive for voice change. Negative for ear pain, mouth sores, nosebleeds and sore throat.    Eyes: Negative for photophobia and visual disturbance.   Respiratory: Positive for cough and shortness of breath ( improved ). Negative for wheezing and stridor.    Cardiovascular: Negative for chest pain and palpitations.   Gastrointestinal: Negative for abdominal pain, diarrhea, nausea and vomiting.   Endocrine: Negative for cold intolerance and heat intolerance.   Genitourinary: Negative for dysuria and hematuria.   Musculoskeletal: Negative for joint swelling and neck stiffness.   Skin: Negative for color change and rash.   Neurological: Positive for weakness. Negative for seizures and syncope.   Hematological: Negative for adenopathy.        No obvious bleeding   Psychiatric/Behavioral:  "Negative for agitation, behavioral problems, confusion and hallucinations.   All other systems reviewed and are negative.     MEDICATIONS:    Scheduled Meds:  budesonide-formoterol, 2 puff, Inhalation, BID - RT  dilTIAZem, 30 mg, Oral, Q6H  folic acid, 1 mg, Oral, Daily  furosemide, 40 mg, Intravenous, Q12H  ipratropium-albuterol, 3 mL, Nebulization, 4x Daily - RT  metoclopramide, 10 mg, Oral, TID AC  metoprolol tartrate, 50 mg, Oral, Q12H  mirtazapine, 15 mg, Oral, Nightly  nicotine, 1 patch, Transdermal, Daily  nystatin, 5 mL, Swish & Swallow, 4x Daily  pantoprazole, 40 mg, Intravenous, Q AM  sodium chloride, 10 mL, Intravenous, Q12H  sucralfate, 1 g, Oral, 4x Daily AC & at Bedtime  traZODone, 50 mg, Oral, Nightly       Continuous Infusions:  dilTIAZem, 5-15 mg/hr, Last Rate: 5 mg/hr (11/27/20 1244)  sodium chloride, 30 mL/hr, Last Rate: 30 mL/hr (11/28/20 9442)       PRN Meds:  •  acetaminophen **OR** acetaminophen  •  acetaminophen  •  benzonatate  •  bisacodyl  •  flumazenil  •  HYDROmorphone  •  HYDROmorphone  •  ipratropium-albuterol  •  labetalol  •  magnesium hydroxide  •  magnesium sulfate **OR** magnesium sulfate **OR** magnesium sulfate  •  melatonin  •  naloxone  •  nitroglycerin  •  ondansetron **OR** ondansetron  •  ondansetron  •  potassium & sodium phosphates **OR** potassium & sodium phosphates  •  potassium chloride **OR** potassium chloride **OR** potassium chloride  •  promethazine  •  sodium chloride  •  sodium chloride  •  sodium chloride     ALLERGIES:  No Known Allergies    Objective    VITALS:   BP 98/58   Pulse 98   Temp 97.7 °F (36.5 °C) (Infrared)   Resp 20   Ht 188 cm (74\")   Wt 78 kg (171 lb 15.3 oz)   SpO2 96%   BMI 22.08 kg/m²     PHYSICAL EXAM: (performed by MD)  Physical Exam  Vitals signs and nursing note reviewed.   Constitutional:       General: He is not in acute distress.     Appearance: He is ill-appearing. He is not diaphoretic.   HENT:      Head: Normocephalic and " atraumatic.      Comments: Patient very hoarse and unable to speak  Eyes:      General: No scleral icterus.        Right eye: No discharge.         Left eye: No discharge.      Conjunctiva/sclera: Conjunctivae normal.   Neck:      Musculoskeletal: Normal range of motion and neck supple.      Thyroid: No thyromegaly.   Cardiovascular:      Rate and Rhythm: Normal rate and regular rhythm.      Heart sounds: Normal heart sounds. No friction rub. No gallop.    Pulmonary:      Effort: Pulmonary effort is normal. No respiratory distress.      Breath sounds: No stridor. Decreased breath sounds present. No wheezing.      Comments: Decreased air entry bilaterally  Chest:      Comments: Left chest wall Port-A-Cath   Abdominal:      General: Bowel sounds are normal.      Palpations: Abdomen is soft. There is no mass.      Tenderness: There is no abdominal tenderness. There is no guarding or rebound.      Comments: PEG tube    Genitourinary:     Comments: External urinary catheter  Musculoskeletal: Normal range of motion.         General: No tenderness.   Lymphadenopathy:      Cervical: No cervical adenopathy.   Skin:     General: Skin is warm.      Findings: No erythema or rash.   Neurological:      Mental Status: He is alert and oriented to person, place, and time.      Motor: No abnormal muscle tone.   Psychiatric:         Behavior: Behavior normal.       RECENT LABS:  Lab Results (last 24 hours)     Procedure Component Value Units Date/Time    POC Glucose Once [[id number]]  (Abnormal) Collected: 12/06/20 1200    Specimen: Blood Updated: 12/06/20 1203     Glucose 188 mg/dL      Comment: Serial Number: [id number]:  [id number]       Manual Differential [[id number]]  (Abnormal) Collected: 12/06/20 0502    Specimen: Blood Updated: 12/06/20 0737     Neutrophil % 66.0 %      Lymphocyte % 7.0 %      Monocyte % 9.0 %      Eosinophil % 3.0 %      Bands %  2.0 %      Metamyelocyte % 6.0 %      Myelocyte % 6.0 %      Atypical  Lymphocyte % 1.0 %      Neutrophils Absolute 9.52 10*3/mm3      Lymphocytes Absolute 0.98 10*3/mm3      Monocytes Absolute 1.26 10*3/mm3      Eosinophils Absolute 0.42 10*3/mm3      Crenated RBC's Slight/1+     Toxic Granulation Slight/1+     Vacuolated Neutrophils Slight/1+     Large Platelets Slight/1+    CBC & Differential [[id number]]  (Abnormal) Collected: 12/06/20 0502    Specimen: Blood Updated: 12/06/20 0737    Narrative:      The following orders were created for panel order CBC & Differential.  Procedure                               Abnormality         Status                     ---------                               -----------         ------                     Scan Slide[[id number]]                                       Final result               CBC Auto Differential[[id number]]        Abnormal            Final result                 Please view results for these tests on the individual orders.    CBC Auto Differential [[id number]]  (Abnormal) Collected: 12/06/20 0502    Specimen: Blood Updated: 12/06/20 0737     WBC 14.00 10*3/mm3      RBC 3.42 10*6/mm3      Hemoglobin 9.8 g/dL      Hematocrit 29.8 %      MCV 87.0 fL      MCH 28.6 pg      MCHC 32.9 g/dL      RDW 14.4 %      RDW-SD 43.3 fl      MPV 10.4 fL      Platelets 33 10*3/mm3     Scan Slide [[id number]] Collected: 12/06/20 0502    Specimen: Blood Updated: 12/06/20 0737     Scan Slide --     Comment: See Manual Differential Results       Magnesium [[id number]]  (Normal) Collected: 12/06/20 0502    Specimen: Blood Updated: 12/06/20 0540     Magnesium 2.3 mg/dL     Phosphorus [[id number]]  (Abnormal) Collected: 12/06/20 0502    Specimen: Blood Updated: 12/06/20 0540     Phosphorus 2.3 mg/dL     Basic Metabolic Panel [[id number]]  (Abnormal) Collected: 12/06/20 0502    Specimen: Blood Updated: 12/06/20 0540     Glucose 244 mg/dL      BUN 21 mg/dL      Creatinine 0.64 mg/dL      Sodium 131 mmol/L      Potassium 3.5 mmol/L      Chloride 93 mmol/L      CO2  30.0 mmol/L      Calcium 7.6 mg/dL      eGFR Non African Amer 121 mL/min/1.73      BUN/Creatinine Ratio 32.8     Anion Gap 8.0 mmol/L     Narrative:      GFR Normal >60  Chronic Kidney Disease <60  Kidney Failure <15      BNP [[id number]]  (Normal) Collected: 12/06/20 0502    Specimen: Blood Updated: 12/06/20 0534     proBNP 962.5 pg/mL     Narrative:      Among patients with dyspnea, NT-proBNP is highly sensitive for the detection of acute congestive heart failure. In addition NT-proBNP of <300 pg/ml effectively rules out acute congestive heart failure with 99% negative predictive value.    Results may be falsely decreased if patient taking Biotin.      POC Glucose Once [[id number]]  (Abnormal) Collected: 12/05/20 1708    Specimen: Blood Updated: 12/05/20 1709     Glucose 240 mg/dL      Comment: Serial Number: [id number]:  [id number]             PENDING RESULTS: Addendum to 12/5/2020 CT chest with contrast, SPEP, occult blood stool, reticulocytes, LDH, haptoglobin,  iron, iron saturation, transferrin, TIBC, ferritin, folate, vitamin B12    IMAGING REVIEWED:  Ct Chest With Contrast    Result Date: 12/5/2020  1.  There is almost complete right lower lung consolidation.  This could represent a postobstructive pneumonitis and/or atelectasis.  There is opacification of the right lower lobe bronchus. 2.  There is moderate infiltrate in the posterior segment right upper lung and mild to moderate right middle lobe atelectasis.  Again, this may represent a postobstructive pneumonitis and atelectasis. 3.  There is very marked mediastinal and right hilar adenopathy.  This adenopathy narrows the right upper and middle lobe bronchi causing postobstructive pneumonitis and atelectasis. 4.  There is a small right upper lung nodule too small to characterize, but more likely a granuloma. 5.  Mild to moderate left lower lung infiltrate and consolidation may represent pneumonia. Thank you for the referral of this patient. This  exam was interpreted by an [employer] Board [employer] certified radiologist. Electronically signed by:  Emery Monte M.D.  12/5/2020 11:26 PM      Assessment/Plan   ASSESSMENT  1. Stage IIIc poorly differentiated adenocarcinoma of lung -patient could potentially have stage IV disease with gastric involvement.  Patient originally presented with a bulky right hilar mass and also bulky mediastinal lymph nodes.  Started on cisplatin/Alimta and consideration of radiation therapy concurrently at a later point; however, patient experienced severe myelosuppression.  2. Esophageal dysphagia/Aphasia/Acute Hoarseness:   Acute hoarseness could be mediastinal disease progression. Concerned for mediastinal disease progression causing laryngeal nerve paralysis.  Primary team considering ENT referral to evaluate loss of speech. Full liquid diet recommended. CT chest 12/4/2020 - awaiting comparison to PET/CT in 11/2020.   3. Thrombocytopenia: Could be multifactorial due to combination of chemotherapy-induced myelosuppression and occult GI bleeding from gastric ulceration, could also be paraneoplastic.  Although ITP is one of the differential diagnosis it seems less likely considering the setting.  He recently had chemotherapy, and now has severe thrombocytopenia.  Bone marrow metastases should also be considered.  Haptoglobin is not indicative of hemolysis.  Status post Nplate.  No signs of DIC. Platelets continue to slowly improve. No transfusion needed today.   4. Leukopenia/Neutropenia: Resolved.  Due to chemo induced myelosuppression.  GCSF given and discontinued.   5. Anemia: On B12 and folic acid as outpatient due to Alimta regimen.  Anemia work-up ordered.    6. Leukocytosis: likely secondary to GCSF   7. Gastric wall thickening and hypermetabolic activity: visualized on recent PET scan: EGD done with PEG showed ulcerated GEJ suggesting potentially metastatic involvement by the lung cancer.  Biopsy was deferred given  thrombocytopenia.     8. Previous hospital admission for pneumonia/Pneumonia: Status post antibiotics and steroids recently.  Finished outpatient doxycycline.   9. Chronic respiratory failure/Shortness of air: Fluid overload  10. Atrial fibrillation with RVR: Currently off Cardizem drip.  11. Diastolic cardiomyopathy: Noted on previous echo.  12. COPD:  management per Primary team   13. Nutrition: PEG tube placed on 11/28/2020. Started on tube feeds.  Dietician following.  14. Altered mental coordination/Fine motor skills: MRI brain ordered   15. Hyponatremia/Hyperphosphatemia:  management per Primary team. Dietician is also following.    16. Prognosis: Guarded      PLAN  1. CBC daily   2. Anemia work-up   3. Transfuse platelets only for bleeding. Platelet transfusions have largely not been successful.  4. Continue diuretics   5. Continue to hold anticoagulants and antiplatelet agents  6. Continue folic acid 1 mg po daily  7. Conitnue B12 1000 mcg IM every 28 days, give today   8. Requested comparison from radiation to PET/CT scan on the CT chest with contrast on 12/5/2020  9. MRI brain    10. Will need repeat EGD in 1 week once thrombocytopenia resolves  11. Use PEG tube for supplemental nutrition - now is on supplemental regular diet   12. Will need significant and substantial dose reduction with the next cycle or to consider changing to immunotherapy   13. Consult radiation oncology for consideration of radiation to bulky lymphadenopathy/acute hoarseness   14. Consult Pulmonary, Dr. Ferguson for potential bronchoscopy/suctioning   15. Discharge plan is inpatient rehab     Electronically signed by MANDI Latham, 12/06/20, 2:10 PM EST.          I personally reviewed all pertinent labs, related documentation and the  imaging. ROS performed and physical exam done during face to face enounter with patient. I agree with  nurse practitioner's doumentation, assessment and plan.    Patient seems to have shown  improvement today.  He is able to speak few sentences.  Also his breathing has stabilized.  On repeat CT scan he does have evidence of progression and also evidence of postobstructive pneumonia in the right middle and right lower lobes.  He would benefit from bronchoalveolar lavage and therefore will ask Dr. Ferguson to see the patient .    Also will consult radiation oncologist Dr. Lux to consider mediastinal radiation therapy to help relieve postobstructive pneumonia as well as hoarseness.    Discussed with daughter at length regarding the clinical situation and that prognosis remains poor.  In about 3 to 7 days we can decide if patient will show any clinical improvement or will need to go towards comfort care.      Electronically signed by Ziggy Daley MD, 12/06/20, 2:25 PM EST.            Electronically signed by Ziggy Daley MD, 12/06/20, 2:17 PM EST.

## 2020-12-06 NOTE — PLAN OF CARE
Goal Outcome Evaluation:  Plan of Care Reviewed With: patient  Progress: no change   Pt. Has been alert but confused. He has lost his voice, but can mouth words and speak softly. Has a dry erase board to write on. Vitals are stable, will continue to monitor.

## 2020-12-06 NOTE — PROGRESS NOTES
[facility] Daily Progress Note      Hospitalist Team  LOS 10 days      Patient Care Team:  Ambar Hassan APRN as PCP - General (Family Medicine)    Patient Location: 1108/1      Subjective   Subjective     Chief Complaint / Subjective  No chief complaint on file.    Patient did well with PEG tube insertion and able to be used starting tube feeds.  Patient is tolerating no abdominal distention or pain.  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  IVIG being started by hematology.    Brief Synopsis of Hospital Course/HPI  Mr. Hernandez is a 80 y.o. with history of atrial fibrillation and COPD with new diagnosis of lung cancer presenting as direct admission from oncologist office for progressive generalized weakness as well as thrombocytopenia.  Patient had initiated chemotherapy and reported progressive weakness as well as loss of appetite over the last few weeks.  Patient had been getting more thrombocytopenic with levels in the 30s the day prior to admission down to 13 on day of admission.  Patient denied hematuria, hematochezia, melena or significant bruising though did report a small amount of blood when he blew his nose in the morning.  Currently patient reports he feels weak though otherwise asymptomatic.  No shortness of breath or chest pain, no nausea or vomiting.  Anorexia noted.     Patient found to be in atrial fibrillation with RVR once reaching the floor.  Stat EKG ordered and ordering Cardizem.  Patient received unit of platelets in ambulatory care center prior to coming up to the floor.  Patient scheduled to have feeding tube placement with GI in the next few days.    11/26/2020: Patient reports feeling better than yesterday.  Heart rate improving.  Patient reported self-limiting epistaxis.  Receiving more platelets per hematology.  Received vitamin K for procedure tomorrow.    11/27/2020: Patient overall doing well.  Heart rate still  elevated in the 110s, starting oral Cardizem trial to wean off IV Cardizem.  Patient's platelets in the 20s but no signs of bleeding.      11/28/2020: Patient reports doing well no new symptoms.  No chest pain or shortness of breath.  Heart rate improving borderline controlled.  Patient going for PEG tube today.  Platelets in the 30s.    11/30/2020  Today the patient continues to have feeding difficulty as well as difficulty with some shortness of breath.  He remains weak.  The patient's platelet count remains extremely low despite hematology oncology's efforts.    12/1/2020  The patient seemed very fatigued and very weak today.  The family's wife is at the bedside and was trying to encourage him to continue to fight.  She is aware that the patient's issues with his platelets as well as his white count is related to the chemotherapy.  She asked me when the patient was going to have received additional chemotherapy and I told her probably not until he recovers from this session of chemo.    12/2/2020  Today the patient is having increased congestion.  His cough reflex is extremely poor.  He is not mobilizing secretions.  His weight is also up 3 kg just over yesterday and his sodium is declining.  The patient does not have a fever does not have an elevation in his white count.    12/3/2020  Today the patient's daughter is present at the time of my visit.  The patient no longer can speak.  It is concerning as the patient may have involvement of his left recurrent laryngeal nerve or the patient may have esophageal issues from the lesion that was seen on upper endoscopy.  He has been seen by speech therapy and is now on a full liquid diet only by mouth and is receiving the majority of his nutrition per his tube feeding.    12/4/2020  Today the patient is really not able to speak very much.  He could make an effort and did make some sounds.  Family was not present at the time of my examination.  He offers no additional  "complaints.    12/5/2020  Today the patient remains terribly weak.  The patient can speak a little bit.  Patient's daughter was there.  All questions were answered to the best of my ability.    12/6/2020  Today the patient's daughter has had numerous conversations both with myself and with oncology and her father.  Her father has been very clear that he wants to go home with his family.  The chemotherapy does not really appear to be having an impact on the patient's disease state.  I did review the patient's CODE STATUS in the presence of his daughter with him.  He tells me that he wants to be coded for \"5 to 10 minutes\".  I told him that is really not how this goes.  I discussed this with both the patient and his daughter and said that for now I am going to leave him as a full code because he still wants intervention at this time.  This is incongruent with his desire to enter a hospice program and change to comfort measures.    Review of Systems   Unable to perform ROS: other (The patient has problems phonating.  He tires very easily.  What is marked is what he was able to convey.)   Constitution: Positive for decreased appetite and malaise/fatigue. Negative for chills and fever.        Patient has difficulty speaking.  He can be heard at about the level of a quiet whisper.   HENT: Negative.  Negative for hoarse voice and sore throat.    Eyes: Negative.  Negative for double vision and photophobia.   Cardiovascular: Negative.  Negative for chest pain and leg swelling.   Respiratory: Positive for shortness of breath. Negative for sputum production.    Endocrine: Negative.    Hematologic/Lymphatic: Negative.    Skin: Negative.    Musculoskeletal: Negative.  Negative for myalgias and stiffness.   Gastrointestinal: Negative.  Negative for nausea and vomiting.   Genitourinary: Negative.  Negative for dysuria and flank pain.   Neurological: Positive for weakness. Negative for dizziness and focal weakness. " "  Psychiatric/Behavioral: Negative.  Negative for altered mental status. The patient is not nervous/anxious.    Allergic/Immunologic: Negative.    All other systems reviewed and are negative.    Objective   Objective      Vital Signs  Temp:  [97.5 °F (36.4 °C)-98.2 °F (36.8 °C)] 97.7 °F (36.5 °C)  Heart Rate:  [] 102  Resp:  [16-20] 20  BP: ()/(46-67) 98/58  Oxygen Therapy  SpO2: 100 %  Pulse Oximetry Type: Continuous  Device (Oxygen Therapy): nasal cannula  Device (Oxygen Therapy): high-flow nasal cannula  Flow (L/min): 8  Oxygen Concentration (%): 4  ETCO2 (mmHg): 36 mmHg  Flowsheet Rows      First Filed Value   Admission Height  188 cm (74\") Documented at 11/25/2020 1510   Admission Weight  79.7 kg (175 lb 11.3 oz) Documented at 11/25/2020 1510        Intake & Output (last 3 days)       12/03 0701 - 12/04 0700 12/04 0701 - 12/05 0700 12/05 0701 - 12/06 0700 12/06 0701 - 12/07 0700    P.O.  0      Other   167     NG/GT   1183     Total Intake(mL/kg)  0 (0) 1350 (17.3)     Urine (mL/kg/hr)   1350 (0.7) 1000 (1.1)    Stool        Total Output   1350 1000    Net  0 0 -1000            Urine Unmeasured Occurrence 3 x 1 x 1 x 2 x    Stool Unmeasured Occurrence  1 x          Lines, Drains & Airways    Active LDAs     Name:   Placement date:   Placement time:   Site:   Days:    Peripheral IV 11/25/20 1822 Distal;Posterior;Right Forearm   11/25/20 1822    Forearm   less than 1    Single Lumen Implantable Port 11/16/20 Left Subclavian   11/16/20    1002    Subclavian   10            Physical Exam:  Physical Exam  Vitals signs and nursing note reviewed.   Constitutional:       General: He is not in acute distress.     Appearance: He is well-developed. He is not ill-appearing, toxic-appearing or diaphoretic.      Comments: Patient is pale, frail and weak in his appearance.  He can barely speak above at a whisper.   HENT:      Head: Normocephalic and atraumatic.      Right Ear: Ear canal and external ear normal. "      Left Ear: Ear canal and external ear normal.      Nose: Nose normal. No congestion or rhinorrhea.      Mouth/Throat:      Mouth: Mucous membranes are moist.      Pharynx: No oropharyngeal exudate.   Eyes:      General: No scleral icterus.        Right eye: No discharge.         Left eye: No discharge.      Extraocular Movements: Extraocular movements intact.      Conjunctiva/sclera: Conjunctivae normal.      Pupils: Pupils are equal, round, and reactive to light.   Neck:      Musculoskeletal: Normal range of motion and neck supple. No neck rigidity or muscular tenderness.      Thyroid: No thyromegaly.      Vascular: No carotid bruit or JVD.      Trachea: No tracheal deviation.   Cardiovascular:      Rate and Rhythm: Normal rate and regular rhythm.      Pulses: Normal pulses.      Heart sounds: Normal heart sounds. No murmur. No friction rub. No gallop.    Pulmonary:      Effort: Pulmonary effort is normal. No respiratory distress.      Breath sounds: Normal breath sounds. No stridor. No wheezing, rhonchi or rales.      Comments: Decreased breath sounds throughout.  The patient is not using any accessory muscles of respiration today.  He is able to maintain a saturation.  Chest:      Chest wall: No tenderness.   Abdominal:      General: Bowel sounds are normal. There is no distension.      Palpations: Abdomen is soft. There is no mass.      Tenderness: There is no abdominal tenderness. There is no guarding or rebound.      Hernia: No hernia is present.   Musculoskeletal:         General: No swelling, tenderness, deformity or signs of injury.      Right lower leg: No edema.      Left lower leg: No edema.      Comments: The patient has diffuse muscle wasting and weakness.   Lymphadenopathy:      Cervical: No cervical adenopathy.   Skin:     General: Skin is warm and dry.      Coloration: Skin is not jaundiced or pale.      Findings: No bruising, erythema or rash.   Neurological:      General: No focal deficit  present.      Mental Status: He is alert and oriented to person, place, and time. Mental status is at baseline.      Cranial Nerves: No cranial nerve deficit.      Sensory: No sensory deficit.      Motor: No weakness or abnormal muscle tone.      Coordination: Coordination normal.   Psychiatric:         Mood and Affect: Mood normal.         Behavior: Behavior normal.         Thought Content: Thought content normal.         Judgment: Judgment normal.           Procedures:    Results Review:     I reviewed the patient's new clinical results.      Lab Results (last 24 hours)     Procedure Component Value Units Date/Time    POC Glucose Once [[id number]]  (Abnormal) Collected: 12/06/20 1740    Specimen: Blood Updated: 12/06/20 1740     Glucose 166 mg/dL      Comment: Serial Number: [id number]:  [id number]       Ferritin [[id number]]  (Abnormal) Collected: 12/06/20 0502    Specimen: Blood Updated: 12/06/20 1611     Ferritin 3,676.00 ng/mL     Narrative:      Results may be falsely decreased if patient taking Biotin.      Lactate Dehydrogenase [[id number]]  (Abnormal) Collected: 12/06/20 0502    Specimen: Blood Updated: 12/06/20 1549      U/L     Iron Profile [[id number]]  (Abnormal) Collected: 12/06/20 0502    Specimen: Blood Updated: 12/06/20 1545     Iron 48 mcg/dL      Iron Saturation 30 %      Transferrin 106 mg/dL      TIBC 158 mcg/dL     Reticulocytes [[id number]]  (Normal) Collected: 12/06/20 0502    Specimen: Blood Updated: 12/06/20 1356     Reticulocyte % 1.12 %      Reticulocyte Absolute 0.0385 10*6/mm3     POC Glucose Once [[id number]]  (Abnormal) Collected: 12/06/20 1200    Specimen: Blood Updated: 12/06/20 1203     Glucose 188 mg/dL      Comment: Serial Number: [id number]:  [id number]       Manual Differential [[id number]]  (Abnormal) Collected: 12/06/20 0502    Specimen: Blood Updated: 12/06/20 0737     Neutrophil % 66.0 %      Lymphocyte % 7.0 %      Monocyte % 9.0 %      Eosinophil % 3.0 %       Bands %  2.0 %      Metamyelocyte % 6.0 %      Myelocyte % 6.0 %      Atypical Lymphocyte % 1.0 %      Neutrophils Absolute 9.52 10*3/mm3      Lymphocytes Absolute 0.98 10*3/mm3      Monocytes Absolute 1.26 10*3/mm3      Eosinophils Absolute 0.42 10*3/mm3      Crenated RBC's Slight/1+     Toxic Granulation Slight/1+     Vacuolated Neutrophils Slight/1+     Large Platelets Slight/1+    CBC & Differential [[id number]]  (Abnormal) Collected: 12/06/20 0502    Specimen: Blood Updated: 12/06/20 0737    Narrative:      The following orders were created for panel order CBC & Differential.  Procedure                               Abnormality         Status                     ---------                               -----------         ------                     Scan Slide[[id number]]                                       Final result               CBC Auto Differential[[id number]]        Abnormal            Final result                 Please view results for these tests on the individual orders.    CBC Auto Differential [[id number]]  (Abnormal) Collected: 12/06/20 0502    Specimen: Blood Updated: 12/06/20 0737     WBC 14.00 10*3/mm3      RBC 3.42 10*6/mm3      Hemoglobin 9.8 g/dL      Hematocrit 29.8 %      MCV 87.0 fL      MCH 28.6 pg      MCHC 32.9 g/dL      RDW 14.4 %      RDW-SD 43.3 fl      MPV 10.4 fL      Platelets 33 10*3/mm3     Scan Slide [[id number]] Collected: 12/06/20 0502    Specimen: Blood Updated: 12/06/20 0737     Scan Slide --     Comment: See Manual Differential Results       Magnesium [[id number]]  (Normal) Collected: 12/06/20 0502    Specimen: Blood Updated: 12/06/20 0540     Magnesium 2.3 mg/dL     Phosphorus [[id number]]  (Abnormal) Collected: 12/06/20 0502    Specimen: Blood Updated: 12/06/20 0540     Phosphorus 2.3 mg/dL     Basic Metabolic Panel [[id number]]  (Abnormal) Collected: 12/06/20 0502    Specimen: Blood Updated: 12/06/20 0540     Glucose 244 mg/dL      BUN 21 mg/dL      Creatinine 0.64 mg/dL       Sodium 131 mmol/L      Potassium 3.5 mmol/L      Chloride 93 mmol/L      CO2 30.0 mmol/L      Calcium 7.6 mg/dL      eGFR Non African Amer 121 mL/min/1.73      BUN/Creatinine Ratio 32.8     Anion Gap 8.0 mmol/L     Narrative:      GFR Normal >60  Chronic Kidney Disease <60  Kidney Failure <15      BNP [[id number]]  (Normal) Collected: 12/06/20 0502    Specimen: Blood Updated: 12/06/20 0534     proBNP 962.5 pg/mL     Narrative:      Among patients with dyspnea, NT-proBNP is highly sensitive for the detection of acute congestive heart failure. In addition NT-proBNP of <300 pg/ml effectively rules out acute congestive heart failure with 99% negative predictive value.    Results may be falsely decreased if patient taking Biotin.          No results found for: HGBA1C            Lab Results   Component Value Date    LIPASE 8 (L) 10/26/2020     No results found for: CHOL, CHLPL, TRIG, HDL, LDL, LDLDIRECT    Lab Results   Lab Value Date/Time    INTRAOP  10/27/2020 1203     FNA of subcarinal lymph node, immediate evaluation    Pass 1: Positive for malignant cells.    FNA of left hilar lymph node, immediate evaluation    Pass 1: Mostly blood, no malignancy.  Pass 2: Mostly blood, no malignancy.  Pass 3: Mostly blood, no malignancy.  Pass 4: Mostly blood, no malignancy.    Initial cytology interpretation performed by Mauirlio Watson MD.        FINALDX  11/25/2020 1146     Leukopenia with left shift and relative eosinophilia  Thrombocytopenia  No blasts identified      FINALDX  10/27/2020 1203     Specimens #1 and #3 (Lymph node, subcarinal, endobronchial fine needle aspiration):    Positive for metastatic poorly differentiated adenocarcinoma, see comment    Specimens #2 and #4 (Lymph node, left hilar, endobronchial fine needle aspiration, smears and cell block preparation):    Rare atypical cells, see comment    JPR/sms           COMDX  10/27/2020 1203     Specimens #1 and #3: Immunohistochemistry was performed utilizing  appropriate controls and the tumor is positive for CK7 and TTF-1 and is negative for napsin A, p63, and CK5/6. 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 Many of the tumor cells display significant pleomorphism with bizarre appearing nuclei and mitotic figures. This raises the possibility of a sarcomatoid component, however, that would have to be evaluated on the resection specimen.     Specimens #2 and #4: One of the smear passes displays scattered atypical cells in a background of lymphoid tissue. Immunohistochemistry was performed utilizing appropriate controls on the cell block for cytokeratin AE1/3. The cytokeratin AE1/3 stain is negative which excludes the presence of atypical cells in the cell block. The significance of the atypical cells on the smears is unknown, however, malignancy cannot be entirely excluded. Further clinical workup should be considered.     JPR/sms          Microbiology Results (last 10 days)     Procedure Component Value - Date/Time    COVID PRE-OP / PRE-PROCEDURE SCREENING ORDER (NO ISOLATION) - Swab, Nasopharynx [[id number]]  (Normal) Collected: 11/27/20 0802    Lab Status: Final result Specimen: Swab from Nasopharynx Updated: 11/27/20 0855    Narrative:      The following orders were created for panel order COVID PRE-OP / PRE-PROCEDURE SCREENING ORDER (NO ISOLATION) - Swab, Nasopharynx.  Procedure                               Abnormality         Status                     ---------                               -----------         ------                     COVID-19,CEPHEID,COR/UDAY...[[id number]]  Normal              Final result                 Please view results for these tests on the individual orders.    COVID-19,CEPHEID,COR/UDAY/PAD IN-HOUSE(OR EMERGENT/ADD-ON),NP SWAB IN TRANSPORT MEDIA 3-4 HR TAT - Swab, Nasopharynx [[id number]]  (Normal) Collected: 11/27/20 0802    Lab Status: Final result Specimen: Swab from Nasopharynx Updated: 11/27/20 0855      COVID19 Not Detected    Narrative:      Fact sheet for providers: https://www.fda.gov/media/684079/download     Fact sheet for patients: https://www.fda.gov/media/525333/download          ECG/EMG Results (most recent)     Procedure Component Value Units Date/Time    ECG 12 Lead [[id number]] Collected: 11/25/20 1614     Updated: 11/29/20 1102     QT Interval 355 ms     Narrative:      HEART RATE= 116  bpm  RR Interval= 516  ms  ID Interval=   ms  P Horizontal Axis=   deg  P Front Axis=   deg  QRSD Interval= 108  ms  QT Interval= 355  ms  QRS Axis= 33  deg  T Wave Axis= 33  deg  - ABNORMAL ECG -  Atrial fibrillation  Low voltage, extremity leads  When compared with ECG of 11-Nov-2020 10:10:57,  NO SIGNIFICANT CHANGE FROM PREVIOUS ECG  Electronically Signed By: Alex Mtz (UDAY) 29-Nov-2020 10:51:45  Date and Time of Study: 2020-11-25 16:14:21               Results for orders placed during the hospital encounter of 10/26/20   Adult Transthoracic Echo Complete W/ Cont if Necessary Per Protocol    Narrative · The left atrial cavity is severely dilated.  · Left ventricular diastolic function is consistent with (grade II w/high   LAP) pseudonormalization.  · Left ventricular wall thickness is consistent with concentric   hypertrophy.  · Estimated left ventricular EF was in agreement with the calculated left   ventricular EF. Left ventricular ejection fraction appears to be 61 - 65%.   Left ventricular systolic function is normal.  · There is moderate calcification of the aortic valve mainly affecting the   non-coronary cusp(s).  · There is mild, bileaflet mitral valve thickening present.  · Mild to moderate mitral valve regurgitation is present with a   centrally-directed jet noted.  · Mild tricuspid valve regurgitation is present.  · Estimated right ventricular systolic pressure from tricuspid   regurgitation is mildly elevated (35-45 mmHg).     Moderate MR  Severe left atrial enlargement  Grade 2 diastolic  dysfunction  Preserved LV systolic function EF 60 to 65%  Normal RV size and function  Normal IVC  Mild pulmonary hypertension by TR gradient  If clinically indicated would recommend transesophageal echo for   evaluation of mitral regurgitation with severe left atrial enlargement,   spectral Doppler and color Doppler indicate not more than moderate but   suboptimal acoustic windows, possibly underestimated, recommend ESE if   clinically indicated and were suspicious of severe MR         Ct Chest With Contrast    Result Date: 12/6/2020   1.  There is almost complete right lower lung consolidation.  This could represent a postobstructive pneumonitis and/or atelectasis.  There is opacification of the right lower lobe bronchus. 2.  There is moderate infiltrate in the posterior segment right upper lung and mild to moderate right middle lobe atelectasis.  Again, this may represent a postobstructive pneumonitis and atelectasis. 3.  There is very marked mediastinal and right hilar adenopathy.  This adenopathy narrows the right upper and middle lobe bronchi causing postobstructive pneumonitis and atelectasis. 4.  There is a small right upper lung nodule too small to characterize, but more likely a granuloma. 5.  Mild to moderate left lower lung infiltrate and consolidation may represent pneumonia.   Thank you for the referral of this patient. This exam was interpreted by an American Board of Radiology certified radiologist.  Electronically signed by:  Emery Monte M.D.  12/5/2020 11:26 PM   Addendum is to compare to prior PET/CT from 11/05/2020.  Supraclavicular lymphadenopathy appears increased with an index left supraclavicular lymph node on image 18 measuring 2.4 x 1.9 cm, previously 1.5 x 1.0 cm.  Mediastinal lymphadenopathy overall appears slightly increased, with an index subcarinal lymph node on image 57 measuring 4.6 x 3.4 cm, previously 4.1 x 3.1 cm.  Right hilar lymphadenopathy appears increased with an index  right hilar lymph node on image 57 measuring 3.1 x 2.7 cm, previously 2.6 x 2.4 cm.  A previously described right hilar mass extending into the right lower lobe is difficult to characterize given the adjacent pleural effusion and consolidation.  Overall, there appears to be disease progression compared to prior PET/CT from 11/05/2020.  Electronically Signed By-Maurilio Dos Santos MD On:12/6/2020 12:32 PM This report was finalized on [id number] by  Maurilio Dos Santos MD.    Xr Chest 1 View    Result Date: 12/2/2020   1. Bilateral mixed interstitial/airspace disease which is worsened from the previous study. This can be seen with multifocal pneumonia or pulmonary edema. 2. The patient either has a small to moderate subpulmonic pleural effusion or elevation of the right hemidiaphragm. This is unchanged from the previous exam. There is also unchanged dense consolidation in the right lower chest. 3. Thickening of the right paratracheal stripe consistent with known adenopathy. 4. Fullness in the right hilum consistent with a known lung cancer.  Electronically Signed By-Antonio Graham MD On:12/2/2020 10:37 AM This report was finalized on [id number] by  Antonio Graham MD.          Xrays, labs reviewed personally by physician.    Medication Review:   I have reviewed the patient's current medication list      Scheduled Meds  budesonide-formoterol, 2 puff, Inhalation, BID - RT  cyanocobalamin, 1,000 mcg, Intramuscular, Q28 Days  dilTIAZem, 30 mg, Oral, Q6H  folic acid, 1 mg, Oral, Daily  furosemide, 40 mg, Intravenous, Q12H  ipratropium-albuterol, 3 mL, Nebulization, 4x Daily - RT  metoclopramide, 10 mg, Oral, TID AC  metoprolol tartrate, 50 mg, Oral, Q12H  mirtazapine, 15 mg, Oral, Nightly  nicotine, 1 patch, Transdermal, Daily  nystatin, 5 mL, Swish & Swallow, 4x Daily  pantoprazole, 40 mg, Intravenous, Q AM  sodium chloride, 10 mL, Intravenous, Q12H  sucralfate, 1 g, Oral, 4x Daily AC & at Bedtime  traZODone, 50 mg, Oral,  "Nightly        Meds Infusions  dilTIAZem, 5-15 mg/hr, Last Rate: 5 mg/hr (11/27/20 1244)  sodium chloride, 30 mL/hr, Last Rate: 30 mL/hr (11/28/20 0312)        Meds PRN  •  acetaminophen **OR** acetaminophen  •  acetaminophen  •  benzonatate  •  bisacodyl  •  flumazenil  •  HYDROmorphone  •  HYDROmorphone  •  ipratropium-albuterol  •  labetalol  •  magnesium hydroxide  •  magnesium sulfate **OR** magnesium sulfate **OR** magnesium sulfate  •  melatonin  •  naloxone  •  nitroglycerin  •  ondansetron **OR** ondansetron  •  ondansetron  •  potassium & sodium phosphates **OR** potassium & sodium phosphates  •  potassium chloride **OR** potassium chloride **OR** potassium chloride  •  promethazine  •  sodium chloride  •  sodium chloride  •  sodium chloride        Assessment/Plan   Assessment/Plan     Active Hospital Problems    Diagnosis  POA   • **Feeding difficulties [R63.3]  Unknown   • Moderate malnutrition (CMS/HCC) [E44.0]  Yes   • Thrombocytopenia (CMS/HCC) [D69.6]  Yes      Resolved Hospital Problems   No resolved problems to display.       MEDICAL DECISION MAKING COMPLEXITY BY PROBLEM:     Generalized weakness  -likely multifactorial given cancer diagnosis and chemotherapy as well as tachyarrhythmia  -PT/OT  -Fall risk  -Monitor for sources of infection  -Telemetry  -COVID-19 negative  -Heart rate control  -Nutrition consult for tube feeding  -Patient received a PEG on 11/28/2020  -Patient will definitely need inpatient rehab.       -The patient is now decided that he wants to consider entering a hospice program and returning home either to his home or to his daughter's home.  Palliative care and hospice have been consulted and hopefully can see him in the morning.  His family is in complete agreement.  We will leave the patient is a full code now as he still wants to be coded for \"5 to 10 minutes\".    Atrial fibrillation  -with RVR.  Patient with established history denies chest pain.  Patient able to be weaned " off overnight 10/26/2020 but went back into RVR this 10/27/2020, slowly improving  -Telemetry  -Increase metoprolol  -Monitor electrolytes  -Off Cardizem drip on oral Cardizem  -Echo as of 10/26/2020 showing dilated atrial cavity with grade 2 diastolic dysfunction    Chronic respiratory failure-patient on 3 L baseline  -Chest x-ray appearing show signs of volume overload  -Ordering CT further evaluate underlying infection versus inflammation versus edema  -Off diuretics       -The patient's oxygenation is not proving.  The patient is up 3 kg and has a negative pro calcitonin.  The patient likely has congestive failure which has not been addressed.  We will start the patient on Lasix 40 mg IV twice daily and follow his weight, renal function and electrolytes.  Would hold off on antibiotics for now as his chest x-ray is more consistent with pulmonary edema.       -Repeat chest x-ray in a.m. and reevaluate  -Given the patient's overall situation with his lung cancer diagnosis,, chronic respiratory failure and very severe weakness this is likely to continue to deteriorate.    Thrombocytopenia-patient with no signs of overt bleeding though did report small amounts of blood per nasal passages, slowly improving.  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  -Patient has been transfused platelets in the past.  The patient now has a platelet count of 33,000.    Leukopenia-likely due to underlying chemotherapy  -Neupogen per hematology  -Neutropenic precaution  -Monitor for signs of occult infection  -Daily CBC  -Patient's leukopenia has resolved.    Lung cancer-patient with recent  diagnosis under treatment with oncology  -Pain control  -Antiemetics  -Daily CBC  -Oncology consult  -Consider ENT referral for the patient's loss of speech       -1 way to evaluate this would be to do a bedside laryngoscopy and see if the vocal cords approximate and are still moving appropriately.  -The patient's family is really struggling with how aggressive to be.  Apparently he just recently moved here to be near to his daughter.  They actually just closed on a house about a month ago which the patient's never even set foot in.  The patient's wife is also having some issues for which the daughter is attempting to find care for her mother.    Diastolic cardiomyopathy  -grade 2 noted on echo with various different valvular diseases  -Monitor volume status     COPD-no signs of acute exacerbation currently  -Bronchodilators  -Mucolytic's  -Wean O2 as tolerated     Tobacco abuse-cessation advised    Aphasia  -This may be multifactorial.  It is suspicious for vocal cord paralysis secondary to the patient's underlying cancer.  Also could be related to esophageal disease.  Will defer to oncology the timing of getting ENT to look at the patient's vocal cords for additional information  -Continue his nutrition through his PEG at this time.  -May be slightly improved today.    Dr. Daley spoke with the patient and his family.  I spoke with the patient and his family.  The patient family tells me that the patient wants to go home with hospice.  Apparently he just bought a new home with his wife and has not even seen it.  The family wants to take him home with hospice and keep him comfortable.  While he did say that that is what he wanted, he still wanted to undergo cardiopulmonary resuscitation for 5 to 10 minutes should his heart stop.  I have told his family that I am going to continue him is a full code because he was so clear about that.  Hopefully tomorrow when he has had a little time to set with this he will perhaps  hear what palliative care and hospice have to say to him.  Hopefully disposition can be made in the near term as the patient is not progressing well to recovery.  VTE Prophylaxis -   Mechanical Order History:      Ordered        11/25/20 1608  Place Sequential Compression Device  Once         11/25/20 1608  Maintain Sequential Compression Device  Continuous                 Pharmalogical Order History:     None        Code Status -   Code Status and Medical Interventions:   Ordered at: 11/25/20 1608     Code Status:    CPR     Medical Interventions (Level of Support Prior to Arrest):    Full     This patient has been examined wearing appropriate Personal Protective Equipment and discussed with hospital infection control department. 12/06/20    Discharge Planning  pending  Electronically signed by Farrah Grimm MD, 12/06/20, 18:15 EST.  [facility]ist Team

## 2020-12-07 PROBLEM — J44.9 CHRONIC OBSTRUCTIVE PULMONARY DISEASE (HCC): Status: ACTIVE | Noted: 2020-01-01

## 2020-12-07 NOTE — TELEPHONE ENCOUNTER
Shay's daughter Eliane is requesting a call from the  regarding an organization to help build pt a ramp. She also was needing to get a letter of medical necessity either from Dr. Daley or Dr. Lux by tomorrow 12/08 afternoon for pt's transportation.     Phone# [phone]

## 2020-12-07 NOTE — DISCHARGE PLACEMENT REQUEST
"Shay Monteiro (80 y.o. Male)     Date of Birth Social Security Number Address Home Phone MRN    1940  [id number]  [place] [address] [phone] [id number]    [employer] Mar[employer] Status          Nondenominational        Admission Date Admission Type Admitting Provider Attending Provider Department, Room/Bed    11/25/20 Elective Justino Youssef DO Taylor, [place], DO [facility], 1108/1    Discharge Date Discharge Disposition Discharge Destination                       Attending Provider: Justino Youssef DO    Allergies: No Known Allergies    Isolation: None   Infection: None   Code Status: CPR    Ht: 188 cm (74\")   Wt: 78 kg (171 lb 15.3 oz)    Admission Cmt: None   Principal Problem: Feeding difficulties [R63.3] More...                 Active Insurance as of 11/25/2020     Primary Coverage     Payor Plan Insurance Group Employer/Plan Group    HUMANA MEDICARE REPLACEMENT HU[employer]NA MEDICARE REPLACEMENT [id number]     Payor Plan Address Payor Plan Phone Number Payor Plan Fax Number Effective Dates    [address] [address] [phone]  1/1/2019 - None Entered    [facility] [id number]       Subscriber Name Subscriber Birth Date Member ID       SHAY MONTEIRO 1940 [id number]                 Emergency Contacts      (Rel.) Home Phone Work Phone Mobile Phone    Catrina Monteiro (Spouse) [phone] -- [phone]    MonteiroEliane sanchez (Daughter) -- -- [phone]              "

## 2020-12-07 NOTE — PROGRESS NOTES
[facility] Medicine Services Daily Progress Note      Hospitalist Team  LOS 11 days      Patient Care Team:  Ambar Hassan APRN as PCP - General (Family Medicine)    Patient Location: 1108/1      Subjective   Subjective     Chief Complaint / Subjective  No chief complaint on file.    Patient did well with PEG tube insertion and able to be used starting tube feeds.  Patient is tolerating no abdominal distention or pain.  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  IVIG being started by hematology.    Brief Synopsis of Hospital Course/HPI  Mr. Hernandez is a 80 y.o. with history of atrial fibrillation and COPD with new diagnosis of lung cancer presenting as direct admission from oncologist office for progressive generalized weakness as well as thrombocytopenia.  Patient had initiated chemotherapy and reported progressive weakness as well as loss of appetite over the last few weeks.  Patient had been getting more thrombocytopenic with levels in the 30s the day prior to admission down to 13 on day of admission.  Patient denied hematuria, hematochezia, melena or significant bruising though did report a small amount of blood when he blew his nose in the morning.  Currently patient reports he feels weak though otherwise asymptomatic.  No shortness of breath or chest pain, no nausea or vomiting.  Anorexia noted.     Patient found to be in atrial fibrillation with RVR once reaching the floor.  Stat EKG ordered and ordering Cardizem.  Patient received unit of platelets in ambulatory care center prior to coming up to the floor.  Patient scheduled to have feeding tube placement with GI in the next few days.    11/26/2020: Patient reports feeling better than yesterday.  Heart rate improving.  Patient reported self-limiting epistaxis.  Receiving more platelets per hematology.  Received vitamin K for procedure tomorrow.    11/27/2020: Patient overall doing well.  Heart rate still  elevated in the 110s, starting oral Cardizem trial to wean off IV Cardizem.  Patient's platelets in the 20s but no signs of bleeding.      11/28/2020: Patient reports doing well no new symptoms.  No chest pain or shortness of breath.  Heart rate improving borderline controlled.  Patient going for PEG tube today.  Platelets in the 30s.    11/30/2020  Today the patient continues to have feeding difficulty as well as difficulty with some shortness of breath.  He remains weak.  The patient's platelet count remains extremely low despite hematology oncology's efforts.    12/1/2020  The patient seemed very fatigued and very weak today.  The family's wife is at the bedside and was trying to encourage him to continue to fight.  She is aware that the patient's issues with his platelets as well as his white count is related to the chemotherapy.  She asked me when the patient was going to have received additional chemotherapy and I told her probably not until he recovers from this session of chemo.    12/2/2020  Today the patient is having increased congestion.  His cough reflex is extremely poor.  He is not mobilizing secretions.  His weight is also up 3 kg just over yesterday and his sodium is declining.  The patient does not have a fever does not have an elevation in his white count.    12/3/2020  Today the patient's daughter is present at the time of my visit.  The patient no longer can speak.  It is concerning as the patient may have involvement of his left recurrent laryngeal nerve or the patient may have esophageal issues from the lesion that was seen on upper endoscopy.  He has been seen by speech therapy and is now on a full liquid diet only by mouth and is receiving the majority of his nutrition per his tube feeding.    12/4/2020  Today the patient is really not able to speak very much.  He could make an effort and did make some sounds.  Family was not present at the time of my examination.  He offers no additional  "complaints.    12/5/2020  Today the patient remains terribly weak.  The patient can speak a little bit.  Patient's daughter was there.  All questions were answered to the best of my ability.    12/6/2020  Today the patient's daughter has had numerous conversations both with myself and with oncology and her father.  Her father has been very clear that he wants to go home with his family.  The chemotherapy does not really appear to be having an impact on the patient's disease state.  I did review the patient's CODE STATUS in the presence of his daughter with him.  He tells me that he wants to be coded for \"5 to 10 minutes\".  I told him that is really not how this goes.  I discussed this with both the patient and his daughter and said that for now I am going to leave him as a full code because he still wants intervention at this time.  This is incongruent with his desire to enter a hospice program and change to comfort measures.    12/7/2020  No overnight events. Patient to get thoracentesis today.  CT reveals disease progression. Discharge plan home is home with caretenders tomorrow.    Review of Systems   Unable to perform ROS: other (The patient has problems phonating.  He tires very easily.  What is marked is what he was able to convey.)   Constitution: Positive for decreased appetite and malaise/fatigue. Negative for chills and fever.        Patient has difficulty speaking.  He can be heard at about the level of a quiet whisper.   HENT: Negative.  Negative for hoarse voice and sore throat.    Eyes: Negative.  Negative for double vision and photophobia.   Cardiovascular: Negative.  Negative for chest pain and leg swelling.   Respiratory: Positive for shortness of breath. Negative for sputum production.    Endocrine: Negative.    Hematologic/Lymphatic: Negative.    Skin: Negative.    Musculoskeletal: Negative.  Negative for myalgias and stiffness.   Gastrointestinal: Negative.  Negative for nausea and vomiting. " "  Genitourinary: Negative.  Negative for dysuria and flank pain.   Neurological: Positive for weakness. Negative for dizziness and focal weakness.   Psychiatric/Behavioral: Negative.  Negative for altered mental status. The patient is not nervous/anxious.    Allergic/Immunologic: Negative.    All other systems reviewed and are negative.    Objective   Objective      Vital Signs  Temp:  [97.3 °F (36.3 °C)-97.5 °F (36.4 °C)] 97.5 °F (36.4 °C)  Heart Rate:  [] 119  Resp:  [16-28] 16  BP: ()/(44-94) 118/44  Oxygen Therapy  SpO2: 93 %(room air.)  Pulse Oximetry Type: Continuous  Device (Oxygen Therapy): nasal cannula  Device (Oxygen Therapy): nasal cannula  Flow (L/min): 3  Oxygen Concentration (%): 4  ETCO2 (mmHg): 36 mmHg  Flowsheet Rows      First Filed Value   Admission Height  188 cm (74\") Documented at 11/25/2020 1510   Admission Weight  79.7 kg (175 lb 11.3 oz) Documented at 11/25/2020 1510        Intake & Output (last 3 days)       12/04 0701 - 12/05 0700 12/05 0701 - 12/06 0700 12/06 0701 - 12/07 0700 12/07 0701 - 12/08 0700    P.O. 0  480     Other  167 60     NG/GT  1183 840     Total Intake(mL/kg) 0 (0) 1350 (17.3) 1380 (17.7)     Urine (mL/kg/hr)  1350 (0.7) 1000 (0.5) 0 (0)    Other    1100    Stool    0    Total Output  1350 1000 1100    Net 0 0 +380 -1100            Urine Unmeasured Occurrence 1 x 1 x 5 x 3 x    Stool Unmeasured Occurrence 1 x  3 x 2 x        Lines, Drains & Airways    Active LDAs     Name:   Placement date:   Placement time:   Site:   Days:    Peripheral IV 11/25/20 1822 Distal;Posterior;Right Forearm   11/25/20 1822    Forearm   less than 1    Single Lumen Implantable Port 11/16/20 Left Subclavian   11/16/20    1002    Subclavian   10            Physical Exam:  Physical Exam  Vitals signs and nursing note reviewed.   Constitutional:       General: He is not in acute distress.     Appearance: He is weak. He is not ill-appearing, toxic-appearing or diaphoretic.      " Comments: Patient is pale, frail and weak in his appearance.  He can barely speak above at a whisper.   HENT:      Head: Normocephalic and atraumatic.      Right Ear: Ear canal and external ear normal.      Left Ear: Ear canal and external ear normal.      Nose: Nose normal. No congestion or rhinorrhea.      Mouth/Throat:      Mouth: Mucous membranes are moist.      Pharynx: No oropharyngeal exudate.   Eyes:      General: No scleral icterus.        Right eye: No discharge.         Left eye: No discharge.      Extraocular Movements: Extraocular movements intact.      Conjunctiva/sclera: Conjunctivae normal.      Pupils: Pupils are equal, round, and reactive to light.   Neck:      Musculoskeletal: Normal range of motion and neck supple. No neck rigidity or muscular tenderness.      Thyroid: No thyromegaly.      Vascular: No carotid bruit or JVD.      Trachea: No tracheal deviation.   Cardiovascular:      Rate and Rhythm: Normal rate and regular rhythm.      Pulses: Normal pulses.      Heart sounds: Normal heart sounds. No murmur. No friction rub. No gallop.    Pulmonary:      Effort: Pulmonary effort is normal. No respiratory distress.      Breath sounds: Normal breath sounds. No stridor. No wheezing, rhonchi or rales.      Comments: Decreased breath sounds throughout.  The patient is not using any accessory muscles of respiration today.  He is able to maintain a saturation.  Chest:      Chest wall: No tenderness.   Abdominal:      General: Bowel sounds are normal. There is no distension.      Palpations: Abdomen is soft. There is no mass.      Tenderness: There is no abdominal tenderness. There is no guarding or rebound.      Hernia: No hernia is present.   Musculoskeletal:         General: No swelling, tenderness, deformity or signs of injury.      Right lower leg: No edema.      Left lower leg: No edema.      Comments: The patient has diffuse muscle wasting and weakness.   Lymphadenopathy:      Cervical: No  cervical adenopathy.   Skin:     General: Skin is warm and dry.      Coloration: Skin is not jaundiced or pale.      Findings: No bruising, erythema or rash.   Neurological:      General: No focal deficit present.      Mental Status: He is alert and oriented to person, place, and time. Mental status is at baseline.      Cranial Nerves: No cranial nerve deficit.      Sensory: No sensory deficit.      Motor: No weakness or abnormal muscle tone.      Coordination: Coordination normal.   Psychiatric:         Mood and Affect: Mood normal.         Behavior: Behavior normal.         Thought Content: Thought content normal.         Judgment: Judgment normal.           Procedures:    Results Review:     I reviewed the patient's new clinical results.      Lab Results (last 24 hours)     Procedure Component Value Units Date/Time    Haptoglobin [[id number]]  (Abnormal) Collected: 12/07/20 0553    Specimen: Blood Updated: 12/07/20 1209     Haptoglobin 343 mg/dL     POC Glucose Once [[id number]]  (Abnormal) Collected: 12/07/20 1154    Specimen: Blood Updated: 12/07/20 1202     Glucose 195 mg/dL      Comment: Serial Number: [id number]:  [id number]       Folate [[id number]]  (Normal) Collected: 12/07/20 0553    Specimen: Blood Updated: 12/07/20 1103     Folate 18.80 ng/mL     Narrative:      Results may be falsely increased if patient taking Biotin.      Vitamin B12 [[id number]]  (Abnormal) Collected: 12/07/20 0553    Specimen: Blood Updated: 12/07/20 1103     Vitamin B-12 >2,000 pg/mL     Narrative:      Results may be falsely increased if patient taking Biotin.      CBC & Differential [[id number]]  (Abnormal) Collected: 12/07/20 0553    Specimen: Blood Updated: 12/07/20 0758    Narrative:      The following orders were created for panel order CBC & Differential.  Procedure                               Abnormality         Status                     ---------                               -----------         ------                      Scan Slide[[id number]]                                       Final result               CBC Auto Differential[[id number]]        Abnormal            Final result                 Please view results for these tests on the individual orders.    Scan Slide [[id number]] Collected: 12/07/20 0553    Specimen: Blood Updated: 12/07/20 0758     Scan Slide --     Comment: See Manual Differential Results       Manual Differential [[id number]]  (Abnormal) Collected: 12/07/20 0553    Specimen: Blood Updated: 12/07/20 0758     Neutrophil % 66.0 %      Lymphocyte % 3.0 %      Monocyte % 9.0 %      Bands %  20.0 %      Metamyelocyte % 1.0 %      Myelocyte % 1.0 %      Neutrophils Absolute 13.93 10*3/mm3      Lymphocytes Absolute 0.49 10*3/mm3      Monocytes Absolute 1.46 10*3/mm3      Anisocytosis Slight/1+     Vacuolated Neutrophils Slight/1+     Platelet Estimate Decreased     Large Platelets Slight/1+    CBC Auto Differential [[id number]]  (Abnormal) Collected: 12/07/20 0553    Specimen: Blood Updated: 12/07/20 0758     WBC 16.20 10*3/mm3      RBC 3.43 10*6/mm3      Hemoglobin 9.8 g/dL      Hematocrit 29.8 %      MCV 86.8 fL      MCH 28.6 pg      MCHC 33.0 g/dL      RDW 14.4 %      RDW-SD 43.8 fl      MPV 9.8 fL      Platelets 66 10*3/mm3     Magnesium [[id number]]  (Normal) Collected: 12/07/20 0553    Specimen: Blood Updated: 12/07/20 0633     Magnesium 2.1 mg/dL     Phosphorus [[id number]]  (Abnormal) Collected: 12/07/20 0553    Specimen: Blood Updated: 12/07/20 0633     Phosphorus 2.3 mg/dL     Basic Metabolic Panel [[id number]]  (Abnormal) Collected: 12/07/20 0553    Specimen: Blood Updated: 12/07/20 0633     Glucose 202 mg/dL      BUN 20 mg/dL      Creatinine 0.67 mg/dL      Sodium 136 mmol/L      Potassium 3.3 mmol/L      Chloride 96 mmol/L      CO2 31.0 mmol/L      Calcium 7.7 mg/dL      eGFR Non African Amer 114 mL/min/1.73      BUN/Creatinine Ratio 29.9     Anion Gap 9.0 mmol/L     Narrative:      GFR Normal >60  Chronic  Kidney Disease <60  Kidney Failure <15      BNP [[id number]]  (Normal) Collected: 12/07/20 0553    Specimen: Blood Updated: 12/07/20 0630     proBNP 1,299.0 pg/mL     Narrative:      Among patients with dyspnea, NT-proBNP is highly sensitive for the detection of acute congestive heart failure. In addition NT-proBNP of <300 pg/ml effectively rules out acute congestive heart failure with 99% negative predictive value.    Results may be falsely decreased if patient taking Biotin.      Protein Electrophoresis, Total [[id number]] Collected: 12/07/20 0553    Specimen: Blood Updated: 12/07/20 0601    POC Glucose Once [[id number]]  (Abnormal) Collected: 12/07/20 0003    Specimen: Blood Updated: 12/07/20 0004     Glucose 214 mg/dL      Comment: Serial Number: [id number]:  [id number]       POC Glucose Once [[id number]]  (Abnormal) Collected: 12/06/20 1740    Specimen: Blood Updated: 12/06/20 1740     Glucose 166 mg/dL      Comment: Serial Number: [id number]:  [id number]           No results found for: HGBA1C            Lab Results   Component Value Date    LIPASE 8 (L) 10/26/2020     No results found for: CHOL, CHLPL, TRIG, HDL, LDL, LDLDIRECT    Lab Results   Lab Value Date/Time    INTRAOP  10/27/2020 1203     FNA of subcarinal lymph node, immediate evaluation    Pass 1: Positive for malignant cells.    FNA of left hilar lymph node, immediate evaluation    Pass 1: Mostly blood, no malignancy.  Pass 2: Mostly blood, no malignancy.  Pass 3: Mostly blood, no malignancy.  Pass 4: Mostly blood, no malignancy.    Initial cytology interpretation performed by Maurilio Watson MD.        FINALDX  11/25/2020 1146     Leukopenia with left shift and relative eosinophilia  Thrombocytopenia  No blasts identified      FINALDX  10/27/2020 1203     Specimens #1 and #3 (Lymph node, subcarinal, endobronchial fine needle aspiration):    Positive for metastatic poorly differentiated adenocarcinoma, see comment    Specimens #2 and #4 (Lymph  node, left hilar, endobronchial fine needle aspiration, smears and cell block preparation):    Rare atypical cells, see comment    JPR/sms           COMDX  10/27/2020 1203     Specimens #1 and #3: Immunohistochemistry was performed utilizing appropriate controls and the tumor is positive for CK7 and TTF-1 and is negative for napsin A, p63, and CK5/6. 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 Many of the tumor cells display significant pleomorphism with bizarre appearing nuclei and mitotic figures. This raises the possibility of a sarcomatoid component, however, that would have to be evaluated on the resection specimen.     Specimens #2 and #4: One of the smear passes displays scattered atypical cells in a background of lymphoid tissue. Immunohistochemistry was performed utilizing appropriate controls on the cell block for cytokeratin AE1/3. The cytokeratin AE1/3 stain is negative which excludes the presence of atypical cells in the cell block. The significance of the atypical cells on the smears is unknown, however, malignancy cannot be entirely excluded. Further clinical workup should be considered.     JPR/sms          Microbiology Results (last 10 days)     ** No results found for the last 240 hours. **          ECG/EMG Results (most recent)     Procedure Component Value Units Date/Time    ECG 12 Lead [[id number]] Collected: 11/25/20 1614     Updated: 11/29/20 1102     QT Interval 355 ms     Narrative:      HEART RATE= 116  bpm  RR Interval= 516  ms  ME Interval=   ms  P Horizontal Axis=   deg  P Front Axis=   deg  QRSD Interval= 108  ms  QT Interval= 355  ms  QRS Axis= 33  deg  T Wave Axis= 33  deg  - ABNORMAL ECG -  Atrial fibrillation  Low voltage, extremity leads  When compared with ECG of 11-Nov-2020 10:10:57,  NO SIGNIFICANT CHANGE FROM PREVIOUS ECG  Electronically Signed By: Alex Mtz) 29-Nov-2020 10:51:45  Date and Time of Study: 2020-11-25 16:14:21                Results for orders placed during the hospital encounter of 10/26/20   Adult Transthoracic Echo Complete W/ Cont if Necessary Per Protocol    Narrative · The left atrial cavity is severely dilated.  · Left ventricular diastolic function is consistent with (grade II w/high   LAP) pseudonormalization.  · Left ventricular wall thickness is consistent with concentric   hypertrophy.  · Estimated left ventricular EF was in agreement with the calculated left   ventricular EF. Left ventricular ejection fraction appears to be 61 - 65%.   Left ventricular systolic function is normal.  · There is moderate calcification of the aortic valve mainly affecting the   non-coronary cusp(s).  · There is mild, bileaflet mitral valve thickening present.  · Mild to moderate mitral valve regurgitation is present with a   centrally-directed jet noted.  · Mild tricuspid valve regurgitation is present.  · Estimated right ventricular systolic pressure from tricuspid   regurgitation is mildly elevated (35-45 mmHg).     Moderate MR  Severe left atrial enlargement  Grade 2 diastolic dysfunction  Preserved LV systolic function EF 60 to 65%  Normal RV size and function  Normal IVC  Mild pulmonary hypertension by TR gradient  If clinically indicated would recommend transesophageal echo for   evaluation of mitral regurgitation with severe left atrial enlargement,   spectral Doppler and color Doppler indicate not more than moderate but   suboptimal acoustic windows, possibly underestimated, recommend ESE if   clinically indicated and were suspicious of severe MR         Ct Chest With Contrast    Result Date: 12/6/2020   1.  There is almost complete right lower lung consolidation.  This could represent a postobstructive pneumonitis and/or atelectasis.  There is opacification of the right lower lobe bronchus. 2.  There is moderate infiltrate in the posterior segment right upper lung and mild to moderate right middle lobe atelectasis.  Again, this may  represent a postobstructive pneumonitis and atelectasis. 3.  There is very marked mediastinal and right hilar adenopathy.  This adenopathy narrows the right upper and middle lobe bronchi causing postobstructive pneumonitis and atelectasis. 4.  There is a small right upper lung nodule too small to characterize, but more likely a granuloma. 5.  Mild to moderate left lower lung infiltrate and consolidation may represent pneumonia.   Thank you for the referral of this patient. This exam was interpreted by an American Board of Rad[employer] certified radiologist.  Electronically signed by:  Emery Monte M.D.  12/5/2020 11:26 PM   Addendum is to compare to prior PET/CT from 11/05/2020.  Supraclavicular lymphadenopathy appears increased with an index left supraclavicular lymph node on image 18 measuring 2.4 x 1.9 cm, previously 1.5 x 1.0 cm.  Mediastinal lymphadenopathy overall appears slightly increased, with an index subcarinal lymph node on image 57 measuring 4.6 x 3.4 cm, previously 4.1 x 3.1 cm.  Right hilar lymphadenopathy appears increased with an index right hilar lymph node on image 57 measuring 3.1 x 2.7 cm, previously 2.6 x 2.4 cm.  A previously described right hilar mass extending into the right lower lobe is difficult to characterize given the adjacent pleural effusion and consolidation.  Overall, there appears to be disease progression compared to prior PET/CT from 11/05/2020.  Electronically Signed By-Maurilio Dos Santos MD On:12/6/2020 12:32 PM This report was finalized on [id number] by  Maurilio Dos Santos MD.    Xr Chest 1 View    Result Date: 12/2/2020   1. Bilateral mixed interstitial/airspace disease which is worsened from the previous study. This can be seen with multifocal pneumonia or pulmonary edema. 2. The patient either has a small to moderate subpulmonic pleural effusion or elevation of the right hemidiaphragm. This is unchanged from the previous exam. There is also unchanged dense consolidation in  the right lower chest. 3. Thickening of the right paratracheal stripe consistent with known adenopathy. 4. Fullness in the right hilum consistent with a known lung cancer.  Electronically Signed By-Antonio Graham MD On:12/2/2020 10:37 AM This report was finalized on [id number] by  Antonio Graham MD.    Us Thoracentesis    Result Date: 12/7/2020  IMPRESSION : Ultrasound-guided large volume diagnostic and therapeutic right thoracentesis procedure. 1.1 L of fluid obtained.  Electronically Signed By-Carlos Flores MD On:12/7/2020 4:47 PM This report was finalized on [id number] by  Carlos Flores MD.          Xrays, labs reviewed personally by physician.    Medication Review:   I have reviewed the patient's current medication list      Scheduled Meds  budesonide-formoterol, 2 puff, Inhalation, BID - RT  cyanocobalamin, 1,000 mcg, Intramuscular, Q28 Days  dilTIAZem, 30 mg, Oral, Q6H  folic acid, 1 mg, Oral, Daily  furosemide, 40 mg, Intravenous, Q12H  ipratropium-albuterol, 3 mL, Nebulization, 4x Daily - RT  metoclopramide, 10 mg, Oral, TID AC  metoprolol tartrate, 50 mg, Oral, Q12H  mirtazapine, 15 mg, Oral, Nightly  nicotine, 1 patch, Transdermal, Daily  nystatin, 5 mL, Swish & Swallow, 4x Daily  pantoprazole, 40 mg, Intravenous, Q AM  piperacillin-tazobactam, 3.375 g, Intravenous, Q8H  sodium chloride, 10 mL, Intravenous, Q12H  sucralfate, 1 g, Oral, 4x Daily AC & at Bedtime  traZODone, 50 mg, Oral, Nightly        Meds Infusions  dilTIAZem, 5-15 mg/hr, Last Rate: 5 mg/hr (11/27/20 1244)  hold, 1 each  sodium chloride, 30 mL/hr, Last Rate: 30 mL/hr (11/28/20 2152)        Meds PRN  •  acetaminophen **OR** acetaminophen  •  acetaminophen  •  benzonatate  •  bisacodyl  •  flumazenil  •  hold  •  HYDROmorphone  •  HYDROmorphone  •  ipratropium-albuterol  •  labetalol  •  magnesium hydroxide  •  magnesium sulfate **OR** magnesium sulfate **OR** magnesium sulfate  •  melatonin  •  naloxone  •  nitroglycerin  •  ondansetron  **OR** ondansetron  •  ondansetron  •  potassium & sodium phosphates **OR** potassium & sodium phosphates  •  potassium chloride **OR** potassium chloride **OR** potassium chloride  •  promethazine  •  sodium chloride  •  sodium chloride  •  sodium chloride        Assessment/Plan   Assessment/Plan     Active Hospital Problems    Diagnosis  POA   • **Feeding difficulties [R63.3]  Unknown   • Moderate malnutrition (CMS/HCC) [E44.0]  Yes   • Thrombocytopenia (CMS/HCC) [D69.6]  Yes   • Chronic obstructive pulmonary disease (CMS/HCC) [J44.9]  Unknown      Resolved Hospital Problems   No resolved problems to display.       MEDICAL DECISION MAKING COMPLEXITY BY PROBLEM:     #Generalized weakness  -likely multifactorial given cancer diagnosis and chemotherapy as well as tachyarrhythmia  -PT/OT  -Fall risk  -Monitor for sources of infection  -Telemetry  -COVID-19 negative  -Heart rate control  -Nutrition consult for tube feeding  -Patient received a PEG on 11/28/2020  -Patient will definitely need inpatient rehab.  - Patient has refused hospice, is to go home with Caregivers tomorrow  -Patient requires frequent position changes due to A. Fib and weakness  -pillows and wedges have been ruled out in standard bed  -head and foot of bed elevated to 30 degrees  -frequent position changes not feasible in standard bed    #Atrial fibrillation  -with RVR.  Patient with established history denies chest pain.  Patient able to be weaned off overnight 10/26/2020 but went back into RVR this 10/27/2020, slowly improving  -Telemetry  -Increase metoprolol  -Monitor electrolytes  -Off Cardizem drip on oral Cardizem  -Echo as of 10/26/2020 showing dilated atrial cavity with grade 2 diastolic dysfunction    #Chronic respiratory failure-patient on 3 L baseline  #Right Pleural Effusion  -Chest x-ray appearing show signs of volume overload  -Ordering CT further evaluate underlying infection versus inflammation versus edema  -Off diuretics        -The patient's oxygenation is not proving.  The patient is up 3 kg and has a negative pro calcitonin.  The patient likely has congestive failure which has not been addressed.  We will start the patient on Lasix 40 mg IV twice daily and follow his weight, renal function and electrolytes.  Would hold off on antibiotics for now as his chest x-ray is more consistent with pulmonary edema.       -Repeat chest x-ray in a.m. and reevaluate  -Given the patient's overall situation with his lung cancer diagnosis,, chronic respiratory failure and very severe weakness this is likely to continue to deteriorate.  -Pleural effusion resolved post thoracentesis    #Postobstructive pneumonia    - possible postobstructive pneumonia on CT and CXR    - However patient is afebrile with stable BP    -Will withhold abx at this time    #Thrombocytopenia-patient with no signs of overt bleeding though did report small amounts of blood per nasal passages, slowly improving.  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  -Patient has been transfused platelets in the past.  The patient now has a platelet count of 66,000    #Leukopenia-likely due to underlying chemotherapy  -Neupogen per hematology  -Neutropenic precaution  -Monitor for signs of occult infection  -Daily CBC  -current white count is 16.2 s/p neupogen    #Lung cancer-patient with recent diagnosis under treatment with oncology  -Pain control  -Antiemetics  -Daily CBC  -Oncology consult  -Consider ENT referral for the patient's loss of speech       -1 way to evaluate this would be to do a bedside laryngoscopy and see if the vocal cords  approximate and are still moving appropriately.  -The patient's family is really struggling with how aggressive to be.  Apparently he just recently moved here to be near to his daughter.  They actually just closed on a house about a month ago which the patient's never even set foot in.  The patient's wife is also having some issues for which the daughter is attempting to find care for her mother.  -radiation oncology consulted, to consider palliative radiation as outpatient  - CT shows progression of lung cancer, consider changing to immunotherapy per rad onc    #Diastolic cardiomyopathy  -grade 2 noted on echo with various different valvular diseases  -Monitor volume status     #COPD-no signs of acute exacerbation currently  -Bronchodilators  -Mucolytic's  -Wean O2 as tolerated     #Tobacco abuse-cessation advised    Aphasia  -This may be multifactorial.  It is suspicious for vocal cord paralysis secondary to the patient's underlying cancer.  Also could be related to esophageal disease.  Will defer to oncology the timing of getting ENT to look at the patient's vocal cords for additional information  -Continue his nutrition through his PEG at this time.  -May be slightly improved today.      VTE Prophylaxis -   Mechanical Order History:      Ordered        11/25/20 1608  Place Sequential Compression Device  Once         11/25/20 1608  Maintain Sequential Compression Device  Continuous                 Pharmalogical Order History:     None        Code Status -   Code Status and Medical Interventions:   Ordered at: 11/25/20 1608     Code Status:    CPR     Medical Interventions (Level of Support Prior to Arrest):    Full     This patient has been examined wearing appropriate Personal Protective Equipment and discussed with hospital infection control department. 12/07/20    Discharge Planning  Home with caregivers tomorrow  Electronically signed by Justino Youssef DO, 12/07/20, 17:00 EST.  [facility]  Team

## 2020-12-07 NOTE — PLAN OF CARE
Goal Outcome Evaluation:  Plan of Care Reviewed With: patient  Progress: no change  Outcome Summary: Pt not available for PT. Checked on pt twice. First time was incontinent and getting cleaned up by nsg. Second time pt/family meeting w/ pulmonologist. Possible bronch tomorrow. RN notes that she will get pt up to chair after MD is finished w/ pt.  PT did not enter room.

## 2020-12-07 NOTE — CONSULTS
"Radiation Oncology Consult Note    Name: Shay Hernandez  YOB: 1940  MRN #: [id number]  Date of Service: 12/7/2020  Referring Provider: Ambar Hassan APRN  [address] [place] [address]  Primary Care Provider: Ambar Hassan APRN      DIAGNOSIS: Locally advanced lung cancer  T4N3M0 vs T4N3M1    REASON FOR CONSULTATION/CHIEF COMPLAINT:  \"SOB\"  I was asked to see the patient at the request of the referring provider noted below for advice and recommendations regarding this diagnosis and the role of radiation therapy.                              REQUESTING PHYSICIAN:  Ambar Hassan Aprn  [address] [place] [address]    RECORDS OBTAINED:  Records of the patients history including those obtained from the referring provider were reviewed and summarized in detail.    HISTORY OF PRESENT ILLNESS:  Shay Hernandez is a 80 y.o. male known to me for recent outpatient consult.  He has atleast stage IIIC lung cancer with potential drop lung metastatic lesion on the right vs. Stage IV as recent EGD done showed ulcerated GEJ suggesting potentially metastaticinvolvement by the lung cancer, biopsy was held due to thrombocytopenia.  He has been admitted for rapidly progressive weakness as well as thrombocytopenia and has slowly improved.  He is denying any hematochezia, melena or significant bruising.  He has been on Cardizem for as an inpatient for Afib with RVR.    1st cycle of Cis/Alimta was on 11/17/2020.  He has tolerated poorly and consideration is given to hospice vs. Immunotherapy.  I am able to talk through his goals of care with him, his wife and daughter today.    CT chest w/contrast shows increasing ellie burden, by my read very marked mediastinal and right hilar adenopthy with SVC concerns and narrowing/collapse of the airways.  I discussed palliative radiation therapy before immunotherapy vs. Hospice with them in great detail.        The following " portions of the patient's history were reviewed and updated as appropriate: allergies, current medications, past family history, past medical history, past social history, past surgical history and problem list. Reviewed with the patient and remain unchanged.    PAST MEDICAL HISTORY:  he  has a past medical history of Adenocarcinoma, lung, unspecified laterality (CMS/HCC) (10/27/2020), Atrial fibrillation (CMS/[facility]), and COPD (chronic obstructive pulmonary disease) (CMS/[facility]).  MEDICATIONS:   Current Facility-Administered Medications:   •  acetaminophen (TYLENOL) tablet 1,000 mg, 1,000 mg, Oral, Once PRN **OR** acetaminophen (TYLENOL) suppository 650 mg, 650 mg, Rectal, Once PRN, Ric Mcnamara MD  •  acetaminophen (TYLENOL) tablet 650 mg, 650 mg, Oral, Q4H PRN, Lizeth Nance MD  •  benzonatate (TESSALON) capsule 200 mg, 200 mg, Oral, TID PRN, Elder Tineo MD  •  bisacodyl (DULCOLAX) EC tablet 5 mg, 5 mg, Oral, Daily PRN, Lizeth Nance MD  •  budesonide-formoterol (SYMBICORT) 160-4.5 MCG/ACT inhaler 2 puff, 2 puff, Inhalation, BID - RT, Lizeth Nance MD, 2 puff at 12/07/20 0755  •  cyanocobalamin injection 1,000 mcg, 1,000 mcg, Intramuscular, Q28 Days, Carol Fragoso APRN, 1,000 mcg at 12/06/20 1724  •  dilTIAZem (CARDIZEM) 100 mg in 100 mL NS infusion (ADV), 5-15 mg/hr, Intravenous, Titrated, Lizeth Nance MD, Last Rate: 5 mL/hr at 11/27/20 1244, 5 mg/hr at 11/27/20 1244  •  dilTIAZem (CARDIZEM) tablet 30 mg, 30 mg, Oral, Q6H, Lizeth Nance MD, 30 mg at 12/07/20 0521  •  flumazenil (ROMAZICON) injection 0.1 mg, 0.1 mg, Intravenous, PRN, Ric Mcnamara MD  •  folic acid (FOLVITE) tablet 1 mg, 1 mg, Oral, Daily, Lizeth Nance MD, 1 mg at 12/05/20 0957  •  furosemide (LASIX) injection 40 mg, 40 mg, Intravenous, Q12H, Farrah Grimm MD, 40 mg at 12/07/20 0522  •  HYDROmorphone (DILAUDID) injection 0.2 mg, 0.2 mg, Intravenous, Q15 Min PRN, Farrah Grimm MD  •   HYDROmorphone (DILAUDID) injection 0.25 mg, 0.25 mg, Intravenous, Q15 Min PRN, Farrah Grimm MD, 0.25 mg at 11/30/20 1152  •  ipratropium-albuterol (DUO-NEB) nebulizer solution 3 mL, 3 mL, Nebulization, 4x Daily - RT, Lizeth Nance MD, 3 mL at 12/07/20 0754  •  ipratropium-albuterol (DUO-NEB) nebulizer solution 3 mL, 3 mL, Nebulization, Once PRN, Ric Mcnamara MD  •  labetalol (NORMODYNE,TRANDATE) injection 5 mg, 5 mg, Intravenous, Q5 Min PRN, Ric Mcnamara MD  •  magnesium hydroxide (MILK OF MAGNESIA) suspension 2400 mg/10mL 10 mL, 10 mL, Oral, Daily PRN, Lizeth Nance MD  •  Magnesium Sulfate 2 gram Bolus, followed by 8 gram infusion (total Mg dose 10 grams)- Mg less than or equal to 1mg/dL, 2 g, Intravenous, PRN **OR** Magnesium Sulfate 2 gram / 50mL Infusion (GIVE X 3 BAGS TO EQUAL 6GM TOTAL DOSE) - Mg 1.1 - 1.5 mg/dl, 2 g, Intravenous, PRN, Last Rate: 25 mL/hr at 11/28/20 0230, 2 g at 11/28/20 0230 **OR** Magnesium Sulfate 4 gram infusion- Mg 1.6-1.9 mg/dL, 4 g, Intravenous, PRN, Lizeth Nance MD, Last Rate: 25 mL/hr at 12/05/20 1000, 4 g at 12/05/20 1000  •  melatonin tablet 5 mg, 5 mg, Oral, Nightly PRN, Lizeth Nance MD  •  metoclopramide (REGLAN) tablet 10 mg, 10 mg, Oral, TID AC, Lizeth Nance MD, 10 mg at 12/06/20 1139  •  metoprolol tartrate (LOPRESSOR) tablet 50 mg, 50 mg, Oral, Q12H, Lizeth Nance MD, 50 mg at 12/06/20 2129  •  mirtazapine (REMERON) tablet 15 mg, 15 mg, Oral, Nightly, Lizeth Nance MD, 15 mg at 12/06/20 2129  •  naloxone (NARCAN) injection 0.4 mg, 0.4 mg, Intravenous, PRN, Ric Mcnamara MD  •  nicotine (NICODERM CQ) 7 MG/24HR patch 1 patch, 1 patch, Transdermal, Daily, Lizeth Nance MD, 1 patch at 12/03/20 1112  •  nitroglycerin (NITROSTAT) SL tablet 0.4 mg, 0.4 mg, Sublingual, Q5 Min PRN, Lizeth Nance MD  •  nystatin (MYCOSTATIN) 864722 UNIT/ML suspension 500,000 Units, 5 mL, Swish & Swallow, 4x Daily, Lizeth Nance MD,  500,000 Units at 12/06/20 2129  •  ondansetron (ZOFRAN) tablet 4 mg, 4 mg, Oral, Q6H PRN **OR** ondansetron (ZOFRAN) injection 4 mg, 4 mg, Intravenous, Q6H PRN, Lizeth Nance MD  •  ondansetron (ZOFRAN) injection 4 mg, 4 mg, Intravenous, Once PRN, Ric Mcnamara MD  •  pantoprazole (PROTONIX) injection 40 mg, 40 mg, Intravenous, Q AM, Lizeth Nance MD, 40 mg at 12/07/20 0536  •  piperacillin-tazobactam (ZOSYN) IVPB 3.375 g in 100 mL NS (CD), 3.375 g, Intravenous, Q8H, Draw, MD Vivian, 3.375 g at 12/07/20 0342  •  potassium & sodium phosphates (PHOS-NAK) 280-160-250 MG packet - for Phosphorus less than 1.25 mg/dL, 2 packet, Oral, Q6H PRN, 2 packet at 11/30/20 0122 **OR** potassium & sodium phosphates (PHOS-NAK) 280-160-250 MG packet - for Phosphorus 1.25 - 2.5 mg/dL, 2 packet, Oral, Q6H PRN, Lizeth Nance MD, 2 packet at 12/04/20 1755  •  potassium chloride (K-DUR,KLOR-CON) CR tablet 40 mEq, 40 mEq, Oral, PRN, 40 mEq at 11/26/20 1807 **OR** potassium chloride (KLOR-CON) packet 40 mEq, 40 mEq, Oral, PRN, 40 mEq at 12/03/20 1759 **OR** potassium chloride 10 mEq in 100 mL IVPB, 10 mEq, Intravenous, Q1H PRN, Lizeth Nance MD, Last Rate: 100 mL/hr at 11/27/20 1024, 10 mEq at 11/27/20 1024  •  promethazine (PHENERGAN) tablet 25 mg, 25 mg, Oral, Once PRN, Ric Mcnamara MD  •  sodium chloride 0.9 % flush 10 mL, 10 mL, Intravenous, Q12H, Lizeth Nance MD, 10 mL at 12/06/20 2128  •  sodium chloride 0.9 % flush 10 mL, 10 mL, Intravenous, PRN, Lizeth Nance MD  •  sodium chloride 0.9 % infusion, 30 mL/hr, Intravenous, Continuous PRN, Lizeth Nance MD, Last Rate: 30 mL/hr at 11/28/20 2152, 30 mL/hr at 11/28/20 2152  •  sodium chloride nasal spray 2 spray, 2 spray, Each Nare, PRN, Lizeth Nance MD  •  sucralfate (CARAFATE) tablet 1 g, 1 g, Oral, 4x Daily AC & at Bedtime, Lizeth Nance MD, 1 g at 12/06/20 2128  •  traZODone (DESYREL) tablet 50 mg, 50 mg, Oral, Nightly, Ziggy Daley,  MD, 50 mg at 12/06/20 2128  ALLERGIES: No Known Allergies  PAST SURGICAL HISTORY: he has a past surgical history that includes Appendectomy; Bronchoscopy (N/A, 10/27/2020); Venous Access Device (Port) (N/A, 11/16/2020); and Esophagogastroduodenoscopy w/ PEG (N/A, 11/28/2020).  PREVIOUS RADIOTHERAPY OR CHEMOTHERAPY: Prior XRT in [place]; I have reviewed those skin cancer treatment courses; he has had recent chemo as noted above.  FAMILY HISTORY: his family history includes Heart attack in his mother; Lung disease in an other family member.  SOCIAL HISTORY: he  reports that he quit smoking about 5 weeks ago. His smoking use included cigarettes. He has a 120.00 pack-year smoking history. He has never used smokeless tobacco. He reports previous alcohol use of about 4.0 standard drinks of alcohol per week. He reports previous drug use.  PAIN AND PAIN MANAGEMENT: Denies pain.  Vitals:    12/07/20 0009 12/07/20 0754 12/07/20 0755 12/07/20 0801   BP: 98/58   111/55   BP Location:       Patient Position:       Pulse: 117 104 108 104   Resp:  19 19 20   Temp:       TempSrc:       SpO2: 96% 99% 99% 100%   Weight:       Height:         NUTRITIONAL STATUS no issues  KPS: 60            Review of Systems:   Review of Systems       General: No fevers, chills, weight change, or drenching night sweats. Skin: No rashes or jaundice.  HEENT: No change in vision or hearing, no headaches.  Neck: No dysphagia or masses.  Heme/Lymph: as noted above Respiratory System: as noted above  Cardiovascular: No chest pain, palpitations, or dyspnea on exertion.  - Pacemaker. GI: No nausea, vomiting, diarrhea, melena, or hematochezia.  : No dysuria or hematuria.  Endocrine: No heat or cold intolerance. Musculoskeletal: No myalgias or arthralgias.  Neuro: No weakness, numbness, syncope, or seizures. Psych: No mood changes or depression. Ext: Denies swelling.        Objective     Vitals:  Vitals:    12/07/20 0009 12/07/20 0754 12/07/20 0755 12/07/20  0801   BP: 98/58   111/55   BP Location:       Patient Position:       Pulse: 117 104 108 104   Resp:  19 19 20   Temp:       TempSrc:       SpO2: 96% 99% 99% 100%   Weight:       Height:             PHYSICAL EXAM:  GENERAL: in no apparent distress, sitting comfortably in room.    HEENT: normocephalic, atraumatic. Pupils are equal, round, reactive to light. Sclera anicteric. Conjunctiva not injected. Oropharynx without erythema, ulcerations or thrush.   NECK: Supple with no masses.  LYMPHATIC: no cervical, supraclavicular or axillary adenopathy appreciated bilaterally.   CARDIOVASCULAR: tachy, irregular   CHEST: clear to auscultation on L, R fluid, comfortable work of breathing normal.  ABDOMEN: bowel sounds present. Abdomen is soft, nontender, nondistended.   MUSCULOSKELETAL: no tenderness to palpation along the spine or scapulae. Normal range of motion.  EXTREMITIES: no clubbing, cyanosis, edema.  SKIN: no erythema, rashes, ulcerations noted.   NEUROLOGIC: cranial nerves II-XII grossly intact bilaterally. No focal neurologic deficits.  PSYCHIATRIC:  alert, aware, and appropriate.      PERTINENT IMAGING/PATHOLOGY/LABS (Medical Decision Making):     COORDINATION OF CARE: A copy of this note is sent to the referring provider.    PATHOLOGY (Reviewed):     IMAGING (Reviewed):  11/26/2020 CT chest without -  1.Compared to previous examinations the paratracheal and mediastinal lymphadenopathy is larger. 2.There is a moderate size right pleural effusion and small left pleural effusion which are increased in size from previous study. 3.The right hilar mass is not definite change in size. 4.There are some new nodules in the right lower lobe. 5.There is increased opacity in the right lower lobe. 6.Cardiomegaly and severe atherosclerotic disease and coronary artery disease. 7.No change in a low-density lesion at the right hepatic dome. 8.Diffuse stomach wall thickening similar previous exam  12/5/2020 Ct Chest With Contrast -  1.  There is almost complete right lower lung consolidation.  This could represent a postobstructive pneumonitis and/or atelectasis.  There is opacification of the right lower lobe bronchus. 2.  There is moderate infiltrate in the posterior segment right upper lung and mild to moderate right middle lobe atelectasis.  Again, this may represent a postobstructive pneumonitis and atelectasis. 3.  There is very marked mediastinal and right hilar adenopathy.  This adenopathy narrows the right upper and middle lobe bronchi causing postobstructive pneumonitis and atelectasis. 4.  There is a small right upper lung nodule too small to characterize, but more likely a granuloma. 5.  Mild to moderate left lower lung infiltrate and consolidation may represent pneumonia    LABS (Reviewed):  Hematology WBC   Date Value Ref Range Status   12/07/2020 16.20 (H) 3.40 - 10.80 10*3/mm3 Final     RBC   Date Value Ref Range Status   12/07/2020 3.43 (L) 4.14 - 5.80 10*6/mm3 Final     Hemoglobin   Date Value Ref Range Status   12/07/2020 9.8 (L) 13.0 - 17.7 g/dL Final     Hematocrit   Date Value Ref Range Status   12/07/2020 29.8 (L) 37.5 - 51.0 % Final     Platelets   Date Value Ref Range Status   12/07/2020 66 (L) 140 - 450 10*3/mm3 Final      Chemistry   Lab Results   Component Value Date    GLUCOSE 202 (H) 12/07/2020    BUN 20 12/07/2020    CREATININE 0.67 (L) 12/07/2020    EGFRIFNONA 114 12/07/2020    BCR 29.9 (H) 12/07/2020    K 3.3 (L) 12/07/2020    CO2 31.0 (H) 12/07/2020    CALCIUM 7.7 (L) 12/07/2020    ALBUMIN 2.50 (L) 12/02/2020    AST 17 12/02/2020    ALT 19 12/02/2020       Assessment/Plan     ASSESSMENT AND PLAN:    Atleast Stage IIIC poorly differentiated adenocarcinoma of the lung--with suspected drop lung met and potential gastric met.  -Doing poorly of upfront chemotherapy.  -Considering switching to immunotherapy alone if able to tolerate. His ellie burden is very bulky in mediastinum and right hilum.  Would benefit from  palliative XRT to help with airway/breathing.  -Long discussion today.  -Complicated by thrombocytopenia, a-fib and COPD. Platelets are trending up.    Recommending CT simulation over the hospital radiology department; will likely give 20 Gy in 5 fractions for palliative benefit and if improvement they will consider hospice vs. Immunotherapy.              This assessment comes from my review of the imaging, pathology, physician notes and other pertinent information as mentioned.    DISPOSITION: CT sim.        TIME SPENT WITH PATIENT:   I spent greater than 55 minutes in face-to-face time with the patient and greater than 60% of those minutes of that time were spent in counseling and coordination of care, including review of imaging and pathology; indications, goals, logistics, alternatives and risks - both common and rare - for my recommendations as well as surveillance and potential outcomes.         CC: Ambar Hassan A* Ambar Hassan APRN John A Cox, MD  12/7/2020  8:44 AM EST

## 2020-12-07 NOTE — NURSING NOTE
Dressing of 2x2, betadine and tegraderm over site.  Pt in no distress, just tired of sitting up.  Pt full assist back supine position.

## 2020-12-07 NOTE — CONSULTS
Group: [facility]         CONSULT NOTE    Patient Identification:  Shay Hernandez  80 y.o.  male  1940  [id number]            Requesting physician: Attending physician    Reason for Consultation:  Lung cancer        History of Present Illness:  80 y.o. with history of atrial fibrillation and COPD with new diagnosis of lung cancer presenting as direct admission from oncologist office for progressive generalized weakness as well as thrombocytopenia.  Patient had initiated chemotherapy and reported progressive weakness as well as loss of appetite over the last few weeks.  Patient had been getting more thrombocytopenic with levels in the 30s the day prior to admission down to 13 on day of admission.  Patient denied hematuria, hematochezia, melena or significant bruising though did report a small amount of blood when he blew his nose in the morning.  Currently patient reports he feels weak though otherwise asymptomatic.  No shortness of breath or chest pain, no nausea or vomiting.  Anorexia noted.     Patient found to be in atrial fibrillation with RVR once reaching the floor.  Stat EKG ordered and ordering Cardizem.  Patient received unit of platelets in ambulatory care center prior to coming up to the floor.  Patient scheduled to have feeding tube placement with GI in the next few days.       Assessment:  Progressive dyspnea with hypoxic respiratory insufficiency   right lower lobe pneumonia   COPD   moderate size right pleural effusion   lung cancer undergoing chemotherapy   leukopenia and thrombocytopenia   AFib    Recommendations:     antibiotics with start Zosyn   steroids IV Solu-Medrol   bronchodilators     patient have thrombocytopenia will discuss with Hematology the facility of doing bronchoscopy or thoracentesis   will try antibiotics and steroids and avoid procedures if possible      Review of Sytems:  Review of Systems   Respiratory: Positive for cough and shortness of breath.     Cardiovascular: Positive for leg swelling.       Past Medical History:  Past Medical History:   Diagnosis Date   • Adenocarcinoma, lung, unspecified laterality (CMS/[facility]) 10/27/2020   • Atrial fibrillation (CMS/[facility])    • COPD (chronic obstructive pulmonary disease) (CMS/[facility])        Past Surgical History:  Past Surgical History:   Procedure Laterality Date   • APPENDECTOMY     • BRONCHOSCOPY N/A 10/27/2020    Procedure: BRONCHOSCOPY WITH BRONCHOALVEOLAR LAVAGE AND ENDOBRONCHIAL ULTRASOUND WITH FINE NEEDLE ASPIRATION X2 AREAS;  Surgeon: Johnny Waldrop MD;  Location: [facility] ENDOSCOPY;  Service: Pulmonary;  Laterality: N/A;  POST:    • ENDOSCOPY W/ PEG TUBE PLACEMENT N/A 11/28/2020    Procedure: ESOPHAGOGASTRODUODENOSCOPY WITH 20F PERCUTANEOUS ENDOSCOPIC GASTROSTOMY TUBE INSERTION WITH ANESTHESIA;  Surgeon: Lizeth Nance MD;  Location: [facility] ENDOSCOPY;  Service: Gastroenterology;  Laterality: N/A;  ulcerative esophagitis, hiatal hernia, PEG placement   • VENOUS ACCESS DEVICE (PORT) INSERTION N/A 11/16/2020    Procedure: INSERTION VENOUS ACCESS DEVICE, LEFT SUBCLAVIAN UNDER FLOUROSCOPIC GUIDANCE ;  Surgeon: Jan Richards MD;  Location: [facility] MAIN OR;  Service: Cardiothoracic;  Laterality: N/A;        Home Meds:  Medications Prior to Admission   Medication Sig Dispense Refill Last Dose   • dexamethasone (DECADRON) 4 MG tablet Take 4 mg by mouth 2 (Two) Times a Day With Meals. For 3 days after chemo   Patient Taking Differently at Unknown time   • albuterol sulfate  (90 Base) MCG/ACT inhaler Inhale 2 puffs Every 4 (Four) Hours As Needed for Wheezing. 8 g 0    • aspirin (aspirin) 81 MG EC tablet Take 81 mg by mouth Daily. Dr. Richards told pt to keep taking until dos      • budesonide-formoterol (SYMBICORT) 160-4.5 MCG/ACT inhaler Inhale 2 puffs 2 (Two) Times a Day. 1 inhaler 0    • Eliquis 5 MG tablet tablet TAKE 1 TABLET BY MOUTH EVERY 12 (TWELVE) HOURS FOR 30 DAYS. 60 tablet 5    • famotidine  (Pepcid) 40 MG tablet Take 1 tablet by mouth At Night As Needed for Heartburn. 30 tablet 1    • folic acid (FOLVITE) 1 MG tablet Take 1 tablet by mouth Daily. Start 7 days prior to chemotherapy until at least 3 weeks after all chemotherapy. 30 tablet 5    • ipratropium-albuterol (DUO-NEB) 0.5-2.5 mg/3 ml nebulizer Take 3 mL by nebulization 4 (Four) Times a Day. (Patient taking differently: Take 3 mL by nebulization 4 (Four) Times a Day. No taking yet) 120 mL 5    • lidocaine-prilocaine (EMLA) 2.5-2.5 % cream Apply  topically to the appropriate area as directed As Needed for Mild Pain  (45min prior to needle insertion). 30 g 5    • metoprolol tartrate (LOPRESSOR) 25 MG tablet Take 1 tablet by mouth Every 12 (Twelve) Hours for 30 days. 60 tablet 0    • mirtazapine (REMERON) 15 MG tablet Take 1 tablet by mouth Every Night. To increase appetite 30 tablet 5    • Multiple Vitamins-Minerals (HEALTHY EYES PO) Take 1 tablet by mouth Daily.      • nicotine (NICODERM CQ) 7 MG/24HR patch Place 1 patch on the skin as directed by provider Daily. 1 patch daily X 3 weeks 42 patch 0    • ondansetron (ZOFRAN) 8 MG tablet Take 1 tablet by mouth 3 (Three) Times a Day As Needed for Nausea or Vomiting. 30 tablet 5    • pantoprazole (PROTONIX) 40 MG EC tablet Take 1 tablet by mouth Daily. 14 tablet 0    • vitamin C (ASCORBIC ACID) 500 MG tablet Take 500 mg by mouth Daily.      • Vitamin D, Cholecalciferol, (CHOLECALCIFEROL) 10 MCG (400 UNIT) tablet Take 400 Units by mouth Daily.      • vitamin E 100 UNIT capsule Take 100 Units by mouth Daily.          Allergies:  No Known Allergies    Social History:   Social History     Socioeconomic History   • Marital status:      Spouse name: Not on file   • Number of children: Not on file   • Years of education: Not on file   • Highest education level: Not on file   Tobacco Use   • Smoking status: Former Smoker     Packs/day: 2.00     Years: 60.00     Pack years: 120.00     Types: Cigarettes  "    Quit date: 10/27/2020     Years since quittin.1   • Smokeless tobacco: Never Used   Substance and Sexual Activity   • Alcohol use: Not Currently     Alcohol/week: 4.0 standard drinks     Types: 4 Cans of beer per week   • Drug use: Not Currently   • Sexual activity: Defer   Social History Narrative    Lives with wife       Family History:  Family History   Problem Relation Age of Onset   • Heart attack Mother    • Lung disease Other         Cryptococcus       Physical Exam:  BP 98/58   Pulse 102   Temp 97.7 °F (36.5 °C) (Infrared)   Resp 20   Ht 188 cm (74\")   Wt 78 kg (171 lb 15.3 oz)   SpO2 100%   BMI 22.08 kg/m²  Body mass index is 22.08 kg/m². 100% 78 kg (171 lb 15.3 oz)  Physical Exam  Cardiovascular:      Heart sounds: Murmur present.   Pulmonary:      Breath sounds: Rhonchi and rales present.         LABS:  Lab Results   Component Value Date    CALCIUM 7.6 (L) 2020    PHOS 2.3 (L) 2020     Results from last 7 days   Lab Units 20  0502 20  0609 20  0403 20  0402 20  0527  20  0923   MAGNESIUM mg/dL 2.3 1.9  --  2.0 1.9  --   --    SODIUM mmol/L 131*  --  133*  --  130*  --  130*   POTASSIUM mmol/L 3.5  --  4.0  --  3.1*  --  3.6   CHLORIDE mmol/L 93*  --  96*  --  91*  --  94*   CO2 mmol/L 30.0*  --  29.0  --  32.0*  --  26.0   BUN mg/dL 21  --  15  --  14  --  13   CREATININE mg/dL 0.64*  --  0.59*  --  0.51*  --  0.46*   GLUCOSE mg/dL 244*  --  155*  --  158*  --  139*   CALCIUM mg/dL 7.6*  --  7.5*  --  7.9*  --  7.4*   WBC 10*3/mm3 14.00* 15.70* 14.70*  --  17.90*   < >  --    HEMOGLOBIN g/dL 9.8* 10.6* 10.6*  --  11.1*   < >  --    PLATELETS 10*3/mm3 33* 19* 11*  --  10*   < >  --    ALT (SGPT) U/L  --   --   --   --   --   --  19   AST (SGOT) U/L  --   --   --   --   --   --  17   PROBNP pg/mL 962.5 1,184.0  --  2,557.0* 5,178.0*  --   --    PROCALCITONIN ng/mL  --   --   --   --   --   --  0.23    < > = values in this interval not displayed. "     Lab Results   Component Value Date    TROPONINT <0.010 11/25/2020             Results from last 7 days   Lab Units 12/02/20  0923   PROCALCITONIN ng/mL 0.23     Results from last 7 days   Lab Units 12/02/20  1310   MODALITY  Cannula                 Lab Results   Component Value Date    TSH 1.460 11/25/2020     Estimated Creatinine Clearance: 82.6 mL/min (A) (by C-G formula based on SCr of 0.64 mg/dL (L)).         Imaging:  Imaging Results (Last 24 Hours)     Procedure Component Value Units Date/Time    CT Chest With Contrast [[id number]] Collected: 12/05/20 2320     Updated: 12/06/20 1234    Narrative:      EXAMINATION: CT SCAN OF THE CHEST WITH INTRAVENOUS CONTRAST     DATE OF EXAM: 12/6/2020 12:54 AM     HISTORY: Shortness of breath.  Recent lung cancer diagnosis     COMPARISON: Chest x-ray 12/2/2020.     TECHNIQUE: CT examination of the chest was performed following the  intravenous administration of 100 mL  Isovue-370. CT dose lowering  techniques were used, to include: automated exposure control, adjustment  for patient size, and/or use of iterative   reconstruction.     FINDINGS:     CHEST:     Lungs:  There is marked right lower lung consolidation.  There are  moderate right upper lung infiltrates.  There is mild left lower lung  infiltrate and consolidation.   There is a 5 mm right upper lung nodule.   There is underlying emphysema.     Pleura: There is a moderate to large right pleural effusion.      Mediastinum And Paulina: There is marked lymphadenopathy.  The largest  right paratracheal lymph node measures 3.4 x 3.2 cm in size.  There is a  subcarinal mass measuring roughly 4 cm in diameter.  There is a right  hilar mass measuring 4.8 x 3.2 cm.  There   are additional enlarged mediastinal lymph nodes..      Cardiovascular: Normal.      Chest Wall:  Normal.      Upper Abdomen: Normal.  Small liver cyst.     MUSCULOSKELETAL: Spondylosis of the thoracic spine.          Impression:         1.  There is  almost complete right lower lung consolidation.  This could  represent a postobstructive pneumonitis and/or atelectasis.  There is  opacification of the right lower lobe bronchus.  2.  There is moderate infiltrate in the posterior segment right upper  lung and mild to moderate right middle lobe atelectasis.  Again, this  may represent a postobstructive pneumonitis and atelectasis.  3.  There is very marked mediastinal and right hilar adenopathy.  This  adenopathy narrows the right upper and middle lobe bronchi causing  postobstructive pneumonitis and atelectasis.  4.  There is a small right upper lung nodule too small to characterize,  but more likely a granuloma.  5.  Mild to moderate left lower lung infiltrate and consolidation may  represent pneumonia.        Thank you for the referral of this patient. This exam was interpreted by  an American Board of Radiology certified radiologist.     Electronically signed by:  Emery Monte M.D.    12/5/2020 11:26 PM        Addendum is to compare to prior PET/CT from 11/05/2020.     Supraclavicular lymphadenopathy appears increased with an index left  supraclavicular lymph node on image 18 measuring 2.4 x 1.9 cm,  previously 1.5 x 1.0 cm.     Mediastinal lymphadenopathy overall appears slightly increased, with an  index subcarinal lymph node on image 57 measuring 4.6 x 3.4 cm,  previously 4.1 x 3.1 cm.     Right hilar lymphadenopathy appears increased with an index right hilar  lymph node on image 57 measuring 3.1 x 2.7 cm, previously 2.6 x 2.4 cm.     A previously described right hilar mass extending into the right lower  lobe is difficult to characterize given the adjacent pleural effusion  and consolidation.     Overall, there appears to be disease progression compared to prior  PET/CT from 11/05/2020.     Electronically Signed By-Maurilio Dos Santos MD On:12/6/2020 12:32 PM  This report was finalized on [id number] by  Maurilio Dos Santos MD.            Current Meds:    SCHEDULE  budesonide-formoterol, 2 puff, Inhalation, BID - RT  cyanocobalamin, 1,000 mcg, Intramuscular, Q28 Days  dilTIAZem, 30 mg, Oral, Q6H  folic acid, 1 mg, Oral, Daily  furosemide, 40 mg, Intravenous, Q12H  ipratropium-albuterol, 3 mL, Nebulization, 4x Daily - RT  metoclopramide, 10 mg, Oral, TID AC  metoprolol tartrate, 50 mg, Oral, Q12H  mirtazapine, 15 mg, Oral, Nightly  nicotine, 1 patch, Transdermal, Daily  nystatin, 5 mL, Swish & Swallow, 4x Daily  pantoprazole, 40 mg, Intravenous, Q AM  sodium chloride, 10 mL, Intravenous, Q12H  sucralfate, 1 g, Oral, 4x Daily AC & at Bedtime  traZODone, 50 mg, Oral, Nightly      Infusions  dilTIAZem, 5-15 mg/hr, Last Rate: 5 mg/hr (11/27/20 1244)  sodium chloride, 30 mL/hr, Last Rate: 30 mL/hr (11/28/20 4202)      PRNs  •  acetaminophen **OR** acetaminophen  •  acetaminophen  •  benzonatate  •  bisacodyl  •  flumazenil  •  HYDROmorphone  •  HYDROmorphone  •  ipratropium-albuterol  •  labetalol  •  magnesium hydroxide  •  magnesium sulfate **OR** magnesium sulfate **OR** magnesium sulfate  •  melatonin  •  naloxone  •  nitroglycerin  •  ondansetron **OR** ondansetron  •  ondansetron  •  potassium & sodium phosphates **OR** potassium & sodium phosphates  •  potassium chloride **OR** potassium chloride **OR** potassium chloride  •  promethazine  •  sodium chloride  •  sodium chloride  •  sodium chloride        Vivian Ferguson MD  12/6/2020  19:53 EST      Much of this encounter note is an electronic transcription/translation of spoken language to printed text using Dragon Software.

## 2020-12-07 NOTE — PROGRESS NOTES
Continued Stay Note  JONAS Lyons     Patient Name: Shay Hernandez  MRN: [id number]  Today's Date: 12/7/2020    Admit Date: 11/25/2020    Discharge Plan     Row Name 12/07/20 1319       Plan    Plan  DC Plan: Anticipate home with [facility] pending hospice evaluation/explanation of services.    Plan Comments  SW reached out to [facility] liaison (Sofya) & resent clinical information/access via Epic. Informed liaison that pt/family would like to discharge home with hospice. Explanation of services to take place & will f/u with liaison.       Phone communication only - no physical contact with patient or family.    NISHI Rodriguez    Phone: [phone]  Cell: [phone]  Fax: [phone]  [email or web address]

## 2020-12-07 NOTE — PROGRESS NOTES
"PULMONARY CRITICAL CARE Progress  NOTE      PATIENT IDENTIFICATION:  Name: Shay Hernandez  MRN: [id number]  :  1940     Age: 80 y.o.  Sex: male    DATE OF Note:  2020   Referring Physician: Justino Youssef DO                  Subjective:   Still weak cough and shortness of breath SOB no chest pain, no nausea or vomiting, no change in bowel habit, no dysuria,  no new  skin rash or itching.      Objective:  tMax 24 hrs: Temp (24hrs), Av.4 °F (36.3 °C), Min:97.3 °F (36.3 °C), Max:97.5 °F (36.4 °C)      Vitals Ranges:   Temp:  [97.3 °F (36.3 °C)-97.5 °F (36.4 °C)] 97.5 °F (36.4 °C)  Heart Rate:  [] 119  Resp:  [16-28] 16  BP: ()/(44-94) 118/44    Intake and Output Last 3 Shifts:   I/O last 3 completed shifts:  In: 2730 [P.O.:480; Other:227; NG/GT:]  Out:  [Urine:]    Exam:  /44   Pulse 119   Temp 97.5 °F (36.4 °C) (Infrared)   Resp 16   Ht 188 cm (74\")   Wt 78 kg (171 lb 15.3 oz)   SpO2 93% Comment: room air.  BMI 22.08 kg/m²     General Appearance:     HEENT:  Normocephalic, without obvious abnormality, Conjunctiva/corneas clear,.  Normal external ear canals, Nares normal, no drainage     Neck:  Supple, symmetrical, trachea midline. No JVD.  Lungs /Chest wall:   Bilateral basal rhonchi, respirations unlabored symmetrical wall movement.     Heart:  Regular rate and rhythm, systolic murmur PMI left sternal border  Abdomen: Soft, non-tender, no masses, no organomegaly.    Extremities: Trace edema no clubbing or Cyanosis        Medications:    Current Facility-Administered Medications:   •  acetaminophen (TYLENOL) tablet 1,000 mg, 1,000 mg, Oral, Once PRN **OR** acetaminophen (TYLENOL) suppository 650 mg, 650 mg, Rectal, Once PRN, Ric Mcnamara MD  •  acetaminophen (TYLENOL) tablet 650 mg, 650 mg, Oral, Q4H PRN, Lizeth Nance MD  •  benzonatate (TESSALON) capsule 200 mg, 200 mg, Oral, TID PRN, Elder Tineo MD  •  bisacodyl (DULCOLAX) EC " tablet 5 mg, 5 mg, Oral, Daily PRN, Lizeth Nance MD  •  budesonide-formoterol (SYMBICORT) 160-4.5 MCG/ACT inhaler 2 puff, 2 puff, Inhalation, BID - RT, Lizeth Nance MD, 2 puff at 12/07/20 0755  •  cyanocobalamin injection 1,000 mcg, 1,000 mcg, Intramuscular, Q28 Days, Carol Fragoso APRN, 1,000 mcg at 12/06/20 1724  •  dilTIAZem (CARDIZEM) 100 mg in 100 mL NS infusion (ADV), 5-15 mg/hr, Intravenous, Titrated, Lizeth Nance MD, Last Rate: 5 mL/hr at 11/27/20 1244, 5 mg/hr at 11/27/20 1244  •  dilTIAZem (CARDIZEM) tablet 30 mg, 30 mg, Oral, Q6H, Lizeth Nance MD, 30 mg at 12/07/20 1207  •  flumazenil (ROMAZICON) injection 0.1 mg, 0.1 mg, Intravenous, PRN, Ric Mcnamara MD  •  folic acid (FOLVITE) tablet 1 mg, 1 mg, Oral, Daily, Lizeth Nance MD, 1 mg at 12/07/20 0922  •  furosemide (LASIX) injection 40 mg, 40 mg, Intravenous, Q12H, Farrah Grimm MD, 40 mg at 12/07/20 0522  •  Hold medication, 1 each, Does not apply, Continuous PRN, Anup Daniels MD  •  HYDROmorphone (DILAUDID) injection 0.2 mg, 0.2 mg, Intravenous, Q15 Min PRN, Farrah Grimm MD  •  HYDROmorphone (DILAUDID) injection 0.25 mg, 0.25 mg, Intravenous, Q15 Min PRN, Farrah Grimm MD, 0.25 mg at 11/30/20 1152  •  ipratropium-albuterol (DUO-NEB) nebulizer solution 3 mL, 3 mL, Nebulization, 4x Daily - RT, Lizeth Nance MD, 3 mL at 12/07/20 1556  •  ipratropium-albuterol (DUO-NEB) nebulizer solution 3 mL, 3 mL, Nebulization, Once PRN, Ric Mcnamara MD  •  labetalol (NORMODYNE,TRANDATE) injection 5 mg, 5 mg, Intravenous, Q5 Min PRN, Ric Mcnamara MD  •  magnesium hydroxide (MILK OF MAGNESIA) suspension 2400 mg/10mL 10 mL, 10 mL, Oral, Daily PRN, Lizeth Nance MD  •  Magnesium Sulfate 2 gram Bolus, followed by 8 gram infusion (total Mg dose 10 grams)- Mg less than or equal to 1mg/dL, 2 g, Intravenous, PRN **OR** Magnesium Sulfate 2 gram / 50mL Infusion (GIVE X 3 BAGS TO EQUAL 6GM TOTAL DOSE) - Mg 1.1 -  1.5 mg/dl, 2 g, Intravenous, PRN, Last Rate: 25 mL/hr at 11/28/20 0230, 2 g at 11/28/20 0230 **OR** Magnesium Sulfate 4 gram infusion- Mg 1.6-1.9 mg/dL, 4 g, Intravenous, PRN, Lizeth Nance MD, Last Rate: 25 mL/hr at 12/05/20 1000, 4 g at 12/05/20 1000  •  melatonin tablet 5 mg, 5 mg, Oral, Nightly PRN, Lizeth Nance MD  •  metoclopramide (REGLAN) tablet 10 mg, 10 mg, Oral, TID AC, Lizeth Nance MD, 10 mg at 12/07/20 1207  •  metoprolol tartrate (LOPRESSOR) tablet 50 mg, 50 mg, Oral, Q12H, Lizeth Nance MD, 50 mg at 12/07/20 0922  •  mirtazapine (REMERON) tablet 15 mg, 15 mg, Oral, Nightly, Lizeth Nance MD, 15 mg at 12/06/20 2129  •  naloxone (NARCAN) injection 0.4 mg, 0.4 mg, Intravenous, PRN, Ric Mcnamara MD  •  nicotine (NICODERM CQ) 7 MG/24HR patch 1 patch, 1 patch, Transdermal, Daily, Lizeth Nance MD, 1 patch at 12/03/20 1112  •  nitroglycerin (NITROSTAT) SL tablet 0.4 mg, 0.4 mg, Sublingual, Q5 Min PRN, Lizeth Nance MD  •  nystatin (MYCOSTATIN) 101243 UNIT/ML suspension 500,000 Units, 5 mL, Swish & Swallow, 4x Daily, Lizeth Nacne MD, 500,000 Units at 12/07/20 0930  •  ondansetron (ZOFRAN) tablet 4 mg, 4 mg, Oral, Q6H PRN **OR** ondansetron (ZOFRAN) injection 4 mg, 4 mg, Intravenous, Q6H PRN, Lizeth Nance MD  •  ondansetron (ZOFRAN) injection 4 mg, 4 mg, Intravenous, Once PRN, Ric Mcnamara MD  •  pantoprazole (PROTONIX) injection 40 mg, 40 mg, Intravenous, Q AM, Lizeth Nance MD, 40 mg at 12/07/20 0536  •  piperacillin-tazobactam (ZOSYN) IVPB 3.375 g in 100 mL NS (CD), 3.375 g, Intravenous, Q8H, Draw, MD Vivian, 3.375 g at 12/07/20 1125  •  potassium & sodium phosphates (PHOS-NAK) 280-160-250 MG packet - for Phosphorus less than 1.25 mg/dL, 2 packet, Oral, Q6H PRN, 2 packet at 11/30/20 0122 **OR** potassium & sodium phosphates (PHOS-NAK) 280-160-250 MG packet - for Phosphorus 1.25 - 2.5 mg/dL, 2 packet, Oral, Q6H PRN, Lizeth Nance MD, 2 packet  at 12/04/20 1755  •  potassium chloride (K-DUR,KLOR-CON) CR tablet 40 mEq, 40 mEq, Oral, PRN, 40 mEq at 11/26/20 1807 **OR** potassium chloride (KLOR-CON) packet 40 mEq, 40 mEq, Oral, PRN, 40 mEq at 12/03/20 1759 **OR** potassium chloride 10 mEq in 100 mL IVPB, 10 mEq, Intravenous, Q1H PRN, Lizeth Nance MD, Last Rate: 100 mL/hr at 11/27/20 1024, 10 mEq at 11/27/20 1024  •  promethazine (PHENERGAN) tablet 25 mg, 25 mg, Oral, Once PRN, Ric Mcnamara MD  •  sodium chloride 0.9 % flush 10 mL, 10 mL, Intravenous, Q12H, Lizeth Nance MD, 10 mL at 12/07/20 0921  •  sodium chloride 0.9 % flush 10 mL, 10 mL, Intravenous, PRN, Lizeth Nance MD  •  sodium chloride 0.9 % infusion, 30 mL/hr, Intravenous, Continuous PRN, Lizeth Nance MD, Last Rate: 30 mL/hr at 11/28/20 2152, 30 mL/hr at 11/28/20 2152  •  sodium chloride nasal spray 2 spray, 2 spray, Each Nare, PRN, Lizeth Nance MD  •  sucralfate (CARAFATE) tablet 1 g, 1 g, Oral, 4x Daily AC & at Bedtime, Lizeth Nance MD, 1 g at 12/07/20 1207  •  traZODone (DESYREL) tablet 50 mg, 50 mg, Oral, Nightly, Ziggy Daley MD, 50 mg at 12/06/20 2128    Data Review:  All labs (24hrs):   Recent Results (from the past 24 hour(s))   POC Glucose Once    Collection Time: 12/07/20 12:03 AM    Specimen: Blood   Result Value Ref Range    Glucose 214 (H) 70 - 105 mg/dL   Haptoglobin    Collection Time: 12/07/20  5:53 AM    Specimen: Blood   Result Value Ref Range    Haptoglobin 343 (H) 30 - 200 mg/dL   Folate    Collection Time: 12/07/20  5:53 AM    Specimen: Blood   Result Value Ref Range    Folate 18.80 4.78 - 24.20 ng/mL   Vitamin B12    Collection Time: 12/07/20  5:53 AM    Specimen: Blood   Result Value Ref Range    Vitamin B-12 >2,000 (H) 211 - 946 pg/mL   Magnesium    Collection Time: 12/07/20  5:53 AM    Specimen: Blood   Result Value Ref Range    Magnesium 2.1 1.6 - 2.4 mg/dL   Phosphorus    Collection Time: 12/07/20  5:53 AM    Specimen: Blood   Result  Value Ref Range    Phosphorus 2.3 (L) 2.5 - 4.5 mg/dL   BNP    Collection Time: 12/07/20  5:53 AM    Specimen: Blood   Result Value Ref Range    proBNP 1,299.0 0.0-1,800.0 pg/mL   Basic Metabolic Panel    Collection Time: 12/07/20  5:53 AM    Specimen: Blood   Result Value Ref Range    Glucose 202 (H) 65 - 99 mg/dL    BUN 20 8 - 23 mg/dL    Creatinine 0.67 (L) 0.76 - 1.27 mg/dL    Sodium 136 136 - 145 mmol/L    Potassium 3.3 (L) 3.5 - 5.2 mmol/L    Chloride 96 (L) 98 - 107 mmol/L    CO2 31.0 (H) 22.0 - 29.0 mmol/L    Calcium 7.7 (L) 8.6 - 10.5 mg/dL    eGFR Non African Amer 114 >60 mL/min/1.73    BUN/Creatinine Ratio 29.9 (H) 7.0 - 25.0    Anion Gap 9.0 5.0 - 15.0 mmol/L   CBC Auto Differential    Collection Time: 12/07/20  5:53 AM    Specimen: Blood   Result Value Ref Range    WBC 16.20 (H) 3.40 - 10.80 10*3/mm3    RBC 3.43 (L) 4.14 - 5.80 10*6/mm3    Hemoglobin 9.8 (L) 13.0 - 17.7 g/dL    Hematocrit 29.8 (L) 37.5 - 51.0 %    MCV 86.8 79.0 - 97.0 fL    MCH 28.6 26.6 - 33.0 pg    MCHC 33.0 31.5 - 35.7 g/dL    RDW 14.4 12.3 - 15.4 %    RDW-SD 43.8 37.0 - 54.0 fl    MPV 9.8 6.0 - 12.0 fL    Platelets 66 (L) 140 - 450 10*3/mm3   Scan Slide    Collection Time: 12/07/20  5:53 AM    Specimen: Blood   Result Value Ref Range    Scan Slide     Manual Differential    Collection Time: 12/07/20  5:53 AM    Specimen: Blood   Result Value Ref Range    Neutrophil % 66.0 42.7 - 76.0 %    Lymphocyte % 3.0 (L) 19.6 - 45.3 %    Monocyte % 9.0 5.0 - 12.0 %    Bands %  20.0 (H) 0.0 - 5.0 %    Metamyelocyte % 1.0 (H) 0.0 - 0.0 %    Myelocyte % 1.0 (H) 0.0 - 0.0 %    Neutrophils Absolute 13.93 (H) 1.70 - 7.00 10*3/mm3    Lymphocytes Absolute 0.49 (L) 0.70 - 3.10 10*3/mm3    Monocytes Absolute 1.46 (H) 0.10 - 0.90 10*3/mm3    Anisocytosis Slight/1+ None Seen    Vacuolated Neutrophils Slight/1+ None Seen    Platelet Estimate Decreased Normal    Large Platelets Slight/1+ None Seen   POC Glucose Once    Collection Time: 12/07/20 11:54 AM     Specimen: Blood   Result Value Ref Range    Glucose 195 (H) 70 - 105 mg/dL   pH, Body Fluid - Pleural Fluid, Pleural Cavity    Collection Time: 12/07/20  4:30 PM    Specimen: Pleural Cavity; Pleural Fluid   Result Value Ref Range    pH, Fluid 7.00 6.50 - 7.50        Imaging:  XR Chest 1 View  Narrative: DATE OF EXAM:  12/17/2020. 4:48 PM.     PROCEDURE:  XR CHEST 1 VW-     INDICATIONS:  Thoracentesis, Right; R53.1-Weakness; R63.3-Feeding difficulties;  J44.9-Chronic obstructive pulmonary disease, unspecified     COMPARISON:  12/02/2020     TECHNIQUE:   Single radiographic view of the chest was obtained.     FINDINGS:  There is a left subclavian Bykdqi-z-Nteg catheter which appears  unchanged. There has been near complete resolution of right pleural  effusion status post thoracentesis. No post thoracentesis pneumothorax  is identified. There are some residual linear opacity in the right lung  base suggesting residual atelectasis. Bulky right hilar adenopathy is  noted, known finding. There are mixed interstitial and alveolar  opacities in the perihilar regions suggesting edema or atypical  infection pattern. Heart size and mediastinal contour appear unchanged.  Aortic vascular calcification is present.     Impression:    1. No evidence of postthoracentesis pneumothorax. Near complete  resolution of right pleural effusion status post thoracentesis.  2. Mixed interstitial and alveolar opacities in the perihilar regions  bilaterally suggesting atypical infection pattern or edema.  3. Bulky right hilar adenopathy.     Electronically Signed By-Carlos Flores MD On:12/7/2020 4:50 PM  This report was finalized on [id number] by  Carlos Flores MD.  US Thoracentesis  Narrative:    DATE OF EXAM:   12/7/2020 4:20 PM     PROCEDURE:   US THORACENTESIS-     INDICATIONS:   Shortness of breath, large right pleural effusion. R53.1-Weakness;  R63.3-Feeding difficulties; J44.9-Chronic obstructive pulmonary disease,  unspecified      COMPARISON:  CT 12/06/2020     Description procedure: Informed consent was obtained. Patient's  medications were reviewed. The patient was placed in upright seated  position and ultrasound evaluation performed over the right posterior  thorax which demonstrates a large amount of pleural fluid. Area  overlying the posterior sulcus was marked and the area prepped and  draped sterilely. Lidocaine was used for local anesthesia. Access was  obtained in and over the rib fashion using a 4 Samoan Yueh needle. 1.1 L  of cloudy dark ramses fluid were then aspirated. The patient tolerated  the procedure well with no immediate post procedure complication. A  sterile dressing was applied.     Impression: IMPRESSION :   Ultrasound-guided large volume diagnostic and therapeutic right  thoracentesis procedure. 1.1 L of fluid obtained.     Electronically Signed By-Carlos Flores MD On:12/7/2020 4:47 PM  This report was finalized on [id number] by  Carlos Flores MD.       ASSESSMENT:  Progressive dyspnea with hypoxic respiratory insufficiency  Pleural effusion   right lower lobe pneumonia   COPD   moderate size right pleural effusion   lung cancer undergoing chemotherapy   leukopenia and thrombocytopenia   Afib    Plan:  Thoracentesis today  Bronchoscopy in the morning  Continue with antibiotics  Bronchodilator  Inhaled corticosteroids  Electrolytes/ glycemic control  DVT and GI prophylaxis.    Total Critical care time in direct medical management (   ) minutes  Anup Daniels MD. D, ABSM.  Anup Daniels MD   12/7/2020  17:51 EST

## 2020-12-07 NOTE — PROGRESS NOTES
Continued Stay Note  JONAS Lyons     Patient Name: Shay Hernandez  MRN: [id number]  Today's Date: 12/7/2020    Admit Date: 11/25/2020    Discharge Plan    Spoke with daughter  Eliane #[phone]. She is open to [facility], but would also like a referral to our Palliative care team to assist wit goals of care.  Palliative care consult order obtained.    Addendum: Daughter called and said patient has decided against [facility] .Plan to return home. Noted [facility] has accepted.Update given to [facility] onsite coordinator of plans. Daughter inquiring about home DME and has no preference.Referral given to [facility] onsite coordinator notified. She will meet with patient and daughter.    Addendum: Spoke with Siri who spoke with patient and daughter.  Order obtained from attending for hospital bed, bedside commode and wheelchair.  Anticipate discharge tomorrow.    Addendum: Spoke with Dr. Youssef and updated him on documentation needed for hospital bed.           .Phone communication only - no physical contact with patient or family.    Avelina Henning RN

## 2020-12-07 NOTE — PROGRESS NOTES
Nutrition Services    Patient Name: Shay Hernandez  YOB: 1940  MRN: [id number]  Admission date: 11/25/2020    PPE Documentation        PPE Worn By Provider RD did not enter room for this encounter   PPE Worn By Patient  N/A     PROGRESS NOTE      Encounter Information: Tube feed check on.       PO Diet: Diet Liquids; Full Liquid     ST continues to follow -- based on documentation, ST suspects esophageal dysphagia and recommends full liquid diet, with continuation of TF for majority of nutrition. Pt requires strict 90 degrees during meals and for 30 minutes after.   PO Supplements: -    PO Intake:  0% of meals documented        TF Orders: Isosource 1.5 at 70 mL/hour with 10 mL free water flush every 2 hours   TF Review: Documented at 70 mL/hr       Labs (reviewed below): -Hyponatremia-resolved   -Mild hypophosphatemia- prn replacement orders, secured chatted RN to see if given.        GI Function:  -Residuals WNL  -+ BM 12/6        Nutrition Intervention: Continue current regimen    Isosource 1.5 at 70 mL/hr + 10 ml q 2 hrs water flush        Results from last 7 days   Lab Units 12/07/20  0553 12/06/20  0502 12/04/20  0403  12/02/20  0923   SODIUM mmol/L 136 131* 133*   < > 130*   POTASSIUM mmol/L 3.3* 3.5 4.0   < > 3.6   CHLORIDE mmol/L 96* 93* 96*   < > 94*   CO2 mmol/L 31.0* 30.0* 29.0   < > 26.0   BUN mg/dL 20 21 15   < > 13   CREATININE mg/dL 0.67* 0.64* 0.59*   < > 0.46*   CALCIUM mg/dL 7.7* 7.6* 7.5*   < > 7.4*   BILIRUBIN mg/dL  --   --   --   --  0.8   ALK PHOS U/L  --   --   --   --  133*   ALT (SGPT) U/L  --   --   --   --  19   AST (SGOT) U/L  --   --   --   --  17   GLUCOSE mg/dL 202* 244* 155*   < > 139*    < > = values in this interval not displayed.     Results from last 7 days   Lab Units 12/07/20  0553 12/06/20  0502 12/05/20  0609   MAGNESIUM mg/dL 2.1 2.3 1.9   PHOSPHORUS mg/dL 2.3* 2.3* 2.5   HEMOGLOBIN g/dL 9.8* 9.8* 10.6*   HEMATOCRIT % 29.8* 29.8* 32.7*     COVID19   Date Value  Ref Range Status   11/27/2020 Not Detected Not Detected - Ref. Range Final     No results found for: HGBA1C    RD to follow up per protocol.    Electronically signed by:  Jelena Ocampo RD  12/07/20 12:58 EST

## 2020-12-08 NOTE — PROGRESS NOTES
Nutrition Services    Patient Name: Shay Hernandez  YOB: 1940  MRN: [id number]  Admission date: 11/25/2020    Comment:    On FLD diet, however per EMR documentation is eating 0% of meals. Continue with tube feeding to provide 100% of estimated nutritional needs    TF orders: Isosource 1.5 at 70 ml/hr + 10 ml q 2 hrs water flush     PPE Documentation        PPE Worn By Provider N/A completed remotely    PPE Worn By Patient  None      CLINICAL NUTRITION ASSESSMENT       Reason for Assessment Follow up protocol     11/27: Unintentional weight loss consult, MST 3 score     H&P:  80 y.o. with history of atrial fibrillation and COPD with new diagnosis of lung cancer presenting as direct admission from oncologist office for progressive generalized weakness as well as thrombocytopenia.  Patient had initiated chemotherapy and reported progressive weakness as well as loss of appetite over the last few weeks.  Patient had been getting more thrombocytopenic with levels in the 30s the day prior to admission down to 13 on day of admission.    Past Medical History:   Diagnosis Date   • Adenocarcinoma, lung, unspecified laterality (CMS/HCC) 10/27/2020   • Atrial fibrillation (CMS/HCC)    • COPD (chronic obstructive pulmonary disease) (CMS/HCC)        Past Surgical History:   Procedure Laterality Date   • APPENDECTOMY     • BRONCHOSCOPY N/A 10/27/2020    Procedure: BRONCHOSCOPY WITH BRONCHOALVEOLAR LAVAGE AND ENDOBRONCHIAL ULTRASOUND WITH FINE NEEDLE ASPIRATION X2 AREAS;  Surgeon: Johnny Waldrop MD;  Location: [facility] ENDOSCOPY;  Service: Pulmonary;  Laterality: N/A;  POST:    • ENDOSCOPY W/ PEG TUBE PLACEMENT N/A 11/28/2020    Procedure: ESOPHAGOGASTRODUODENOSCOPY WITH 20F PERCUTANEOUS ENDOSCOPIC GASTROSTOMY TUBE INSERTION WITH ANESTHESIA;  Surgeon: Lizeth Nance MD;  Location: [facility] ENDOSCOPY;  Service: Gastroenterology;  Laterality: N/A;  ulcerative esophagitis, hiatal hernia, PEG placement   • VENOUS ACCESS  DEVICE (PORT) INSERTION N/A 11/16/2020    Procedure: INSERTION VENOUS ACCESS DEVICE, LEFT SUBCLAVIAN UNDER FLOUROSCOPIC GUIDANCE ;  Surgeon: Jan Richards MD;  Location: [facility] MAIN OR;  Service: Cardiothoracic;  Laterality: N/A;        Current Problems:   Per hospitalist note 12/7:     #Generalized weakness  -likely multifactorial given cancer diagnosis and chemotherapy as well as tachyarrhythmia  -Patient received a PEG on 11/28/2020  -Patient will definitely need inpatient rehab.  - Patient has refused hospice, is to go home with Caregivers tomorrow  -Patient requires frequent position changes due to A. Fib and weakness  -pillows and wedges have been ruled out in standard bed  -head and foot of bed elevated to 30 degrees  -frequent position changes not feasible in standard bed     #Atrial fibrillation  -with RVR.  Patient with established history denies chest pain.  Patient able to be weaned off overnight 10/26/2020 but went back into RVR this 10/27/2020, slowly improving  -Telemetry  -Increase metoprolol  -Monitor electrolytes  -Off Cardizem drip on oral Cardizem  -Echo as of 10/26/2020 showing dilated atrial cavity with grade 2 diastolic dysfunction     #Chronic respiratory failure-patient on 3 L baseline  #Right Pleural Effusion  -Chest x-ray appearing show signs of volume overload  -Ordering CT further evaluate underlying infection versus inflammation versus edema  -Off diuretics       -The patient's oxygenation is not proving.  The patient is up 3 kg and has a negative pro calcitonin.  The patient likely has congestive failure which has not been addressed.  We will start the patient on Lasix 40 mg IV twice daily and follow his weight, renal function and electrolytes.  Would hold off on antibiotics for now as his chest x-ray is more consistent with pulmonary edema.       -Repeat chest x-ray in a.m. and reevaluate  -Given the patient's overall situation with his lung cancer diagnosis,, chronic respiratory  failure and very severe weakness this is likely to continue to deteriorate.  -Pleural effusion resolved post thoracentesis     #Postobstructive pneumonia    - possible postobstructive pneumonia on CT and CXR    - However patient is afebrile with stable BP    -Will withhold abx at this time     #Thrombocytopenia-patient with no signs of overt bleeding though did report small amounts of blood per nasal passages, slowly improving.  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  -Patient has been transfused platelets in the past.  The patient now has a platelet count of 66,000     #Leukopenia-likely due to underlying chemotherapy  -Neupogen per hematology  -Neutropenic precaution  -Monitor for signs of occult infection  -Daily CBC  -current white count is 16.2 s/p neupogen     #Lung cancer-patient with recent diagnosis under treatment with oncology  -Pain control  -Antiemetics  -Daily CBC  -Oncology consult  -Consider ENT referral for the patient's loss of speech       -1 way to evaluate this would be to do a bedside laryngoscopy and see if the vocal cords approximate and are still moving appropriately.  -The patient's family is really struggling with how aggressive to be.  Apparently he just recently moved here to be near to his daughter.  They actually just closed on a house about a month ago which the patient's never even set foot in.  The patient's wife is also having some issues for which the daughter is attempting to find care for her mother.  -radiation oncology consulted, to consider palliative radiation as outpatient  - CT shows progression of lung cancer, consider  "changing to immunotherapy per rad onc     #Diastolic cardiomyopathy  -grade 2 noted on echo with various different valvular diseases  -Monitor volume status     #COPD-no signs of acute exacerbation currently  -Bronchodilators  -Mucolytic's  -Wean O2 as tolerated     #Tobacco abuse-cessation advised     Aphasia  -This may be multifactorial.  It is suspicious for vocal cord paralysis secondary to the patient's underlying cancer.  Also could be related to esophageal disease.  Will defer to oncology the timing of getting ENT to look at the patient's vocal cords for additional information  -Continue his nutrition through his PEG at this time.  -May be slightly improved today.     Nutrition/Diet History         Narrative     12/8: Pt at bronch this am at time of assessment. TFs off for this reason. Previously tolerating TFs at goal rate.     12/2: Pt seen for follow up.  Pt sleeping very soundly at visit to unit; observed via window that TF is infusing at 55mL/hour. Spoke with RN, who reports pt tolerating TF well.  Had low phos again this morning, which was replaced. Re-check lab pending. Pt has been seen by ST with recommendations for \"regular diet with thin liquids for pleasure only when amenable to full HOB elevation during and for min of 30 minutes following PO.\"  Regular diet is now in place, but pt is eating 0% PO.  Of note, sodium level continues to drop -- may benefit from less fluid volume.     11/30: Visited pt today who was resting in recliner, visually observed correct TF infusing in room. Spoke with RN about patient's PO diet. Uncertain if patient should remain NPO given the ulceration at the GEJ junction. Noted ST has not evaluated patient this admission. RN is to seek clarification from attending about PO diet and if ST should be involved.     11/27: S/w patient's RN who reports pt is to get PEG tomorrow. Attempted to visit patient, but patient was sleeping and would not wake to RD repeatedly calling name. " "Pt would benefit from full NFPE when able. Physical s/s of malnutrition noted to temple, clavicle, orbital area (moderate). Per discussion with RN, pt is getting PEG due to poor oral intake and no issues with dysphagia.     Functional Status Unsure of baseline- PT unable to eval yet, pt reports being weak per PT note     Food Allergies NKFA     Factors Affecting   Nutritional Intake Cancer  Hx of Dysphagia      Anthropometrics        Current Height, Weight Height: 188 cm (74\")  Weight: 78 kg (171 lb 15.3 oz) (12/05/20 0526)        Admit Height, Weight -    Flowsheet Rows      First Filed Value   Admission Height  188 cm (74\") Documented at 11/25/2020 1510   Admission Weight  79.7 kg (175 lb 11.3 oz) Documented at 11/25/2020 1510             Ideal Body Weight (IBW) 190lb   % Ideal Body Weight 90%       Usual Body Weight UTD   % Usual Body Weight UTD   Wt Change Observation 11/24: 8% loss x less than 1 month   Weight Hx    Wt Readings from Last 30 Encounters:   12/05/20 0526 78 kg (171 lb 15.3 oz)   12/04/20 0500 78.4 kg (172 lb 13.5 oz)   12/03/20 0530 82.4 kg (181 lb 10.5 oz)   12/02/20 0500 83 kg (182 lb 15.7 oz)   12/01/20 0437 80.9 kg (178 lb 5.6 oz)   11/30/20 0500 79.8 kg (175 lb 14.8 oz)   11/29/20 0422 78.7 kg (173 lb 8 oz)   11/28/20 0532 79.6 kg (175 lb 7.8 oz)   11/27/20 0500 79 kg (174 lb 2.6 oz)   11/26/20 0615 80.1 kg (176 lb 9.4 oz)   11/25/20 1510 79.7 kg (175 lb 11.3 oz)   11/19/20 1305 86.2 kg (190 lb)   11/17/20 0816 82.1 kg (181 lb)   11/16/20 0757 81.3 kg (179 lb 3.7 oz)   11/10/20 1526 83.5 kg (184 lb)   11/13/20 1259 83.5 kg (184 lb)   11/10/20 1421 83.2 kg (183 lb 6.4 oz)   11/10/20 1240 83.5 kg (184 lb)   11/05/20 1519 84.6 kg (186 lb 6.4 oz)   11/02/20 1502 86.5 kg (190 lb 12.8 oz)   10/26/20 2209 85 kg (187 lb 6.3 oz)   10/26/20 1143 86.4 kg (190 lb 7.6 oz)   10/24/20 1130 88.5 kg (195 lb 1.7 oz)   10/17/20 1943 91.4 kg (201 lb 8 oz)        BMI kg/m2 Body mass index is 22.08 kg/m². " "    Labs/Medications         Pertinent Labs    Results from last 7 days   Lab Units 12/08/20  0541 12/07/20  0553 12/06/20  0502  12/02/20  0923   SODIUM mmol/L 138 136 131*   < > 130*   POTASSIUM mmol/L 3.4* 3.3* 3.5   < > 3.6   CHLORIDE mmol/L 97* 96* 93*   < > 94*   CO2 mmol/L 33.0* 31.0* 30.0*   < > 26.0   BUN mg/dL 22 20 21   < > 13   CREATININE mg/dL 0.67* 0.67* 0.64*   < > 0.46*   CALCIUM mg/dL 8.1* 7.7* 7.6*   < > 7.4*   BILIRUBIN mg/dL  --   --   --   --  0.8   ALK PHOS U/L  --   --   --   --  133*   ALT (SGPT) U/L  --   --   --   --  19   AST (SGOT) U/L  --   --   --   --  17   GLUCOSE mg/dL 155* 202* 244*   < > 139*    < > = values in this interval not displayed.     Results from last 7 days   Lab Units 12/08/20  0541 12/07/20  0553 12/06/20  0502   MAGNESIUM mg/dL 2.0 2.1 2.3   PHOSPHORUS mg/dL 3.6 2.3* 2.3*   HEMOGLOBIN g/dL 10.0* 9.8* 9.8*   HEMATOCRIT % 30.1* 29.8* 29.8*     COVID19   Date Value Ref Range Status   11/27/2020 Not Detected Not Detected - Ref. Range Final     No results found for: HGBA1C      Pertinent Medications B12, Folvite, Lasix, Reglan, Remeron, Nystatin, Zosyn, Carafate, NS at 30 ml/hr      Physical Findings        Overall Physical   Appearance, [facility] 12/8: Did not visualize patient on this date   12/2: Limited visualization of pt today due to pt positioning  11/30: Pt continues to appear malnourished   11/27: Pt sleeping at visit, visually observed moderate temporal, orbital, clavicle wasting- possible severe with NFPE   -  Edema  No documented     Gastrointestinal + BM x 2 on 12/7      Tubes PEG      Oral/Mouth Cavity Hx of Dysphagia      Skin No pressure injuries      Estimated/Assessed Needs       Energy Requirements    Height for Calculation  Height: 188 cm (74\")   Weight for Calculation 79kg                                      Method for Estimation  30kcal/kg                               EST Needs (kcal/day) 2370kcal/day --- remains appropriate 12/8  Monitor weight changes " with ongoing TF                            Protein Requirements    Weight for Calculation 79kg   EST Protein Needs (g/kg) 1.3g/kg   EST Daily Needs (g/day) 103g/day       Fluid Requirements     Estimated Needs (mL/day) 2370 ml (1ml/kcal)        Fluid Deficit    Current Na Level (mEq/L) -   Desired Na Level (mEq/L) -   Estimated Fluid Deficit (L)  -     Current Nutrition Orders & Evaluation of Received Nutrient/Fluid Intake       Oral Nutrition     Current PO Diet Diet Liquids; Full Liquid   Supplement None    PO Evaluation     % PO Intake 12/8- Mostly consuming 0% of meals   11/30 0% r/t NPO status   12/1 started regular diet but not taking PO - 0% intake   -  Enteral Nutrition    Enteral Route PEG    TF Modular None    TF Delivery Method Continuous    Current Ordered TF  Isosource 1.5 at 70ml/hr    Current Ordered H2O flush  10 ml q 2 hrs     TF Observation  TF currently off for procedure    EN Evaluation     TF Changes None     TF Residual WNL     TF Tolerance Tolerating Well     Average EN Delivered -       Parenteral Nutrition     TPN Route -   Current Ordered TPN VOL  -   Dextrose (g/kcals)  -   Amino Acid (g/kcals) -   Lipids (mL/Concentration/FREQ)  -   MVI Frequency  -   Trace Element Frequency  -   TPN Observation  -    TPN Evaluation    Total # Days on TPN -   -  Clinical Course    Nutrition Course Details PO diet     11/25-11/26 Regular  11/27 Clear Liquid   11/28-11/30 NPO  12/1-12/2 Regular  12/3 NPO  12/4 to present (12/8) FLD     Nutrition Support    11/28 TF began  11/5 TF at goal rate. Continues at goal presently (12/8)        Nutritional Risk Screening        NRS-2002 Score   -       Nutrition Diagnosis         Nutrition Dx Problem 1 Moderate chronic illness malnutrition related to likely inadequate calorie intake in the context of lung cancer as evidenced by report of poor oral intake PTA, visual observation of muscle loss, and 8% weight loss in 1 month.      Nutrition Dx Problem 2 Swallowing  difficulty r/t dysphagia AEB NPO diet order & need for EN to meet nutritional needs.        Intervention Goal         Intervention Goal(s) Pt will tolerate EN      Nutrition Intervention        RD/Tech Action Continue with EN as ordered      Nutrition Prescription          Diet Prescription FLD    Supplement Prescription -    -  Enteral Prescription END goal:     Isosource 1.5 @70mL/hour (x 22 hrs to allow time off for ADLs) + 10 ml q 2 hrs to provide 2310kcal (97%), 105g protein (102%), and 1390 ml total fluid (1170mL free water + 220 ml flushes)          TPN Prescription -     Monitor/Evaluation        Monitor PO intake, EN delivery/tolerance, Weight, GI status, pertinent labs     Electronically signed by:  Jelena Ocampo RD  12/08/20 12:47 EST

## 2020-12-08 NOTE — SIGNIFICANT NOTE
ST following for dysphagia. Pt currently in endo for bronch today. Pt has been on full liquids with PEG as primary source of nutrition. However, RD noted pt's oral intake has been 0% over the last few days. Will f/u for tx tomorrow as available.

## 2020-12-08 NOTE — ANESTHESIA PREPROCEDURE EVALUATION
Anesthesia Evaluation     Nursing notes reviewed   NPO Solid Status: > 8 hours  NPO Liquid Status: > 8 hours           Airway   Mallampati: II  No difficulty expected  Dental    (+) edentulous    Pulmonary    (+) lung cancer, COPD, shortness of breath,   Cardiovascular     Rhythm: irregular    (+) dysrhythmias Atrial Fib,       Neuro/Psych  GI/Hepatic/Renal/Endo      Musculoskeletal     Abdominal    Substance History      OB/GYN          Other      history of cancer active      Other Comment: Lung cancer.  Hx thrombo- and leukopenia                  Anesthesia Plan    ASA 4     MAC

## 2020-12-08 NOTE — PLAN OF CARE
Problem: Adult Inpatient Plan of Care  Goal: Patient-Specific Goal (Individualized)  Outcome: Ongoing, Progressing  Goal: Absence of Hospital-Acquired Illness or Injury  Outcome: Ongoing, Progressing  Intervention: Identify and Manage Fall Risk  Recent Flowsheet Documentation  Taken 12/7/2020 2330 by Lakeshia Roa RN  Safety Promotion/Fall Prevention: safety round/check completed  Taken 12/7/2020 2230 by Lakeshia Roa RN  Safety Promotion/Fall Prevention: safety round/check completed  Intervention: Prevent Skin Injury  Recent Flowsheet Documentation  Taken 12/7/2020 2330 by Lakeshia Roa RN  Body Position: tilted, right  Intervention: Prevent Infection  Recent Flowsheet Documentation  Taken 12/7/2020 2330 by Lakeshia Roa RN  Infection Prevention: personal protective equipment utilized  Taken 12/7/2020 2230 by Lakeshia Roa RN  Infection Prevention: personal protective equipment utilized  Goal: Optimal Comfort and Wellbeing  Outcome: Ongoing, Progressing  Goal: Readiness for Transition of Care  Outcome: Ongoing, Progressing  Goal: Plan of Care Review  Outcome: Ongoing, Progressing  Goal: Plan of Care Review  Outcome: Ongoing, Progressing     Problem: Skin Injury Risk Increased  Goal: Skin Health and Integrity  Outcome: Ongoing, Progressing  Intervention: Optimize Skin Protection  Recent Flowsheet Documentation  Taken 12/7/2020 2330 by Lakeshia Roa RN  Head of Bed (HOB): HOB at 30 degrees     Problem: Fall Injury Risk  Goal: Absence of Fall and Fall-Related Injury  Outcome: Ongoing, Progressing  Intervention: Promote Injury-Free Environment  Recent Flowsheet Documentation  Taken 12/7/2020 2330 by Lakeshia Roa RN  Safety Promotion/Fall Prevention: safety round/check completed  Taken 12/7/2020 2230 by Lakeshia Roa RN  Safety Promotion/Fall Prevention: safety round/check completed     Problem: Malnutrition  Goal: Improved Nutritional Intake  Outcome: Ongoing, Progressing   Goal  Outcome Evaluation:  Plan of Care Reviewed With: patient  Progress: no change

## 2020-12-08 NOTE — PROGRESS NOTES
Continued Stay Note   [place]     Patient Name: Shay Hernandez  MRN: [id number]  Today's Date: 12/8/2020    Admit Date: 11/25/2020    Discharge Plan      Spoke with Staff RN, plan discharge today after radiation.  Spoke with Zenobia with [facility], accepted and ready to resume services.  Referral to [facility] for home Tube Feeds.They accepted and will provide supplies tonight.  Notified Siri with [facility] who confirmed all equipment has been delivered to home.    Expected Discharge Date and Time     Expected Discharge Date Expected Discharge Time    Dec 8, 2020             Aevlina Henning RN

## 2020-12-08 NOTE — PLAN OF CARE
Goal Outcome Evaluation:  Plan of Care Reviewed With: patient  Progress: no change   Patient discharged to cancer care center then will be going home. Absence of injury/fall, illness acquired in hospital. Met comfort, skin integrity care. PEG placed in order to provide adequate nutrition intake. Daughter with patient with education regarding PEG care. Discharge instructions.

## 2020-12-08 NOTE — PROGRESS NOTES
Hematology/Oncology Inpatient Progress Note    PATIENT NAME: Shay Hernandez  : 1940  MRN: [id number]    CHIEF COMPLAINT: Thrombocytopenia; lung cancer    HISTORY OF PRESENT ILLNESS:   Mr. Hernandez is a 80 y.o. with history of stage IIIc poorly differentiated adenocarcinoma lung, atrial fibrillation and COPD  presenting as direct admission from oncologist office for progressive generalized weakness as well as thrombocytopenia.  Patient had initiated chemotherapy recently 2020 and reported progressive weakness as well as loss of appetite over the last few weeks. Patient had been getting more thrombocytopenic with levels in the 30s the day prior to admission down to 13 on day of admission.  Patient denied hematuria, hematochezia, melena or significant bruising though did report a small amount of blood when he blew his nose in the morning.      Patient found to be in atrial fibrillation with RVR once reaching the floor.  Stat EKG ordered and ordering Cardizem.  Patient received unit of platelets in ambulatory care center prior to coming up to the floor.  Patient scheduled to have feeding tube placement with GI in the next few days.     He received his PEG 2020 platelet count improved nominally from 26->32 with steroids and transfusion.  However, despite 2 doses of prednisone, today the platelets have dropped to 13.  He is interested in potentially eating something by mouth.  EGD done with PEG showed ulcerated GEJ suggesting potentially metastatic involvement by the lung cancer.  bx was deferred given thrombocytopenia.        Oncological history:  Stage IIIc poorly differentiated adenocarcinoma of lung -patient presented with a bulky right hilar mass and also bulky mediastinal lymph nodes.    Also has left hilar lymph nodes consistent with metastasis and borderline enlarged left supraclavicular lymph node. . MRI abdomen 10/28/2020 - 2.2 cm lesion in the dome of liver with features of a cyst.  Negative for evidence of hepatic metastases. S/p bronchoscopy with EBUS on 10/27/2020 - subcarinal lymph node FNA positive for metastatic poorly differentiated adenocarcinoma. Staining pattern is consistent with adenocarcinoma of pulmonary origin. Many of the tumor cells display significant pleomorphism with bizarre appearing nuclei and mitotic figures. This raises the possibility of a sarcomatoid component. MRI brain 10/29/2020 - no metastatic disease.   ·  11/5/2020: PET CT scan: Hypermetabolic right hilar mass consistent with malignancy.  Hypermetabolic nodular densities within the right lower lobe consistent with metastatic disease.  Bulky pathological enlarged hypermetabolic mediastinal adenopathy, consistent with metastatic disease.  No left hilar adenopathy.  Moderate FDG accumulation within the proximal stomach with gastric wall thickening.  This could be physiologic uptake..  Could be gastritis.  Gastric malignancy cannot be excluded.  Endoscopy recommended.  Enlarged hypermetabolic bilateral supraclavicular lymph nodes in the lower neck consistent with metastasis  · 11/17/2020 patient received cycle 1 of cisplatin and pemetrexed.  Cisplatin 75 mg/m², pemetrexed 400 mg per metered square..  WBC 15.6, hemoglobin 13.7, platelets 129,000 creatinine 0.7, albumin 2.8  · 11/26/2020 CT chest without -  1.Compared to previous examinations the paratracheal and mediastinal lymphadenopathy is larger. 2.There is a moderate size right pleural effusion and small left pleural effusion which are increased in size from previous study. 3.The right hilar mass is not definite change in size. 4.There are some new nodules in the right lower lobe. 5.There is increased opacity in the right lower lobe. 6.Cardiomegaly and severe atherosclerotic disease and coronary artery disease. 7.No change in a low-density lesion at the right hepatic dome. 8.Diffuse stomach wall thickening similar previous exam  · 12/5/2020 Ct Chest With Contrast  - 1.  There is almost complete right lower lung consolidation.  This could represent a postobstructive pneumonitis and/or atelectasis.  There is opacification of the right lower lobe bronchus. 2.  There is moderate infiltrate in the posterior segment right upper lung and mild to moderate right middle lobe atelectasis.  Again, this may represent a postobstructive pneumonitis and atelectasis. 3.  There is very marked mediastinal and right hilar adenopathy.  This adenopathy narrows the right upper and middle lobe bronchi causing postobstructive pneumonitis and atelectasis. 4.  There is a small right upper lung nodule too small to characterize, but more likely a granuloma. 5.  Mild to moderate left lower lung infiltrate and consolidation may represent pneumonia     History of present illness was reviewed and is unchanged from the previous visit. 12/06/20    Subjective      Ultrasound-guided large volume diagnostic and therapeutic right  thoracentesis procedure. 1.1 L of fluid obtained.   His voice has improved.      ROS:  Review of Systems   Constitutional: Positive for fatigue. Negative for chills and fever.   HENT: Positive for voice change. Negative for ear pain, mouth sores, nosebleeds and sore throat.    Eyes: Negative for photophobia and visual disturbance.   Respiratory: Positive for cough and shortness of breath ( improved ). Negative for wheezing and stridor.    Cardiovascular: Negative for chest pain and palpitations.   Gastrointestinal: Negative for abdominal pain, diarrhea, nausea and vomiting.   Endocrine: Negative for cold intolerance and heat intolerance.   Genitourinary: Negative for dysuria and hematuria.   Musculoskeletal: Negative for joint swelling and neck stiffness.   Skin: Negative for color change and rash.   Neurological: Positive for weakness. Negative for seizures and syncope.   Hematological: Negative for adenopathy.        No obvious bleeding   Psychiatric/Behavioral: Negative for agitation,  "behavioral problems, confusion and hallucinations.   All other systems reviewed and are negative.     MEDICATIONS:    Scheduled Meds:  budesonide-formoterol, 2 puff, Inhalation, BID - RT  cyanocobalamin, 1,000 mcg, Intramuscular, Q28 Days  dilTIAZem, 30 mg, Oral, Q6H  folic acid, 1 mg, Oral, Daily  furosemide, 40 mg, Intravenous, Q12H  ipratropium-albuterol, 3 mL, Nebulization, 4x Daily - RT  metoclopramide, 10 mg, Oral, TID AC  metoprolol tartrate, 50 mg, Oral, Q12H  mirtazapine, 15 mg, Oral, Nightly  nicotine, 1 patch, Transdermal, Daily  nystatin, 5 mL, Swish & Swallow, 4x Daily  pantoprazole, 40 mg, Intravenous, Q AM  piperacillin-tazobactam, 3.375 g, Intravenous, Q8H  sodium chloride, 10 mL, Intravenous, Q12H  sucralfate, 1 g, Oral, 4x Daily AC & at Bedtime  traZODone, 50 mg, Oral, Nightly       Continuous Infusions:  dilTIAZem, 5-15 mg/hr, Last Rate: 5 mg/hr (11/27/20 1244)  hold, 1 each  sodium chloride, 30 mL/hr, Last Rate: 30 mL/hr (11/28/20 2152)       PRN Meds:  •  acetaminophen **OR** acetaminophen  •  acetaminophen  •  benzonatate  •  bisacodyl  •  flumazenil  •  hold  •  HYDROmorphone  •  HYDROmorphone  •  ipratropium-albuterol  •  labetalol  •  magnesium hydroxide  •  magnesium sulfate **OR** magnesium sulfate **OR** magnesium sulfate  •  melatonin  •  naloxone  •  nitroglycerin  •  ondansetron **OR** ondansetron  •  ondansetron  •  potassium & sodium phosphates **OR** potassium & sodium phosphates  •  potassium chloride **OR** potassium chloride **OR** potassium chloride  •  promethazine  •  sodium chloride  •  sodium chloride  •  sodium chloride     ALLERGIES:  No Known Allergies    Objective    VITALS:   /44   Pulse 112   Temp 97.5 °F (36.4 °C) (Infrared)   Resp 16   Ht 188 cm (74\")   Wt 78 kg (171 lb 15.3 oz)   SpO2 98%   BMI 22.08 kg/m²     PHYSICAL EXAM: (performed by MD)  Physical Exam  Vitals signs and nursing note reviewed.   Constitutional:       General: He is not in acute " distress.     Appearance: He is ill-appearing. He is not diaphoretic.   HENT:      Head: Normocephalic and atraumatic.      Comments: Patient very hoarse and unable to speak  Eyes:      General: No scleral icterus.        Right eye: No discharge.         Left eye: No discharge.      Conjunctiva/sclera: Conjunctivae normal.   Neck:      Musculoskeletal: Normal range of motion and neck supple.      Thyroid: No thyromegaly.   Cardiovascular:      Rate and Rhythm: Normal rate and regular rhythm.      Heart sounds: Normal heart sounds. No friction rub. No gallop.    Pulmonary:      Effort: Pulmonary effort is normal. No respiratory distress.      Breath sounds: No stridor. Decreased breath sounds present. No wheezing.      Comments: Decreased air entry bilaterally  Chest:      Comments: Left chest wall Port-A-Cath   Abdominal:      General: Bowel sounds are normal.      Palpations: Abdomen is soft. There is no mass.      Tenderness: There is no abdominal tenderness. There is no guarding or rebound.      Comments: PEG tube    Genitourinary:     Comments: External urinary catheter  Musculoskeletal: Normal range of motion.         General: No tenderness.   Lymphadenopathy:      Cervical: No cervical adenopathy.   Skin:     General: Skin is warm.      Findings: No erythema or rash.   Neurological:      Mental Status: He is alert and oriented to person, place, and time.      Motor: No abnormal muscle tone.   Psychiatric:         Behavior: Behavior normal.       RECENT LABS:  Lab Results (last 24 hours)     Procedure Component Value Units Date/Time    POC Glucose Once [[id number]]  (Abnormal) Collected: 12/07/20 1843    Specimen: Blood Updated: 12/07/20 1844     Glucose 137 mg/dL      Comment: Serial Number: [id number]:  [id number]       Body Fluid Cell Count With Differential - Pleural Fluid, Pleural Cavity [[id number]]  (Abnormal) Collected: 12/07/20 1630    Specimen: Pleural Fluid from Pleural Cavity Updated: 12/07/20  1830    Narrative:      The following orders were created for panel order Body Fluid Cell Count With Differential - Pleural Fluid, Pleural Cavity.  Procedure                               Abnormality         Status                     ---------                               -----------         ------                     Body fluid cell count - ...[[id number]]  Abnormal            Final result               Body fluid differential ...[[id number]]                      Final result                 Please view results for these tests on the individual orders.    Body fluid differential - Pleural Fluid, Pleural Cavity [[id number]] Collected: 12/07/20 1630    Specimen: Pleural Fluid from Pleural Cavity Updated: 12/07/20 1830     Neutrophils, Fluid 18 %      Lymphocytes, Fluid 28 %      Monocytes, Fluid 39 %      Mesothelial Cells, Fluid 15 %     Narrative:      Concentrated Smear by Cytocentrifuge Prep.    Body fluid cell count - Pleural Fluid, Pleural Cavity [[id number]]  (Abnormal) Collected: 12/07/20 1630    Specimen: Pleural Fluid from Pleural Cavity Updated: 12/07/20 1822     Color, Fluid Yellow     Appearance, Fluid Slightly Cloudy     Nucleated Cells, Fluid 755 /mm3      Method: Automated DXH Analyzer    pH, Body Fluid - Pleural Fluid, Pleural Cavity [[id number]]  (Normal) Collected: 12/07/20 1630    Specimen: Pleural Fluid from Pleural Cavity Updated: 12/07/20 1740     pH, Fluid 7.00    Anaerobic Culture - Pleural Fluid, Pleural Cavity [[id number]] Collected: 12/07/20 1630    Specimen: Pleural Fluid from Pleural Cavity Updated: 12/07/20 1720    Body Fluid Culture - Body Fluid, Pleural Cavity [[id number]] Collected: 12/07/20 1630    Specimen: Body Fluid from Pleural Cavity Updated: 12/07/20 1720    Lactate Dehydrogenase, Body Fluid - Body Fluid, Pleural Cavity [[id number]] Collected: 12/07/20 1630    Specimen: Body Fluid from Pleural Cavity Updated: 12/07/20 1720    Protein, Body Fluid - Body Fluid, Pleural Cavity  [[id number]] Collected: 12/07/20 1630    Specimen: Body Fluid from Pleural Cavity Updated: 12/07/20 1720    Haptoglobin [[id number]]  (Abnormal) Collected: 12/07/20 0553    Specimen: Blood Updated: 12/07/20 1209     Haptoglobin 343 mg/dL     POC Glucose Once [[id number]]  (Abnormal) Collected: 12/07/20 1154    Specimen: Blood Updated: 12/07/20 1202     Glucose 195 mg/dL      Comment: Serial Number: [id number]:  [id number]       Folate [[id number]]  (Normal) Collected: 12/07/20 0553    Specimen: Blood Updated: 12/07/20 1103     Folate 18.80 ng/mL     Narrative:      Results may be falsely increased if patient taking Biotin.      Vitamin B12 [[id number]]  (Abnormal) Collected: 12/07/20 0553    Specimen: Blood Updated: 12/07/20 1103     Vitamin B-12 >2,000 pg/mL     Narrative:      Results may be falsely increased if patient taking Biotin.      CBC & Differential [[id number]]  (Abnormal) Collected: 12/07/20 0553    Specimen: Blood Updated: 12/07/20 0758    Narrative:      The following orders were created for panel order CBC & Differential.  Procedure                               Abnormality         Status                     ---------                               -----------         ------                     Scan Slide[[id number]]                                       Final result               CBC Auto Differential[[id number]]        Abnormal            Final result                 Please view results for these tests on the individual orders.    Scan Slide [[id number]] Collected: 12/07/20 0553    Specimen: Blood Updated: 12/07/20 0758     Scan Slide --     Comment: See Manual Differential Results       Manual Differential [[id number]]  (Abnormal) Collected: 12/07/20 0553    Specimen: Blood Updated: 12/07/20 0758     Neutrophil % 66.0 %      Lymphocyte % 3.0 %      Monocyte % 9.0 %      Bands %  20.0 %      Metamyelocyte % 1.0 %      Myelocyte % 1.0 %      Neutrophils Absolute 13.93 10*3/mm3      Lymphocytes Absolute  0.49 10*3/mm3      Monocytes Absolute 1.46 10*3/mm3      Anisocytosis Slight/1+     Vacuolated Neutrophils Slight/1+     Platelet Estimate Decreased     Large Platelets Slight/1+    CBC Auto Differential [[id number]]  (Abnormal) Collected: 12/07/20 0553    Specimen: Blood Updated: 12/07/20 0758     WBC 16.20 10*3/mm3      RBC 3.43 10*6/mm3      Hemoglobin 9.8 g/dL      Hematocrit 29.8 %      MCV 86.8 fL      MCH 28.6 pg      MCHC 33.0 g/dL      RDW 14.4 %      RDW-SD 43.8 fl      MPV 9.8 fL      Platelets 66 10*3/mm3     Magnesium [[id number]]  (Normal) Collected: 12/07/20 0553    Specimen: Blood Updated: 12/07/20 0633     Magnesium 2.1 mg/dL     Phosphorus [[id number]]  (Abnormal) Collected: 12/07/20 0553    Specimen: Blood Updated: 12/07/20 0633     Phosphorus 2.3 mg/dL     Basic Metabolic Panel [[id number]]  (Abnormal) Collected: 12/07/20 0553    Specimen: Blood Updated: 12/07/20 0633     Glucose 202 mg/dL      BUN 20 mg/dL      Creatinine 0.67 mg/dL      Sodium 136 mmol/L      Potassium 3.3 mmol/L      Chloride 96 mmol/L      CO2 31.0 mmol/L      Calcium 7.7 mg/dL      eGFR Non African Amer 114 mL/min/1.73      BUN/Creatinine Ratio 29.9     Anion Gap 9.0 mmol/L     Narrative:      GFR Normal >60  Chronic Kidney Disease <60  Kidney Failure <15      BNP [[id number]]  (Normal) Collected: 12/07/20 0553    Specimen: Blood Updated: 12/07/20 0630     proBNP 1,299.0 pg/mL     Narrative:      Among patients with dyspnea, NT-proBNP is highly sensitive for the detection of acute congestive heart failure. In addition NT-proBNP of <300 pg/ml effectively rules out acute congestive heart failure with 99% negative predictive value.    Results may be falsely decreased if patient taking Biotin.      Protein Electrophoresis, Total [[id number]] Collected: 12/07/20 0553    Specimen: Blood Updated: 12/07/20 0601    POC Glucose Once [[id number]]  (Abnormal) Collected: 12/07/20 0003    Specimen: Blood Updated: 12/07/20 0004     Glucose 214  mg/dL      Comment: Serial Number: [id number]:  [id number]             PENDING RESULTS: Addendum to 12/5/2020 CT chest with contrast, SPEP, occult blood stool, reticulocytes, LDH, haptoglobin,  iron, iron saturation, transferrin, TIBC, ferritin, folate, vitamin B12    IMAGING REVIEWED:  Ct Chest With Contrast    Result Date: 12/6/2020   1.  There is almost complete right lower lung consolidation.  This could represent a postobstructive pneumonitis and/or atelectasis.  There is opacification of the right lower lobe bronchus. 2.  There is moderate infiltrate in the posterior segment right upper lung and mild to moderate right middle lobe atelectasis.  Again, this may represent a postobstructive pneumonitis and atelectasis. 3.  There is very marked mediastinal and right hilar adenopathy.  This adenopathy narrows the right upper and middle lobe bronchi causing postobstructive pneumonitis and atelectasis. 4.  There is a small right upper lung nodule too small to characterize, but more likely a granuloma. 5.  Mild to moderate left lower lung infiltrate and consolidation may represent pneumonia.   Thank you for the referral of this patient. This exam was interpreted by an American Board of Radiology certified radiologist.  Electronically signed by:  Emery Monte M.D.  12/5/2020 11:26 PM   Addendum is to compare to prior PET/CT from 11/05/2020.  Supraclavicular lymphadenopathy appears increased with an index left supraclavicular lymph node on image 18 measuring 2.4 x 1.9 cm, previously 1.5 x 1.0 cm.  Mediastinal lymphadenopathy overall appears slightly increased, with an index subcarinal lymph node on image 57 measuring 4.6 x 3.4 cm, previously 4.1 x 3.1 cm.  Right hilar lymphadenopathy appears increased with an index right hilar lymph node on image 57 measuring 3.1 x 2.7 cm, previously 2.6 x 2.4 cm.  A previously described right hilar mass extending into the right lower lobe is difficult to characterize given the  adjacent pleural effusion and consolidation.  Overall, there appears to be disease progression compared to prior PET/CT from 11/05/2020.  Electronically Signed By-Maurilio Dos Santos MD On:12/6/2020 12:32 PM This report was finalized on [id number] by  Maurilio Dos Santos MD.    Xr Chest 1 View    Result Date: 12/7/2020   1. No evidence of postthoracentesis pneumothorax. Near complete resolution of right pleural effusion status post thoracentesis. 2. Mixed interstitial and alveolar opacities in the perihilar regions bilaterally suggesting atypical infection pattern or edema. 3. Bulky right hilar adenopathy.  Electronically Signed By-Carlos Flores MD On:12/7/2020 4:50 PM This report was finalized on [id number] by  Carlos Flores MD.    Us Thoracentesis    Result Date: 12/7/2020  IMPRESSION : Ultrasound-guided large volume diagnostic and therapeutic right thoracentesis procedure. 1.1 L of fluid obtained.  Electronically Signed By-Carlos Flores MD On:12/7/2020 4:47 PM This report was finalized on [id number] by  Carlos Flores MD.      Assessment/Plan   ASSESSMENT  1. Stage IIIc poorly differentiated adenocarcinoma of lung -patient could potentially have stage IV disease with gastric involvement.  Patient originally presented with a bulky right hilar mass and also bulky mediastinal lymph nodes.  Started on cisplatin/Alimta and consideration of radiation therapy concurrently at a later point; however, patient experienced severe myelosuppression.  2. Esophageal dysphagia/Aphasia/Acute Hoarseness:   Acute hoarseness could be mediastinal disease progression. Concerned for mediastinal disease progression causing laryngeal nerve paralysis.  Primary team considering ENT referral to evaluate loss of speech. Full liquid diet recommended. CT chest 12/4/2020 - awaiting comparison to PET/CT in 11/2020.   3. Thrombocytopenia: Could be multifactorial due to combination of chemotherapy-induced myelosuppression and occult GI bleeding  from gastric ulceration, could also be paraneoplastic.  Although ITP is one of the differential diagnosis it seems less likely considering the setting.  He recently had chemotherapy, and now has severe thrombocytopenia.  Bone marrow metastases should also be considered.  Haptoglobin is not indicative of hemolysis.  Status post Nplate.  No signs of DIC. Platelets continue to slowly improve. No transfusion needed today.   4. Leukopenia/Neutropenia: Resolved.  Due to chemo induced myelosuppression.  GCSF given and discontinued.   5. Anemia: On B12 and folic acid as outpatient due to Alimta regimen.  Anemia work-up ordered.    6. Leukocytosis: likely secondary to GCSF   7. Gastric wall thickening and hypermetabolic activity: visualized on recent PET scan: EGD done with PEG showed ulcerated GEJ suggesting potentially metastatic involvement by the lung cancer.  Biopsy was deferred given thrombocytopenia.     8. Previous hospital admission for pneumonia/Pneumonia: Status post antibiotics and steroids recently.  Finished outpatient doxycycline.   9. Chronic respiratory failure/Shortness of air: Fluid overload  10. Atrial fibrillation with RVR: Currently off Cardizem drip.  11. Diastolic cardiomyopathy: Noted on previous echo.  12. COPD:  management per Primary team   13. Nutrition: PEG tube placed on 11/28/2020. Started on tube feeds.  Dietician following.  14. Altered mental coordination/Fine motor skills: MRI brain ordered   15. Hyponatremia/Hyperphosphatemia:  management per Primary team. Dietician is also following.    16. Prognosis: Guarded      PLAN  1. CBC daily   2. Discussed with pulmonary.  Patient underwent thoracentesis.  1.1 L removed.  3. Bronchoscopy tomorrow to help lavage.  4. Radiation oncology consulted.  Plan for radiation tomorrow..  Discussed with Dr. Lux  5. Transfuse platelets only for bleeding. Platelet transfusions have largely not been successful.  6. Continue diuretics   7. Continue to hold  anticoagulants and antiplatelet agents  8. Continue folic acid 1 mg po daily  9. Conitnue B12 1000 mcg IM every 28 days, give today   10. MRI brain    11. Will need repeat EGD in 1 week once thrombocytopenia resolves  12. Use PEG tube for supplemental nutrition - now is on supplemental regular diet   13. Plan to change to immunotherapy if patient continues treatment.                Electronically signed by Ziggy Daley MD, 12/07/20, 9:10 PM EST.

## 2020-12-08 NOTE — ANESTHESIA POSTPROCEDURE EVALUATION
Patient: Shay Hernandez    Procedure Summary     Date: 12/08/20 Room / Location: [facility] ENDOSCOPY 4 / [facility] ENDOSCOPY    Anesthesia Start: 0705 Anesthesia Stop: 0740    Procedure: BRONCHOSCOPY WITH BRONCHIAL WASHING (N/A Bronchus) Diagnosis:       Chronic obstructive pulmonary disease, unspecified COPD type (CMS/HCC)      (Chronic obstructive pulmonary disease, unspecified COPD type (CMS/HCC) [J44.9])    Surgeon: Anup Daniels MD Provider: Velma Monahan MD    Anesthesia Type: MAC ASA Status: 4          Anesthesia Type: MAC    Vitals  Vitals Value Taken Time   BP 95/56 12/08/20 0759   Temp     Pulse 77 12/08/20 0759   Resp 22 12/08/20 0759   SpO2 99 % 12/08/20 0759           Post Anesthesia Care and Evaluation    Patient location during evaluation: PACU  Patient participation: complete - patient participated  Level of consciousness: awake  Pain scale: See nurse's notes for pain score.  Pain management: adequate  Airway patency: patent  Anesthetic complications: No anesthetic complications  PONV Status: none  Cardiovascular status: acceptable  Respiratory status: acceptable  Hydration status: acceptable    Comments: Patient seen and examined postoperatively; vital signs stable; SpO2 greater than or equal to 90%; cardiopulmonary status stable; nausea/vomiting adequately controlled; pain adequately controlled; no apparent anesthesia complications; patient discharged from anesthesia care when discharge criteria were met

## 2020-12-08 NOTE — TELEPHONE ENCOUNTER
Case Management/ Note    Patient Name: Shay Hernandez  YOB: 1940  MRN #: [id number]    OSW called patient's daughter, Eliane at her request. She said her father needs a ramp to their home. She was told of [facility] and is open to a referral being made. OSW called and spoke with Radha at [facility] who will contact Eliane. She said her father needed a letter of medical necessity to go by ambulance from home to radiation therapy treatments and this form was completed by Dr. Lxu and given to RENAN Matthews to give to the patient's daughter. She said her parents are concerned about the medical bills and she was told of the financial assistance application. She said she would need to speak with her mother before proceeding. OSW will remain available.     Electronically signed by:   Julisa Schultz LCSW, OSW-C  12/08/20, 14:59 EST

## 2020-12-08 NOTE — DISCHARGE SUMMARY
[facility]  DISCHARGE SUMMARY        Prepared For PCP:  Ambar Hassan APRN    Patient Name: Shay Hernandez  : 1940  MRN: [id number]      Date of Admission:   2020    Date of Discharge:  2020    Length of stay:  LOS: 12 days     Hospital Course     Presenting Problem:   Thrombocytopenia (CMS/HCC) [D69.6]  Thrombocytopenia (CMS/HCC) [D69.6]  Feeding difficulties [R63.3]      Active Hospital Problems    Diagnosis  POA   • **Feeding difficulties [R63.3]  Unknown   • Moderate malnutrition (CMS/HCC) [E44.0]  Yes   • Thrombocytopenia (CMS/HCC) [D69.6]  Yes   • Chronic obstructive pulmonary disease (CMS/HCC) [J44.9]  Unknown      Resolved Hospital Problems   No resolved problems to display.           Hospital Course:  Shay Hernandez is a 80 y.o. male with history of stage IIIc poorly differentiated adenocarcinoma lung, atrial fibrillation and COPD  presenting as direct admission from oncologist office for progressive generalized weakness as well as thrombocytopenia.  Platelets were 13 on day of admission, though no overt bleeding present, plt transfused.  He also developed A. Fib with RVR which was controlled with Cardizem.  PEG placed on  as platelet count improved with steroids and after tranfusion.  EGD revealed ulcerated GEJ that was suggestive of metastatic involvement. Patient and family denied hospice care.  Due to fluid overload, was started on lasix.  Tube feeds were started as per dietician recommendation. CT showed progression of disease and thoracentesis performed on 2020.  Radiation and Bronchoscopy done on 2020. Patient was inadvertently discharged by nursing before Primary could discuss plan with patient but patient was clear to to go [facility] s/p radiation as per heme/onc.       Of note, patient was inadvertently discharged by nursing before physician could assess patient, discharge orders placed due to fact that patient is  absent.  Orders based upon previous available records.  Due to primary's inability to assess patient before discharge, inaccuracies may be present.    Recommendation for Outpatient Providers:       - Follow up with PCP within 5-7 days   - Follow up with Dr. Daley within 2 weeks   - Follow up with Dr. Ferguson within 2 weeks   - Change Lopressor to 50mg twice day   - Hold Eliquis until follow up with Heme/Onc and has been approved   - Take Omnicef 300mg twice a day for 10 days   - Take Cardizem 30mg every 6 hours   - Take Lasix 40mg twice a day   - Take Reglan 10mg three times a day before meals   - Take Nystatin swish and swallow four times a day for 15 more doses   - Tube feeds as follows:  Isosource 1.5 at 70 ml/hr + 10 ml q 2 hrs water flush   - [facility] to provide home tube feeds, supplies to be provided tonight   -[facility] to provide home hospital bed and equipment  -Repeat EGD in one week.      Reasons For Change In Medications and Indications for New Medications:        Day of Discharge     HPI:   Patient inadvertently discharged by nursing before primary could assess, apparently heme/onc ok for d/c to [facility].    Vital Signs:   Temp:  [97 °F (36.1 °C)] 97 °F (36.1 °C)  Heart Rate:  [] 119  Resp:  [15-22] 22  BP: ()/(42-71) 118/71     Physical Exam:  Physical Exam  Patient was inadvertantly discharged by staff before physician could evaluate patient, physical exam was unable to be performed  Pertinent  and/or Most Recent Results     Results from last 7 days   Lab Units 12/08/20  0541 12/07/20  0553 12/06/20  0502 12/05/20  0609 12/04/20  0403 12/03/20  0527 12/02/20  1315 12/02/20  0923   WBC 10*3/mm3 19.70* 16.20* 14.00* 15.70* 14.70* 17.90* 8.10  --    HEMOGLOBIN g/dL 10.0* 9.8* 9.8* 10.6* 10.6* 11.1* 15.2  --    HEMATOCRIT % 30.1* 29.8* 29.8* 32.7* 31.7* 34.0* 45.9  --    PLATELETS 10*3/mm3 116* 66* 33* 19* 11* 10* 6*  --    SODIUM mmol/L 138 136 131*  --  133* 130*  --  130*   POTASSIUM mmol/L 3.4*  3.3* 3.5  --  4.0 3.1*  --  3.6   CHLORIDE mmol/L 97* 96* 93*  --  96* 91*  --  94*   CO2 mmol/L 33.0* 31.0* 30.0*  --  29.0 32.0*  --  26.0   BUN mg/dL 22 20 21  --  15 14  --  13   CREATININE mg/dL 0.67* 0.67* 0.64*  --  0.59* 0.51*  --  0.46*   GLUCOSE mg/dL 155* 202* 244*  --  155* 158*  --  139*   CALCIUM mg/dL 8.1* 7.7* 7.6*  --  7.5* 7.9*  --  7.4*     Results from last 7 days   Lab Units 12/02/20  0923   BILIRUBIN mg/dL 0.8   ALK PHOS U/L 133*   ALT (SGPT) U/L 19   AST (SGOT) U/L 17           Invalid input(s): TG, LDLCALC, LDLREALC  Results from last 7 days   Lab Units 12/08/20  0541 12/07/20  0553 12/06/20  0502  12/02/20  0923   PROBNP pg/mL 1,779.0 1,299.0 962.5   < >  --    PROCALCITONIN ng/mL  --   --   --   --  0.23    < > = values in this interval not displayed.       Brief Urine Lab Results  (Last result in the past 365 days)      Color   Clarity   Blood   Leuk Est   Nitrite   Protein   CREAT   Urine HCG        11/25/20 2135 Dark Yellow  Comment:  Result checked  Turbid  Comment:  Result checked  Small (1+) Small (1+) Negative 30 mg/dL (1+)               Microbiology Results Abnormal     Procedure Component Value - Date/Time    Respiratory Culture - Wash, Lung, L [[id number]] Collected: 12/08/20 0733    Lab Status: Preliminary result Specimen: Wash from Lung, L Updated: 12/08/20 0850     Gram Stain Rare (1+) WBCs per low power field      Rare (1+) Epithelial cells per low power field      Rare (1+) Gram positive cocci    Body Fluid Culture - Body Fluid, Pleural Cavity [[id number]] Collected: 12/07/20 1630    Lab Status: Preliminary result Specimen: Body Fluid from Pleural Cavity Updated: 12/08/20 0657     Body Fluid Culture No growth at less than 24 hours     Gram Stain Few (2+) WBCs per low power field      No organisms seen    Blood Culture - Blood, Arm, Left [[id number]] Collected: 11/25/20 2013    Lab Status: Final result Specimen: Blood from Arm, Left Updated: 11/30/20 2245     Blood Culture No  growth at 5 days    Blood Culture - Blood, Hand, Left [[id number]] Collected: 11/25/20 2035    Lab Status: Final result Specimen: Blood from Hand, Left Updated: 11/30/20 2115     Blood Culture No growth at 5 days    COVID PRE-OP / PRE-PROCEDURE SCREENING ORDER (NO ISOLATION) - Swab, Nasopharynx [[id number]]  (Normal) Collected: 11/27/20 0802    Lab Status: Final result Specimen: Swab from Nasopharynx Updated: 11/27/20 0855    Narrative:      The following orders were created for panel order COVID PRE-OP / PRE-PROCEDURE SCREENING ORDER (NO ISOLATION) - Swab, Nasopharynx.  Procedure                               Abnormality         Status                     ---------                               -----------         ------                     COVID-19,CEPHEID,COR/UDAY...[[id number]]  Normal              Final result                 Please view results for these tests on the individual orders.    COVID-19,CEPHEID,COR/UDAY/PAD IN-HOUSE(OR EMERGENT/ADD-ON),NP SWAB IN TRANSPORT MEDIA 3-4 HR TAT - Swab, Nasopharynx [[id number]]  (Normal) Collected: 11/27/20 0802    Lab Status: Final result Specimen: Swab from Nasopharynx Updated: 11/27/20 0855     COVID19 Not Detected    Narrative:      Fact sheet for providers: https://www.fda.gov/media/311101/download     Fact sheet for patients: https://www.fda.gov/media/613258/download          Ct Chest Without Contrast    Result Date: 11/26/2020  Impression: 1.Compared to previous examinations the paratracheal and mediastinal lymphadenopathy is larger. 2.There is a moderate size right pleural effusion and small left pleural effusion which are increased in size from previous study. 3.The right hilar mass is not definite change in size. 4.There are some new nodules in the right lower lobe. 5.There is increased opacity in the right lower lobe. 6.Cardiomegaly and severe atherosclerotic disease and coronary artery disease. 7.No change in a low-density lesion at the right hepatic dome. 8.Diffuse  stomach wall thickening similar previous exam.    Electronically Signed By-Meg Marie MD On:11/26/2020 1:38 PM This report was finalized on [id number] by  Meg Marie MD.    Ct Chest With Contrast    Result Date: 12/6/2020  Impression:  1.  There is almost complete right lower lung consolidation.  This could represent a postobstructive pneumonitis and/or atelectasis.  There is opacification of the right lower lobe bronchus. 2.  There is moderate infiltrate in the posterior segment right upper lung and mild to moderate right middle lobe atelectasis.  Again, this may represent a postobstructive pneumonitis and atelectasis. 3.  There is very marked mediastinal and right hilar adenopathy.  This adenopathy narrows the right upper and middle lobe bronchi causing postobstructive pneumonitis and atelectasis. 4.  There is a small right upper lung nodule too small to characterize, but more likely a granuloma. 5.  Mild to moderate left lower lung infiltrate and consolidation may represent pneumonia.   Thank you for the referral of this patient. This exam was interpreted by an American Board of Radiology certified radiologist.  Electronically signed by:  Emery Monte M.D.  12/5/2020 11:26 PM   Addendum is to compare to prior PET/CT from 11/05/2020.  Supraclavicular lymphadenopathy appears increased with an index left supraclavicular lymph node on image 18 measuring 2.4 x 1.9 cm, previously 1.5 x 1.0 cm.  Mediastinal lymphadenopathy overall appears slightly increased, with an index subcarinal lymph node on image 57 measuring 4.6 x 3.4 cm, previously 4.1 x 3.1 cm.  Right hilar lymphadenopathy appears increased with an index right hilar lymph node on image 57 measuring 3.1 x 2.7 cm, previously 2.6 x 2.4 cm.  A previously described right hilar mass extending into the right lower lobe is difficult to characterize given the adjacent pleural effusion and consolidation.  Overall, there appears to be disease progression compared  to prior PET/CT from 11/05/2020.  Electronically Signed By-Maurilio Dos Santos MD On:12/6/2020 12:32 PM This report was finalized on [id number] by  Maurilio Dos Santos MD.    Xr Chest 1 View    Result Date: 12/7/2020  Impression:  1. No evidence of postthoracentesis pneumothorax. Near complete resolution of right pleural effusion status post thoracentesis. 2. Mixed interstitial and alveolar opacities in the perihilar regions bilaterally suggesting atypical infection pattern or edema. 3. Bulky right hilar adenopathy.  Electronically Signed By-Carlos Flores MD On:12/7/2020 4:50 PM This report was finalized on [id number] by  Carlos Flores MD.    Xr Chest 1 View    Result Date: 12/2/2020  Impression:  1. Bilateral mixed interstitial/airspace disease which is worsened from the previous study. This can be seen with multifocal pneumonia or pulmonary edema. 2. The patient either has a small to moderate subpulmonic pleural effusion or elevation of the right hemidiaphragm. This is unchanged from the previous exam. There is also unchanged dense consolidation in the right lower chest. 3. Thickening of the right paratracheal stripe consistent with known adenopathy. 4. Fullness in the right hilum consistent with a known lung cancer.  Electronically Signed By-Antonio Graham MD On:12/2/2020 10:37 AM This report was finalized on [id number] by  Antonio Graham MD.    Us Thoracentesis    Result Date: 12/7/2020  Impression: IMPRESSION : Ultrasound-guided large volume diagnostic and therapeutic right thoracentesis procedure. 1.1 L of fluid obtained.  Electronically Signed By-Carlos Flores MD On:12/7/2020 4:47 PM This report was finalized on [id number] by  Carlos Flores MD.    Xr Chest Pa & Lateral    Result Date: 11/25/2020  Impression: 1 increasing right lower lobe airspace disease may represent atelectasis or pneumonia and small right pleural effusion. 2. Patient has right lower lobe lung lesion consistent with the appearance of  malignancy on previous PET/CT. Right paratracheal adenopathy is observed on today's x-ray. 3. FINDINGS consistent with developing interstitial edema in comparison to the 10/26/2020 exam.  Electronically Signed By-Aranza Martinez MD On:11/25/2020 5:01 PM This report was finalized on [id number] by  Aranza Martinez MD.                Results for orders placed during the hospital encounter of 10/26/20   Adult Transthoracic Echo Complete W/ Cont if Necessary Per Protocol    Narrative · The left atrial cavity is severely dilated.  · Left ventricular diastolic function is consistent with (grade II w/high   LAP) pseudonormalization.  · Left ventricular wall thickness is consistent with concentric   hypertrophy.  · Estimated left ventricular EF was in agreement with the calculated left   ventricular EF. Left ventricular ejection fraction appears to be 61 - 65%.   Left ventricular systolic function is normal.  · There is moderate calcification of the aortic valve mainly affecting the   non-coronary cusp(s).  · There is mild, bileaflet mitral valve thickening present.  · Mild to moderate mitral valve regurgitation is present with a   centrally-directed jet noted.  · Mild tricuspid valve regurgitation is present.  · Estimated right ventricular systolic pressure from tricuspid   regurgitation is mildly elevated (35-45 mmHg).     Moderate MR  Severe left atrial enlargement  Grade 2 diastolic dysfunction  Preserved LV systolic function EF 60 to 65%  Normal RV size and function  Normal IVC  Mild pulmonary hypertension by TR gradient  If clinically indicated would recommend transesophageal echo for   evaluation of mitral regurgitation with severe left atrial enlargement,   spectral Doppler and color Doppler indicate not more than moderate but   suboptimal acoustic windows, possibly underestimated, recommend ESE if   clinically indicated and were suspicious of severe MR                 Test Results Pending at Discharge  Pending  Labs     Order Current Status    Anaerobic Culture - Pleural Fluid, Pleural Cavity In process    Fungus Culture - Wash, Lung, L In process    Non-gynecologic Cytology In process    Non-gynecologic Cytology In process    Body Fluid Culture - Body Fluid, Pleural Cavity Preliminary result    Respiratory Culture - Wash, Lung, L Preliminary result            Procedures Performed  Procedure(s):  BRONCHOSCOPY WITH BRONCHIAL WASHING         Consults:   Consults     Date and Time Order Name Status Description    12/7/2020 1151 Inpatient Palliative Care MD Consult      12/7/2020 0030 Inpatient Radiation Oncology Consult Completed     12/6/2020 1457 Inpatient Palliative Care MD Consult      12/6/2020 1409 Inpatient Pulmonology Consult Completed     12/6/2020 1056 Inpatient Pulmonology Consult Completed     11/25/2020 1608 Inpatient Gastroenterology Consult Completed     11/25/2020 1608 Hematology & Oncology Inpatient Consult      10/27/2020 1710 Inpatient Cardiology Consult Completed     10/26/2020 1641 IP Consult to Pulmonology Completed     10/26/2020 1445 Hospitalist (on-call MD unless specified) Completed             Discharge Details        Discharge Medications      ASK your doctor about these medications      Instructions Start Date   albuterol sulfate  (90 Base) MCG/ACT inhaler  Commonly known as: PROVENTIL HFA;VENTOLIN HFA;PROAIR HFA   2 puffs, Inhalation, Every 4 Hours PRN      aspirin 81 MG EC tablet   81 mg, Oral, Daily, Dr. Richards told pt to keep taking until dos      budesonide-formoterol 160-4.5 MCG/ACT inhaler  Commonly known as: SYMBICORT   2 puffs, Inhalation, 2 Times Daily - RT      dexamethasone 4 MG tablet  Commonly known as: DECADRON   4 mg, Oral, 2 Times Daily With Meals, For 3 days after chemo      Eliquis 5 MG tablet tablet  Generic drug: apixaban   TAKE 1 TABLET BY MOUTH EVERY 12 (TWELVE) HOURS FOR 30 DAYS.      famotidine 40 MG tablet  Commonly known as: Pepcid   40 mg, Oral, Nightly PRN       folic acid 1 MG tablet  Commonly known as: FOLVITE   1 mg, Oral, Daily, Start 7 days prior to chemotherapy until at least 3 weeks after all chemotherapy.      ipratropium-albuterol 0.5-2.5 mg/3 ml nebulizer  Commonly known as: DUO-NEB   3 mL, Nebulization, 4 Times Daily - RT      lidocaine-prilocaine 2.5-2.5 % cream  Commonly known as: EMLA   Topical, As Needed      metoprolol tartrate 25 MG tablet  Commonly known as: LOPRESSOR   25 mg, Oral, Every 12 Hours Scheduled      mirtazapine 15 MG tablet  Commonly known as: REMERON   15 mg, Oral, Nightly, To increase appetite      multivitamin with minerals tablet tablet   1 tablet, Oral, Daily      nicotine 7 MG/24HR patch  Commonly known as: NICODERM CQ   1 patch, Transdermal, Every 24 Hours, 1 patch daily X 3 weeks      ondansetron 8 MG tablet  Commonly known as: ZOFRAN   8 mg, Oral, 3 Times Daily PRN      pantoprazole 40 MG EC tablet  Commonly known as: PROTONIX   40 mg, Oral, Daily      vitamin C 500 MG tablet  Commonly known as: ASCORBIC ACID   500 mg, Oral, Daily      Vitamin D (Cholecalciferol) 10 MCG (400 UNIT) tablet  Commonly known as: CHOLECALCIFEROL   400 Units, Oral, Daily      vitamin E 100 UNIT capsule   100 Units, Oral, Daily             No Known Allergies      Discharge Disposition:      Diet:  Hospital:  Diet Order   Procedures   • Diet Liquids; Full Liquid         Discharge Activity:         CODE STATUS:    Code Status and Medical Interventions:   Ordered at: 11/25/20 160     Code Status:    CPR     Medical Interventions (Level of Support Prior to Arrest):    Full         Follow-up Appointments  Future Appointments   Date Time Provider Department Center   12/8/2020  3:40 PM Zion uLx MD [facility] RO None   12/9/2020  1:45 PM [facility] RO None   12/10/2020 10:15 AM Jan Richards MD [facility] NA None   12/10/2020  1:45 PM [facility] RO None   12/11/2020  1:45 PM [facility] RO None    12/14/2020  1:45 PM  UDAY [facility] S[facility] LIN[facility]  UDAY RO None   12/14/2020  2:00 PM Ziggy Daley MD [facility] ONC NA UDAY   12/14/2020  2:00 PM LAB MD [facility] ONC LAB NA [facility] ONAL UDAY   12/29/2020  8:30 AM INF ROOM 22 - CHAIR A  UDAY [facility] CC NA LAG       Additional Instructions for the Follow-ups that You Need to Schedule     Ambulatory Referral to Home Health   As directed      Face to Face Visit Date: 12/8/2020    Follow-up provider for Plan of Care?: I will be treating the patient on an ongoing basis.  Please send me the Plan of Care for signature.    Follow-up provider: PORTILLO STARK [[id number]]    Reason/Clinical Findings: Evaluation and treat    Describe mobility limitations that make leaving home difficult: weakness    Nursing/Therapeutic Services Requested: Skilled Nursing Physical Therapy    Skilled nursing orders: Other    Frequency: 1 Week 1                 Condition on Discharge:      Primary was unable to assess patient on discharge to [facility] as nursing inadvertently discharged patient before primary could assess, discuss plan, or place orders      This patient has been examined wearing appropriate Personal Protective Equipment and discussed with Patient. 12/08/20      Electronically signed by Justino Youssef DO, 12/08/20, 3:10 PM EST.      Time: I spent    minutes on this discharge activity which included face-to-face encounter with the patient/reviewing the data in the system/coordination of the care with the nursing staff as well as consultants/documentation/entering orders.

## 2020-12-08 NOTE — PROGRESS NOTES
"PULMONARY CRITICAL CARE Progress  NOTE      PATIENT IDENTIFICATION:  Name: Shay Hernandez  MRN: [id number]  :  1940     Age: 80 y.o.  Sex: male    DATE OF Note:  2020   Referring Physician: Justino Youssef DO                  Subjective:    Feeling much better after bronchoscopy no shortness of breath SOB no chest pain, no nausea or vomiting, no change in bowel habit, no dysuria,  no new  skin rash or itching.      Objective:  tMax 24 hrs: Temp (24hrs), Av °F (36.1 °C), Min:97 °F (36.1 °C), Max:97 °F (36.1 °C)      Vitals Ranges:   Temp:  [97 °F (36.1 °C)] 97 °F (36.1 °C)  Heart Rate:  [] 119  Resp:  [15-22] 22  BP: ()/(42-71) 118/71    Intake and Output Last 3 Shifts:   I/O last 3 completed shifts:  In: 900 [Other:60; NG/GT:840]  Out: 1100 [Other:1100]    Exam:  /71   Pulse 119   Temp 97 °F (36.1 °C)   Resp 22   Ht 188 cm (74\")   Wt 78 kg (171 lb 15.3 oz)   SpO2 95%   BMI 22.08 kg/m²     General Appearance:   Alert oriented fatigue  HEENT:  Normocephalic, without obvious abnormality, Conjunctiva/corneas clear,.  Normal external ear canals, Nares normal, no drainage     Neck:  Supple, symmetrical, trachea midline. No JVD.  Lungs /Chest wall:   Bilateral basal rhonchi, respirations unlabored symmetrical wall movement.     Heart:  Regular rate and rhythm, systolic murmur PMI left sternal border  Abdomen: Soft, non-tender, no masses, no organomegaly.    Extremities: Trace edema no clubbing or Cyanosis        Medications:    Current Facility-Administered Medications:   •  acetaminophen (TYLENOL) tablet 650 mg, 650 mg, Oral, Q4H PRN, Lizeth Nance MD  •  acetylcysteine (MUCOMYST) 20 % solution  - ADS Override Pull, , , ,   •  benzonatate (TESSALON) capsule 200 mg, 200 mg, Oral, TID PRN, Elder Tineo MD  •  bisacodyl (DULCOLAX) EC tablet 5 mg, 5 mg, Oral, Daily PRN, Lizeth Nance MD  •  budesonide-formoterol (SYMBICORT) 160-4.5 MCG/ACT inhaler 2 puff, 2 " puff, Inhalation, BID - RT, Lizeth Nance MD, 2 puff at 12/07/20 2044  •  cyanocobalamin injection 1,000 mcg, 1,000 mcg, Intramuscular, Q28 Days, Carol Fragoso APRN, 1,000 mcg at 12/06/20 1724  •  dilTIAZem (CARDIZEM) 100 mg in 100 mL NS infusion (ADV), 5-15 mg/hr, Intravenous, Titrated, Lizeth Nance MD, Last Rate: 5 mL/hr at 11/27/20 1244, 5 mg/hr at 11/27/20 1244  •  dilTIAZem (CARDIZEM) tablet 30 mg, 30 mg, Oral, Q6H, Lizeth Nance MD, 30 mg at 12/08/20 0531  •  folic acid (FOLVITE) tablet 1 mg, 1 mg, Oral, Daily, Lizeth Nance MD, 1 mg at 12/07/20 0922  •  furosemide (LASIX) injection 40 mg, 40 mg, Intravenous, Q12H, Farrah Grimm MD, 40 mg at 12/08/20 0530  •  Hold medication, 1 each, Does not apply, Continuous PRN, Anup Daniels MD  •  ipratropium-albuterol (DUO-NEB) nebulizer solution 3 mL, 3 mL, Nebulization, 4x Daily - RT, Lizeth Nance MD, 3 mL at 12/08/20 1123  •  [START ON 12/9/2020] lansoprazole (FIRST) oral suspension 30 mg, 30 mg, Per G Tube, QAM, Anup Daniels MD  •  Lidocaine HCl (Cardiac) PF (XYLOCAINE) 100 MG/5ML injection  - ADS Override Pull, , , ,   •  Lidocaine HCl Urethral/Mucosal/Topical 2% (XYLOCAINE) 2 % gel  - ADS Override Pull, , , ,   •  magnesium hydroxide (MILK OF MAGNESIA) suspension 2400 mg/10mL 10 mL, 10 mL, Oral, Daily PRN, Lizeth Nance MD  •  Magnesium Sulfate 2 gram Bolus, followed by 8 gram infusion (total Mg dose 10 grams)- Mg less than or equal to 1mg/dL, 2 g, Intravenous, PRN **OR** Magnesium Sulfate 2 gram / 50mL Infusion (GIVE X 3 BAGS TO EQUAL 6GM TOTAL DOSE) - Mg 1.1 - 1.5 mg/dl, 2 g, Intravenous, PRN, Last Rate: 25 mL/hr at 11/28/20 0230, 2 g at 11/28/20 0230 **OR** Magnesium Sulfate 4 gram infusion- Mg 1.6-1.9 mg/dL, 4 g, Intravenous, PRN, Lizeth Nance MD, Last Rate: 25 mL/hr at 12/05/20 1000, 4 g at 12/05/20 1000  •  melatonin tablet 5 mg, 5 mg, Oral, Nightly PRN, Lizeth Nance MD  •  metoclopramide (REGLAN) tablet 10 mg,  10 mg, Oral, TID AC, Lizeth Nance MD, 10 mg at 12/07/20 1207  •  metoprolol tartrate (LOPRESSOR) tablet 50 mg, 50 mg, Oral, Q12H, Lizeth Nance MD, 50 mg at 12/08/20 1143  •  mirtazapine (REMERON) tablet 15 mg, 15 mg, Oral, Nightly, Lizeth Nance MD, 15 mg at 12/07/20 2333  •  nicotine (NICODERM CQ) 7 MG/24HR patch 1 patch, 1 patch, Transdermal, Daily, Lizeth Nance MD, 1 patch at 12/03/20 1112  •  nitroglycerin (NITROSTAT) SL tablet 0.4 mg, 0.4 mg, Sublingual, Q5 Min PRN, Lizeth Nance MD  •  nystatin (MYCOSTATIN) 629783 UNIT/ML suspension 500,000 Units, 5 mL, Swish & Swallow, 4x Daily, Lizeth Nance MD, 500,000 Units at 12/08/20 1154  •  ondansetron (ZOFRAN) tablet 4 mg, 4 mg, Oral, Q6H PRN **OR** ondansetron (ZOFRAN) injection 4 mg, 4 mg, Intravenous, Q6H PRN, Lizeth Nance MD  •  piperacillin-tazobactam (ZOSYN) IVPB 3.375 g in 100 mL NS (CD), 3.375 g, Intravenous, Q8H, Draw, MD Vivian, 3.375 g at 12/08/20 1144  •  potassium & sodium phosphates (PHOS-NAK) 280-160-250 MG packet - for Phosphorus less than 1.25 mg/dL, 2 packet, Oral, Q6H PRN, 2 packet at 11/30/20 0122 **OR** potassium & sodium phosphates (PHOS-NAK) 280-160-250 MG packet - for Phosphorus 1.25 - 2.5 mg/dL, 2 packet, Oral, Q6H PRN, Lizeth Nance MD, 2 packet at 12/07/20 1907  •  potassium chloride (K-DUR,KLOR-CON) CR tablet 40 mEq, 40 mEq, Oral, PRN, 40 mEq at 11/26/20 1807 **OR** potassium chloride (KLOR-CON) packet 40 mEq, 40 mEq, Oral, PRN, 40 mEq at 12/03/20 1759 **OR** potassium chloride 10 mEq in 100 mL IVPB, 10 mEq, Intravenous, Q1H PRN, Lizeth Nance MD, Last Rate: 100 mL/hr at 11/27/20 1024, 10 mEq at 11/27/20 1024  •  sodium chloride 0.9 % flush 10 mL, 10 mL, Intravenous, Q12H, Lizeth Nance MD, 10 mL at 12/08/20 1446  •  sodium chloride 0.9 % flush 10 mL, 10 mL, Intravenous, PRN, Lizeth Nance MD  •  sodium chloride 0.9 % infusion, 30 mL/hr, Intravenous, Continuous PRN, Lizeth Nance MD, Last  Rate: 30 mL/hr at 12/08/20 1144, 30 mL/hr at 12/08/20 1144  •  sodium chloride nasal spray 2 spray, 2 spray, Each Nare, PRN, Lizeth Nance MD  •  sucralfate (CARAFATE) tablet 1 g, 1 g, Oral, 4x Daily AC & at Bedtime, Lizeth Nance MD, 1 g at 12/08/20 1147  •  traZODone (DESYREL) tablet 50 mg, 50 mg, Oral, Nightly, Ziggy Daley MD, 50 mg at 12/07/20 2332    Current Outpatient Medications:   •  dexamethasone (DECADRON) 4 MG tablet, Take 4 mg by mouth 2 (Two) Times a Day With Meals. For 3 days after chemo, Disp: , Rfl:   •  albuterol sulfate  (90 Base) MCG/ACT inhaler, Inhale 2 puffs Every 4 (Four) Hours As Needed for Wheezing., Disp: 8 g, Rfl: 0  •  aspirin (aspirin) 81 MG EC tablet, Take 81 mg by mouth Daily. Dr. Richards told pt to keep taking until dos, Disp: , Rfl:   •  budesonide-formoterol (SYMBICORT) 160-4.5 MCG/ACT inhaler, Inhale 2 puffs 2 (Two) Times a Day., Disp: 1 inhaler, Rfl: 0  •  cefdinir (OMNICEF) 300 MG capsule, Take 1 capsule by mouth 2 (Two) Times a Day for 10 days., Disp: 20 capsule, Rfl: 0  •  dilTIAZem (CARDIZEM) 30 MG tablet, Take 1 tablet by mouth Every 6 (Six) Hours., Disp: 120 tablet, Rfl: 1  •  famotidine (Pepcid) 40 MG tablet, Take 1 tablet by mouth At Night As Needed for Heartburn., Disp: 30 tablet, Rfl: 1  •  folic acid (FOLVITE) 1 MG tablet, Take 1 tablet by mouth Daily. Start 7 days prior to chemotherapy until at least 3 weeks after all chemotherapy., Disp: 30 tablet, Rfl: 5  •  furosemide (Lasix) 40 MG tablet, Take 1 tablet by mouth 2 (Two) Times a Day., Disp: 60 tablet, Rfl: 1  •  ipratropium-albuterol (DUO-NEB) 0.5-2.5 mg/3 ml nebulizer, Take 3 mL by nebulization 4 (Four) Times a Day. (Patient taking differently: Take 3 mL by nebulization 4 (Four) Times a Day. No taking yet), Disp: 120 mL, Rfl: 5  •  lidocaine-prilocaine (EMLA) 2.5-2.5 % cream, Apply  topically to the appropriate area as directed As Needed for Mild Pain  (45min prior to needle insertion)., Disp: 30  g, Rfl: 5  •  metoclopramide (REGLAN) 10 MG tablet, Take 1 tablet by mouth 3 (Three) Times a Day Before Meals., Disp: 90 tablet, Rfl: 1  •  metoprolol tartrate (LOPRESSOR) 25 MG tablet, Take 1 tablet by mouth Every 12 (Twelve) Hours for 30 days., Disp: 60 tablet, Rfl: 0  •  metoprolol tartrate (LOPRESSOR) 50 MG tablet, Take 1 tablet by mouth Every 12 (Twelve) Hours., Disp: 60 tablet, Rfl: 2  •  mirtazapine (REMERON) 15 MG tablet, Take 1 tablet by mouth Every Night. To increase appetite, Disp: 30 tablet, Rfl: 5  •  Multiple Vitamins-Minerals (HEALTHY EYES PO), Take 1 tablet by mouth Daily., Disp: , Rfl:   •  nicotine (NICODERM CQ) 7 MG/24HR patch, Place 1 patch on the skin as directed by provider Daily. 1 patch daily X 3 weeks, Disp: 42 patch, Rfl: 0  •  nystatin (MYCOSTATIN) 339284 UNIT/ML suspension, Swish and swallow 5 mL 4 (Four) Times a Day for 15 doses., Disp: 473 mL, Rfl: 0  •  ondansetron (ZOFRAN) 8 MG tablet, Take 1 tablet by mouth 3 (Three) Times a Day As Needed for Nausea or Vomiting., Disp: 30 tablet, Rfl: 5  •  pantoprazole (PROTONIX) 40 MG EC tablet, Take 1 tablet by mouth Daily., Disp: 14 tablet, Rfl: 0  •  vitamin C (ASCORBIC ACID) 500 MG tablet, Take 500 mg by mouth Daily., Disp: , Rfl:   •  Vitamin D, Cholecalciferol, (CHOLECALCIFEROL) 10 MCG (400 UNIT) tablet, Take 400 Units by mouth Daily., Disp: , Rfl:   •  vitamin E 100 UNIT capsule, Take 100 Units by mouth Daily., Disp: , Rfl:     Data Review:  All labs (24hrs):   Recent Results (from the past 24 hour(s))   POC Glucose Once    Collection Time: 12/07/20  6:43 PM    Specimen: Blood   Result Value Ref Range    Glucose 137 (H) 70 - 105 mg/dL   POC Glucose Once    Collection Time: 12/08/20 12:07 AM    Specimen: Blood   Result Value Ref Range    Glucose 215 (H) 70 - 105 mg/dL   Magnesium    Collection Time: 12/08/20  5:41 AM    Specimen: Blood   Result Value Ref Range    Magnesium 2.0 1.6 - 2.4 mg/dL   Phosphorus    Collection Time: 12/08/20  5:41 AM     Specimen: Blood   Result Value Ref Range    Phosphorus 3.6 2.5 - 4.5 mg/dL   BNP    Collection Time: 12/08/20  5:41 AM    Specimen: Blood   Result Value Ref Range    proBNP 1,779.0 0.0-1,800.0 pg/mL   Basic Metabolic Panel    Collection Time: 12/08/20  5:41 AM    Specimen: Blood   Result Value Ref Range    Glucose 155 (H) 65 - 99 mg/dL    BUN 22 8 - 23 mg/dL    Creatinine 0.67 (L) 0.76 - 1.27 mg/dL    Sodium 138 136 - 145 mmol/L    Potassium 3.4 (L) 3.5 - 5.2 mmol/L    Chloride 97 (L) 98 - 107 mmol/L    CO2 33.0 (H) 22.0 - 29.0 mmol/L    Calcium 8.1 (L) 8.6 - 10.5 mg/dL    eGFR Non African Amer 114 >60 mL/min/1.73    BUN/Creatinine Ratio 32.8 (H) 7.0 - 25.0    Anion Gap 8.0 5.0 - 15.0 mmol/L   CBC Auto Differential    Collection Time: 12/08/20  5:41 AM    Specimen: Blood   Result Value Ref Range    WBC 19.70 (H) 3.40 - 10.80 10*3/mm3    RBC 3.47 (L) 4.14 - 5.80 10*6/mm3    Hemoglobin 10.0 (L) 13.0 - 17.7 g/dL    Hematocrit 30.1 (L) 37.5 - 51.0 %    MCV 86.7 79.0 - 97.0 fL    MCH 28.7 26.6 - 33.0 pg    MCHC 33.1 31.5 - 35.7 g/dL    RDW 14.0 12.3 - 15.4 %    RDW-SD 42.4 37.0 - 54.0 fl    MPV 9.2 6.0 - 12.0 fL    Platelets 116 (L) 140 - 450 10*3/mm3   Scan Slide    Collection Time: 12/08/20  5:41 AM    Specimen: Blood   Result Value Ref Range    Scan Slide     Manual Differential    Collection Time: 12/08/20  5:41 AM    Specimen: Blood   Result Value Ref Range    Neutrophil % 75.0 42.7 - 76.0 %    Lymphocyte % 8.0 (L) 19.6 - 45.3 %    Monocyte % 7.0 5.0 - 12.0 %    Bands %  8.0 (H) 0.0 - 5.0 %    Metamyelocyte % 2.0 (H) 0.0 - 0.0 %    Neutrophils Absolute 16.35 (H) 1.70 - 7.00 10*3/mm3    Lymphocytes Absolute 1.58 0.70 - 3.10 10*3/mm3    Monocytes Absolute 1.38 (H) 0.10 - 0.90 10*3/mm3    Anisocytosis Slight/1+ None Seen    Vacuolated Neutrophils Slight/1+ None Seen    Platelet Morphology Normal Normal   Respiratory Culture - Wash, Lung, L    Collection Time: 12/08/20  7:33 AM    Specimen: Lung, L; Wash   Result  Value Ref Range    Gram Stain Rare (1+) WBCs per low power field     Gram Stain Rare (1+) Epithelial cells per low power field     Gram Stain Rare (1+) Gram positive cocci         Imaging:  XR Chest 1 View  Narrative: DATE OF EXAM:  12/17/2020. 4:48 PM.     PROCEDURE:  XR CHEST 1 VW-     INDICATIONS:  Thoracentesis, Right; R53.1-Weakness; R63.3-Feeding difficulties;  J44.9-Chronic obstructive pulmonary disease, unspecified     COMPARISON:  12/02/2020     TECHNIQUE:   Single radiographic view of the chest was obtained.     FINDINGS:  There is a left subclavian Obnsyd-v-Cvlq catheter which appears  unchanged. There has been near complete resolution of right pleural  effusion status post thoracentesis. No post thoracentesis pneumothorax  is identified. There are some residual linear opacity in the right lung  base suggesting residual atelectasis. Bulky right hilar adenopathy is  noted, known finding. There are mixed interstitial and alveolar  opacities in the perihilar regions suggesting edema or atypical  infection pattern. Heart size and mediastinal contour appear unchanged.  Aortic vascular calcification is present.     Impression:    1. No evidence of postthoracentesis pneumothorax. Near complete  resolution of right pleural effusion status post thoracentesis.  2. Mixed interstitial and alveolar opacities in the perihilar regions  bilaterally suggesting atypical infection pattern or edema.  3. Bulky right hilar adenopathy.     Electronically Signed By-Carlos Flores MD On:12/7/2020 4:50 PM  This report was finalized on [id number] by  Carlos Flores MD.  US Thoracentesis  Narrative:    DATE OF EXAM:   12/7/2020 4:20 PM     PROCEDURE:   US THORACENTESIS-     INDICATIONS:   Shortness of breath, large right pleural effusion. R53.1-Weakness;  R63.3-Feeding difficulties; J44.9-Chronic obstructive pulmonary disease,  unspecified     COMPARISON:  CT 12/06/2020     Description procedure: Informed consent was obtained.  Patient's  medications were reviewed. The patient was placed in upright seated  position and ultrasound evaluation performed over the right posterior  thorax which demonstrates a large amount of pleural fluid. Area  overlying the posterior sulcus was marked and the area prepped and  draped sterilely. Lidocaine was used for local anesthesia. Access was  obtained in and over the rib fashion using a 4 Turkish Yueh needle. 1.1 L  of cloudy dark ramses fluid were then aspirated. The patient tolerated  the procedure well with no immediate post procedure complication. A  sterile dressing was applied.     Impression: IMPRESSION :   Ultrasound-guided large volume diagnostic and therapeutic right  thoracentesis procedure. 1.1 L of fluid obtained.     Electronically Signed By-Carlos Flores MD On:12/7/2020 4:47 PM  This report was finalized on [id number] by  Carlos Flores MD.       ASSESSMENT:  Progressive dyspnea with hypoxic respiratory insufficiency  Pleural effusion   right lower lobe pneumonia   COPD   moderate size right pleural effusion   lung cancer undergoing chemotherapy   leukopenia and thrombocytopenia   Afib    Plan:  Patient status post bronchoscopy and thoracentesis feeling significantly better is going to continue with his oncologist  Follow-up blood cultures for the antibiotic follow-up as outpatient  Bronchodilator  Inhaled corticosteroids  Electrolytes/ glycemic control  DVT and GI prophylaxis.    Total Critical care time in direct medical management (   ) minutes  Anup Daniels MD. D, ABSM.  Anup Daniels MD   12/8/2020  17:08 EST

## 2020-12-08 NOTE — BRIEF OP NOTE
BRONCHOSCOPY  Progress Note    Shay Hernandez  12/8/2020    Pre-op Diagnosis:   Chronic obstructive pulmonary disease, unspecified COPD type (CMS/HCC) [J44.9]       Post-Op Diagnosis Codes:     * Chronic obstructive pulmonary disease, unspecified COPD type (CMS/HCC) [J44.9]    Procedure/CPT® Codes:        Procedure(s):  BRONCHOSCOPY WITH BRONCHIAL WASHING    Surgeon(s):  Anup Daniels MD    Anesthesia: Monitored Anesthesia Care    Staff:   Endo Technician: Payton San  Endo Nurse: Gissel Carey RN         Estimated Blood Loss: none    Urine Voided: * No values recorded between 12/8/2020  7:01 AM and 12/8/2020  8:17 AM *    Specimens:                Specimens     ID Source Type Tests Collected By Collected At Frozen?      A Lung, L Wash · NON-GYNECOLOGIC CYTOLOGY  · FUNGAL CULTURE  · RESPIRATORY CULTURE   Anup Daniels MD 12/8/20 0733 No     Description: bilateral lung washing    This specimen was not marked as sent.                Drains:   Gastrostomy/Enterostomy Percutaneous endoscopic gastrostomy (PEG) 1 20 Fr. LUQ (Active)   Surrounding Skin Dry;Intact 12/07/20 1605   Securement sutured to abdomen 12/06/20 0314   Drain Status Other (Comment) 11/30/20 0400   Drainage Appearance None 12/05/20 1600   Site Description Healing 12/06/20 0314   Dressing Status Clean;Dry;Intact 12/07/20 1605   Dressing Type Split gauze 12/07/20 1605   Tube Feeding Frequency Continuous 12/07/20 1605   Tube Feeding Product Jevity 1.5 donnell 12/07/20 0315   Tube Feeding Strength Full strength 12/07/20 1605   Tube Feeding Method Continuous per pump 12/07/20 0315   Tube Feeding Rate (mL) 70 mL 12/07/20 0315   Tube Feeding Bag Changed Yes 12/07/20 1605   Tube Feeding Residual (mL) 10 mL 12/07/20 0315   Tube Feeding Residual Returned (mL) 10 mL 12/07/20 0315   Feeding Tube Flushed With Tap water 12/07/20 0315   Flush/ Irrigation Intake (mL) 60 12/07/20 0315   Tube Feeding Intake (mL) 840 12/07/20 0315       External Urinary Catheter  (Active)   Site Assessment Clean;Skin intact 12/06/20 0730   Application/Removal skin care provided 12/05/20 2332   Collection Container Standard drainage bag 12/06/20 1930   Securement Method Securing device 12/06/20 1930   Output (mL) 500 mL 12/06/20 1644       Findings: Mucous plugs    Complications: None    The patient is a 64 y.o. with abnormal chest x-ray.  The risks, benefits, complications, treatment options and expected outcomes were discussed with the family and informed consent was obtained. The possibilities of reaction to medication, pulmonary aspiration, pneumothorax, bleeding, respiratory failure and failure to diagnose a condition and creating a complication requiring transfusion or operation were discussed with the patient who freely signed the consent.      Description of Procedure:  After the induction of topical nasopharyngeal anesthesia, the bronchoscope was passed through the oropharynx . The vocal cords were visualized and 2% lidocaine  was topically placed onto the cords and below in the bronchus.  Careful inspection of the tracheal lumen was accomplished.  Significant amount of mucous very sick in the oropharynx area and around the vocal cord    An entire bronchial exam was performed including the right and left bronchi with the following findings:     Endobronchial findings: Large amount of thick mucus trachea right mainstem left mainstem more prominent in the left lower lobe we suctioned and we have to apply Mucomyst to be able to make it loose and be able to suction it and we have to flush the bronchoscope few times  Trachea: Normal  Ida: Normal shape  Right upper lobe bronchus: nicely open no lesions  Right midlelobe bronchus:nicely open no lesions  Right lower lobe bronchus: nicely open no lesions  Left main bronchus: nicely open no lesions  Left upper lobe bronchus: nicely open no lesions  Left lower lobe bronchus: nicely open no lesions    The Patient tolerated the procedure well.         Anup Daniels MD     Date: 12/8/2020  Time: 14:56 EST

## 2020-12-08 NOTE — DISCHARGE INSTRUCTIONS
Do not drink alcohol, drive, operate any heavy machinery or power tools, or make any important/legal decisions for the next 24 hours.    Call you doctor immediately if you experience severe chest pain, shortness of breath, bleeding or coughing up blood, or fever over 101 F.    Diet: Nothing by mouth until      After a bronchoscopy, you may experience a scratchy throat. This will gradually get better. You may gargle with warm salt water for this after the time noted above is over.     A responsible adult should stay with you and you should rest quietly for the rest of the day. Follow up with MD as instructed.

## 2020-12-09 NOTE — DISCHARGE PLACEMENT REQUEST
"Shay Monteiro (80 y.o. Male)     Date of Birth Social Security Number Address Home Phone MRN    1940  [address] [facility] [place] [address] [phone] [id number]    Nondenominational Marital Status          Rastafarian        Admission Date Admission Type Admitting Provider Attending Provider Department, Room/Bed    20 Elective Justino Youssef DO  [facility], 1108/1    Discharge Date Discharge Disposition Discharge Destination        2020 Home-Health Care Svc              Attending Provider: (none)   Allergies: No Known Allergies    Isolation: None   Infection: None   Code Status: Prior    Ht: 188 cm (74\")   Wt: 78 kg (171 lb 15.3 oz)    Admission Cmt: None   Principal Problem: Feeding difficulties [R63.3] More...                 Active Insurance as of 2020     Primary Coverage     Payor Plan Insurance Group Employer/Plan Group    HUMANA MEDICARE REPLACEMENT HUMANA MEDICARE REPLACEMENT [id number]     Payor Plan Address Payor Plan Phone Number Payor Plan Fax Number Effective Dates    [address] [address] [phone]  2019 - None Entered    [facility] [address]       Subscriber Name Subscriber Birth Date Member ID       SHAY MONTEIRO 1940 [id number]                 Emergency Contacts      (Rel.) Home Phone Work Phone Mobile Phone    Catrina oMnteiro (Spouse) [phone] -- [phone]    Eliane Monteiro (Daughter) -- -- [phone]        [facility]  [address] [facility] [place] [address]  Dept. Phone:  [phone]  Dept. Fax:  [phone] Date Ordered: Dec 9, 2020         Patient:  Shay Monteiro MRN:  [id number]   [address] [facility]  [place] [address] :  1940  SSN:    Phone: [phone] Sex:  M     Weight: 78 kg (171 lb 15.3 oz)         Ht Readings from Last 1 Encounters:   20 188 cm (74\")         Miscellaneous DME   (Order ID: [id number])    Diagnosis:  Chronic obstructive pulmonary disease, unspecified COPD " "type (CMS/HCC) (J44.9 [ICD-10-CM] 496 [ICD-9-CM])  Weakness (R53.1 [ICD-10-CM] 780.79 [ICD-9-CM])  Lung mass (R91.8 [ICD-10-CM] 786.6 [ICD-9-CM])  Liver mass (R16.0 [ICD-10-CM] 573.8 [ICD-9-CM])   Quantity:  1     Type of DME: overbed tray table  Length of Need (99 Months = Lifetime): 99 Months = Lifetime        Authorizing Provider's Phone: [phone]   Verbal Order Mode: Telephone with readback   Authorizing Provider: Justino Youssef DO  Authorizing Provider's NPI: [id number]     Order Entered By: Emma Lujan RN 2020  9:55 AM     Electronically signed by:        [address] [place] [address]  Dept. Phone:  [phone]  Dept. Fax:  [phone] Date Ordered: Dec 9, 2020         Patient:  Shay Hernandez MRN:  [id number]   [address] [facility] [place] [address] :  1940  SSN:    Phone: [phone] Sex:  M     Weight: 78 kg (171 lb 15.3 oz)         Ht Readings from Last 1 Encounters:   20 188 cm (74\")         Miscellaneous DME   (Order ID: [id number])    Diagnosis:  Chronic obstructive pulmonary disease, unspecified COPD type (CMS/HCC) (J44.9 [ICD-10-CM] 496 [ICD-9-CM])  Weakness (R53.1 [ICD-10-CM] 780.79 [ICD-9-CM])  Lung mass (R91.8 [ICD-10-CM] 786.6 [ICD-9-CM])  Liver mass (R16.0 [ICD-10-CM] 573.8 [ICD-9-CM])   Quantity:  1     Type of DME: Shirley lift  Length of Need (99 Months = Lifetime): 99 Months = Lifetime        Authorizing Provider's Phone: [phone]   Verbal Order Mode: Telephone with readback   Authorizing Provider: Justino Youssef DO  Authorizing Provider's NPI: [id number]     Order Entered By: Emma Lujan RN 2020  9:55 AM     Electronically signed by:                      "

## 2020-12-09 NOTE — OUTREACH NOTE
Prep Survey      Responses   [facility] facility patient discharged from?  [facility]   Is LACE score < 7 ?  No   Eligibility  Readm Mgmt   Discharge diagnosis  Feeding difficulty, thrombocytopenia [s/p PEG placement 11/28]   Does the patient have one of the following disease processes/diagnoses(primary or secondary)?  General Surgery   Does the patient have Home health ordered?  Yes   What [facility] the [facility]   Is there a DME ordered?  No   Comments regarding appointments  Needs f/u scheduled   Medication alerts for this patient  stop eliquis   Prep survey completed?  Yes          Liv Lujan RN

## 2020-12-10 NOTE — TELEPHONE ENCOUNTER
Summer with [facility] called to review the patient's medications because he just got out of the hospital and what's on his med list does not match up with his list at home. We went through the list together and he had quite a few new prescriptions that hadn't been picked up since he got out of the hospital. I told her what those were and that they were at [facility] in [place]. She questioned this script for metoprolol 50mg because he had been on 25mg daily and now they've increased his dose but his blood pressure is running quite low. I told her it was ok to hold it when it's low and have him discuss it at his next appt. He also is supposed to start in reglan but had also been on pepcid and protonix so she didn't know if he was supposed to continue taking all three. I told her to just have him take the reglan and hold the other two.     She was also asking about the goal of his treatment because she felt like he may need a referral to hospice. She said he had decided to stop chemo and just continue radiation. I told her that was fine if that's what the patient wanted and he and his family can discuss it further with Dr. Daley at his appt next week.

## 2020-12-18 NOTE — OUTREACH NOTE
General Surgery Week 2 Survey      Responses   [facility] patient discharged from?  [facility]   Does the patient have one of the following disease processes/diagnoses(primary or secondary)?  General Surgery   Week 2 attempt successful?  Yes   Revoke  Patient           Maurilio Almaraz RN

## 2020-12-29 ENCOUNTER — APPOINTMENT (OUTPATIENT)
Dept: ONCOLOGY | Facility: HOSPITAL | Age: 80
End: 2020-12-29

## 2021-01-05 LAB — FUNGUS WND CULT: NORMAL

## 2022-09-08 NOTE — THERAPY RE-EVALUATION
Acute Care - Speech Language Pathology   Swallow Re-Evaluation [facility]     Patient Name: Shay Hernandez  : 1940  MRN: [id number]  Today's Date: 12/3/2020               Admit Date: 2020    Visit Dx:     ICD-10-CM ICD-9-CM   1. Feeding difficulties  R63.3 783.3     Patient Active Problem List   Diagnosis   • COVID-19 ruled out   • COPD (chronic obstructive pulmonary disease) (CMS/HCC)   • Weight loss   • Weakness   • Chest pain   • Cough   • Shortness of breath   • Lung mass   • Liver mass   • Atrial fibrillation (CMS/HCC)   • Adenocarcinoma, lung, unspecified laterality (CMS/HCC)   • Encounter for fitting and adjustment of vascular catheter   • Thrombocytopenia (CMS/HCC)   • Feeding difficulties   • Moderate malnutrition (CMS/HCC)     Past Medical History:   Diagnosis Date   • Adenocarcinoma, lung, unspecified laterality (CMS/HCC) 10/27/2020   • Atrial fibrillation (CMS/HCC)    • COPD (chronic obstructive pulmonary disease) (CMS/HCC)      Past Surgical History:   Procedure Laterality Date   • APPENDECTOMY     • BRONCHOSCOPY N/A 10/27/2020    Procedure: BRONCHOSCOPY WITH BRONCHOALVEOLAR LAVAGE AND ENDOBRONCHIAL ULTRASOUND WITH FINE NEEDLE ASPIRATION X2 AREAS;  Surgeon: Johnny Waldrop MD;  Location: [facility] ENDOSCOPY;  Service: Pulmonary;  Laterality: N/A;  POST:    • ENDOSCOPY W/ PEG TUBE PLACEMENT N/A 2020    Procedure: ESOPHAGOGASTRODUODENOSCOPY WITH 20F PERCUTANEOUS ENDOSCOPIC GASTROSTOMY TUBE INSERTION WITH ANESTHESIA;  Surgeon: Lizeth Nance MD;  Location: [facility] ENDOSCOPY;  Service: Gastroenterology;  Laterality: N/A;  ulcerative esophagitis, hiatal hernia, PEG placement   • VENOUS ACCESS DEVICE (PORT) INSERTION N/A 2020    Procedure: INSERTION VENOUS ACCESS DEVICE, LEFT SUBCLAVIAN UNDER FLOUROSCOPIC GUIDANCE ;  Surgeon: Jan Richards MD;  Location: [facility] MAIN OR;  Service: Cardiothoracic;  Laterality: N/A;        SWALLOW EVALUATION (last 72 hours)      SLP Adult Swallow  Dr. Adames can you order Jardiance 25 mg instead  Of 10mg? Insurance will not cover 2.5 tablets a day of the 10 mg tablet. Please advise. Thank you    Evaluation     Row Name 12/03/20 1300          Document Type  re-evaluation  -          Patient Profile Reviewed  yes  -    Pertinent History Of Current Problem  Pt was made NPO overnight by RN d/t reported difficulty with nystatin mouth rinse. Pt & family reports minimal intake of PO diet yesterday. Pt requesting ice chips & was seen for re-evaluation this date.  He has been receiving PEG feeds  -    Current Method of Nutrition  NPO;gastrostomy feedings  -          Additional Documentation  Pain Scale: Word Pre/Post-Treatment (Group)  -          Pain: Word Scale, Pretreatment  0 - no pain  -    Posttreatment Pain Rating  0 - no pain  -                Oral Motor, Comment  Pt without dentures placed during exam. Dentures found in cup, pt requested these be cleaned & SLP placed denture cleaning tablet.   -          Respiratory Support Currently in Use  high-flow nasal cannula 15L- 02 requirments have increased since initial eval  -    Consistencies Trialed  thin liquids  -    Pre SpO2 (%)  --    Post SpO2 (%)  --          Oral Prep Phase  WFL  -    Oral Transit  WFL  -    Oral Residue  WFL  -    Pharyngeal Phase  --    Esophageal Phase  suspected esophageal impairment  -    Clinical Swallow Evaluation Summary  Pt was amenable only to ice chips and water, declining all other trials (puree, solids) for re-eval. Discomfort reported with HOB elevation but did tolerate for duration of exam. Significantly delayed cough (~1 minute following swallow) appreciated x2 which was accompanied by belching. Due to this in conjunction with current medical comorbidities & esophageal mets as well as risk of refeeding syndrome, an esophageal dysphagia is suspected versus any acute pharyngeal involvement. Recommend full liquid diet at this time given limited PO accepted for re-evaluation. Recommend pt continue with TF as primary source of nutrition & full liquids for pleasure feeds as monitored by medical  management team including MDs & RD.  Pt must be up at strict 90 degrees during & for 30 minutes following all PO. Will f/u to establish diet tolerance as able with further recs as appropriate.   -          Esophageal Phase Concerns  belching delayed coughing, esophageal mets  -    Belching  thin  -                Therapy Frequency (Swallow)  PRN  -MC    Predicted Duration Therapy Intervention (Days)  --    SLP Diet Recommendation  full liquid diet pleasure feeds  -    Recommended Diagnostics  reassess via clinical swallow evaluation as amenable  -    Recommended Precautions and Strategies  upright posture during/after eating;reflux precautions  -    Oral Care Recommendations  Oral Care before breakfast, after meals and PRN  -MC    SLP Rec. for Method of Medication Administration  meds via alternate route via PEG  -    Monitor for Signs of Aspiration  yes;notify SLP if any concerns;cough  -          Oral Nutrition/Hydration Goal Selection (SLP)  oral nutrition/hydration, SLP goal 1;oral nutrition/hydration, SLP goal 2;oral nutrition/hydration, SLP goal (free text)  -          Oral Nutrition/Hydration Goal 1, SLP  Pt will tolerate least restrictive diet with no s/s aspiration clinically  -    Time Frame (Oral Nutrition/Hydration Goal 1, SLP)  by discharge  -    Barriers (Oral Nutrition/Hydration Goal 1, SLP)  See above. Diet changed this date to full liquids following re-evaluation  -    Progress/Outcomes (Oral Nutrition/Hydration Goal 1, SLP)  medical status inhibited participation;progress slower than expected  -          Oral Nutrition/Hydration Goal 2, SLP  Pt will verbalize/demonstrate understanding of safe swallow positioning & aspiration/reflux precautions  -    Time Frame (Oral Nutrition/Hydration Goal 2, SLP)  by discharge  -          Oral Nutrition/Hydration Goal, SLP  Pt will participate in diagnostic PO assessment via meal or snack to establish diet tolerance & identify any  further skilled needs.   -    Time Frame (Oral Nutrition/Hydration Goal, SLP)  2 days;3 days;1 day  -    Barriers (Oral Nutrition/Hydration Goal, SLP)  Pt accepted only liquids during re-evaluation  -      User Key  (r) = Recorded By, (t) = Taken By, (c) = Cosigned By    Initials Name Effective Dates     Kirti Jones SLP 03/01/19 -           Patient was not wearing a face mask during this therapy encounter. Therapist used appropriate personal protective equipment including mask, eye protection and gloves.  Mask used was standard procedure mask. Appropriate PPE was worn during the entire therapy session. Hand hygiene was completed before and after therapy session. Patient is not in enhanced droplet precautions.         EDUCATION  The patient, patients s/o, and RN have been educated in the following areas:   Modified Diet Instruction, ST EDUCATION: Risks of aspiration, ST goals/POC, Safe swallow positioning and Reflux precautions      SLP Recommendation and Plan     SLP Diet Recommendation: full liquid diet(pleasure feeds)  Recommended Precautions and Strategies: upright posture during/after eating, reflux precautions  SLP Rec. for Method of Medication Administration: meds via alternate route(via PEG)     Monitor for Signs of Aspiration: yes, notify SLP if any concerns, cough  Recommended Diagnostics: reassess via clinical swallow evaluation(as amenable)           Therapy Frequency (Swallow): PRN          Plan of Care Reviewed With: patient, daughter           Time Calculation:                ALEXUS Corral  12/3/2020

## (undated) DEVICE — NDL HYPO PRECISIONGLIDE/REG 18G 1IN PNK

## (undated) DEVICE — PRESSURE TUBING: Brand: TRUWAVE

## (undated) DEVICE — PRESSURE MONITORING ACCESSORY: Brand: TRUWAVE

## (undated) DEVICE — SYR LL TP 10ML STRL

## (undated) DEVICE — 3M™ STERI-STRIP™ REINFORCED ADHESIVE SKIN CLOSURES, R1547, 1/2 IN X 4 IN (12 MM X 100 MM), 6 STRIPS/ENVELOPE: Brand: 3M™ STERI-STRIP™

## (undated) DEVICE — PK MINOR LAPAROTOMY 50

## (undated) DEVICE — Device: Brand: SINGLE USE ASPIRATION NEEDLE NA-U401SX

## (undated) DEVICE — SOL IRRIG H2O 1000ML STRL

## (undated) DEVICE — CUFF SCD HEMOFORCE SEQ CALF STD MD

## (undated) DEVICE — BAPTIST FLOYD BRONCHOSCOPY: Brand: MEDLINE INDUSTRIES, INC.

## (undated) DEVICE — Device: Brand: BALLOON

## (undated) DEVICE — C-ARM: Brand: UNBRANDED

## (undated) DEVICE — GAUZE,SPONGE,4"X4",16PLY,XRAY,STRL,LF: Brand: MEDLINE

## (undated) DEVICE — Device

## (undated) DEVICE — DRSNG WND BORDR/ADHS NONADHR/GZ LF 4X4IN STRL

## (undated) DEVICE — SUT VIC 3/0 SH 27IN J416H

## (undated) DEVICE — GLV SURG SIGNATURE ESSENTIAL PF LTX SZ8.5

## (undated) DEVICE — ADHS LIQ MASTISOL 2/3ML

## (undated) DEVICE — ST NDL BRONCH ASP VIZISHOT 2 FLX 19GA

## (undated) DEVICE — PERCUTANEOUS ENDOSCOPIC GASTROSTOMY KIT: Brand: ENDOVIVE SAFETY PEG KIT

## (undated) DEVICE — DRAPE,CHEST,FENES,15X10,STERIL: Brand: MEDLINE

## (undated) DEVICE — KT SURG TURNOVER 050